# Patient Record
Sex: MALE | Race: BLACK OR AFRICAN AMERICAN | NOT HISPANIC OR LATINO | Employment: OTHER | URBAN - NONMETROPOLITAN AREA
[De-identification: names, ages, dates, MRNs, and addresses within clinical notes are randomized per-mention and may not be internally consistent; named-entity substitution may affect disease eponyms.]

---

## 2018-01-17 ENCOUNTER — APPOINTMENT (OUTPATIENT)
Dept: LAB | Facility: HOSPITAL | Age: 58
End: 2018-01-17

## 2018-01-17 ENCOUNTER — OFFICE VISIT (OUTPATIENT)
Dept: FAMILY MEDICINE CLINIC | Facility: CLINIC | Age: 58
End: 2018-01-17

## 2018-01-17 VITALS
SYSTOLIC BLOOD PRESSURE: 150 MMHG | HEART RATE: 83 BPM | HEIGHT: 73 IN | OXYGEN SATURATION: 98 % | DIASTOLIC BLOOD PRESSURE: 84 MMHG | WEIGHT: 184 LBS | BODY MASS INDEX: 24.39 KG/M2

## 2018-01-17 DIAGNOSIS — M17.0 PRIMARY OSTEOARTHRITIS OF BOTH KNEES: ICD-10-CM

## 2018-01-17 DIAGNOSIS — E78.5 HYPERLIPIDEMIA, UNSPECIFIED HYPERLIPIDEMIA TYPE: ICD-10-CM

## 2018-01-17 DIAGNOSIS — K21.9 GASTROESOPHAGEAL REFLUX DISEASE, ESOPHAGITIS PRESENCE NOT SPECIFIED: ICD-10-CM

## 2018-01-17 DIAGNOSIS — Z02.83 ENCOUNTER FOR DRUG SCREENING: ICD-10-CM

## 2018-01-17 DIAGNOSIS — F17.200 TOBACCO DEPENDENCE SYNDROME: Primary | ICD-10-CM

## 2018-01-17 DIAGNOSIS — I10 ESSENTIAL HYPERTENSION: ICD-10-CM

## 2018-01-17 LAB
ALBUMIN SERPL-MCNC: 4.5 G/DL (ref 3.4–4.8)
ALBUMIN/GLOB SERPL: 1.3 G/DL (ref 1.1–1.8)
ALP SERPL-CCNC: 77 U/L (ref 38–126)
ALT SERPL W P-5'-P-CCNC: 32 U/L (ref 21–72)
ANION GAP SERPL CALCULATED.3IONS-SCNC: 9 MMOL/L (ref 5–15)
ARTICHOKE IGE QN: 109 MG/DL (ref 1–129)
AST SERPL-CCNC: 30 U/L (ref 17–59)
BILIRUB SERPL-MCNC: 0.2 MG/DL (ref 0.2–1.3)
BUN BLD-MCNC: 16 MG/DL (ref 7–21)
BUN/CREAT SERPL: 15.7 (ref 7–25)
CALCIUM SPEC-SCNC: 9.6 MG/DL (ref 8.4–10.2)
CHLORIDE SERPL-SCNC: 104 MMOL/L (ref 95–110)
CHOLEST SERPL-MCNC: 204 MG/DL (ref 0–199)
CO2 SERPL-SCNC: 25 MMOL/L (ref 22–31)
CREAT BLD-MCNC: 1.02 MG/DL (ref 0.7–1.3)
DEPRECATED RDW RBC AUTO: 48 FL (ref 35.1–43.9)
ERYTHROCYTE [DISTWIDTH] IN BLOOD BY AUTOMATED COUNT: 15.1 % (ref 11.5–14.5)
GFR SERPL CREATININE-BSD FRML MDRD: 91 ML/MIN/1.73 (ref 56–130)
GLOBULIN UR ELPH-MCNC: 3.4 GM/DL (ref 2.3–3.5)
GLUCOSE BLD-MCNC: 94 MG/DL (ref 60–100)
HCT VFR BLD AUTO: 43.7 % (ref 39–49)
HDLC SERPL-MCNC: 60 MG/DL (ref 60–200)
HGB BLD-MCNC: 14.3 G/DL (ref 13.7–17.3)
LDLC/HDLC SERPL: 1.96 {RATIO} (ref 0–3.55)
MCH RBC QN AUTO: 28.5 PG (ref 26.5–34)
MCHC RBC AUTO-ENTMCNC: 32.7 G/DL (ref 31.5–36.3)
MCV RBC AUTO: 87.1 FL (ref 80–98)
PLATELET # BLD AUTO: 259 10*3/MM3 (ref 150–450)
PMV BLD AUTO: 10.4 FL (ref 8–12)
POTASSIUM BLD-SCNC: 4.1 MMOL/L (ref 3.5–5.1)
PROT SERPL-MCNC: 7.9 G/DL (ref 6.3–8.6)
RBC # BLD AUTO: 5.02 10*6/MM3 (ref 4.37–5.74)
SODIUM BLD-SCNC: 138 MMOL/L (ref 137–145)
TRIGL SERPL-MCNC: 131 MG/DL (ref 20–199)
WBC NRBC COR # BLD: 6.31 10*3/MM3 (ref 3.2–9.8)

## 2018-01-17 PROCEDURE — 80307 DRUG TEST PRSMV CHEM ANLYZR: CPT | Performed by: FAMILY MEDICINE

## 2018-01-17 PROCEDURE — 80061 LIPID PANEL: CPT | Performed by: FAMILY MEDICINE

## 2018-01-17 PROCEDURE — 80053 COMPREHEN METABOLIC PANEL: CPT | Performed by: FAMILY MEDICINE

## 2018-01-17 PROCEDURE — G0481 DRUG TEST DEF 8-14 CLASSES: HCPCS | Performed by: FAMILY MEDICINE

## 2018-01-17 PROCEDURE — 85027 COMPLETE CBC AUTOMATED: CPT | Performed by: FAMILY MEDICINE

## 2018-01-17 PROCEDURE — 99213 OFFICE O/P EST LOW 20 MIN: CPT | Performed by: FAMILY MEDICINE

## 2018-01-17 PROCEDURE — 36415 COLL VENOUS BLD VENIPUNCTURE: CPT | Performed by: FAMILY MEDICINE

## 2018-01-17 RX ORDER — ALBUTEROL SULFATE 90 UG/1
2 AEROSOL, METERED RESPIRATORY (INHALATION) EVERY 4 HOURS PRN
Qty: 6.7 G | Refills: 2 | Status: SHIPPED | OUTPATIENT
Start: 2018-01-17 | End: 2018-07-23 | Stop reason: SDUPTHER

## 2018-01-17 RX ORDER — GABAPENTIN 300 MG/1
300 CAPSULE ORAL DAILY
Qty: 30 CAPSULE | Refills: 2 | Status: SHIPPED | OUTPATIENT
Start: 2018-01-17 | End: 2018-04-25 | Stop reason: SDUPTHER

## 2018-01-17 RX ORDER — GABAPENTIN 600 MG/1
600 TABLET ORAL DAILY
Qty: 30 TABLET | Refills: 2 | Status: SHIPPED | OUTPATIENT
Start: 2018-01-17 | End: 2018-01-17 | Stop reason: DRUGHIGH

## 2018-01-17 RX ORDER — LISINOPRIL 40 MG/1
40 TABLET ORAL DAILY
Qty: 30 TABLET | Refills: 1 | Status: SHIPPED | OUTPATIENT
Start: 2018-01-17 | End: 2018-02-26 | Stop reason: ALTCHOICE

## 2018-01-17 RX ORDER — RANITIDINE 150 MG/1
150 TABLET ORAL NIGHTLY
Qty: 30 TABLET | Refills: 2 | Status: SHIPPED | OUTPATIENT
Start: 2018-01-17 | End: 2018-04-18

## 2018-01-17 RX ORDER — NICOTINE 21 MG/24HR
1 PATCH, TRANSDERMAL 24 HOURS TRANSDERMAL EVERY 24 HOURS
Qty: 28 PATCH | Refills: 3 | Status: SHIPPED | OUTPATIENT
Start: 2018-01-17 | End: 2018-08-09

## 2018-01-17 RX ORDER — FLUTICASONE PROPIONATE 50 MCG
2 SPRAY, SUSPENSION (ML) NASAL DAILY
Qty: 9.9 ML | Refills: 2 | Status: SHIPPED | OUTPATIENT
Start: 2018-01-17 | End: 2018-05-04 | Stop reason: SDUPTHER

## 2018-01-17 NOTE — PROGRESS NOTES
Subjective   Zak Lutz is a 57 y.o. male.     History of Present Illness  Patient is a 57-year-old -American male who is presenting today to establish care.  Patient states he was previously incarcerated and is coming back to establish care with a residency after being gone for a while.  Patient would like to be back on his medications he had been on and states he has not been taking his medications as he was in care home.  Patient mentions that previously he smoked a pack per day but has cut that down to one cigarette but still would like a nicotine patch.  Patient also states previously with his physician that for his blood pressure they discuss HCTZ and lisinopril, discussed with the patient today we would just try lisinopril 40 mg tablet daily and then reassess in 2 months to see how his blood pressure responded to the medication.  Patient mentions that he has chronic arthritis in his knees and cannot chart review patient had been previously prescribed Neurontin 600 mg tablet daily.  Did indicate to the patient we could start that medication but we would start at a lower dose and titrate up as needed.  Patient also states he has a history of GERD but has been well-controlled with Zantac 150 milligrams tablet daily.  Patient states no other acute complaints at this time.  Patient is agreeable to getting a CBC, CMP, and lipid panel performed today.  The following portions of the patient's history were reviewed and updated as appropriate: allergies, current medications, past family history, past medical history, past social history, past surgical history and problem list.    Review of Systems      Constitutional: no weight loss, fever, chills, weakness  HEENT: no visual loss, blurred vision, double vision, yellow scalarea  Skin: no rash, no discoloration   CVS: no chest pain, palpitations  Chest: no shortness of air, cough, dyspnea  GI: no abdominal pain, blood in stool, no nausea, vomiting,  "diarrhea  : no burning in urination, change in odor, no change in frequency  Neuro: no headache, dizziness, syncope, paralysis, ataxia, numbness  Musculoskeletal: no muscle, back pain, joint pain, stiffness  Lymphatics: no enlarged nodes  Endo: no reports of sweating, cold or heat intolerance, no polyuria      Objective   Physical Exam  /84  Pulse 83  Ht 185.4 cm (73\")  Wt 83.5 kg (184 lb)  SpO2 98%  BMI 24.28 kg/m2      GENERAL APPEARANCE: Well developed, well nourished, in no acute distress.  SKIN: Inspection of the skin reveals no rashes, ulcerations or petechiae.  HEENT: The sclerae were anicteric and conjunctivae were pink and moist. Extraocular movements were intact and pupils were equal, round, and reactive to light with normal accommodation. External inspection of the ears and nose showed no scars, lesions, or masses. Lips, teeth, and gums showed normal mucosa.   LUNGS: Auscultation of the lungs revealed normal breath sounds without any other adventitious sounds or rubs.  CARDIOVASCULAR: There was a regular rate and rhythm without any murmurs, gallops, rubs.   MUSCULOSKELETAL: Gait was normal. There was no tenderness or effusions noted. Muscle strength and tone were normal.  EXTREMITIES: No cyanosis, clubbing or edema.  NEUROLOGIC: Alert and oriented x 3. Normal affect. Gait was normal. . Sensation to touch was normal. CN2-12 grossly intact    Assessment/Plan   Zak was seen today for establish care.    Diagnoses and all orders for this visit:    Tobacco dependence syndrome  -     CBC No Differential  -     Comprehensive Metabolic Panel    Primary osteoarthritis of both knees    Gastroesophageal reflux disease, esophagitis presence not specified    Essential hypertension  -     CBC No Differential  -     Comprehensive Metabolic Panel    Hyperlipidemia, unspecified hyperlipidemia type  -     Lipid Panel    Encounter for drug screening  -     Cancel: Urine Drug Screen - Urine, Clean Catch  -    "  ToxASSURE Select 13 (MW) - Urine, Clean Catch    Other orders  -     nicotine (NICODERM CQ) 21 MG/24HR patch; Place 1 patch on the skin Daily.  -     lisinopril (PRINIVIL,ZESTRIL) 40 MG tablet; Take 1 tablet by mouth Daily.  -     fluticasone (FLONASE) 50 MCG/ACT nasal spray; 2 sprays into each nostril Daily.  -     albuterol (VENTOLIN HFA) 108 (90 Base) MCG/ACT inhaler; Inhale 2 puffs Every 4 (Four) Hours As Needed for Wheezing.  -     raNITIdine (ZANTAC) 150 MG tablet; Take 1 tablet by mouth Every Night.  -     Discontinue: gabapentin (NEURONTIN) 600 MG tablet; Take 1 tablet by mouth Daily.  -     gabapentin (NEURONTIN) 300 MG capsule; Take 1 capsule by mouth Daily.               This document has been electronically signed by Nayely Cedillo MD on January 17, 2018 4:51 PM

## 2018-01-23 ENCOUNTER — OFFICE VISIT (OUTPATIENT)
Dept: ORTHOPEDIC SURGERY | Facility: CLINIC | Age: 58
End: 2018-01-23

## 2018-01-23 VITALS — HEIGHT: 73 IN | WEIGHT: 187 LBS | BODY MASS INDEX: 24.78 KG/M2

## 2018-01-23 DIAGNOSIS — M25.511 RIGHT SHOULDER PAIN, UNSPECIFIED CHRONICITY: ICD-10-CM

## 2018-01-23 DIAGNOSIS — M17.0 PRIMARY OSTEOARTHRITIS OF BOTH KNEES: Primary | ICD-10-CM

## 2018-01-23 PROCEDURE — 99214 OFFICE O/P EST MOD 30 MIN: CPT | Performed by: NURSE PRACTITIONER

## 2018-01-23 RX ORDER — MELOXICAM 15 MG/1
15 TABLET ORAL DAILY
COMMUNITY
End: 2018-02-01 | Stop reason: SDUPTHER

## 2018-01-23 NOTE — PROGRESS NOTES
Zak Lutz is a 57 y.o. male returns for     Chief Complaint   Patient presents with   • Right Knee - Pain   • Left Knee - Pain       HISTORY OF PRESENT ILLNESS: Patient is here today for bilateral knee pain. Patient states that his knee pain has gotten worse and that the left knee has been giving out on him. Both knees cause him significant pain.        CONCURRENT MEDICAL HISTORY:    Past Medical History:   Diagnosis Date   • Allergic rhinitis    • Alternating exotropia     with hypertrophic component      • Asthma    • Astigmatism    • Carpal tunnel syndrome    • Cervical radiculopathy    • Chronic bronchitis    • Chronic obstructive lung disease    • Degenerative joint disease involving multiple joints    • Essential hypertension    • Gastroesophageal reflux disease    • Hip pain    • History of colon polyps    • Hypercholesterolemia    • Hyperlipidemia    • Hypertensive left ventricular hypertrophy    • Neck pain     L trapezius strain      • Paresthesia of upper limb     left shoulder, arm, forearm, hands and fingers      • Presbyopia    • Shoulder pain    • Tobacco dependence syndrome        Allergies   Allergen Reactions   • Morphine And Related    • Tramadol          Current Outpatient Prescriptions:   •  albuterol (VENTOLIN HFA) 108 (90 Base) MCG/ACT inhaler, Inhale 2 puffs Every 4 (Four) Hours As Needed for Wheezing., Disp: 6.7 g, Rfl: 2  •  fluticasone (FLONASE) 50 MCG/ACT nasal spray, 2 sprays into each nostril Daily., Disp: 9.9 mL, Rfl: 2  •  gabapentin (NEURONTIN) 300 MG capsule, Take 1 capsule by mouth Daily., Disp: 30 capsule, Rfl: 2  •  lisinopril (PRINIVIL,ZESTRIL) 40 MG tablet, Take 1 tablet by mouth Daily., Disp: 30 tablet, Rfl: 1  •  meloxicam (MOBIC) 15 MG tablet, Take 15 mg by mouth Daily., Disp: , Rfl:   •  nicotine (NICODERM CQ) 21 MG/24HR patch, Place 1 patch on the skin Daily., Disp: 28 patch, Rfl: 3  •  raNITIdine (ZANTAC) 150 MG tablet, Take 1 tablet by mouth Every Night., Disp:  "30 tablet, Rfl: 2    Past Surgical History:   Procedure Laterality Date   • ENDOSCOPY       w/ tube 65187 (Slight erythema, distal esophagus, compatible with reflux esophagitis.)   07/26/1990    • ENDOSCOPY AND COLONOSCOPY      (Diverticulum in sigmoid colon. Internal & external hemorrhoids found.)   03/01/2012    • HIP ARTHROSCOPY      Removal of plate & screws from the left hip & left total hip arthroplasty. Status post ORIF of intertrochanteric fracture of the left hip w/ osteonecrosis of the left femoral head)   07/25/2011    • HIP SURGERY      Open reduction and intern fixation fo left posterior column acetabulum fracture.Closed treatment of the pelvic ring fracture of left superior and inferior rami.)   12/10/2015    • INJECTION OF MEDICATION  07/21/2015    Depo Medrol (Methylprednisone   • INJECTION OF MEDICATION  01/28/2014    kenalog(1)   • INJECTION OF MEDICATION  11/08/2014    toradol(2)    • OTHER SURGICAL HISTORY      Neck/chest surgery procedure (Excision of multiple scars and keloids of the neck and the upper chest measuring 15 cm x 2 cm in diameter. Plastic repair of the wound with zig-zag plasty 15 cm x 3 cm.)   09/21/2001    • SHOULDER SURGERY      Repair of rotator cuff of left shoulder. Repair of rotator cuff of left shoulder.)   07/08/2013        ROS  No fevers or chills.  No chest pain or shortness of air.  No GI or  disturbances.    PHYSICAL EXAMINATION:       Ht 185.4 cm (73\")  Wt 84.8 kg (187 lb)  BMI 24.67 kg/m2    Physical Exam   Constitutional: He is oriented to person, place, and time. Vital signs are normal. He appears well-developed and well-nourished. He is cooperative.   HENT:   Head: Normocephalic and atraumatic.   Neck: Trachea normal and phonation normal.   Pulmonary/Chest: Effort normal. No respiratory distress.   Abdominal: Soft. Normal appearance. He exhibits no distension.   Musculoskeletal:        Right knee: He exhibits no effusion.        Left knee: He exhibits no " effusion.   Neurological: He is alert and oriented to person, place, and time. GCS eye subscore is 4. GCS verbal subscore is 5. GCS motor subscore is 6.   Skin: Skin is warm, dry and intact.   Psychiatric: He has a normal mood and affect. His speech is normal and behavior is normal. Judgment and thought content normal. Cognition and memory are normal.   Vitals reviewed.      GAIT:     []  Normal  [x]  Antalgic    Assistive device: []  None  []  Walker     []  Crutches  []  Cane     []  Wheelchair  []  Stretcher    Right Knee Exam     Tenderness   The patient is experiencing tenderness in the pes anserinus and medial joint line.    Range of Motion   Extension: normal   Flexion: abnormal     Other   Erythema: absent  Scars: absent  Sensation: normal  Pulse: present  Swelling: mild  Other tests: no effusion present      Left Knee Exam     Tenderness   The patient is experiencing tenderness in the medial joint line and pes anserinus.    Range of Motion   Extension: normal   Flexion: abnormal     Other   Erythema: absent  Scars: absent  Sensation: normal  Pulse: present  Swelling: none  Effusion: no effusion present      Right Shoulder Exam     Tenderness   The patient is experiencing tenderness in the acromioclavicular joint.    Range of Motion   Active Abduction: abnormal   Forward Flexion: abnormal   External Rotation: abnormal   Internal Rotation 90 degrees: abnormal     Muscle Strength   Abduction: 4/5   Internal Rotation: 4/5   External Rotation: 4/5   Supraspinatus: 4/5     Tests   Cross Arm: positive  Drop Arm: positive  Impingement: positive    Other   Erythema: absent  Scars: absent  Sensation: normal  Pulse: present      Left Shoulder Exam   Left shoulder exam is normal.              No results found.          ASSESSMENT:    Diagnoses and all orders for this visit:    Primary osteoarthritis of both knees  -     XR Knee Bilateral AP Standing; Future  -     XR Knee 1 or 2 View Bilateral; Future  -      Miscellaneous DME  -     MRI Shoulder Right Without Contrast; Future    Right shoulder pain, unspecified chronicity  -     XR Shoulder 2+ View Right; Future  -     Miscellaneous DME  -     MRI Shoulder Right Without Contrast; Future    Other orders  -     meloxicam (MOBIC) 15 MG tablet; Take 15 mg by mouth Daily.          PLAN  Patient was seen today for both knee and shoulder pain.  We will recommend bracing with a hinged knee brace and for the shoulder will recommend an MRI to rule out a rotator cuff tear. In the meantime he can continue take his meloxicam which was refilled today for discomfort  No Follow-up on file.    Franck Andres, APRN

## 2018-01-24 LAB — CONV REPORT SUMMARY: NORMAL

## 2018-01-25 RX ORDER — MELOXICAM 15 MG/1
TABLET ORAL
Qty: 30 TABLET | Refills: 0 | OUTPATIENT
Start: 2018-01-25

## 2018-02-01 ENCOUNTER — TELEPHONE (OUTPATIENT)
Dept: ORTHOPEDIC SURGERY | Facility: CLINIC | Age: 58
End: 2018-02-01

## 2018-02-01 DIAGNOSIS — M25.511 CHRONIC RIGHT SHOULDER PAIN: Primary | ICD-10-CM

## 2018-02-01 DIAGNOSIS — G89.29 CHRONIC RIGHT SHOULDER PAIN: Primary | ICD-10-CM

## 2018-02-01 RX ORDER — MELOXICAM 15 MG/1
15 TABLET ORAL DAILY
Qty: 30 TABLET | Refills: 0 | Status: SHIPPED | OUTPATIENT
Start: 2018-02-01 | End: 2018-03-06 | Stop reason: SDUPTHER

## 2018-02-01 NOTE — TELEPHONE ENCOUNTER
----- Message from Sallie Ryan sent at 2/1/2018  8:46 AM CST -----  Franck,    Patient's insurance has denied MRI right shoulder. Patient must complete 6 weeks of conservative therapy that includes physical therapy within the last 3 months.    -Sallie

## 2018-02-07 ENCOUNTER — HOSPITAL ENCOUNTER (OUTPATIENT)
Dept: PHYSICAL THERAPY | Facility: HOSPITAL | Age: 58
Setting detail: THERAPIES SERIES
Discharge: HOME | End: 2018-02-07

## 2018-02-07 DIAGNOSIS — G89.29 CHRONIC RIGHT SHOULDER PAIN: Primary | ICD-10-CM

## 2018-02-07 DIAGNOSIS — M25.511 CHRONIC RIGHT SHOULDER PAIN: Primary | ICD-10-CM

## 2018-02-07 PROCEDURE — 97161 PT EVAL LOW COMPLEX 20 MIN: CPT | Performed by: PHYSICAL THERAPIST

## 2018-02-07 NOTE — THERAPY EVALUATION
Outpatient Physical Therapy Ortho Initial Evaluation  [facility]     Patient Name: Zak Lutz  : 1960  MRN: [id number]  Today's Date: 2018      Visit Date: 2018  Attendance:   Subjective % Improvement: N/A  Recert Date: 2018  MD appointment: TBD    Therapy Diagnosis: Chronic right shoulder pain    Patient Active Problem List   Diagnosis   • Tobacco dependence syndrome   • Hyperlipidemia   • Gastroesophageal reflux disease   • Essential hypertension   • Degenerative joint disease involving multiple joints   • Chronic obstructive lung disease   • Allergic rhinitis   • Primary osteoarthritis of both knees   • Encounter for drug screening        Past Medical History:   Diagnosis Date   • Allergic rhinitis    • Alternating exotropia     with hypertrophic component      • Asthma    • Astigmatism    • Carpal tunnel syndrome    • Cervical radiculopathy    • Chronic bronchitis    • Chronic obstructive lung disease    • Degenerative joint disease involving multiple joints    • Essential hypertension    • Gastroesophageal reflux disease    • Hip pain    • History of colon polyps    • Hypercholesterolemia    • Hyperlipidemia    • Hypertensive left ventricular hypertrophy    • Neck pain     L trapezius strain      • Paresthesia of upper limb     left shoulder, arm, forearm, hands and fingers      • Presbyopia    • Shoulder pain    • Tobacco dependence syndrome         Past Surgical History:   Procedure Laterality Date   • ENDOSCOPY       w/ tube 86729 (Slight erythema, distal esophagus, compatible with reflux esophagitis.)   1990    • ENDOSCOPY AND COLONOSCOPY      (Diverticulum in sigmoid colon. Internal & external hemorrhoids found.)   2012    • HIP ARTHROSCOPY      Removal of plate & screws from the left hip & left total hip arthroplasty. Status post ORIF of intertrochanteric fracture of the left hip w/ osteonecrosis of the left femoral head)   2011    • HIP  "SURGERY      Open reduction and intern fixation fo left posterior column acetabulum fracture.Closed treatment of the pelvic ring fracture of left superior and inferior rami.)   12/10/2015    • INJECTION OF MEDICATION  07/21/2015    Depo Medrol (Methylprednisone   • INJECTION OF MEDICATION  01/28/2014    kenalog(1)   • INJECTION OF MEDICATION  11/08/2014    toradol(2)    • OTHER SURGICAL HISTORY      Neck/chest surgery procedure (Excision of multiple scars and keloids of the neck and the upper chest measuring 15 cm x 2 cm in diameter. Plastic repair of the wound with zig-zag plasty 15 cm x 3 cm.)   09/21/2001    • SHOULDER SURGERY      Repair of rotator cuff of left shoulder. Repair of rotator cuff of left shoulder.)   07/08/2013        Visit Dx:     ICD-10-CM ICD-9-CM   1. Chronic right shoulder pain M25.511 719.41    G89.29 338.29             Patient History       02/07/18 1350          History    Chief Complaint Pain  -BB      Type of Pain Shoulder pain  -BB      Date Current Problem(s) Began --   chronic  -BB      Brief Description of Current Complaint Present today with right shoulder pain, reports pain is chronic of \"couple years\" reports it has the same symptoms of the left shoulder that had to have a RTC repair 2013, reports having numbness and tingling in right arm when lays on it. Pain in shoulder comes and goes. Lifting and straining it increases pain.   -BB      Onset Date- PT 2/7/2018  -BB      Patient/Caregiver Goals Relieve pain;Improve mobility  -BB      Patient's Rating of General Health Very good  -BB      Hand Dominance right-handed  -BB      Occupation/sports/leisure activities Disabled-working on getting disability back   -BB      What clinical tests have you had for this problem? X-ray  -BB      Pain     Pain Location Shoulder  -BB      Pain at Present 2  -BB      Pain at Best 0  -BB      Pain at Worst 10  -BB      Pain Frequency Intermittent  -BB      Pain Description Sharp;Throbbing  -BB      " What Performance Factors Make the Current Problem(s) WORSE? using the arm   -BB      What Performance Factors Make the Current Problem(s) BETTER? rest   -BB      Is your sleep disturbed? Yes  -BB      Is medication used to assist with sleep? Yes  -BB      Fall Risk Assessment    Any falls in the past year: Yes  -BB      Number of falls reported in the last 12 months 1  -BB      Factors that contributed to the fall: --   carrying items up stairs  -BB        User Key  (r) = Recorded By, (t) = Taken By, (c) = Cosigned By    Initials Name Provider Type    DANTE Cruz PT Physical Therapist                PT Ortho       02/07/18 1350    Subjective Comments    Subjective Comments See patient history   -BB    Precautions and Contraindications    Precautions Check PRN BP, Caution with Bilateral knees and left shoulder   -BB    Subjective Pain    Able to rate subjective pain? yes  -BB    Pre-Treatment Pain Level 2  -BB    Post-Treatment Pain Level 2  -BB    Posture/Observations    Posture/Observations Comments No acute distress. Antalgic gait with cane.    -BB    Special Tests/Palpation    Special Tests/Palpation Shoulder  -BB    Shoulder Girdle Palpation    Long Head of Biceps Right:;Tender;Guarded/taut  -BB    Short Head of Biceps Right:;Tender;Guarded/taut  -BB    Teres Minor Right:;Guarded/taut  -BB    Pect Minor Right:;Guarded/taut  -BB    Shoulder Girdle Palpation? Yes  -BB    Shoulder Impingement/Rotator Cuff Special Tests    Avina-Vargas Test (RC Lesion vs. Bursitis) Right:;Positive  -BB    Empty Can Test (RC Lesion) Right:;Positive  -BB    Drop Arm Test (Full Thickness RC Lesion) Right:;Negative  -BB    Hornblower Test (Teres Minor Lesion) Right:;Negative  -BB    ROM (Range of Motion)    General ROM upper extremity range of motion deficits identified  -BB    Right Shoulder    Flexion AROM Deficit 120  -BB    Extension AROM Deficit 47  -BB    ABduction AROM Deficit 105  -BB    External Rotation AROM Deficit  57  -BB    Internal Rotation AROM Deficit Thumb to glute   -BB    General UE Assessment    ROM shoulder, right: UE ROM deficit  -BB    MMT (Manual Muscle Testing)    General MMT Assessment upper extremity strength deficits identified  -BB    Right Shoulder    Flexion Gross Movement (4-/5) good minus  -BB    ABduction Gross Movement (4-/5) good minus  -BB    Int Rotation Gross Movement (4-/5) good minus  -BB    Ext Rotation Gross Movement (4-/5) good minus  -BB    Upper Extremity    Upper Ext Manual Muscle Testing right shoulder strength deficit  -BB      User Key  (r) = Recorded By, (t) = Taken By, (c) = Cosigned By    Initials Name Provider Type    DANTE Cruz, PT Physical Therapist                      Therapy Education  Education Details: Ice PRN for pain, pain is guide  Given: Symptoms/condition management  Program: New  How Provided: Verbal  Provided to: Patient  Level of Understanding: Verbalized           PT OP Goals       02/07/18 1350       PT Short Term Goals    STG Date to Achieve 02/28/18  -BB     STG 1 Independent in HEP   -BB     STG 2 AROM shoulder abduction 130 degrees or better  -BB     STG 3 AROM shoulder flexion 130 degrees or better  -BB     STG 4 Shoulder MMT grossly 5/5   -BB     Time Calculation    PT Goal Re-Cert Due Date 02/28/18  -BB       User Key  (r) = Recorded By, (t) = Taken By, (c) = Cosigned By    Initials Name Provider Type    DANTE Cruz PT Physical Therapist                PT Assessment/Plan       02/07/18 1350       PT Assessment    Functional Limitations Limitation in home management;Limitations in functional capacity and performance;Limitations in community activities  -BB     Impairments Impaired muscle endurance;Muscle strength;Pain;Poor body mechanics;Range of motion;Sensation  -BB     Assessment Comments Patient presents with chronic right shoulder pain, sensation in tact to crude tough, decreased AROM of the shoulder, decreased strength all limited to pain  today. Patient primarily has tenderness of the anterior shoulder capsule of the bicep tendon. Patient could benefit from skilled PT to improve mobility and use of arm.   -BB     Rehab Potential Good  -BB     Patient/caregiver participated in establishment of treatment plan and goals Yes  -BB     Patient would benefit from skilled therapy intervention Yes  -BB     PT Plan    PT Frequency 2x/week  -BB     Predicted Duration of Therapy Intervention (days/wks) 3-4 weeks  -BB     Planned CPT's? PT RE-EVAL: 76107;PT EVAL LOW COMPLEXITY: 57539;PT THER PROC EA 15 MIN: 18891;PT THER ACT EA 15 MIN: 34548;PT MANUAL THERAPY EA 15 MIN: 98064;PT ELECTRICAL STIM UNATTEND: ;PT ULTRASOUND EA 15 MIN: 52336;PT THER SUPP EA 15 MIN  -BB     Physical Therapy Interventions (Optional Details) gross motor skills;home exercise program;joint mobilization;manual therapy techniques;modalities;patient/family education;postural re-education;ROM (Range of Motion);strengthening;stretching  -BB     PT Plan Comments Progress ROM, strength, scapular stabilization, manual as needed, and modalities PRN. Caution with patients visits as patient may have a left knee surgery this year per patient report.   -BB       User Key  (r) = Recorded By, (t) = Taken By, (c) = Cosigned By    Initials Name Provider Type    DANTE Cruz PT Physical Therapist                  Exercises       02/07/18 1350          Subjective Comments    Subjective Comments See patient history   -BB      Subjective Pain    Able to rate subjective pain? yes  -BB      Pre-Treatment Pain Level 2  -BB      Post-Treatment Pain Level 2  -BB        User Key  (r) = Recorded By, (t) = Taken By, (c) = Cosigned By    Initials Name Provider Type    DANTE Cruz PT Physical Therapist                        Outcome Measure Options: Quick DASH  Quick DASH  Open a tight or new jar.: Mild Difficulty  Do heavy household chores (e.g., wash walls, wash floors): Severe Difficulty  Carry a  shopping bag or briefcase: Mild Difficulty  Wash your back: Severe Difficulty  Use a knife to cut food: No Difficulty  Recreational activities in which you take some force or impact through your arm, should or hand (e.g. golf, hammering, tennis, etc.): Severe Difficulty  During the past week, to what extent has your arm, shoulder, or hand problem interfered with your normal social activites with family, friends, neighbors or groups?: Moderately  During the past week, were you limited in your work or other regular daily activities as a result of your arm, shoulder or hand problem?: Slightly Limited  Arm, Shoulder, or hand pain: Moderate  Tingling (pins and needles) in your arm, shoulder, or hand: Mild  During the past week, how much difficulty have you had sleeping because of the pain in your arm, shoulder or hand?: Moderate Difficiculty  Number of Questions Answered: 11  Quick DASH Score: 43.18         Time Calculation:   Start Time: 1350  Stop Time: 1423  Time Calculation (min): 33 min  Total Timed Code Minutes- PT: 0 minute(s) (eval only per insurance )     Therapy Charges for Today     Code Description Service Date Service Provider Modifiers Qty    [id number]  PT EVAL LOW COMPLEXITY 2 2/7/2018 Cornelia Cruz, PT GP 1          PT G-Codes  Outcome Measure Options: Quick DASH         Cornelia Cruz, PT  2/7/2018

## 2018-02-13 ENCOUNTER — HOSPITAL ENCOUNTER (OUTPATIENT)
Dept: PHYSICAL THERAPY | Facility: HOSPITAL | Age: 58
Setting detail: THERAPIES SERIES
End: 2018-02-13

## 2018-02-15 ENCOUNTER — HOSPITAL ENCOUNTER (OUTPATIENT)
Dept: PHYSICAL THERAPY | Facility: HOSPITAL | Age: 58
Setting detail: THERAPIES SERIES
Discharge: HOME | End: 2018-02-15

## 2018-02-15 DIAGNOSIS — M25.511 CHRONIC RIGHT SHOULDER PAIN: Primary | ICD-10-CM

## 2018-02-15 DIAGNOSIS — G89.29 CHRONIC RIGHT SHOULDER PAIN: Primary | ICD-10-CM

## 2018-02-15 PROCEDURE — 97110 THERAPEUTIC EXERCISES: CPT | Performed by: PHYSICAL THERAPIST

## 2018-02-15 NOTE — THERAPY TREATMENT NOTE
Outpatient Physical Therapy Ortho Treatment Note  [facility]     Patient Name: Zak Lutz  : 1960  MRN: [id number]  Today's Date: 2/15/2018      Visit Date: 02/15/2018  Attendance: 2/2 of 8 approved  Subjective % Improvement: 0%  Recert Date: 2018  MD appointment: 3/1/18 Fredi for knee    Therapy Diagnosis: Chronic right shoulder pain    Visit Dx:    ICD-10-CM ICD-9-CM   1. Chronic right shoulder pain M25.511 719.41    G89.29 338.29       Patient Active Problem List   Diagnosis   • Tobacco dependence syndrome   • Hyperlipidemia   • Gastroesophageal reflux disease   • Essential hypertension   • Degenerative joint disease involving multiple joints   • Chronic obstructive lung disease   • Allergic rhinitis   • Primary osteoarthritis of both knees   • Encounter for drug screening        Past Medical History:   Diagnosis Date   • Allergic rhinitis    • Alternating exotropia     with hypertrophic component      • Asthma    • Astigmatism    • Carpal tunnel syndrome    • Cervical radiculopathy    • Chronic bronchitis    • Chronic obstructive lung disease    • Degenerative joint disease involving multiple joints    • Essential hypertension    • Gastroesophageal reflux disease    • Hip pain    • History of colon polyps    • Hypercholesterolemia    • Hyperlipidemia    • Hypertensive left ventricular hypertrophy    • Neck pain     L trapezius strain      • Paresthesia of upper limb     left shoulder, arm, forearm, hands and fingers      • Presbyopia    • Shoulder pain    • Tobacco dependence syndrome         Past Surgical History:   Procedure Laterality Date   • ENDOSCOPY       w/ tube 68210 (Slight erythema, distal esophagus, compatible with reflux esophagitis.)   1990    • ENDOSCOPY AND COLONOSCOPY      (Diverticulum in sigmoid colon. Internal & external hemorrhoids found.)   2012    • HIP ARTHROSCOPY      Removal of plate & screws from the left hip & left total hip arthroplasty.  "Status post ORIF of intertrochanteric fracture of the left hip w/ osteonecrosis of the left femoral head)   07/25/2011    • HIP SURGERY      Open reduction and intern fixation fo left posterior column acetabulum fracture.Closed treatment of the pelvic ring fracture of left superior and inferior rami.)   12/10/2015    • INJECTION OF MEDICATION  07/21/2015    Depo Medrol (Methylprednisone   • INJECTION OF MEDICATION  01/28/2014    kenalog(1)   • INJECTION OF MEDICATION  11/08/2014    toradol(2)    • OTHER SURGICAL HISTORY      Neck/chest surgery procedure (Excision of multiple scars and keloids of the neck and the upper chest measuring 15 cm x 2 cm in diameter. Plastic repair of the wound with zig-zag plasty 15 cm x 3 cm.)   09/21/2001    • SHOULDER SURGERY      Repair of rotator cuff of left shoulder. Repair of rotator cuff of left shoulder.)   07/08/2013              PT Ortho       02/15/18 1345    Precautions and Contraindications    Precautions Check PRN BP, Caution with Bilateral knees and left shoulder   -BB    Subjective Pain    Able to rate subjective pain? yes  -BB    Pre-Treatment Pain Level 1  -BB    Post-Treatment Pain Level --   \"no more than a 3\"  -BB    Subjective Pain Comment \"took my medicine about 1.5 hours ago\"  -BB    Posture/Observations    Posture/Observations Comments No AD, Left dynamic ACL brace noted recieved today.   -BB      User Key  (r) = Recorded By, (t) = Taken By, (c) = Cosigned By    Initials Name Provider Type    DANTE Cruz, PT Physical Therapist                            PT Assessment/Plan       02/15/18 2125       PT Assessment    Assessment Comments Patient tolerated treatment well today with out increased complaints of pain. Patient reported thinking his medicine had kicked in and helped.   -BB     Patient would benefit from skilled therapy intervention Yes  -BB     PT Plan    PT Frequency 2x/week  -BB     Predicted Duration of Therapy Intervention (days/wks) 3-4 weeks  " "-BB     PT Plan Comments Progress strength and ROM as tolerated.   -BB       User Key  (r) = Recorded By, (t) = Taken By, (c) = Cosigned By    Initials Name Provider Type    DANTE Cruz PT Physical Therapist                Modalities       02/15/18 1345          Ice    Patient reports will apply ice at home to involved area Yes   ice to go  -BB        User Key  (r) = Recorded By, (t) = Taken By, (c) = Cosigned By    Initials Name Provider Type    DANTE Cruz, PT Physical Therapist                Exercises       02/15/18 1345          Subjective Comments    Subjective Comments [facility] ([id number]). Reports shoulder hurting today due to slamming car door. Reports taking medicine about 1.5 hour ago.     Arm shakes with strain on right arm   -BB      Subjective Pain    Able to rate subjective pain? yes  -BB      Pre-Treatment Pain Level 1  -BB      Post-Treatment Pain Level --   \"no more than a 3\"  -BB      Subjective Pain Comment \"took my medicine about 1.5 hours ago\"  -BB      Exercise 1    Exercise Name 1 Pro 2 seat 11 level 1   -BB      Time (Minutes) 1 10'  -BB      Exercise 2    Exercise Name 2 BP check   -BB      Additional Comments 140/90   -BB      Exercise 3    Exercise Name 3 Rest with recheck BP   -BB      Time (Minutes) 3 3'  -BB      Additional Comments 120/82  -BB      Exercise 4    Exercise Name 4 No money with red tband   -BB      Sets 4 2  -BB      Reps 4 10  -BB      Exercise 5    Exercise Name 5 Door way stretch arms down   -BB      Sets 5 3  -BB      Time (Seconds) 5 30\"  -BB      Exercise 6    Exercise Name 6 Pulleys flex/scap   -BB      Sets 6 1  -BB      Reps 6 20  -BB      Additional Comments each   -BB      Exercise 7    Exercise Name 7 Lat pull down red tband   -BB      Sets 7 2  -BB      Reps 7 10  -BB      Exercise 8    Exercise Name 8 Mid row red tband   -BB      Sets 8 2  -BB      Reps 8 10  -BB   "      User Key  (r) = Recorded By, (t) = Taken By, (c) = Cosigned By    Initials Name Provider Type    DANTE Cruz, PT Physical Therapist                               PT OP Goals       02/15/18 1345       PT Short Term Goals    STG Date to Achieve 02/28/18  -BB     STG 1 Independent in HEP   -BB     STG 1 Progress Progressing  -BB     STG 2 AROM shoulder abduction 130 degrees or better  -BB     STG 2 Progress Not Met  -BB     STG 3 AROM shoulder flexion 130 degrees or better  -BB     STG 3 Progress Not Met  -BB     STG 4 Shoulder MMT grossly 5/5   -BB     STG 4 Progress Not Met  -BB     Time Calculation    PT Goal Re-Cert Due Date 02/28/18  -BB       User Key  (r) = Recorded By, (t) = Taken By, (c) = Cosigned By    Initials Name Provider Type    DANTE Cruz PT Physical Therapist          Therapy Education  Education Details: No new HEP today. Ice for 20' after activity  Given: Symptoms/condition management  How Provided: Verbal  Provided to: Patient  Level of Understanding: Verbalized              Time Calculation:   Start Time: 1345  Stop Time: 1437  Time Calculation (min): 52 min  Total Timed Code Minutes- PT: 41 minute(s) (with Elliot from ([id number]))    Therapy Charges for Today     Code Description Service Date Service Provider Modifiers Qty    75924302155 HC PT THER PROC EA 15 MIN 2/15/2018 Cornelia Cruz, PT GP 3    88031020330 HC PT THER SUPP EA 15 MIN 2/15/2018 Cornelia Cruz PT GP 1                    Cornelia Cruz, PT  2/15/2018

## 2018-02-21 ENCOUNTER — TELEPHONE (OUTPATIENT)
Dept: ORTHOPEDIC SURGERY | Facility: CLINIC | Age: 58
End: 2018-02-21

## 2018-02-21 ENCOUNTER — HOSPITAL ENCOUNTER (OUTPATIENT)
Dept: PHYSICAL THERAPY | Facility: HOSPITAL | Age: 58
Setting detail: THERAPIES SERIES
Discharge: HOME | End: 2018-02-21

## 2018-02-21 DIAGNOSIS — G89.29 CHRONIC RIGHT SHOULDER PAIN: Primary | ICD-10-CM

## 2018-02-21 DIAGNOSIS — M25.511 CHRONIC RIGHT SHOULDER PAIN: Primary | ICD-10-CM

## 2018-02-21 PROCEDURE — 97110 THERAPEUTIC EXERCISES: CPT

## 2018-02-21 NOTE — THERAPY TREATMENT NOTE
Outpatient Physical Therapy Ortho Treatment Note  [facility]     Patient Name: Zak Lutz  : 1960  MRN: [id number]  Today's Date: 2018      Visit Date: 2018     Subjective Improvement 0  Visits 3/4  Visits approved 8 visits until 3-  RTMD After PT  Recert Date 2018    Chronic right shoulder pain    Visit Dx:    ICD-10-CM ICD-9-CM   1. Chronic right shoulder pain M25.511 719.41    G89.29 338.29       Patient Active Problem List   Diagnosis   • Tobacco dependence syndrome   • Hyperlipidemia   • Gastroesophageal reflux disease   • Essential hypertension   • Degenerative joint disease involving multiple joints   • Chronic obstructive lung disease   • Allergic rhinitis   • Primary osteoarthritis of both knees   • Encounter for drug screening        Past Medical History:   Diagnosis Date   • Allergic rhinitis    • Alternating exotropia     with hypertrophic component      • Asthma    • Astigmatism    • Carpal tunnel syndrome    • Cervical radiculopathy    • Chronic bronchitis    • Chronic obstructive lung disease    • Degenerative joint disease involving multiple joints    • Essential hypertension    • Gastroesophageal reflux disease    • Hip pain    • History of colon polyps    • Hypercholesterolemia    • Hyperlipidemia    • Hypertensive left ventricular hypertrophy    • Neck pain     L trapezius strain      • Paresthesia of upper limb     left shoulder, arm, forearm, hands and fingers      • Presbyopia    • Shoulder pain    • Tobacco dependence syndrome         Past Surgical History:   Procedure Laterality Date   • ENDOSCOPY       w/ tube 51071 (Slight erythema, distal esophagus, compatible with reflux esophagitis.)   1990    • ENDOSCOPY AND COLONOSCOPY      (Diverticulum in sigmoid colon. Internal & external hemorrhoids found.)   2012    • HIP ARTHROSCOPY      Removal of plate & screws from the left hip & left total hip arthroplasty. Status post ORIF of  intertrochanteric fracture of the left hip w/ osteonecrosis of the left femoral head)   07/25/2011    • HIP SURGERY      Open reduction and intern fixation fo left posterior column acetabulum fracture.Closed treatment of the pelvic ring fracture of left superior and inferior rami.)   12/10/2015    • INJECTION OF MEDICATION  07/21/2015    Depo Medrol (Methylprednisone   • INJECTION OF MEDICATION  01/28/2014    kenalog(1)   • INJECTION OF MEDICATION  11/08/2014    toradol(2)    • OTHER SURGICAL HISTORY      Neck/chest surgery procedure (Excision of multiple scars and keloids of the neck and the upper chest measuring 15 cm x 2 cm in diameter. Plastic repair of the wound with zig-zag plasty 15 cm x 3 cm.)   09/21/2001    • SHOULDER SURGERY      Repair of rotator cuff of left shoulder. Repair of rotator cuff of left shoulder.)   07/08/2013              PT Ortho       02/21/18 1500    Precautions and Contraindications    Precautions check BP PRN, caution with bilateral knees and left shoulder  -CP    Subjective Pain    Able to rate subjective pain? yes  -CP    Pre-Treatment Pain Level 2  -CP    Subjective Pain Comment took pain pill this am  -CP    Posture/Observations    Posture/Observations Comments No AD and no ACL brace  -CP      User Key  (r) = Recorded By, (t) = Taken By, (c) = Cosigned By    Initials Name Provider Type    CP Carolyn Zaman PTA Physical Therapy Assistant                            PT Assessment/Plan       02/21/18 1655       PT Assessment    Assessment Comments Patient advised to call his MD concerning elevated B/P.  -CP     PT Plan    PT Frequency 2x/week  -CP     Predicted Duration of Therapy Intervention (days/wks) 3 to 4 weeks  -CP     PT Plan Comments cont with poc.  cont to monitor B/P  -CP       User Key  (r) = Recorded By, (t) = Taken By, (c) = Cosigned By    Initials Name Provider Type    CP Carolyn Zaman PTA Physical Therapy Assistant                    Exercises       02/21/18 1500           Subjective Comments    Subjective Comments Patient states that he has pulleys at home. Will see see Dr. landry for knee knee pain. Didnt wear the knee brace today .  States that the MD changed his B/P meds and his B/P has been running high.    -CP      Subjective Pain    Able to rate subjective pain? yes  -CP      Pre-Treatment Pain Level 2  -CP      Post-Treatment Pain Level 2  -CP      Subjective Pain Comment took pain pill this am  -CP      Exercise 1    Exercise Name 1 Pro II level 1  -CP      Time (Minutes) 1 10  -CP      Exercise 2    Exercise Name 2 pulley  -CP      Reps 2 30  -CP      Time (Minutes) 2 fl and AB  -CP      Exercise 3    Exercise Name 3 doorway stretch  -CP      Sets 3 3  -CP      Reps 3 --  -CP      Time (Seconds) 3 30  -CP      Exercise 4    Exercise Name 4 vitals  -CP      Sets 4 --  -CP      Time (Seconds) 4 --  -CP      Additional Comments B/P 160/100, Pulse 98  -CP      Exercise 5    Exercise Name 5 Rest  -CP      Sets 5 --  -CP      Time (Minutes) 5 5  -CP      Time (Seconds) 5 --  -CP      Exercise 6    Exercise Name 6 vitals  -CP      Sets 6 --  -CP      Reps 6 --  -CP      Time (Minutes) 6 --  -CP      Time (Seconds) 6 pulse 92, O2 94  -CP      Additional Comments B/P 165/100  -CP        User Key  (r) = Recorded By, (t) = Taken By, (c) = Cosigned By    Initials Name Provider Type    CP Carolyn Zaman, PTA Physical Therapy Assistant                               PT OP Goals       02/21/18 1600       PT Short Term Goals    STG Date to Achieve 02/28/18  -CP     STG 1 Independent in HEP   -CP     STG 1 Progress Progressing  -CP     STG 2 AROM shoulder abduction 130 degrees or better  -CP     STG 2 Progress Not Met  -CP     STG 3 AROM shoulder flexion 130 degrees or better  -CP     STG 3 Progress Not Met  -CP     STG 4 Shoulder MMT grossly 5/5   -CP     STG 4 Progress Not Met  -CP     Time Calculation    PT Goal Re-Cert Due Date 02/28/18  -CP       User Key  (r) = Recorded By,  (t) = Taken By, (c) = Cosigned By    Initials Name Provider Type    CP Carolyn Zaman PTA Physical Therapy Assistant                         Time Calculation:   Start Time: 1525  Stop Time: 1550  Time Calculation (min): 25 min  Total Timed Code Minutes- PT: 15 minute(s)    Therapy Charges for Today     Code Description Service Date Service Provider Modifiers Qty    66302247525 HC PT THER PROC EA 15 MIN 2/21/2018 Carolyn Zaman PTA GP 1    10081516529 HC PT THER SUPP EA 15 MIN 2/21/2018 Carolyn Zaman PTA GP 1                    Carolyn Zaman PTA  2/21/2018

## 2018-02-22 ENCOUNTER — TELEPHONE (OUTPATIENT)
Dept: FAMILY MEDICINE CLINIC | Facility: CLINIC | Age: 58
End: 2018-02-22

## 2018-02-22 NOTE — TELEPHONE ENCOUNTER
PATIENT CALLED ABOUT HIS BP. IT HAS BEEN RUNNING HIGH SINCE HIS LISINOPRIL WAS CHANGED TO A HIGHER DOSAGE.  TODAY IT /100.  WHAT SHOULD HE DO?    PLEASE CALL  [phone]    THANK YOU

## 2018-02-22 NOTE — TELEPHONE ENCOUNTER
He is calling his primary care. He got confused and thought lauren was his primary care provider.

## 2018-02-23 ENCOUNTER — TELEPHONE (OUTPATIENT)
Dept: FAMILY MEDICINE CLINIC | Facility: CLINIC | Age: 58
End: 2018-02-23

## 2018-02-23 ENCOUNTER — HOSPITAL ENCOUNTER (OUTPATIENT)
Dept: PHYSICAL THERAPY | Facility: HOSPITAL | Age: 58
Setting detail: THERAPIES SERIES
Discharge: HOME | End: 2018-02-23

## 2018-02-23 NOTE — THERAPY TREATMENT NOTE
Outpatient Physical Therapy Ortho Treatment Note  [facility]     Patient Name: Zak Lutz  : 1960  MRN: [id number]  Today's Date: 2018      Visit Date: 2018     Subjective Improvement 0  Visits 4/5  Visits approved 8 plus eval until 3-  RTMD After PT  Recert Date 2018    Chronic right shoulder pain    Visit Dx:  No diagnosis found.    Patient Active Problem List   Diagnosis   • Tobacco dependence syndrome   • Hyperlipidemia   • Gastroesophageal reflux disease   • Essential hypertension   • Degenerative joint disease involving multiple joints   • Chronic obstructive lung disease   • Allergic rhinitis   • Primary osteoarthritis of both knees   • Encounter for drug screening        Past Medical History:   Diagnosis Date   • Allergic rhinitis    • Alternating exotropia     with hypertrophic component      • Asthma    • Astigmatism    • Carpal tunnel syndrome    • Cervical radiculopathy    • Chronic bronchitis    • Chronic obstructive lung disease    • Degenerative joint disease involving multiple joints    • Essential hypertension    • Gastroesophageal reflux disease    • Hip pain    • History of colon polyps    • Hypercholesterolemia    • Hyperlipidemia    • Hypertensive left ventricular hypertrophy    • Neck pain     L trapezius strain      • Paresthesia of upper limb     left shoulder, arm, forearm, hands and fingers      • Presbyopia    • Shoulder pain    • Tobacco dependence syndrome         Past Surgical History:   Procedure Laterality Date   • ENDOSCOPY       w/ tube 16117 (Slight erythema, distal esophagus, compatible with reflux esophagitis.)   1990    • ENDOSCOPY AND COLONOSCOPY      (Diverticulum in sigmoid colon. Internal & external hemorrhoids found.)   2012    • HIP ARTHROSCOPY      Removal of plate & screws from the left hip & left total hip arthroplasty. Status post ORIF of intertrochanteric fracture of the left hip w/ osteonecrosis of the left  femoral head)   07/25/2011    • HIP SURGERY      Open reduction and intern fixation fo left posterior column acetabulum fracture.Closed treatment of the pelvic ring fracture of left superior and inferior rami.)   12/10/2015    • INJECTION OF MEDICATION  07/21/2015    Depo Medrol (Methylprednisone   • INJECTION OF MEDICATION  01/28/2014    kenalog(1)   • INJECTION OF MEDICATION  11/08/2014    toradol(2)    • OTHER SURGICAL HISTORY      Neck/chest surgery procedure (Excision of multiple scars and keloids of the neck and the upper chest measuring 15 cm x 2 cm in diameter. Plastic repair of the wound with zig-zag plasty 15 cm x 3 cm.)   09/21/2001    • SHOULDER SURGERY      Repair of rotator cuff of left shoulder. Repair of rotator cuff of left shoulder.)   07/08/2013              PT Ortho       02/21/18 1500    Precautions and Contraindications    Precautions check BP PRN, caution with bilateral knees and left shoulder  -CP    Subjective Pain    Able to rate subjective pain? yes  -CP    Pre-Treatment Pain Level 2  -CP    Subjective Pain Comment took pain pill this am  -CP    Posture/Observations    Posture/Observations Comments No AD and no ACL brace  -CP      User Key  (r) = Recorded By, (t) = Taken By, (c) = Cosigned By    Initials Name Provider Type    CP Carolyn Zaman PTA Physical Therapy Assistant                            PT Assessment/Plan       02/23/18 1133       PT Assessment    Assessment Comments No ther ex secondary to patient elvated b/p.  Patient will have started his new b/p meds this weekend and hopefully it will help to control his b/p  -CP     PT Plan    PT Frequency 2x/week  -CP     Predicted Duration of Therapy Intervention (days/wks) 3-4 weeks  -CP     PT Plan Comments Cont to monitor patient b/p prior to ther ex.  -CP       User Key  (r) = Recorded By, (t) = Taken By, (c) = Cosigned By    Initials Name Provider Type    SERENITY Zaman PTA Physical Therapy Assistant                     Exercises       02/23/18 1100          Subjective Comments    Subjective Comments States that he called his Md concerning his high B/P.  MD has called in another prescription.  However, patient hasnt bunnyn it filled yet.  He is taking his old med still.  States that at home his B/P has been running high this am it was 158/100..  Reports being dizzy about one month ago catalina tis when they changed his meds.  -CP      Subjective Pain    Able to rate subjective pain? yes  -CP      Pre-Treatment Pain Level 2  -CP      Subjective Pain Comment Took a pain pill around  5 amn.  Took old B/P meds at 830 a.m.  -CP      Exercise 1    Exercise Name 1 vital  -CP      Time (Minutes) 1 o2 98  -CP      Time (Seconds) 1 Pulse 90  -CP      Additional Comments B/P 160/102  -CP      Exercise 2    Exercise Name 2 rest 5 minutes  -CP      Time (Minutes) 2 O2 98  -CP      Time (Seconds) 2 pulse 81  -CP      Additional Comments B/P 150/100  -CP      Exercise 3    Exercise Name 3 vitals  -CP      Time (Minutes) 3 o2 98  -CP      Time (Seconds) 3 pulse 81  -CP      Additional Comments B/P 150/100  -CP        User Key  (r) = Recorded By, (t) = Taken By, (c) = Cosigned By    Initials Name Provider Type    CP Carolyn Zaman PTA Physical Therapy Assistant                               PT OP Goals       02/23/18 1100       PT Short Term Goals    STG Date to Achieve 02/28/18  -CP     STG 1 Independent in HEP   -CP     STG 1 Progress Progressing  -CP     STG 2 AROM shoulder abduction 130 degrees or better  -CP     STG 2 Progress Not Met  -CP     STG 3 AROM shoulder flexion 130 degrees or better  -CP     STG 3 Progress Not Met  -CP     STG 4 Shoulder MMT grossly 5/5   -CP     STG 4 Progress Not Met  -CP     Time Calculation    PT Goal Re-Cert Due Date 02/28/18  -CP       User Key  (r) = Recorded By, (t) = Taken By, (c) = Cosigned By    Initials Name Provider Type    CP Carolyn Zaman PTA Physical Therapy Assistant                         Time  Calculation:   Start Time: 1110  Stop Time: 1135  Time Calculation (min): 25 min  Total Timed Code Minutes- PT: 25 minute(s)    Therapy Charges for Today     Code Description Service Date Service Provider Modifiers Qty    49931652966 HC PT THER SUPP EA 15 MIN 2/23/2018 Carolyn Zaman, PTA GP 1                    Carolyn Zaman, ALYSA  2/23/2018

## 2018-02-23 NOTE — TELEPHONE ENCOUNTER
PATIENT CALLED AGAIN TODAY ABOUT HIS BP.  IT'S STILL RUNNING HIGH AND HE THINKS IT'S THE MEDICATION CHANGE ON 1/17 OF HIS LISINOPRIL.  IT STARTED AT THE SAME TIME.    PLEASE CALL    THANK YOU

## 2018-02-26 ENCOUNTER — OFFICE VISIT (OUTPATIENT)
Dept: FAMILY MEDICINE CLINIC | Facility: CLINIC | Age: 58
End: 2018-02-26

## 2018-02-26 ENCOUNTER — HOSPITAL ENCOUNTER (OUTPATIENT)
Dept: PHYSICAL THERAPY | Facility: HOSPITAL | Age: 58
Setting detail: THERAPIES SERIES
Discharge: HOME | End: 2018-02-26

## 2018-02-26 VITALS
HEART RATE: 76 BPM | DIASTOLIC BLOOD PRESSURE: 96 MMHG | HEIGHT: 73 IN | WEIGHT: 184.5 LBS | OXYGEN SATURATION: 98 % | SYSTOLIC BLOOD PRESSURE: 140 MMHG | BODY MASS INDEX: 24.45 KG/M2

## 2018-02-26 DIAGNOSIS — M25.511 CHRONIC RIGHT SHOULDER PAIN: Primary | ICD-10-CM

## 2018-02-26 DIAGNOSIS — I10 ESSENTIAL HYPERTENSION: Primary | ICD-10-CM

## 2018-02-26 DIAGNOSIS — G89.29 CHRONIC RIGHT SHOULDER PAIN: Primary | ICD-10-CM

## 2018-02-26 PROCEDURE — 99213 OFFICE O/P EST LOW 20 MIN: CPT | Performed by: FAMILY MEDICINE

## 2018-02-26 PROCEDURE — 97110 THERAPEUTIC EXERCISES: CPT | Performed by: PHYSICAL THERAPIST

## 2018-02-26 RX ORDER — HYDROCHLOROTHIAZIDE 12.5 MG/1
12.5 CAPSULE, GELATIN COATED ORAL DAILY
Qty: 30 CAPSULE | Refills: 0 | Status: SHIPPED | OUTPATIENT
Start: 2018-02-26 | End: 2018-04-18

## 2018-02-26 NOTE — THERAPY TREATMENT NOTE
Outpatient Physical Therapy Ortho Treatment Note  [facility]     Patient Name: Zak Lutz  : 1960  MRN: [id number]  Today's Date: 2018      Visit Date: 2018  Attendance: 5/6  Subjective % Improvement: 0%  Recert Date: 18  MD appointment: 3/1/18    Therapy Diagnosis: Right shoulder pain    Visit Dx:    ICD-10-CM ICD-9-CM   1. Chronic right shoulder pain M25.511 719.41    G89.29 338.29       Patient Active Problem List   Diagnosis   • Tobacco dependence syndrome   • Hyperlipidemia   • Gastroesophageal reflux disease   • Essential hypertension   • Degenerative joint disease involving multiple joints   • Chronic obstructive lung disease   • Allergic rhinitis   • Primary osteoarthritis of both knees   • Encounter for drug screening        Past Medical History:   Diagnosis Date   • Allergic rhinitis    • Alternating exotropia     with hypertrophic component      • Asthma    • Astigmatism    • Carpal tunnel syndrome    • Cervical radiculopathy    • Chronic bronchitis    • Chronic obstructive lung disease    • Degenerative joint disease involving multiple joints    • Essential hypertension    • Gastroesophageal reflux disease    • Hip pain    • History of colon polyps    • Hypercholesterolemia    • Hyperlipidemia    • Hypertensive left ventricular hypertrophy    • Neck pain     L trapezius strain      • Paresthesia of upper limb     left shoulder, arm, forearm, hands and fingers      • Presbyopia    • Shoulder pain    • Tobacco dependence syndrome         Past Surgical History:   Procedure Laterality Date   • ENDOSCOPY       w/ tube 77972 (Slight erythema, distal esophagus, compatible with reflux esophagitis.)   1990    • ENDOSCOPY AND COLONOSCOPY      (Diverticulum in sigmoid colon. Internal & external hemorrhoids found.)   2012    • HIP ARTHROSCOPY      Removal of plate & screws from the left hip & left total hip arthroplasty. Status post ORIF of intertrochanteric  fracture of the left hip w/ osteonecrosis of the left femoral head)   07/25/2011    • HIP SURGERY      Open reduction and intern fixation fo left posterior column acetabulum fracture.Closed treatment of the pelvic ring fracture of left superior and inferior rami.)   12/10/2015    • INJECTION OF MEDICATION  07/21/2015    Depo Medrol (Methylprednisone   • INJECTION OF MEDICATION  01/28/2014    kenalog(1)   • INJECTION OF MEDICATION  11/08/2014    toradol(2)    • OTHER SURGICAL HISTORY      Neck/chest surgery procedure (Excision of multiple scars and keloids of the neck and the upper chest measuring 15 cm x 2 cm in diameter. Plastic repair of the wound with zig-zag plasty 15 cm x 3 cm.)   09/21/2001    • SHOULDER SURGERY      Repair of rotator cuff of left shoulder. Repair of rotator cuff of left shoulder.)   07/08/2013              PT Ortho       02/26/18 1345    Subjective Comments    Subjective Comments Patient presents today stating he has a appt with PCP for BP concerns with no HA or dizziness and that he continues to have shoulder pain with shaking in the hand and slept on his arm last night that made it go to sleep and continues to have left knee pain.   -BB    Precautions and Contraindications    Precautions check BP PRN, caution with bilateral knees and left shoulder  -BB    Subjective Pain    Able to rate subjective pain? yes  -BB    Pre-Treatment Pain Level 3  -BB    Post-Treatment Pain Level 3  -BB    Posture/Observations    Posture/Observations Comments antaglic gait, no bracing noted. Right hand shakes with exercise.No acute distress. Denies dizziness or feeling bad.   -BB      User Key  (r) = Recorded By, (t) = Taken By, (c) = Cosigned By    Initials Name Provider Type    DANTE Cruz, PT Physical Therapist                            PT Assessment/Plan       02/26/18 1345       PT Assessment    Assessment Comments Patient presented today with elevated BP and we decided to perform low level shoulder  exercise sitting and monitor BP. BP at start of treatment was 140/95 at 1350 and increased to 150/98 with two sitting shoulder exercises. Treatment was ended and sent patient to PCP appointment. Patient is to check BP prior to coming to PT and cx if BP is too high or low. Patient in agreement.   -BB     Patient would benefit from skilled therapy intervention Yes  -BB     PT Plan    PT Frequency 2x/week  -BB     Predicted Duration of Therapy Intervention (days/wks) 3-4 weeks  -BB     PT Plan Comments Check BP prior to start of tx. recheck scheduled for 3/5/18.   -BB       User Key  (r) = Recorded By, (t) = Taken By, (c) = Cosigned By    Initials Name Provider Type    DANTE Cruz, PT Physical Therapist                    Exercises       02/26/18 1345          Subjective Comments    Subjective Comments Patient presents today stating he has a appt with PCP for BP concerns with no HA or dizziness and that he continues to have shoulder pain with shaking in the hand and slept on his arm last night that made it go to sleep and continues to have left knee pain.   -BB      Subjective Pain    Able to rate subjective pain? yes  -BB      Pre-Treatment Pain Level 3  -BB      Post-Treatment Pain Level 3  -BB      Exercise 1    Exercise Name 1 BP: 140/95  -BB      Exercise 2    Exercise Name 2 Sitting mid rows  -BB      Sets 2 2  -BB      Reps 2 10  -BB      Exercise 3    Exercise Name 3 sitting lat pull down   -BB      Sets 3 2  -BB      Reps 3 10  -BB      Exercise 4    Exercise Name 4 /98  -BB        User Key  (r) = Recorded By, (t) = Taken By, (c) = Cosigned By    Initials Name Provider Type    DANTE Cruz PT Physical Therapist                               PT OP Goals       02/26/18 1345       PT Short Term Goals    STG Date to Achieve 02/28/18  -BB     STG 1 Independent in HEP   -BB     STG 1 Progress Progressing  -BB     STG 2 AROM shoulder abduction 130 degrees or better  -BB     STG 2 Progress Not Met  -BB      STG 3 AROM shoulder flexion 130 degrees or better  -BB     STG 3 Progress Not Met  -BB     STG 4 Shoulder MMT grossly 5/5   -BB     STG 4 Progress Not Met  -BB     Time Calculation    PT Goal Re-Cert Due Date 02/28/18  -BB       User Key  (r) = Recorded By, (t) = Taken By, (c) = Cosigned By    Initials Name Provider Type    DANTE Cruz, PT Physical Therapist          Therapy Education  Education Details: No new HEP today              Time Calculation:   Start Time: 1345  Stop Time: 1406  Time Calculation (min): 21 min    Therapy Charges for Today     Code Description Service Date Service Provider Modifiers Qty    61956121546 HC PT THER PROC EA 15 MIN 2/26/2018 Cornelia Cruz, PT GP 1    98735418725 HC PT THER SUPP EA 15 MIN 2/26/2018 Cornelia Cruz, PT GP 1                    Cornelia Cruz, PT  2/26/2018

## 2018-02-26 NOTE — PROGRESS NOTES
"Subjective   Zak Lutz is a 57 y.o. male.     History of Present Illness  Patient is a 57-year-old -American male who is coming in for follow-up for hypertension.  Patient previously had been well-controlled on lisinopril and on previous visit I did have to explain to the patient that because of his race the guidelines recommend a different class of medication.  I visit patient wanted to continue the same medication and wanted to increase the dose.  Patient states that he sees been on the Cipro 40 mg for the past couple of months and states that his systolic blood pressure has been ranging from 140-150 and his diastolic blood pressure has been going from .  Patient does have chronic right shoulder pain in which he has been seen physical therapy and that has been following up with orthopedic surgery.  Patient denies any dizziness, blurry vision, or any other complaints at this time.  The following portions of the patient's history were reviewed and updated as appropriate: allergies, current medications, past family history, past medical history, past social history, past surgical history and problem list.    Review of Systems      Constitutional: no weight loss, fever, chills, weakness  HEENT: no visual loss, blurred vision, double vision, yellow scalarea  Skin: no rash, no discoloration   CVS: no chest pain, palpitations  Chest: no shortness of air, cough, dyspnea  GI: no abdominal pain, blood in stool, no nausea, vomiting, diarrhea  : no burning in urination, change in odor, no change in frequency  Neuro: no headache, dizziness, syncope, paralysis, ataxia, numbness  Musculoskeletal: positive for right shoulder pain  Lymphatics: no enlarged nodes  Endo: no reports of sweating, cold or heat intolerance, no polyuria      Objective   Physical Exam  /96 (BP Location: Right arm, Patient Position: Sitting, Cuff Size: Adult)  Pulse 76  Ht 185.4 cm (73\")  Wt 83.7 kg (184 lb 8 oz)  SpO2 98% "  BMI 24.34 kg/m2      GENERAL APPEARANCE: Well developed, well nourished, in no acute distress.  SKIN: Inspection of the skin reveals no rashes, ulcerations or petechiae.  HEENT: The sclerae were anicteric and conjunctivae were pink and moist. Extraocular movements were intact and pupils were equal, round, and reactive to light with normal accommodation.   CHEST: Normal AP diameter and normal contour without any kyphoscoliosis.  LUNGS: Auscultation of the lungs revealed normal breath sounds without any other adventitious sounds or rubs.  CARDIOVASCULAR: There was a regular rate and rhythm without any murmurs, gallops, rubs.   MUSCULOSKELETAL: Gait was normal. There was no tenderness or effusions noted. Muscle strength and tone were normal.  EXTREMITIES: No cyanosis, clubbing or edema.  NEUROLOGIC: Alert and oriented x 3. Normal affect. Gait was normal.  Sensation to touch was normal. CN2-12 grossly intact    Assessment/Plan   Zak was seen today for follow-up.    Diagnoses and all orders for this visit:    Essential hypertension    Other orders  -     hydrochlorothiazide (MICROZIDE) 12.5 MG capsule; Take 1 capsule by mouth Daily.             This document has been electronically signed by Nayely Cedillo MD on February 26, 2018 2:38 PM

## 2018-02-26 NOTE — PROGRESS NOTES
I have seen the patient.  I have reviewed the notes, assessments, and/or procedures performed by Dr. Cedillo, I concur with her/his documentation and assessment and plan for Zak Lutz.       This document has been electronically signed by Parminder Govea MD on February 26, 2018 2:59 PM

## 2018-02-28 ENCOUNTER — APPOINTMENT (OUTPATIENT)
Dept: PHYSICAL THERAPY | Facility: HOSPITAL | Age: 58
End: 2018-02-28

## 2018-03-01 ENCOUNTER — OFFICE VISIT (OUTPATIENT)
Dept: ORTHOPEDIC SURGERY | Facility: CLINIC | Age: 58
End: 2018-03-01

## 2018-03-01 VITALS — WEIGHT: 183.5 LBS | HEIGHT: 73 IN | BODY MASS INDEX: 24.32 KG/M2

## 2018-03-01 DIAGNOSIS — M23.92 INTERNAL DERANGEMENT OF KNEE, LEFT: Primary | ICD-10-CM

## 2018-03-01 DIAGNOSIS — J44.9 CHRONIC OBSTRUCTIVE PULMONARY DISEASE, UNSPECIFIED COPD TYPE (HCC): ICD-10-CM

## 2018-03-01 DIAGNOSIS — J45.909 MILD ASTHMA, UNSPECIFIED WHETHER COMPLICATED, UNSPECIFIED WHETHER PERSISTENT: ICD-10-CM

## 2018-03-01 DIAGNOSIS — G89.29 CHRONIC PAIN OF LEFT KNEE: ICD-10-CM

## 2018-03-01 DIAGNOSIS — I10 ESSENTIAL HYPERTENSION: ICD-10-CM

## 2018-03-01 DIAGNOSIS — M25.562 CHRONIC PAIN OF LEFT KNEE: ICD-10-CM

## 2018-03-01 DIAGNOSIS — K21.9 GASTROESOPHAGEAL REFLUX DISEASE WITHOUT ESOPHAGITIS: ICD-10-CM

## 2018-03-01 PROCEDURE — 99214 OFFICE O/P EST MOD 30 MIN: CPT | Performed by: ORTHOPAEDIC SURGERY

## 2018-03-01 NOTE — PROGRESS NOTES
Zak Lutz is a 57 y.o. male returns for     Chief Complaint   Patient presents with   • Left Knee - Follow-up   • Right Knee - Follow-up       HISTORY OF PRESENT ILLNESS:   Patient was originally treated by Dr Urban then by Franck. He is experiencing worsening pain especially in the left. He is wearing the knee brace ordered by Franck. Patient wants surgery options for the left.   He has pain most of the time.  He states that his left knee seems to hyperextend and is unstable for many activities.  He states that the brace does help some.  His left knee is significantly worse than his right knee.       CONCURRENT MEDICAL HISTORY:    Past Medical History:   Diagnosis Date   • Allergic rhinitis    • Alternating exotropia     with hypertrophic component      • Asthma    • Astigmatism    • Carpal tunnel syndrome    • Cervical radiculopathy    • Chronic bronchitis    • Chronic obstructive lung disease    • Degenerative joint disease involving multiple joints    • Essential hypertension    • Gastroesophageal reflux disease    • Hip pain    • History of colon polyps    • Hypercholesterolemia    • Hyperlipidemia    • Hypertensive left ventricular hypertrophy    • Neck pain     L trapezius strain      • Paresthesia of upper limb     left shoulder, arm, forearm, hands and fingers      • Presbyopia    • Shoulder pain    • Tobacco dependence syndrome        Allergies   Allergen Reactions   • Morphine And Related    • Tramadol          Current Outpatient Prescriptions:   •  albuterol (VENTOLIN HFA) 108 (90 Base) MCG/ACT inhaler, Inhale 2 puffs Every 4 (Four) Hours As Needed for Wheezing., Disp: 6.7 g, Rfl: 2  •  fluticasone (FLONASE) 50 MCG/ACT nasal spray, 2 sprays into each nostril Daily., Disp: 9.9 mL, Rfl: 2  •  gabapentin (NEURONTIN) 300 MG capsule, Take 1 capsule by mouth Daily., Disp: 30 capsule, Rfl: 2  •  hydrochlorothiazide (MICROZIDE) 12.5 MG capsule, Take 1 capsule by mouth Daily., Disp: 30 capsule, Rfl:  "0  •  meloxicam (MOBIC) 15 MG tablet, Take 1 tablet by mouth Daily., Disp: 30 tablet, Rfl: 0  •  nicotine (NICODERM CQ) 21 MG/24HR patch, Place 1 patch on the skin Daily., Disp: 28 patch, Rfl: 3  •  raNITIdine (ZANTAC) 150 MG tablet, Take 1 tablet by mouth Every Night., Disp: 30 tablet, Rfl: 2    Past Surgical History:   Procedure Laterality Date   • ENDOSCOPY       w/ tube 82776 (Slight erythema, distal esophagus, compatible with reflux esophagitis.)   07/26/1990    • ENDOSCOPY AND COLONOSCOPY      (Diverticulum in sigmoid colon. Internal & external hemorrhoids found.)   03/01/2012    • HIP ARTHROSCOPY      Removal of plate & screws from the left hip & left total hip arthroplasty. Status post ORIF of intertrochanteric fracture of the left hip w/ osteonecrosis of the left femoral head)   07/25/2011    • HIP SURGERY      Open reduction and intern fixation fo left posterior column acetabulum fracture.Closed treatment of the pelvic ring fracture of left superior and inferior rami.)   12/10/2015    • INJECTION OF MEDICATION  07/21/2015    Depo Medrol (Methylprednisone   • INJECTION OF MEDICATION  01/28/2014    kenalog(1)   • INJECTION OF MEDICATION  11/08/2014    toradol(2)    • OTHER SURGICAL HISTORY      Neck/chest surgery procedure (Excision of multiple scars and keloids of the neck and the upper chest measuring 15 cm x 2 cm in diameter. Plastic repair of the wound with zig-zag plasty 15 cm x 3 cm.)   09/21/2001    • SHOULDER SURGERY      Repair of rotator cuff of left shoulder. Repair of rotator cuff of left shoulder.)   07/08/2013        ROS  No fevers or chills.  No chest pain or shortness of air.  No GI or  disturbances.  All other systems reviewed as negative.    PHYSICAL EXAMINATION:       Ht 185.4 cm (73\")  Wt 83.2 kg (183 lb 8 oz)  BMI 24.21 kg/m2    Physical Exam   Constitutional: He is oriented to person, place, and time. He appears well-developed and well-nourished.   Neurological: He is alert and " oriented to person, place, and time.   Psychiatric: He has a normal mood and affect. His behavior is normal. Judgment and thought content normal.       GAIT:     []  Normal  []  Antalgic    Assistive device: []  None  []  Walker     []  Crutches  []  Cane     []  Wheelchair  []  Stretcher    Right Knee Exam     Tenderness   Right knee tenderness location: diffuse.    Range of Motion   Extension: 0   Flexion: 120     Muscle Strength     The patient has normal right knee strength.    Tests   Drawer:       Anterior - negative    Posterior - negative  Varus: negative  Valgus: negative    Other   Sensation: normal  Pulse: present  Swelling: none    Comments:  Mild Crepitation on motion.        Left Knee Exam     Tenderness   Left knee tenderness location: diffuse.    Range of Motion   Extension: -5   Flexion: 120     Muscle Strength     The patient has normal left knee strength.    Other   Erythema: absent  Sensation: normal  Pulse: present  Swelling: none    Comments:  Crepitation with motion  Mild to moderate pain through arc of motion  He has a significant amount of laxity with varus and valgus testing.  He has mild laxity with anterior drawer testing as well.                    Standing AP of both knees with lateral views of both knees reveal mild tricompartmental degenerative changes without any acute radiological abnormality noted.  01/24/18 at 10:12 AM by TON Kamara       PROCEDURE DATE/TIME- October 22, 2013      PROCEDURE- MRI OF LEFT KNEE      INDICATION FOR PROCEDURE- 53-year-old patient presents for evaluation  of left knee pain.      TECHNIQUE-  Multiplanar multisequence MR images of the left knee are  submitted for interpretation.      COMPARISON-  Radiographs of left knee dated October 10, 2013.      FINDINGS-  There is a small joint effusion. There is abnormal thinning  of the retropatellar cartilage adjacent to the medial patellar facet  with associated abnormal signal consistent with grade  III  chondromalacia changes. The patella is appropriately positioned within  the trochlear groove. Medial and lateral retinacula are within normal  limits. There is a small Baker's cyst.       Quadriceps and patellar tendons, medial and lateral collateral  ligaments have a normal appearance. The iliotibial band and biceps  femoris tendon has a normal appearance. Anterior and posterior  cruciate ligaments have a normal appearance. There does appear to be  anterior displacement of the tibia in relationship to the femur which  suggests an MR Lachman's sign. There is abnormal signal within the  posterior horn of the medial meniscus that probably represent sequela  of myxoid degeneration. Medial and lateral menisci have normal MR  appearance, otherwise. The visualized hyaline cartilage has a normal  appearance.      Imaged medullary bone and skeletal muscles have a normal appearance.      CONCLUSION-       1. Chondromalacia patella.   2. Small joint effusion.   3. Apparent ligamentous laxity of the anterior cruciate ligament which  appears to be intact.   4. 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   5. Small Baker's cyst.  Electronically Signed By- Rose Marie Mills MD On: 2013-10-22 19:16:42          ASSESSMENT:    Diagnoses and all orders for this visit:    Internal derangement of knee, left  -     MRI Knee Left Without Contrast; Future  -     Ambulatory Referral to Physical Therapy ([id number])    Chronic pain of left knee  -     MRI Knee Left Without Contrast; Future  -     Ambulatory Referral to Physical Therapy ([id number])    Chronic obstructive pulmonary disease, unspecified COPD type    Mild asthma, unspecified whether complicated, unspecified whether persistent    Essential hypertension    Gastroesophageal reflux disease without esophagitis          PLAN    He does have mild arthritic changes on his x-ray.  In review of the MRI taken in 2013 he does have some apparent laxity in the ACL although it  appeared to be intact at the time.  Plan is to proceed with a repeat MRI on today's date to assess for bone bruising, ligamentous instability and to help to assess the potential surgical indications.  He also is going to get a [id number] to assess the integrity of the anterior cruciate ligament.  We will follow-up after the MRI to discuss further treatment options.  Continue taking meloxicam as nonsteroidal anti-inflammatory medication.  Today with home exercise program and physical therapy exercises.    Patient's BMI is within normal parameters. No follow-up required.      Return for recheck for MRI results.    Jeramie Rai MD

## 2018-03-05 ENCOUNTER — HOSPITAL ENCOUNTER (OUTPATIENT)
Dept: PHYSICAL THERAPY | Facility: HOSPITAL | Age: 58
Setting detail: THERAPIES SERIES
Discharge: HOME | End: 2018-03-05

## 2018-03-05 ENCOUNTER — DOCUMENTATION (OUTPATIENT)
Dept: PHYSICAL THERAPY | Facility: HOSPITAL | Age: 58
End: 2018-03-05

## 2018-03-05 DIAGNOSIS — M25.562 CHRONIC PAIN OF LEFT KNEE: ICD-10-CM

## 2018-03-05 DIAGNOSIS — M23.92 INTERNAL DERANGEMENT OF LEFT KNEE: Primary | ICD-10-CM

## 2018-03-05 DIAGNOSIS — G89.29 CHRONIC PAIN OF LEFT KNEE: ICD-10-CM

## 2018-03-05 PROCEDURE — 97161 PT EVAL LOW COMPLEX 20 MIN: CPT | Performed by: PHYSICAL THERAPIST

## 2018-03-05 NOTE — THERAPY EVALUATION
Outpatient Physical Therapy Ortho Initial Evaluation  [facility]     Patient Name: Zak Lutz  : 1960  MRN: [id number]  Today's Date: 3/5/2018      Visit Date: 2018  Attendance:   Subjective % Improvement: N/A  Recert Date: N/A  MD appointment: TBD     Therapy Diagnosis: Chronic left knee pain    Patient Active Problem List   Diagnosis   • Tobacco dependence syndrome   • Hyperlipidemia   • Gastroesophageal reflux disease   • Essential hypertension   • Degenerative joint disease involving multiple joints   • Chronic obstructive lung disease   • Allergic rhinitis   • Primary osteoarthritis of both knees   • Encounter for drug screening        Past Medical History:   Diagnosis Date   • Allergic rhinitis    • Alternating exotropia     with hypertrophic component      • Asthma    • Astigmatism    • Carpal tunnel syndrome    • Cervical radiculopathy    • Chronic bronchitis    • Chronic obstructive lung disease    • Degenerative joint disease involving multiple joints    • Essential hypertension    • Gastroesophageal reflux disease    • Hip pain    • History of colon polyps    • Hypercholesterolemia    • Hyperlipidemia    • Hypertensive left ventricular hypertrophy    • Neck pain     L trapezius strain      • Paresthesia of upper limb     left shoulder, arm, forearm, hands and fingers      • Presbyopia    • Shoulder pain    • Tobacco dependence syndrome         Past Surgical History:   Procedure Laterality Date   • ENDOSCOPY       w/ tube 71939 (Slight erythema, distal esophagus, compatible with reflux esophagitis.)   1990    • ENDOSCOPY AND COLONOSCOPY      (Diverticulum in sigmoid colon. Internal & external hemorrhoids found.)   2012    • HIP ARTHROSCOPY      Removal of plate & screws from the left hip & left total hip arthroplasty. Status post ORIF of intertrochanteric fracture of the left hip w/ osteonecrosis of the left femoral head)   2011    • HIP SURGERY       "Open reduction and intern fixation fo left posterior column acetabulum fracture.Closed treatment of the pelvic ring fracture of left superior and inferior rami.)   12/10/2015    • INJECTION OF MEDICATION  07/21/2015    Depo Medrol (Methylprednisone   • INJECTION OF MEDICATION  01/28/2014    kenalog(1)   • INJECTION OF MEDICATION  11/08/2014    toradol(2)    • OTHER SURGICAL HISTORY      Neck/chest surgery procedure (Excision of multiple scars and keloids of the neck and the upper chest measuring 15 cm x 2 cm in diameter. Plastic repair of the wound with zig-zag plasty 15 cm x 3 cm.)   09/21/2001    • SHOULDER SURGERY      Repair of rotator cuff of left shoulder. Repair of rotator cuff of left shoulder.)   07/08/2013        Visit Dx:     ICD-10-CM ICD-9-CM   1. Internal derangement of left knee M23.92 717.9   2. 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 Reports \"when i have weight on it, most of the time it hurts\". Recieved a ACL brace that helps stabilize the leg. Reports occasional numbness and tingling sensation in the knee. Reports the knee moves side to side and the pain is deep in the knee. Reports pain in the knee is improved with rest.   -BB      Patient/Caregiver Goals Improve strength;Return to prior level of function  -BB      Patient's Rating of General Health Very good  -BB      Hand Dominance right-handed  -BB      Occupation/sports/leisure activities Disabled-working on getting disability back   -BB      What clinical tests have you had for this problem? X-ray  -BB      Pain     Pain Location Knee  -BB      Pain at Present 0  -BB      Pain at Best 0  " "-BB      Pain at Worst 8  -BB      Pain Frequency Intermittent  -BB      Pain Description Throbbing  -BB      What Performance Factors Make the Current Problem(s) WORSE? weightbearing   -BB      What Performance Factors Make the Current Problem(s) BETTER? rest   -BB      Is your sleep disturbed? Yes  -BB      Is medication used to assist with sleep? Yes  -BB      Fall Risk Assessment    Any falls in the past year: Yes  -BB      Number of falls reported in the last 12 months --   1  -BB      Factors that contributed to the fall: --   \"knee gave out\"  -BB        User Key  (r) = Recorded By, (t) = Taken By, (c) = Cosigned By    Initials Name Provider Type    BB Cornelia Cruz, PT Physical Therapist                PT Ortho       03/05/18 1307    Subjective Comments    Subjective Comments See patient history   -BB    Precautions and Contraindications    Precautions check BP PRN, caution with bilateral knees and left shoulder  -BB    Subjective Pain    Pre-Treatment Pain Level 0  -BB    Post-Treatment Pain Level 0  -BB    Posture/Observations    Posture/Observations Comments Antalgic gait, no bracing. No edema. Skin intact.   -BB    Special Tests/Palpation    Special Tests/Palpation Knee  -BB    Knee Palpation    Knee Palpation? --   No siginificant tenderness noted today.   -BB    Patellar Accessory Motions    Patellar Accessory Motions Tested? --   Hypermobile   -BB    Knee Special Tests    Anterior drawer (ACL lesion) Positive   laxity noted-endfeel seen   -BB    Lachman’s (ACL lesion) Positive   laxity noted-endfeel noted   -BB    ROM (Range of Motion)    General ROM Detail Left knee 0-130 with pain in the left knee   -BB    MMT (Manual Muscle Testing)    General MMT Assessment Detail No lag with SLR- difficulty noted. Knee flexion: 4/5, knee extension 4/5 with pain. Hip flexion/abduction 4/5   -BB    Transfers    Transfer, Comment Independent   -BB      User Key  (r) = Recorded By, (t) = Taken By, (c) = Cosigned By    " Initials Name Provider Type    DANTE Cruz PT Physical Therapist                      Therapy Education  Education Details: SLR, VMO SLR, LAQ, QS, hip abduction sitting and SL, Clamshells, HS curl with tband, hip extension prone   Given: HEP  Program: New  How Provided: Verbal (pictures )  Provided to: Patient  Level of Understanding: Verbalized, Demonstrated           PT OP Goals       03/05/18 1307       PT Short Term Goals    STG Date to Achieve 03/05/18  -BB     STG 1 Independent in HEP-demonstrate each exercise   -BB     STG 1 Progress Met  -BB       User Key  (r) = Recorded By, (t) = Taken By, (c) = Cosigned By    Initials Name Provider Type    DANTE Cruz PT Physical Therapist                PT Assessment/Plan       03/05/18 1307       PT Assessment    Functional Limitations Impaired gait;Decreased safety during functional activities;Limitations in functional capacity and performance;Performance in leisure activities  -BB     Impairments Gait;Joint integrity;Muscle strength;Pain;Range of motion;Impaired muscle endurance  -BB     Assessment Comments Patient presents today with chronic left knee pain with pain with ROM, decreased functional strength of the hip and knee and with laxity noted of the knee but endfeels felt. Patient underwent a KT 1000 that noted mild laxity of ACL but is in tact. Patient given HEP for hip and knee to strengthen and conserve PT visits in the event of possible surgery in the future. Chart will be placed on hold pending further recommendations of MD.   -BB     Rehab Potential Fair  -BB     Patient/caregiver participated in establishment of treatment plan and goals Yes  -BB     Patient would benefit from skilled therapy intervention Yes  -BB     PT Plan    Predicted Duration of Therapy Intervention (days/wks) On hold-awaiting further MD recommendations  -BB     Planned CPT's? PT EVAL LOW COMPLEXITY: 75340;PT RE-EVAL: 30393;PT THER PROC EA 15 MIN: 91679;PT GAIT TRAINING EA  15 MIN: 60397;PT THER SUPP EA 15 MIN  -BB     Physical Therapy Interventions (Optional Details) stretching;strengthening;ROM (Range of Motion);patient/family education;manual therapy techniques;home exercise program;gait training;balance training  -BB     PT Plan Comments On hold-awaiting further MD recommendations  -BB       User Key  (r) = Recorded By, (t) = Taken By, (c) = Cosigned By    Initials Name Provider Type    BB Cornelia Cruz PT Physical Therapist                  Exercises       03/05/18 1307          Subjective Comments    Subjective Comments See patient history   -BB      Subjective Pain    Able to rate subjective pain? yes  -BB      Pre-Treatment Pain Level 0  -BB      Post-Treatment Pain Level 0  -BB      Exercise 1    Exercise Name 1 [id number]  -BB      Additional Comments performed by [employer] [facility] [employer]   -BB      Exercise 2    Exercise Name 2 HEP for strength given for patient which performed 1-2 reps of each exercise   -BB        User Key  (r) = Recorded By, (t) = Taken By, (c) = Cosigned By    Initials Name Provider Type    BB Cornelia Cruz, PT Physical Therapist                        Outcome Measure Options: Lower Extremity Functional Scale (LEFS)  Lower Extremity Functional Index  Any of your usual work, housework or school activities: Quite a bit of difficulty  Your usual hobbies, recreational or sporting activities: Quite a bit of difficulty  Getting into or out of the bath: Moderate difficulty  Walking between rooms: A little bit of difficulty  Putting on your shoes or socks: Moderate difficulty  Squatting: Quite a bit of difficulty  Lifting an object, like a bag of groceries from the floor: Moderate difficulty  Performing light activities around your home: Moderate difficulty  Performing heavy activities around your home: Quite a bit of difficulty  Getting into or out of a car: A little bit of difficulty  Walking 2 blocks: A little bit of difficulty  Walking a mile: Quite a bit of  difficulty  Going up or down 10 stairs (about 1 flight of stairs): Quite a bit of difficulty  Standing for 1 hour: Extreme difficulty or unable to perform activity  Sitting for 1 hour: Quite a bit of difficulty  Running on even ground: Extreme difficulty or unable to perform activity  Running on uneven ground: Extreme difficulty or unable to perform activity  Making sharp turns while running fast: Extreme difficulty or unable to perform activity  Hopping: Quite a bit of difficulty  Rolling over in bed: Moderate difficulty  Total: 27      Time Calculation:   Start Time: 1307  Stop Time: 1351  Time Calculation (min): 44 min  Total Timed Code Minutes- PT: 0 minute(s)     Therapy Charges for Today     Code Description Service Date Service Provider Modifiers Qty    96443223492 HC PT EVAL LOW COMPLEXITY 3 3/5/2018 Cornelia Cruz, PT GP 1          PT G-Codes  Outcome Measure Options: Lower Extremity Functional Scale (LEFS)         Cornelia Cruz, PT  3/5/2018

## 2018-03-06 DIAGNOSIS — R52 PAIN: Primary | ICD-10-CM

## 2018-03-06 RX ORDER — MELOXICAM 15 MG/1
TABLET ORAL
Qty: 30 TABLET | Refills: 0 | Status: SHIPPED | OUTPATIENT
Start: 2018-03-06 | End: 2018-04-18

## 2018-03-12 ENCOUNTER — HOSPITAL ENCOUNTER (OUTPATIENT)
Dept: MRI IMAGING | Facility: HOSPITAL | Age: 58
Discharge: HOME | End: 2018-03-12
Admitting: ORTHOPAEDIC SURGERY

## 2018-03-12 DIAGNOSIS — M25.562 CHRONIC PAIN OF LEFT KNEE: ICD-10-CM

## 2018-03-12 DIAGNOSIS — M23.92 INTERNAL DERANGEMENT OF KNEE, LEFT: ICD-10-CM

## 2018-03-12 DIAGNOSIS — G89.29 CHRONIC PAIN OF LEFT KNEE: ICD-10-CM

## 2018-03-12 PROCEDURE — 73721 MRI JNT OF LWR EXTRE W/O DYE: CPT

## 2018-03-14 ENCOUNTER — TELEPHONE (OUTPATIENT)
Dept: FAMILY MEDICINE CLINIC | Facility: CLINIC | Age: 58
End: 2018-03-14

## 2018-03-27 ENCOUNTER — OFFICE VISIT (OUTPATIENT)
Dept: ORTHOPEDIC SURGERY | Facility: CLINIC | Age: 58
End: 2018-03-27

## 2018-03-27 ENCOUNTER — OFFICE VISIT (OUTPATIENT)
Dept: FAMILY MEDICINE CLINIC | Facility: CLINIC | Age: 58
End: 2018-03-27

## 2018-03-27 VITALS
WEIGHT: 183.25 LBS | HEART RATE: 82 BPM | OXYGEN SATURATION: 98 % | DIASTOLIC BLOOD PRESSURE: 80 MMHG | SYSTOLIC BLOOD PRESSURE: 152 MMHG | HEIGHT: 73 IN | BODY MASS INDEX: 24.29 KG/M2

## 2018-03-27 VITALS — WEIGHT: 183 LBS | BODY MASS INDEX: 24.25 KG/M2 | HEIGHT: 73 IN

## 2018-03-27 DIAGNOSIS — M94.262 CHONDROMALACIA, LEFT KNEE: ICD-10-CM

## 2018-03-27 DIAGNOSIS — G89.29 CHRONIC PAIN OF LEFT KNEE: ICD-10-CM

## 2018-03-27 DIAGNOSIS — I10 ESSENTIAL HYPERTENSION: Primary | ICD-10-CM

## 2018-03-27 DIAGNOSIS — K21.9 GASTROESOPHAGEAL REFLUX DISEASE WITHOUT ESOPHAGITIS: ICD-10-CM

## 2018-03-27 DIAGNOSIS — M17.0 PRIMARY OSTEOARTHRITIS OF BOTH KNEES: Primary | ICD-10-CM

## 2018-03-27 DIAGNOSIS — J44.9 CHRONIC OBSTRUCTIVE PULMONARY DISEASE, UNSPECIFIED COPD TYPE (HCC): ICD-10-CM

## 2018-03-27 DIAGNOSIS — I10 ESSENTIAL HYPERTENSION: ICD-10-CM

## 2018-03-27 DIAGNOSIS — M71.22 BAKER'S CYST OF KNEE, LEFT: ICD-10-CM

## 2018-03-27 DIAGNOSIS — M23.92 INTERNAL DERANGEMENT OF KNEE, LEFT: ICD-10-CM

## 2018-03-27 DIAGNOSIS — M25.562 CHRONIC PAIN OF LEFT KNEE: ICD-10-CM

## 2018-03-27 PROCEDURE — 99214 OFFICE O/P EST MOD 30 MIN: CPT | Performed by: ORTHOPAEDIC SURGERY

## 2018-03-27 PROCEDURE — 99213 OFFICE O/P EST LOW 20 MIN: CPT | Performed by: FAMILY MEDICINE

## 2018-03-27 RX ORDER — AMLODIPINE BESYLATE 5 MG/1
5 TABLET ORAL DAILY
Qty: 30 TABLET | Refills: 2 | Status: SHIPPED | OUTPATIENT
Start: 2018-03-27 | End: 2018-06-17 | Stop reason: SDUPTHER

## 2018-03-27 NOTE — PROGRESS NOTES
I have reviewed the notes, assessments, and/or procedures performed. I concur with her/his documentation of Zak Lutz.     Sourav Mccollum, DO

## 2018-03-27 NOTE — PROGRESS NOTES
Subjective   Zak Lutz is a 57 y.o. male.     History of Present Illness  Patient is a 57 year old  male who is presenting today as a hypertension recheck.  On previous visit patient was started on HCTZ 12.5mg tablet daily and is following up.  Patient states that he has been taking his blood pressure medication as directed and has been checking it at home.  Patient mentions that his systolic is still ranging from 140-150.  Patient denies any headaches, dizziness, or any other complaints at this time.  Patient does follow up with orthopedics for chronic left knee issues and has been going to physical therapy for this.  Patient recently had an MRI performed of his left knee which showed:   1. Moderate to large size Baker's cyst.  2. Very small likely ganglion cyst along the upper posterior  aspect of the medial gastrocnemius tendon.  3. Mild changes of chondromalacia and underlying reactive marrow  change in the medial patellofemoral compartment.     Does have an appointment with orthopedics today to discuss possible surgical intervention  The following portions of the patient's history were reviewed and updated as appropriate: allergies, current medications, past family history, past medical history, past social history, past surgical history and problem list.    Review of Systems      Constitutional: no weight loss, fever, chills, weakness  HEENT: no visual loss, blurred vision, double vision, yellow scalarea  Skin: no rash, no discoloration   CVS: no chest pain, palpitations  Chest: no shortness of air, cough, dyspnea  GI: no abdominal pain, blood in stool, no nausea, vomiting, diarrhea  : no burning in urination, change in odor, no change in frequency  Neuro: no headache, dizziness, syncope, paralysis, ataxia, numbness  Musculoskeletal: chronic left knee pain  Lymphatics: no enlarged nodes  Endo: no reports of sweating, cold or heat intolerance, no polyuria      Objective   Physical  "Exam  /80 (BP Location: Left arm, Patient Position: Sitting, Cuff Size: Adult)   Pulse 82   Ht 185.4 cm (73\")   Wt 83.1 kg (183 lb 4 oz)   SpO2 98%   BMI 24.18 kg/m²       GENERAL APPEARANCE: Well developed, well nourished, in no acute distress.  SKIN: Inspection of the skin reveals no rashes, ulcerations or petechiae.  HEENT: The sclerae were anicteric and conjunctivae were pink and moist. Extraocular movements were intact and pupils were equal, round  LUNGS: Auscultation of the lungs revealed normal breath sounds without any other adventitious sounds or rubs.  CARDIOVASCULAR: There was a regular rate and rhythm without any murmurs, gallops, rubs.   EXTREMITIES: No cyanosis, clubbing or edema.  NEUROLOGIC: Alert and oriented x 3. Normal affect. Gait was normal.     Assessment/Plan   Zak was seen today for hypertension.    Diagnoses and all orders for this visit:    Essential hypertension    Other orders  -     amLODIPine (NORVASC) 5 MG tablet; Take 1 tablet by mouth Daily.       -patient's blood pressure is still elevated despite HCTZ 12.5mg tablet daily, will add in norvasc 5mg daily in addition          This document has been electronically signed by Nayely Cedillo MD on March 27, 2018 11:13 AM             "

## 2018-03-27 NOTE — PROGRESS NOTES
Zak Lutz is a 57 y.o. male returns for     Chief Complaint   Patient presents with   • Left Knee - Follow-up   • Results     mri left knee       HISTORY OF PRESENT ILLNESS:  Patient is having pain with pivoting and twisting.  His left knee is loose and he has difficulty walking up and down slopes and he has difficulty with daily activities.  His knee hyperextends he has pain with activity.  He reports no new injury.  Mostly he has dull aches but he does have sharp stabbing pains as well.  He has tried physical therapy, home exercise program.  Wearing braces.  Using a cane.  He has had steroid injection as well.  He is unhappy with his quality of life and wants to pursue surgical intervention.     CONCURRENT MEDICAL HISTORY:    Past Medical History:   Diagnosis Date   • Allergic rhinitis    • Alternating exotropia     with hypertrophic component      • Asthma    • Astigmatism    • Carpal tunnel syndrome    • Cervical radiculopathy    • Chronic bronchitis    • Chronic obstructive lung disease    • Degenerative joint disease involving multiple joints    • Essential hypertension    • Gastroesophageal reflux disease    • Hip pain    • History of colon polyps    • Hypercholesterolemia    • Hyperlipidemia    • Hypertensive left ventricular hypertrophy    • Neck pain     L trapezius strain      • Paresthesia of upper limb     left shoulder, arm, forearm, hands and fingers      • Presbyopia    • Shoulder pain    • Tobacco dependence syndrome        Allergies   Allergen Reactions   • Morphine And Related    • Tramadol          Current Outpatient Prescriptions:   •  albuterol (VENTOLIN HFA) 108 (90 Base) MCG/ACT inhaler, Inhale 2 puffs Every 4 (Four) Hours As Needed for Wheezing., Disp: 6.7 g, Rfl: 2  •  amLODIPine (NORVASC) 5 MG tablet, Take 1 tablet by mouth Daily., Disp: 30 tablet, Rfl: 2  •  fluticasone (FLONASE) 50 MCG/ACT nasal spray, 2 sprays into each nostril Daily., Disp: 9.9 mL, Rfl: 2  •  gabapentin  (NEURONTIN) 300 MG capsule, Take 1 capsule by mouth Daily., Disp: 30 capsule, Rfl: 2  •  hydrochlorothiazide (MICROZIDE) 12.5 MG capsule, Take 1 capsule by mouth Daily., Disp: 30 capsule, Rfl: 0  •  meloxicam (MOBIC) 15 MG tablet, TAKE 1 TABLET BY MOUTH DAILY., Disp: 30 tablet, Rfl: 0  •  nicotine (NICODERM CQ) 21 MG/24HR patch, Place 1 patch on the skin Daily., Disp: 28 patch, Rfl: 3  •  raNITIdine (ZANTAC) 150 MG tablet, Take 1 tablet by mouth Every Night., Disp: 30 tablet, Rfl: 2    Past Surgical History:   Procedure Laterality Date   • ENDOSCOPY       w/ tube 47844 (Slight erythema, distal esophagus, compatible with reflux esophagitis.)   07/26/1990    • ENDOSCOPY AND COLONOSCOPY      (Diverticulum in sigmoid colon. Internal & external hemorrhoids found.)   03/01/2012    • HIP ARTHROSCOPY      Removal of plate & screws from the left hip & left total hip arthroplasty. Status post ORIF of intertrochanteric fracture of the left hip w/ osteonecrosis of the left femoral head)   07/25/2011    • HIP SURGERY      Open reduction and intern fixation fo left posterior column acetabulum fracture.Closed treatment of the pelvic ring fracture of left superior and inferior rami.)   12/10/2015    • INJECTION OF MEDICATION  07/21/2015    Depo Medrol (Methylprednisone   • INJECTION OF MEDICATION  01/28/2014    kenalog(1)   • INJECTION OF MEDICATION  11/08/2014    toradol(2)    • OTHER SURGICAL HISTORY      Neck/chest surgery procedure (Excision of multiple scars and keloids of the neck and the upper chest measuring 15 cm x 2 cm in diameter. Plastic repair of the wound with zig-zag plasty 15 cm x 3 cm.)   09/21/2001    • SHOULDER SURGERY      Repair of rotator cuff of left shoulder. Repair of rotator cuff of left shoulder.)   07/08/2013      Family History   Problem Relation Age of Onset   • Throat cancer Father    • Aneurysm Sister      brain   • Diabetes Other    • Hypertension Other      Social History   Substance Use Topics   •  "Smoking status: Current Every Day Smoker     Types: Cigarettes   • Smokeless tobacco: Never Used      Comment: 2-3 cigs/day   • Alcohol use No      Comment: nominal         ROS  No fevers or chills.  No chest pain or shortness of air.  No GI or  disturbances.  All other systems reviewed as negative.    PHYSICAL EXAMINATION:       Ht 185.4 cm (73\")   Wt 83 kg (183 lb)   BMI 24.14 kg/m²     Physical Exam   Constitutional: He is oriented to person, place, and time. He appears well-developed and well-nourished. No distress.   Cardiovascular: Normal rate, regular rhythm and normal heart sounds.    Pulmonary/Chest: Effort normal and breath sounds normal.   Abdominal: Soft. Bowel sounds are normal.   Neurological: He is alert and oriented to person, place, and time.   Psychiatric: He has a normal mood and affect. His behavior is normal. Judgment and thought content normal.   Vitals reviewed.      GAIT:     [x]  Normal  []  Antalgic    Assistive device: [x]  None  []  Walker     []  Crutches  []  Cane     []  Wheelchair  []  Stretcher    Right Knee Exam     Tenderness   The patient is experiencing no tenderness.         Range of Motion   The patient has normal right knee ROM.    Muscle Strength     The patient has normal right knee strength.    Tests   Lachman:  Anterior - negative      Drawer:       Anterior - negative        Other   Erythema: absent  Sensation: normal  Pulse: present  Swelling: none      Left Knee Exam     Tenderness   Left knee tenderness location: diffuse.    Range of Motion   Extension: 5   Flexion: 130     Muscle Strength     The patient has normal left knee strength.    Tests   Drawer:       Anterior - 2+     Posterior - negative  Varus: positive  Valgus: positive    Other   Erythema: absent  Sensation: normal  Pulse: present  Swelling: none    Comments:  He has mild instability with varus valgus testing.  He does have a good endpoint.  He has some crepitus with motion as well.              Mri " Knee Left Without Contrast    Result Date: 3/12/2018  Narrative: MRI left knee without contrast on 3/12/2018 CLINICAL INDICATION: Left knee pain mostly posterior to patella TECHNIQUE: Multiplanar, multisequence MR images are obtained throughout the left knee without the administration of contrast. This examination was performed on a 1.5 Bri magnet. COMPARISON: X-ray from 1/23/2018 FINDINGS: There is a moderate to large size Baker's cyst. The Baker's cyst measures approximately 5.1 x 1.6 x 2.7 cm. There is no significant joint effusion. Popliteus tendon is intact and unremarkable. There is mild chondromalacia with underlying minimal reactive marrow change in the medial patellar facet. There is also chondromalacia with underlying reactive marrow change in the anterior lateral aspect of the medial femoral condyle. The MCL and lateral collateral ligament complex are intact and unremarkable. There is a small likely ganglion cyst along the upper posterior aspect of the medial head of the gastrocnemius tendon. This is small but may be multiseptated, and in greatest dimension this measures 1.1 cm. The patellar and quadriceps tendons are intact and unremarkable. The ACL and PCL are intact and unremarkable. There is some mild degenerative signal in the posterior horn of the medial meniscus without extension to the surface to suggest a tear. Medial and lateral menisci are otherwise intact and unremarkable without evidence of a meniscal tear. No other cartilage abnormality is noted. Bone marrow signal is otherwise unremarkable.     Impression: 1. Moderate to large size Baker's cyst. 2. Very small likely ganglion cyst along the upper posterior aspect of the medial gastrocnemius tendon. 3. Mild changes of chondromalacia and underlying reactive marrow change in the medial patellofemoral compartment. Electronically signed by:  Ricky Palacios  3/12/2018 10:12 PM CDT Workstation: [id number]    Standing AP of both knees with  lateral views of both knees reveal mild tricompartmental degenerative changes without any acute radiological abnormality noted.  01/24/18 at 10:12 AM by TON Kamara        ASSESSMENT:    Diagnoses and all orders for this visit:    Primary osteoarthritis of both knees  -     Case Request; Standing  -     Type and screen; Future  -     sodium chloride 0.9 % flush 1-10 mL; Infuse 1-10 mL into a venous catheter As Needed for Line Care.  -     ceFAZolin (ANCEF) 2 g in sodium chloride 0.9 % 100 mL IVPB; Infuse 2 g into a venous catheter 1 (One) Time.  -     acetaminophen (TYLENOL) tablet 975 mg; Take 3 tablets by mouth 1 (One) Time.  -     meloxicam (MOBIC) tablet 15 mg; Take 2 tablets by mouth 1 (One) Time.  -     pregabalin (LYRICA) capsule 75 mg; Take 3 capsules by mouth 1 (One) Time.  -     oxyCODONE (oxyCONTIN) 12 hr tablet 10 mg; Take 1 tablet by mouth 1 (One) Time.  -     Tranexamic Acid 1,000 mg in sodium chloride 0.9 % 100 mL; Infuse 1,000 mg into a venous catheter 1 (One) Time.  -     Tranexamic Acid 1,000 mg in sodium chloride 0.9 % 100 mL; Infuse 1,000 mg into a venous catheter 1 (One) Time.    Internal derangement of knee, left  -     Case Request; Standing  -     Type and screen; Future  -     sodium chloride 0.9 % flush 1-10 mL; Infuse 1-10 mL into a venous catheter As Needed for Line Care.  -     ceFAZolin (ANCEF) 2 g in sodium chloride 0.9 % 100 mL IVPB; Infuse 2 g into a venous catheter 1 (One) Time.  -     acetaminophen (TYLENOL) tablet 975 mg; Take 3 tablets by mouth 1 (One) Time.  -     meloxicam (MOBIC) tablet 15 mg; Take 2 tablets by mouth 1 (One) Time.  -     pregabalin (LYRICA) capsule 75 mg; Take 3 capsules by mouth 1 (One) Time.  -     oxyCODONE (oxyCONTIN) 12 hr tablet 10 mg; Take 1 tablet by mouth 1 (One) Time.  -     Tranexamic Acid 1,000 mg in sodium chloride 0.9 % 100 mL; Infuse 1,000 mg into a venous catheter 1 (One) Time.  -     Tranexamic Acid 1,000 mg in sodium chloride 0.9  % 100 mL; Infuse 1,000 mg into a venous catheter 1 (One) Time.    Chronic pain of left knee  -     Case Request; Standing  -     Type and screen; Future  -     sodium chloride 0.9 % flush 1-10 mL; Infuse 1-10 mL into a venous catheter As Needed for Line Care.  -     ceFAZolin (ANCEF) 2 g in sodium chloride 0.9 % 100 mL IVPB; Infuse 2 g into a venous catheter 1 (One) Time.  -     acetaminophen (TYLENOL) tablet 975 mg; Take 3 tablets by mouth 1 (One) Time.  -     meloxicam (MOBIC) tablet 15 mg; Take 2 tablets by mouth 1 (One) Time.  -     pregabalin (LYRICA) capsule 75 mg; Take 3 capsules by mouth 1 (One) Time.  -     oxyCODONE (oxyCONTIN) 12 hr tablet 10 mg; Take 1 tablet by mouth 1 (One) Time.  -     Tranexamic Acid 1,000 mg in sodium chloride 0.9 % 100 mL; Infuse 1,000 mg into a venous catheter 1 (One) Time.  -     Tranexamic Acid 1,000 mg in sodium chloride 0.9 % 100 mL; Infuse 1,000 mg into a venous catheter 1 (One) Time.    Baker's cyst of knee, left  -     Case Request; Standing  -     Type and screen; Future  -     sodium chloride 0.9 % flush 1-10 mL; Infuse 1-10 mL into a venous catheter As Needed for Line Care.  -     ceFAZolin (ANCEF) 2 g in sodium chloride 0.9 % 100 mL IVPB; Infuse 2 g into a venous catheter 1 (One) Time.  -     acetaminophen (TYLENOL) tablet 975 mg; Take 3 tablets by mouth 1 (One) Time.  -     meloxicam (MOBIC) tablet 15 mg; Take 2 tablets by mouth 1 (One) Time.  -     pregabalin (LYRICA) capsule 75 mg; Take 3 capsules by mouth 1 (One) Time.  -     oxyCODONE (oxyCONTIN) 12 hr tablet 10 mg; Take 1 tablet by mouth 1 (One) Time.  -     Tranexamic Acid 1,000 mg in sodium chloride 0.9 % 100 mL; Infuse 1,000 mg into a venous catheter 1 (One) Time.  -     Tranexamic Acid 1,000 mg in sodium chloride 0.9 % 100 mL; Infuse 1,000 mg into a venous catheter 1 (One) Time.    Chondromalacia, left knee  -     Case Request; Standing  -     Type and screen; Future  -     sodium chloride 0.9 % flush 1-10 mL;  Infuse 1-10 mL into a venous catheter As Needed for Line Care.  -     ceFAZolin (ANCEF) 2 g in sodium chloride 0.9 % 100 mL IVPB; Infuse 2 g into a venous catheter 1 (One) Time.  -     acetaminophen (TYLENOL) tablet 975 mg; Take 3 tablets by mouth 1 (One) Time.  -     meloxicam (MOBIC) tablet 15 mg; Take 2 tablets by mouth 1 (One) Time.  -     pregabalin (LYRICA) capsule 75 mg; Take 3 capsules by mouth 1 (One) Time.  -     oxyCODONE (oxyCONTIN) 12 hr tablet 10 mg; Take 1 tablet by mouth 1 (One) Time.  -     Tranexamic Acid 1,000 mg in sodium chloride 0.9 % 100 mL; Infuse 1,000 mg into a venous catheter 1 (One) Time.  -     Tranexamic Acid 1,000 mg in sodium chloride 0.9 % 100 mL; Infuse 1,000 mg into a venous catheter 1 (One) Time.    Essential hypertension  -     Case Request; Standing  -     Type and screen; Future  -     sodium chloride 0.9 % flush 1-10 mL; Infuse 1-10 mL into a venous catheter As Needed for Line Care.  -     ceFAZolin (ANCEF) 2 g in sodium chloride 0.9 % 100 mL IVPB; Infuse 2 g into a venous catheter 1 (One) Time.  -     acetaminophen (TYLENOL) tablet 975 mg; Take 3 tablets by mouth 1 (One) Time.  -     meloxicam (MOBIC) tablet 15 mg; Take 2 tablets by mouth 1 (One) Time.  -     pregabalin (LYRICA) capsule 75 mg; Take 3 capsules by mouth 1 (One) Time.  -     oxyCODONE (oxyCONTIN) 12 hr tablet 10 mg; Take 1 tablet by mouth 1 (One) Time.  -     Tranexamic Acid 1,000 mg in sodium chloride 0.9 % 100 mL; Infuse 1,000 mg into a venous catheter 1 (One) Time.  -     Tranexamic Acid 1,000 mg in sodium chloride 0.9 % 100 mL; Infuse 1,000 mg into a venous catheter 1 (One) Time.    Chronic obstructive pulmonary disease, unspecified COPD type  -     Case Request; Standing  -     Type and screen; Future  -     sodium chloride 0.9 % flush 1-10 mL; Infuse 1-10 mL into a venous catheter As Needed for Line Care.  -     ceFAZolin (ANCEF) 2 g in sodium chloride 0.9 % 100 mL IVPB; Infuse 2 g into a venous catheter 1  (One) Time.  -     acetaminophen (TYLENOL) tablet 975 mg; Take 3 tablets by mouth 1 (One) Time.  -     meloxicam (MOBIC) tablet 15 mg; Take 2 tablets by mouth 1 (One) Time.  -     pregabalin (LYRICA) capsule 75 mg; Take 3 capsules by mouth 1 (One) Time.  -     oxyCODONE (oxyCONTIN) 12 hr tablet 10 mg; Take 1 tablet by mouth 1 (One) Time.  -     Tranexamic Acid 1,000 mg in sodium chloride 0.9 % 100 mL; Infuse 1,000 mg into a venous catheter 1 (One) Time.  -     Tranexamic Acid 1,000 mg in sodium chloride 0.9 % 100 mL; Infuse 1,000 mg into a venous catheter 1 (One) Time.    Gastroesophageal reflux disease without esophagitis  -     Case Request; Standing  -     Type and screen; Future  -     sodium chloride 0.9 % flush 1-10 mL; Infuse 1-10 mL into a venous catheter As Needed for Line Care.  -     ceFAZolin (ANCEF) 2 g in sodium chloride 0.9 % 100 mL IVPB; Infuse 2 g into a venous catheter 1 (One) Time.  -     acetaminophen (TYLENOL) tablet 975 mg; Take 3 tablets by mouth 1 (One) Time.  -     meloxicam (MOBIC) tablet 15 mg; Take 2 tablets by mouth 1 (One) Time.  -     pregabalin (LYRICA) capsule 75 mg; Take 3 capsules by mouth 1 (One) Time.  -     oxyCODONE (oxyCONTIN) 12 hr tablet 10 mg; Take 1 tablet by mouth 1 (One) Time.  -     Tranexamic Acid 1,000 mg in sodium chloride 0.9 % 100 mL; Infuse 1,000 mg into a venous catheter 1 (One) Time.  -     Tranexamic Acid 1,000 mg in sodium chloride 0.9 % 100 mL; Infuse 1,000 mg into a venous catheter 1 (One) Time.    Other orders  -     Outpatient In A Bed; Standing  -     Follow Anesthesia Guidelines / Standing Orders; Future  -     Orthopedic Discharge Planning for PT & Case Management - Inpatient With Post-Op Day 2 or 3 Discharge  -     Provide instructions to patient regarding NPO status  -     Follow Anesthesia Guidelines / Standing Orders; Standing  -     Verify NPO Status; Standing  -     GLENYS hose- To be placed on patient in pre-op; Standing  -     POC Glucose Once;  Standing  -     Clip operative site; Standing  -     Obtain informed consent (if not collected inpatient or PAT); Standing  -     MRSA Screen, PCR - Swab, Nares; Standing  -     Insert Peripheral IV; Standing  -     Saline Lock & Maintain IV Access; Standing  -     PT Consult: Eval & Treat; Standing  -     OT Consult: Eval & Treat ADL's, mobility; Standing  -     Inpatient Consult to Case Management ; Standing          PLAN  Discussed the patient's BMI with him. BMI is within normal parameters. No follow-up required.    The patient voiced understanding of the risks, benefits, and alternative forms of treatment that were discussed and the patient consents to proceed with surgery.  All risks, benefits and alternatives were discussed.  Risks including to but not exclusive to anesthetic complications, including death, MI, CVA, infection, bleeding DVT, fracture, residual pain and need for future surgery.  This discussion was held with the patient by Jeramie Rai MD and all questions were answered.    Long discussion was held patient regarding treatment options.  ACL reconstruction at 57 years old is not feasible especially with mild arthritic changes in the left knee.  He is unhappy with his activities of daily living and is unhappy with his stability.  He is unable to do many of the activities that he desires to do.  We discussed possibility of total knee arthroplasty.  Even though his arthritic change was not significant on the x-ray, bracing has not been sufficient and he has continued having pain.  We discussed proceeding with total knee arthroplasty on the left.    Good candidate for TXA    Will need TC-3 as backup in case ligamentous stability is lacking.    Return for Post-operative eval.    Jeramie Rai MD

## 2018-03-28 RX ORDER — MELOXICAM 7.5 MG/1
15 TABLET ORAL ONCE
Status: CANCELLED | OUTPATIENT
Start: 2018-04-30 | End: 2018-04-30

## 2018-03-28 RX ORDER — PREGABALIN 75 MG/1
75 CAPSULE ORAL ONCE
Status: CANCELLED | OUTPATIENT
Start: 2018-04-30 | End: 2018-04-30

## 2018-03-28 RX ORDER — ACETAMINOPHEN 500 MG
1000 TABLET ORAL ONCE
Status: CANCELLED | OUTPATIENT
Start: 2018-04-30 | End: 2018-04-30

## 2018-03-28 RX ORDER — SODIUM CHLORIDE 0.9 % (FLUSH) 0.9 %
1-10 SYRINGE (ML) INJECTION AS NEEDED
Status: CANCELLED | OUTPATIENT
Start: 2018-04-30

## 2018-03-28 RX ORDER — OXYCODONE HCL 10 MG/1
10 TABLET, FILM COATED, EXTENDED RELEASE ORAL ONCE
Status: CANCELLED | OUTPATIENT
Start: 2018-04-30 | End: 2018-04-30

## 2018-04-03 PROBLEM — K21.9 GASTROESOPHAGEAL REFLUX DISEASE WITHOUT ESOPHAGITIS: Status: ACTIVE | Noted: 2018-04-03

## 2018-04-03 PROBLEM — J44.9 CHRONIC OBSTRUCTIVE PULMONARY DISEASE: Status: ACTIVE | Noted: 2018-04-03

## 2018-04-11 ENCOUNTER — OFFICE VISIT (OUTPATIENT)
Dept: FAMILY MEDICINE CLINIC | Facility: CLINIC | Age: 58
End: 2018-04-11

## 2018-04-11 VITALS
OXYGEN SATURATION: 98 % | HEART RATE: 104 BPM | HEIGHT: 73 IN | DIASTOLIC BLOOD PRESSURE: 90 MMHG | WEIGHT: 177 LBS | BODY MASS INDEX: 23.46 KG/M2 | SYSTOLIC BLOOD PRESSURE: 120 MMHG

## 2018-04-11 DIAGNOSIS — I10 ESSENTIAL HYPERTENSION: Primary | ICD-10-CM

## 2018-04-11 PROCEDURE — 99213 OFFICE O/P EST LOW 20 MIN: CPT | Performed by: FAMILY MEDICINE

## 2018-04-11 NOTE — PROGRESS NOTES
"Subjective   Zak Lutz is a 57 y.o. male.     History of Present Illness  Patient is a 57-year-old -American male that is presenting today as a follow-up for hypertension.  Patient previous visit was started on HCTZ 12.5 mg tablet daily as well as Norvasc 5 mg tablet daily.  Patient states that he's been checking his systolic blood pressure home and has been ranged from 120 to 130 this diastolic ranging from 80-90.  Patient's coming in because he was concerned that his blood pressure was too low.  Patient denies any symptoms such as lightheadedness, dizziness on standing, or any headaches at this time.  Patient mentions  left knee arthritis and has been following up with orthopedic surgery.  Patient has failed a trial of injections as well as physical therapy and has been scheduled for a total left knee replacement at the end of this month.  Patient reports no other acute complaints at this time.  The following portions of the patient's history were reviewed and updated as appropriate: allergies, current medications, past family history, past medical history, past social history, past surgical history and problem list.    Review of Systems      Constitutional: no weight loss, fever, chills, weakness  HEENT: no visual loss, blurred vision, double vision, yellow scalarea  Skin: no rash, no discoloration   CVS: no chest pain, palpitations  Chest: no shortness of air, cough, dyspnea  GI: no abdominal pain, blood in stool, no nausea, vomiting, diarrhea  : no burning in urination, change in odor, no change in frequency  Neuro: no headache, dizziness, syncope, paralysis, ataxia, numbness  Musculoskeletal: no muscle, back pain, joint pain, stiffness  Lymphatics: no enlarged nodes  Endo: no reports of sweating, cold or heat intolerance, no polyuria      Objective   Physical Exam  /90 (BP Location: Left arm, Patient Position: Sitting, Cuff Size: Adult)   Pulse 104   Ht 185.4 cm (73\")   Wt 80.3 kg " (177 lb)   SpO2 98%   BMI 23.35 kg/m²       GENERAL APPEARANCE: Well developed, well nourished, in no acute distress.  SKIN: Inspection of the skin reveals no rashes, ulcerations or petechiae.  HEENT: The sclerae were anicteric and conjunctivae were pink and moist. Extraocular movements were intact and pupils were equal, round  LUNGS: Auscultation of the lungs revealed normal breath sounds without any other adventitious sounds or rubs.  CARDIOVASCULAR: There was a regular rate and rhythm without any murmurs, gallops, rubs.  EXTREMITIES: No cyanosis, clubbing or edema.  NEUROLOGIC: Alert and oriented x 3. Normal affect. Gait was normal.    Assessment/Plan   Zak was seen today for hypertension.    Diagnoses and all orders for this visit:    Essential hypertension             This document has been electronically signed by Nayely Cedillo MD on April 11, 2018 10:37 AM

## 2018-04-18 ENCOUNTER — OFFICE VISIT (OUTPATIENT)
Dept: ORTHOPEDIC SURGERY | Facility: CLINIC | Age: 58
End: 2018-04-18

## 2018-04-18 ENCOUNTER — APPOINTMENT (OUTPATIENT)
Dept: PREADMISSION TESTING | Facility: HOSPITAL | Age: 58
End: 2018-04-18

## 2018-04-18 VITALS
HEIGHT: 73 IN | OXYGEN SATURATION: 98 % | RESPIRATION RATE: 16 BRPM | BODY MASS INDEX: 24.65 KG/M2 | DIASTOLIC BLOOD PRESSURE: 68 MMHG | SYSTOLIC BLOOD PRESSURE: 110 MMHG | WEIGHT: 186 LBS | HEART RATE: 87 BPM

## 2018-04-18 VITALS — BODY MASS INDEX: 23.86 KG/M2 | WEIGHT: 180 LBS | HEIGHT: 73 IN

## 2018-04-18 DIAGNOSIS — M17.0 PRIMARY OSTEOARTHRITIS OF BOTH KNEES: ICD-10-CM

## 2018-04-18 DIAGNOSIS — M71.22 BAKER'S CYST OF KNEE, LEFT: ICD-10-CM

## 2018-04-18 DIAGNOSIS — G89.29 CHRONIC PAIN OF LEFT KNEE: ICD-10-CM

## 2018-04-18 DIAGNOSIS — M94.262 CHONDROMALACIA, LEFT KNEE: ICD-10-CM

## 2018-04-18 DIAGNOSIS — I10 ESSENTIAL HYPERTENSION: ICD-10-CM

## 2018-04-18 DIAGNOSIS — M23.92 INTERNAL DERANGEMENT OF KNEE, LEFT: Primary | ICD-10-CM

## 2018-04-18 DIAGNOSIS — M23.92 INTERNAL DERANGEMENT OF KNEE, LEFT: ICD-10-CM

## 2018-04-18 DIAGNOSIS — K21.9 GASTROESOPHAGEAL REFLUX DISEASE WITHOUT ESOPHAGITIS: ICD-10-CM

## 2018-04-18 DIAGNOSIS — M25.562 CHRONIC PAIN OF LEFT KNEE: ICD-10-CM

## 2018-04-18 DIAGNOSIS — J44.9 CHRONIC OBSTRUCTIVE PULMONARY DISEASE, UNSPECIFIED COPD TYPE (HCC): ICD-10-CM

## 2018-04-18 LAB
ABO GROUP BLD: NORMAL
BLD GP AB SCN SERPL QL: NEGATIVE
DEPRECATED RDW RBC AUTO: 45 FL (ref 35.1–43.9)
ERYTHROCYTE [DISTWIDTH] IN BLOOD BY AUTOMATED COUNT: 13.9 % (ref 11.5–14.5)
HCT VFR BLD AUTO: 43.6 % (ref 39–49)
HGB BLD-MCNC: 14.9 G/DL (ref 13.7–17.3)
Lab: NORMAL
MCH RBC QN AUTO: 30.1 PG (ref 26.5–34)
MCHC RBC AUTO-ENTMCNC: 34.2 G/DL (ref 31.5–36.3)
MCV RBC AUTO: 88.1 FL (ref 80–98)
MRSA DNA SPEC QL NAA+PROBE: NEGATIVE
PLATELET # BLD AUTO: 239 10*3/MM3 (ref 150–450)
PMV BLD AUTO: 10.2 FL (ref 8–12)
RBC # BLD AUTO: 4.95 10*6/MM3 (ref 4.37–5.74)
RH BLD: POSITIVE
T&S EXPIRATION DATE: NORMAL
WBC NRBC COR # BLD: 7.44 10*3/MM3 (ref 3.2–9.8)

## 2018-04-18 PROCEDURE — 85027 COMPLETE CBC AUTOMATED: CPT | Performed by: ANESTHESIOLOGY

## 2018-04-18 PROCEDURE — 86901 BLOOD TYPING SEROLOGIC RH(D): CPT | Performed by: ORTHOPAEDIC SURGERY

## 2018-04-18 PROCEDURE — 86850 RBC ANTIBODY SCREEN: CPT | Performed by: ORTHOPAEDIC SURGERY

## 2018-04-18 PROCEDURE — 99214 OFFICE O/P EST MOD 30 MIN: CPT | Performed by: NURSE PRACTITIONER

## 2018-04-18 PROCEDURE — 93010 ELECTROCARDIOGRAM REPORT: CPT | Performed by: INTERNAL MEDICINE

## 2018-04-18 PROCEDURE — 87641 MR-STAPH DNA AMP PROBE: CPT | Performed by: ORTHOPAEDIC SURGERY

## 2018-04-18 PROCEDURE — 93005 ELECTROCARDIOGRAM TRACING: CPT

## 2018-04-18 PROCEDURE — 86900 BLOOD TYPING SEROLOGIC ABO: CPT | Performed by: ORTHOPAEDIC SURGERY

## 2018-04-18 PROCEDURE — 36415 COLL VENOUS BLD VENIPUNCTURE: CPT

## 2018-04-18 RX ORDER — SODIUM CHLORIDE, SODIUM GLUCONATE, SODIUM ACETATE, POTASSIUM CHLORIDE, AND MAGNESIUM CHLORIDE 526; 502; 368; 37; 30 MG/100ML; MG/100ML; MG/100ML; MG/100ML; MG/100ML
1000 INJECTION, SOLUTION INTRAVENOUS CONTINUOUS
Status: CANCELLED | OUTPATIENT
Start: 2018-04-30

## 2018-04-18 RX ORDER — MELOXICAM 15 MG/1
15 TABLET ORAL DAILY
COMMUNITY
End: 2018-05-02 | Stop reason: HOSPADM

## 2018-04-18 RX ORDER — RANITIDINE 150 MG/1
150 TABLET ORAL DAILY
COMMUNITY
End: 2018-05-16 | Stop reason: SDUPTHER

## 2018-04-18 ASSESSMENT — KOOS JR
KOOS JR SCORE: 21
KOOS JR SCORE: 34.174

## 2018-04-18 NOTE — PAT
Dr. Douglas here to review EKG and talk with patient. No new orders received. Chlorhexidine cloth instructions given, patient voiced understanding.

## 2018-04-18 NOTE — DISCHARGE INSTRUCTIONS
[facility]  Pre-op Information and Guidelines    You will be called after 2 p.m. the day before your surgery (Friday for Monday surgery) and notified of your time for arrival and approximate surgery time.  If you have not received a call by 4P.M., please contact Same Day Surgery at [phone] of if outside [facility] call [phone].    Please Follow these Important Safety Guidelines:    • The morning of your procedure, take only the medications listed below with   A sip of water:_____________________________________________       AMLODIPINE, GABAPENTIN, RANITIDINE___________    • DO NOT eat or drink anything after 12:00 midnight the night before surgery  Specific instructions concerning drinking clear liquids will be discussed during  the pre-surgery instruction call the day before your surgery.    • If you take a blood thinner (ex. Plavix, Coumadin, aspirin), ask your doctor when to stop it before surgery  STOP DATE: _________________    • Only 2 visitors are allowed in patient rooms at a time  Your visitors will be asked to wait in the lobby until the admission process is complete with the exception of a parent with a child and patients in need of special assistance.    • YOU CANNOT DRIVE YOURSELF HOME  You must be accompanied by someone who will be responsible for driving you home after surgery and for your care at home.    • DO NOT chew gum, use breath mints, hard candy, or smoke the day of surgery  • DO NOT drink alcohol for at least 24 hours before your surgery  • DO NOT wear any jewelry and remove all body piercing before coming to the hospital  • DO NOT wear make-up to the hospital  • If you are having surgery on an extremity (arm/leg/foot) remove nail polish/artificial nails on the surgical side  • Clothing, glasses, contacts, dentures, and hairpieces must be removed before surgery  • Bathe the night before or the morning of your surgery and do not use powders/lotions on  skin.
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
## 2018-04-23 ENCOUNTER — TELEPHONE (OUTPATIENT)
Dept: ORTHOPEDIC SURGERY | Facility: CLINIC | Age: 58
End: 2018-04-23

## 2018-04-24 ENCOUNTER — TELEPHONE (OUTPATIENT)
Dept: FAMILY MEDICINE CLINIC | Facility: CLINIC | Age: 58
End: 2018-04-24

## 2018-04-25 ENCOUNTER — TELEPHONE (OUTPATIENT)
Dept: FAMILY MEDICINE CLINIC | Facility: CLINIC | Age: 58
End: 2018-04-25

## 2018-04-25 RX ORDER — GABAPENTIN 300 MG/1
300 CAPSULE ORAL DAILY
Qty: 30 CAPSULE | Refills: 2 | Status: SHIPPED | OUTPATIENT
Start: 2018-04-25 | End: 2018-05-09 | Stop reason: SDUPTHER

## 2018-04-25 NOTE — TELEPHONE ENCOUNTER
[facility] called for refills on Gabapentin, Ranitidine, and Fluticasone      They sent over faxes as well for these.

## 2018-04-28 ENCOUNTER — ANESTHESIA EVENT (OUTPATIENT)
Dept: PERIOP | Facility: HOSPITAL | Age: 58
End: 2018-04-28

## 2018-04-29 RX ORDER — MELOXICAM 15 MG/1
15 TABLET ORAL ONCE
Status: COMPLETED | OUTPATIENT
Start: 2018-04-29 | End: 2018-04-30

## 2018-04-30 ENCOUNTER — APPOINTMENT (OUTPATIENT)
Dept: GENERAL RADIOLOGY | Facility: HOSPITAL | Age: 58
End: 2018-04-30

## 2018-04-30 ENCOUNTER — ANESTHESIA (OUTPATIENT)
Dept: PERIOP | Facility: HOSPITAL | Age: 58
End: 2018-04-30

## 2018-04-30 ENCOUNTER — HOSPITAL ENCOUNTER (INPATIENT)
Facility: HOSPITAL | Age: 58
LOS: 2 days | Discharge: HOME | End: 2018-05-02
Attending: ORTHOPAEDIC SURGERY | Admitting: ORTHOPAEDIC SURGERY

## 2018-04-30 DIAGNOSIS — M71.22 BAKER'S CYST OF KNEE, LEFT: ICD-10-CM

## 2018-04-30 DIAGNOSIS — G89.29 CHRONIC PAIN OF LEFT KNEE: ICD-10-CM

## 2018-04-30 DIAGNOSIS — M25.562 CHRONIC PAIN OF LEFT KNEE: ICD-10-CM

## 2018-04-30 DIAGNOSIS — Z78.9 IMPAIRED MOBILITY AND ADLS: ICD-10-CM

## 2018-04-30 DIAGNOSIS — M23.92 INTERNAL DERANGEMENT OF KNEE, LEFT: ICD-10-CM

## 2018-04-30 DIAGNOSIS — K21.9 GASTROESOPHAGEAL REFLUX DISEASE WITHOUT ESOPHAGITIS: ICD-10-CM

## 2018-04-30 DIAGNOSIS — F17.200 TOBACCO DEPENDENCE SYNDROME: ICD-10-CM

## 2018-04-30 DIAGNOSIS — M94.262 CHONDROMALACIA, LEFT KNEE: ICD-10-CM

## 2018-04-30 DIAGNOSIS — Z74.09 IMPAIRED MOBILITY AND ADLS: ICD-10-CM

## 2018-04-30 DIAGNOSIS — J44.9 CHRONIC OBSTRUCTIVE PULMONARY DISEASE, UNSPECIFIED COPD TYPE (HCC): ICD-10-CM

## 2018-04-30 DIAGNOSIS — M17.0 PRIMARY OSTEOARTHRITIS OF BOTH KNEES: Primary | ICD-10-CM

## 2018-04-30 DIAGNOSIS — I10 ESSENTIAL HYPERTENSION: ICD-10-CM

## 2018-04-30 DIAGNOSIS — Z74.09 IMPAIRED PHYSICAL MOBILITY: ICD-10-CM

## 2018-04-30 DIAGNOSIS — Z79.01 ANTICOAGULANT LONG-TERM USE: ICD-10-CM

## 2018-04-30 LAB
ABO GROUP BLD: NORMAL
BLD GP AB SCN SERPL QL: NEGATIVE
HCT VFR BLD AUTO: 42.4 % (ref 39–49)
HGB BLD-MCNC: 14.2 G/DL (ref 13.7–17.3)
Lab: NORMAL
RH BLD: POSITIVE
T&S EXPIRATION DATE: NORMAL

## 2018-04-30 PROCEDURE — 97162 PT EVAL MOD COMPLEX 30 MIN: CPT | Performed by: PHYSICAL THERAPIST

## 2018-04-30 PROCEDURE — 25010000002 FENTANYL CITRATE (PF) 100 MCG/2ML SOLUTION: Performed by: NURSE ANESTHETIST, CERTIFIED REGISTERED

## 2018-04-30 PROCEDURE — G8988 SELF CARE GOAL STATUS: HCPCS

## 2018-04-30 PROCEDURE — 25010000003 CEFAZOLIN PER 500 MG: Performed by: ORTHOPAEDIC SURGERY

## 2018-04-30 PROCEDURE — 88305 TISSUE EXAM BY PATHOLOGIST: CPT | Performed by: PATHOLOGY

## 2018-04-30 PROCEDURE — C1776 JOINT DEVICE (IMPLANTABLE): HCPCS | Performed by: ORTHOPAEDIC SURGERY

## 2018-04-30 PROCEDURE — 25010000002 PROPOFOL 1000 MG/ML EMULSION: Performed by: NURSE ANESTHETIST, CERTIFIED REGISTERED

## 2018-04-30 PROCEDURE — 85014 HEMATOCRIT: CPT | Performed by: ORTHOPAEDIC SURGERY

## 2018-04-30 PROCEDURE — G8978 MOBILITY CURRENT STATUS: HCPCS | Performed by: PHYSICAL THERAPIST

## 2018-04-30 PROCEDURE — 27447 TOTAL KNEE ARTHROPLASTY: CPT | Performed by: ORTHOPAEDIC SURGERY

## 2018-04-30 PROCEDURE — 97535 SELF CARE MNGMENT TRAINING: CPT

## 2018-04-30 PROCEDURE — 73560 X-RAY EXAM OF KNEE 1 OR 2: CPT

## 2018-04-30 PROCEDURE — 97530 THERAPEUTIC ACTIVITIES: CPT

## 2018-04-30 PROCEDURE — G8987 SELF CARE CURRENT STATUS: HCPCS

## 2018-04-30 PROCEDURE — 86900 BLOOD TYPING SEROLOGIC ABO: CPT | Performed by: ANESTHESIOLOGY

## 2018-04-30 PROCEDURE — 0SRD0J9 REPLACEMENT OF LEFT KNEE JOINT WITH SYNTHETIC SUBSTITUTE, CEMENTED, OPEN APPROACH: ICD-10-PCS | Performed by: ORTHOPAEDIC SURGERY

## 2018-04-30 PROCEDURE — G8979 MOBILITY GOAL STATUS: HCPCS | Performed by: PHYSICAL THERAPIST

## 2018-04-30 PROCEDURE — 97165 OT EVAL LOW COMPLEX 30 MIN: CPT

## 2018-04-30 PROCEDURE — 86850 RBC ANTIBODY SCREEN: CPT | Performed by: ANESTHESIOLOGY

## 2018-04-30 PROCEDURE — 97530 THERAPEUTIC ACTIVITIES: CPT | Performed by: PHYSICAL THERAPIST

## 2018-04-30 PROCEDURE — C1713 ANCHOR/SCREW BN/BN,TIS/BN: HCPCS | Performed by: ORTHOPAEDIC SURGERY

## 2018-04-30 PROCEDURE — 94760 N-INVAS EAR/PLS OXIMETRY 1: CPT

## 2018-04-30 PROCEDURE — 25010000002 MIDAZOLAM PER 1 MG: Performed by: NURSE ANESTHETIST, CERTIFIED REGISTERED

## 2018-04-30 PROCEDURE — 88305 TISSUE EXAM BY PATHOLOGIST: CPT | Performed by: ORTHOPAEDIC SURGERY

## 2018-04-30 PROCEDURE — 25010000002 HYDROMORPHONE PER 4 MG: Performed by: ORTHOPAEDIC SURGERY

## 2018-04-30 PROCEDURE — 85018 HEMOGLOBIN: CPT | Performed by: ORTHOPAEDIC SURGERY

## 2018-04-30 PROCEDURE — 94799 UNLISTED PULMONARY SVC/PX: CPT

## 2018-04-30 PROCEDURE — 86901 BLOOD TYPING SEROLOGIC RH(D): CPT | Performed by: ANESTHESIOLOGY

## 2018-04-30 DEVICE — ATTUNE KNEE SYSTEM TIBIAL BASE ROTATING PLATFORM SIZE 7 CEMENTED
Type: IMPLANTABLE DEVICE | Status: FUNCTIONAL
Brand: ATTUNE

## 2018-04-30 DEVICE — SMARTSET HV HIGH VISCOSITY BONE CEMENT 40G
Type: IMPLANTABLE DEVICE | Status: FUNCTIONAL
Brand: SMARTSET

## 2018-04-30 DEVICE — ATTUNE KNEE SYSTEM TIBIAL INSERT ROTATING PLATFORM POSTERIOR STABILIZED 8 6MM AOX
Type: IMPLANTABLE DEVICE | Status: FUNCTIONAL
Brand: ATTUNE

## 2018-04-30 DEVICE — ATTUNE KNEE SYSTEM FEMORAL POSTERIOR STABILIZED SIZE 8 LEFT CEMENTED
Type: IMPLANTABLE DEVICE | Status: FUNCTIONAL
Brand: ATTUNE

## 2018-04-30 DEVICE — TOTL KN ATTUNE DEPUY 9527038: Type: IMPLANTABLE DEVICE | Status: FUNCTIONAL

## 2018-04-30 DEVICE — ATTUNE PATELLA MEDIALIZED DOME 38MM CEMENTED AOX
Type: IMPLANTABLE DEVICE | Status: FUNCTIONAL
Brand: ATTUNE

## 2018-04-30 RX ORDER — ACETAMINOPHEN 650 MG/1
650 SUPPOSITORY RECTAL ONCE AS NEEDED
Status: DISCONTINUED | OUTPATIENT
Start: 2018-04-30 | End: 2018-04-30 | Stop reason: HOSPADM

## 2018-04-30 RX ORDER — FLUTICASONE PROPIONATE 50 MCG
2 SPRAY, SUSPENSION (ML) NASAL NIGHTLY
Status: DISCONTINUED | OUTPATIENT
Start: 2018-04-30 | End: 2018-05-02 | Stop reason: HOSPADM

## 2018-04-30 RX ORDER — SODIUM CHLORIDE, SODIUM GLUCONATE, SODIUM ACETATE, POTASSIUM CHLORIDE, AND MAGNESIUM CHLORIDE 526; 502; 368; 37; 30 MG/100ML; MG/100ML; MG/100ML; MG/100ML; MG/100ML
1000 INJECTION, SOLUTION INTRAVENOUS CONTINUOUS
Status: ACTIVE | OUTPATIENT
Start: 2018-04-30 | End: 2018-05-01

## 2018-04-30 RX ORDER — OXYCODONE HCL 10 MG/1
10 TABLET, FILM COATED, EXTENDED RELEASE ORAL ONCE
Status: COMPLETED | OUTPATIENT
Start: 2018-04-30 | End: 2018-04-30

## 2018-04-30 RX ORDER — ONDANSETRON 2 MG/ML
4 INJECTION INTRAMUSCULAR; INTRAVENOUS ONCE AS NEEDED
Status: DISCONTINUED | OUTPATIENT
Start: 2018-04-30 | End: 2018-04-30 | Stop reason: HOSPADM

## 2018-04-30 RX ORDER — WARFARIN SODIUM 5 MG/1
5 TABLET ORAL
Status: DISCONTINUED | OUTPATIENT
Start: 2018-04-30 | End: 2018-04-30

## 2018-04-30 RX ORDER — WARFARIN SODIUM 4 MG/1
8 TABLET ORAL
Status: COMPLETED | OUTPATIENT
Start: 2018-04-30 | End: 2018-04-30

## 2018-04-30 RX ORDER — PREGABALIN 75 MG/1
75 CAPSULE ORAL ONCE
Status: COMPLETED | OUTPATIENT
Start: 2018-04-30 | End: 2018-04-30

## 2018-04-30 RX ORDER — BISACODYL 5 MG/1
10 TABLET, DELAYED RELEASE ORAL DAILY PRN
Status: DISCONTINUED | OUTPATIENT
Start: 2018-05-01 | End: 2018-05-02 | Stop reason: HOSPADM

## 2018-04-30 RX ORDER — DIPHENHYDRAMINE HYDROCHLORIDE 50 MG/ML
12.5 INJECTION INTRAMUSCULAR; INTRAVENOUS
Status: DISCONTINUED | OUTPATIENT
Start: 2018-04-30 | End: 2018-04-30 | Stop reason: HOSPADM

## 2018-04-30 RX ORDER — FERROUS SULFATE TAB EC 324 MG (65 MG FE EQUIVALENT) 324 (65 FE) MG
324 TABLET DELAYED RESPONSE ORAL
Status: DISCONTINUED | OUTPATIENT
Start: 2018-04-30 | End: 2018-05-02 | Stop reason: HOSPADM

## 2018-04-30 RX ORDER — FAMOTIDINE 40 MG/1
40 TABLET, FILM COATED ORAL DAILY
Status: DISCONTINUED | OUTPATIENT
Start: 2018-04-30 | End: 2018-05-02 | Stop reason: HOSPADM

## 2018-04-30 RX ORDER — ACETAMINOPHEN 500 MG
1000 TABLET ORAL ONCE
Status: COMPLETED | OUTPATIENT
Start: 2018-04-30 | End: 2018-04-30

## 2018-04-30 RX ORDER — BACTERIOSTATIC SODIUM CHLORIDE 0.9 %
VIAL (ML) INJECTION AS NEEDED
Status: DISCONTINUED | OUTPATIENT
Start: 2018-04-30 | End: 2018-05-02 | Stop reason: HOSPADM

## 2018-04-30 RX ORDER — DOCUSATE SODIUM 100 MG/1
100 CAPSULE, LIQUID FILLED ORAL 2 TIMES DAILY PRN
Status: DISCONTINUED | OUTPATIENT
Start: 2018-04-30 | End: 2018-05-02 | Stop reason: HOSPADM

## 2018-04-30 RX ORDER — ALBUTEROL SULFATE 2.5 MG/3ML
SOLUTION RESPIRATORY (INHALATION) EVERY 4 HOURS PRN
Status: DISCONTINUED | OUTPATIENT
Start: 2018-04-30 | End: 2018-05-02 | Stop reason: HOSPADM

## 2018-04-30 RX ORDER — OXYCODONE HCL 10 MG/1
10 TABLET, FILM COATED, EXTENDED RELEASE ORAL EVERY 12 HOURS SCHEDULED
Status: DISCONTINUED | OUTPATIENT
Start: 2018-04-30 | End: 2018-05-02 | Stop reason: HOSPADM

## 2018-04-30 RX ORDER — SODIUM CHLORIDE 0.9 % (FLUSH) 0.9 %
1-10 SYRINGE (ML) INJECTION AS NEEDED
Status: DISCONTINUED | OUTPATIENT
Start: 2018-04-30 | End: 2018-05-02 | Stop reason: HOSPADM

## 2018-04-30 RX ORDER — FLUMAZENIL 0.1 MG/ML
0.2 INJECTION INTRAVENOUS AS NEEDED
Status: DISCONTINUED | OUTPATIENT
Start: 2018-04-30 | End: 2018-04-30 | Stop reason: HOSPADM

## 2018-04-30 RX ORDER — CLINDAMYCIN PHOSPHATE 600 MG/50ML
600 INJECTION INTRAVENOUS EVERY 8 HOURS
Status: COMPLETED | OUTPATIENT
Start: 2018-04-30 | End: 2018-04-30

## 2018-04-30 RX ORDER — WARFARIN SODIUM 5 MG/1
5 TABLET ORAL
Status: DISCONTINUED | OUTPATIENT
Start: 2018-05-01 | End: 2018-05-02 | Stop reason: HOSPADM

## 2018-04-30 RX ORDER — ACETAMINOPHEN 500 MG
1000 TABLET ORAL 4 TIMES DAILY
Status: DISCONTINUED | OUTPATIENT
Start: 2018-04-30 | End: 2018-05-02 | Stop reason: HOSPADM

## 2018-04-30 RX ORDER — ONDANSETRON 4 MG/1
4 TABLET, FILM COATED ORAL EVERY 6 HOURS PRN
Status: DISCONTINUED | OUTPATIENT
Start: 2018-04-30 | End: 2018-05-02 | Stop reason: HOSPADM

## 2018-04-30 RX ORDER — LABETALOL HYDROCHLORIDE 5 MG/ML
5 INJECTION, SOLUTION INTRAVENOUS
Status: DISCONTINUED | OUTPATIENT
Start: 2018-04-30 | End: 2018-04-30 | Stop reason: HOSPADM

## 2018-04-30 RX ORDER — FENTANYL CITRATE 50 UG/ML
INJECTION, SOLUTION INTRAMUSCULAR; INTRAVENOUS AS NEEDED
Status: DISCONTINUED | OUTPATIENT
Start: 2018-04-30 | End: 2018-04-30 | Stop reason: SURG

## 2018-04-30 RX ORDER — BACITRACIN 50000 [IU]/1
INJECTION, POWDER, FOR SOLUTION INTRAMUSCULAR AS NEEDED
Status: DISCONTINUED | OUTPATIENT
Start: 2018-04-30 | End: 2018-05-02 | Stop reason: HOSPADM

## 2018-04-30 RX ORDER — MEPERIDINE HYDROCHLORIDE 50 MG/ML
12.5 INJECTION INTRAMUSCULAR; INTRAVENOUS; SUBCUTANEOUS
Status: DISCONTINUED | OUTPATIENT
Start: 2018-04-30 | End: 2018-04-30 | Stop reason: HOSPADM

## 2018-04-30 RX ORDER — SODIUM CHLORIDE 0.9 % (FLUSH) 0.9 %
1-10 SYRINGE (ML) INJECTION AS NEEDED
Status: DISCONTINUED | OUTPATIENT
Start: 2018-04-30 | End: 2018-04-30 | Stop reason: HOSPADM

## 2018-04-30 RX ORDER — AMLODIPINE BESYLATE 5 MG/1
5 TABLET ORAL DAILY
Status: DISCONTINUED | OUTPATIENT
Start: 2018-05-01 | End: 2018-05-02 | Stop reason: HOSPADM

## 2018-04-30 RX ORDER — NICOTINE 21 MG/24HR
1 PATCH, TRANSDERMAL 24 HOURS TRANSDERMAL EVERY 24 HOURS
Status: DISCONTINUED | OUTPATIENT
Start: 2018-04-30 | End: 2018-05-02 | Stop reason: HOSPADM

## 2018-04-30 RX ORDER — GABAPENTIN 300 MG/1
300 CAPSULE ORAL DAILY
Status: DISCONTINUED | OUTPATIENT
Start: 2018-05-01 | End: 2018-05-02 | Stop reason: HOSPADM

## 2018-04-30 RX ORDER — NALOXONE HCL 0.4 MG/ML
0.2 VIAL (ML) INJECTION AS NEEDED
Status: DISCONTINUED | OUTPATIENT
Start: 2018-04-30 | End: 2018-04-30 | Stop reason: HOSPADM

## 2018-04-30 RX ORDER — SODIUM CHLORIDE 9 MG/ML
100 INJECTION, SOLUTION INTRAVENOUS CONTINUOUS
Status: DISCONTINUED | OUTPATIENT
Start: 2018-04-30 | End: 2018-05-02 | Stop reason: HOSPADM

## 2018-04-30 RX ORDER — EPHEDRINE SULFATE 50 MG/ML
5 INJECTION, SOLUTION INTRAVENOUS ONCE AS NEEDED
Status: DISCONTINUED | OUTPATIENT
Start: 2018-04-30 | End: 2018-04-30 | Stop reason: HOSPADM

## 2018-04-30 RX ORDER — NALOXONE HCL 0.4 MG/ML
0.1 VIAL (ML) INJECTION
Status: DISCONTINUED | OUTPATIENT
Start: 2018-04-30 | End: 2018-05-02 | Stop reason: HOSPADM

## 2018-04-30 RX ORDER — OXYCODONE HYDROCHLORIDE 5 MG/1
5 TABLET ORAL EVERY 4 HOURS PRN
Status: DISCONTINUED | OUTPATIENT
Start: 2018-04-30 | End: 2018-05-02 | Stop reason: HOSPADM

## 2018-04-30 RX ORDER — ONDANSETRON 4 MG/1
4 TABLET, ORALLY DISINTEGRATING ORAL EVERY 6 HOURS PRN
Status: DISCONTINUED | OUTPATIENT
Start: 2018-04-30 | End: 2018-05-02 | Stop reason: HOSPADM

## 2018-04-30 RX ORDER — MIDAZOLAM HYDROCHLORIDE 1 MG/ML
INJECTION INTRAMUSCULAR; INTRAVENOUS AS NEEDED
Status: DISCONTINUED | OUTPATIENT
Start: 2018-04-30 | End: 2018-04-30 | Stop reason: SURG

## 2018-04-30 RX ORDER — ONDANSETRON 2 MG/ML
4 INJECTION INTRAMUSCULAR; INTRAVENOUS EVERY 6 HOURS PRN
Status: DISCONTINUED | OUTPATIENT
Start: 2018-04-30 | End: 2018-05-02 | Stop reason: HOSPADM

## 2018-04-30 RX ORDER — ACETAMINOPHEN 325 MG/1
650 TABLET ORAL ONCE AS NEEDED
Status: DISCONTINUED | OUTPATIENT
Start: 2018-04-30 | End: 2018-04-30 | Stop reason: HOSPADM

## 2018-04-30 RX ADMIN — FENTANYL CITRATE 0.2 MCG: 50 INJECTION, SOLUTION INTRAMUSCULAR; INTRAVENOUS at 07:15

## 2018-04-30 RX ADMIN — OXYCODONE HYDROCHLORIDE 10 MG: 10 TABLET, FILM COATED, EXTENDED RELEASE ORAL at 18:00

## 2018-04-30 RX ADMIN — MIDAZOLAM 2 MG: 1 INJECTION INTRAMUSCULAR; INTRAVENOUS at 07:00

## 2018-04-30 RX ADMIN — CLINDAMYCIN IN 5 PERCENT DEXTROSE 600 MG: 12 INJECTION, SOLUTION INTRAVENOUS at 20:23

## 2018-04-30 RX ADMIN — OXYCODONE HYDROCHLORIDE 5 MG: 5 TABLET ORAL at 20:22

## 2018-04-30 RX ADMIN — ACETAMINOPHEN 1000 MG: 500 TABLET ORAL at 12:36

## 2018-04-30 RX ADMIN — PREGABALIN 75 MG: 75 CAPSULE ORAL at 05:55

## 2018-04-30 RX ADMIN — OXYCODONE HYDROCHLORIDE 10 MG: 10 TABLET, FILM COATED, EXTENDED RELEASE ORAL at 05:55

## 2018-04-30 RX ADMIN — ACETAMINOPHEN 1000 MG: 500 TABLET ORAL at 18:00

## 2018-04-30 RX ADMIN — TRANEXAMIC ACID 1000 MG: 100 INJECTION, SOLUTION INTRAVENOUS at 09:22

## 2018-04-30 RX ADMIN — TRANEXAMIC ACID 1000 MG: 100 INJECTION, SOLUTION INTRAVENOUS at 07:10

## 2018-04-30 RX ADMIN — CLINDAMYCIN IN 5 PERCENT DEXTROSE 600 MG: 12 INJECTION, SOLUTION INTRAVENOUS at 12:37

## 2018-04-30 RX ADMIN — MELOXICAM 15 MG: 15 TABLET ORAL at 05:55

## 2018-04-30 RX ADMIN — OXYCODONE HYDROCHLORIDE 5 MG: 5 TABLET ORAL at 16:28

## 2018-04-30 RX ADMIN — FENTANYL CITRATE 48 MCG: 50 INJECTION, SOLUTION INTRAMUSCULAR; INTRAVENOUS at 08:00

## 2018-04-30 RX ADMIN — NICOTINE 1 PATCH: 21 PATCH TRANSDERMAL at 12:37

## 2018-04-30 RX ADMIN — SODIUM CHLORIDE, SODIUM GLUCONATE, SODIUM ACETATE, POTASSIUM CHLORIDE, AND MAGNESIUM CHLORIDE: 526; 502; 368; 37; 30 INJECTION, SOLUTION INTRAVENOUS at 08:00

## 2018-04-30 RX ADMIN — CEFAZOLIN SODIUM 2 G: 1 INJECTION, POWDER, FOR SOLUTION INTRAMUSCULAR; INTRAVENOUS at 16:28

## 2018-04-30 RX ADMIN — WARFARIN SODIUM 8 MG: 4 TABLET ORAL at 18:05

## 2018-04-30 RX ADMIN — MEPERIDINE HYDROCHLORIDE 12.5 MG: 50 INJECTION INTRAMUSCULAR; INTRAVENOUS; SUBCUTANEOUS at 10:32

## 2018-04-30 RX ADMIN — MEPERIDINE HYDROCHLORIDE 12.5 MG: 50 INJECTION INTRAMUSCULAR; INTRAVENOUS; SUBCUTANEOUS at 10:47

## 2018-04-30 RX ADMIN — FERROUS SULFATE TAB EC 324 MG (65 MG FE EQUIVALENT) 324 MG: 324 (65 FE) TABLET DELAYED RESPONSE at 12:36

## 2018-04-30 RX ADMIN — SODIUM CHLORIDE, SODIUM GLUCONATE, SODIUM ACETATE, POTASSIUM CHLORIDE, AND MAGNESIUM CHLORIDE 1000 ML: 526; 502; 368; 37; 30 INJECTION, SOLUTION INTRAVENOUS at 05:54

## 2018-04-30 RX ADMIN — MEPERIDINE HYDROCHLORIDE 25 MG: 25 INJECTION, SOLUTION INTRAMUSCULAR; INTRAVENOUS; SUBCUTANEOUS at 10:12

## 2018-04-30 RX ADMIN — PROPOFOL 40 MCG/KG/MIN: 10 INJECTION, EMULSION INTRAVENOUS at 07:30

## 2018-04-30 RX ADMIN — ACETAMINOPHEN 1000 MG: 500 TABLET ORAL at 05:55

## 2018-04-30 RX ADMIN — HYDROMORPHONE HYDROCHLORIDE 0.5 MG: 1 INJECTION, SOLUTION INTRAMUSCULAR; INTRAVENOUS; SUBCUTANEOUS at 14:28

## 2018-04-30 RX ADMIN — ACETAMINOPHEN 1000 MG: 500 TABLET ORAL at 20:22

## 2018-04-30 RX ADMIN — OXYCODONE HYDROCHLORIDE 5 MG: 5 TABLET ORAL at 12:48

## 2018-04-30 RX ADMIN — FENTANYL CITRATE 50 MCG: 50 INJECTION, SOLUTION INTRAMUSCULAR; INTRAVENOUS at 07:30

## 2018-04-30 RX ADMIN — TRANEXAMIC ACID 1000 MG: 100 INJECTION, SOLUTION INTRAVENOUS at 05:55

## 2018-04-30 RX ADMIN — SODIUM CHLORIDE, SODIUM GLUCONATE, SODIUM ACETATE, POTASSIUM CHLORIDE, AND MAGNESIUM CHLORIDE: 526; 502; 368; 37; 30 INJECTION, SOLUTION INTRAVENOUS at 09:40

## 2018-04-30 RX ADMIN — CEFAZOLIN SODIUM 2 G: 1 INJECTION, POWDER, FOR SOLUTION INTRAMUSCULAR; INTRAVENOUS at 07:33

## 2018-04-30 RX ADMIN — CEFAZOLIN SODIUM 2 G: 1 INJECTION, POWDER, FOR SOLUTION INTRAMUSCULAR; INTRAVENOUS at 23:39

## 2018-04-30 RX ADMIN — FAMOTIDINE 40 MG: 40 TABLET ORAL at 12:36

## 2018-04-30 RX ADMIN — SODIUM CHLORIDE 100 ML/HR: 9 INJECTION, SOLUTION INTRAVENOUS at 23:39

## 2018-04-30 RX ADMIN — HYDROMORPHONE HYDROCHLORIDE 0.5 MG: 1 INJECTION, SOLUTION INTRAMUSCULAR; INTRAVENOUS; SUBCUTANEOUS at 23:39

## 2018-04-30 RX ADMIN — SODIUM CHLORIDE 100 ML/HR: 9 INJECTION, SOLUTION INTRAVENOUS at 12:37

## 2018-04-30 NOTE — ANESTHESIA POSTPROCEDURE EVALUATION
Patient: Zak Lutz    Procedure Summary     Date:  04/30/18 Room / Location:  [facility] OR [address] [place] [address] OR    Anesthesia Start:  0705 Anesthesia Stop:  1016    Procedure:  TOTAL KNEE ARTHROPLASTY ATTUNE with adductor canal block tc-3 for backup (Left ) Diagnosis:       Essential hypertension      Gastroesophageal reflux disease without esophagitis      Primary osteoarthritis of both knees      Chronic pain of left knee      Internal derangement of knee, left      Baker's cyst of knee, left      Chronic obstructive pulmonary disease, unspecified COPD type      Chondromalacia, left knee      (Essential hypertension [I10])      (Gastroesophageal reflux disease without esophagitis [K21.9])      (Primary osteoarthritis of both knees [M17.0])      (Chronic pain of left knee [M25.562, G89.29])      (Internal derangement of knee, left [M23.92])      (Baker's cyst of knee, left [M71.22])      (Chronic obstructive pulmonary disease, unspecified COPD type [J44.9])      (Chondromalacia, left knee [M94.262])    Surgeon:  Jeramie Rai MD Provider:  Pepe Hernandez MD    Anesthesia Type:  spinal, MAC ASA Status:  3          Anesthesia Type: spinal, MAC  Last vitals  BP   165/99 (04/30/18 0544)   Temp   97.2 °F (36.2 °C) (04/30/18 0544)   Pulse   85 (04/30/18 0544)   Resp   20 (04/30/18 0544)     SpO2   97 % (04/30/18 0544)     Post Anesthesia Care and Evaluation    Patient location during evaluation: PACU  Patient participation: complete - patient participated  Level of consciousness: awake and alert  Pain score: 0  Pain management: adequate  Airway patency: patent  Anesthetic complications: No anesthetic complications  PONV Status: none  Cardiovascular status: acceptable  Respiratory status: acceptable  Hydration status: acceptable

## 2018-04-30 NOTE — ANESTHESIA PROCEDURE NOTES
Spinal Block    Patient location during procedure: OR  Start Time: 4/30/2018 7:21 AM  Stop Time: 4/30/2018 7:29 AM  Preanesthetic Checklist  Completed: patient identified, site marked, surgical consent, pre-op evaluation, timeout performed, IV checked, risks and benefits discussed and monitors and equipment checked  Spinal Block Prep:  Patient Position:sitting  Sterile Tech:cap, gloves, mask and sterile barriers  Prep:Chloraprep  Patient Monitoring:blood pressure monitoring and continuous pulse oximetry  Spinal Block Procedure  Approach:midline  Location:L4-L5  Needle Type:Pencan  Needle Gauge:24 G  Placement of Spinal needle event:cerebrospinal fluid aspirated  Paresthesia: transient  Fluid Appearance:clear  Post Assessment  Patient Tolerance:patient tolerated the procedure well with no apparent complications  Complications no

## 2018-04-30 NOTE — ANESTHESIA PREPROCEDURE EVALUATION
Anesthesia Evaluation     Patient summary reviewed and Nursing notes reviewed   NPO Solid Status: > 8 hours  NPO Liquid Status: > 8 hours           Airway   Mallampati: II  TM distance: >3 FB  Neck ROM: full  possible difficult intubation  Dental    (+) poor dentation    Pulmonary - normal exam    breath sounds clear to auscultation  (+) a smoker Current Abstained day of surgery, COPD moderate, asthma, decreased breath sounds,   Cardiovascular - normal exam    ECG reviewed  Rhythm: regular  Rate: normal    (+) hypertension, hyperlipidemia,   (-) murmur, carotid bruits    ROS comment: Normal sinus rhythm  Nonspecific ST and T wave abnormality  Abnormal ECG  When compared with ECG of 04-APR-2016 12:07,  No significant change was found  Confirmed by IZZY VALENTIN, B. N. ([id number]),  SHAHBAZ SHERIFF ([id number]) on  4/19/2018 12:10:30 PM    Neuro/Psych  (+) numbness (Carpal tunnel and cervicalgia.),     GI/Hepatic/Renal/Endo    (+)  GERD well controlled,      Musculoskeletal     (+) neck pain,   Abdominal    Substance History - negative use     OB/GYN negative ob/gyn ROS         Other   (+) arthritis                     Anesthesia Plan    ASA 3     spinal   (Discussed peripheral nerve block(adductor canal) for post op pain relief and patient understands possible complications,risks and agrees.)  intravenous induction   Anesthetic plan and risks discussed with patient.

## 2018-04-30 NOTE — ANESTHESIA PROCEDURE NOTES
Peripheral Block    Patient location during procedure: OR  Start time: 4/30/2018 7:10 AM  Stop time: 4/30/2018 7:17 AM  Reason for block: at surgeon's request  Performed by  CRNA: RAGHU JAEGER  Preanesthetic Checklist  Completed: patient identified, site marked, surgical consent, pre-op evaluation, timeout performed, IV checked, risks and benefits discussed and monitors and equipment checked  Prep:  Pt Position: supine  Sterile barriers:cap, gloves, mask and sterile barriers  Prep: ChloraPrep  Patient monitoring: blood pressure monitoring and continuous pulse oximetry  Procedure  Sedation:no  Performed under: local infiltration  Guidance:ultrasound guided  ULTRASOUND INTERPRETATION.  Using ultrasound guidance a 22 G gauge needle was placed in close proximity to the femoral nerve, at which point, under ultrasound guidance anesthetic was injected in the area of the nerve and spread of the anesthesia was seen on ultrasound in close proximity thereto.  There were no abnormalities seen on ultrasound; a digital image was taken; and the patient tolerated the procedure with no complications. Images:still images obtained    Laterality:left  Block Type:adductor canal block  Injection Technique:single-shot  Needle Type:echogenic  Needle Gauge:22 G    Medications  Local Injected:ropivacaine 0.5% Local Amount Injected:30mL  Post Assessment  Injection Assessment: negative aspiration for heme  Patient Tolerance:comfortable throughout block  Complications:no

## 2018-05-01 LAB
INR PPP: 1.06 (ref 0.8–1.2)
LAB AP CASE REPORT: NORMAL
Lab: NORMAL
PATH REPORT.FINAL DX SPEC: NORMAL
PATH REPORT.GROSS SPEC: NORMAL
PROTHROMBIN TIME: 13.6 SECONDS (ref 11.1–15.3)

## 2018-05-01 PROCEDURE — 97530 THERAPEUTIC ACTIVITIES: CPT

## 2018-05-01 PROCEDURE — 99024 POSTOP FOLLOW-UP VISIT: CPT | Performed by: ORTHOPAEDIC SURGERY

## 2018-05-01 PROCEDURE — 85610 PROTHROMBIN TIME: CPT | Performed by: ORTHOPAEDIC SURGERY

## 2018-05-01 PROCEDURE — 94799 UNLISTED PULMONARY SVC/PX: CPT

## 2018-05-01 PROCEDURE — 25010000002 PNEUMOCOCCAL VAC POLYVALENT PER 0.5 ML: Performed by: ORTHOPAEDIC SURGERY

## 2018-05-01 PROCEDURE — 97110 THERAPEUTIC EXERCISES: CPT

## 2018-05-01 PROCEDURE — 90732 PPSV23 VACC 2 YRS+ SUBQ/IM: CPT | Performed by: ORTHOPAEDIC SURGERY

## 2018-05-01 PROCEDURE — G0009 ADMIN PNEUMOCOCCAL VACCINE: HCPCS | Performed by: ORTHOPAEDIC SURGERY

## 2018-05-01 PROCEDURE — 97535 SELF CARE MNGMENT TRAINING: CPT

## 2018-05-01 PROCEDURE — 97116 GAIT TRAINING THERAPY: CPT

## 2018-05-01 RX ORDER — OXYCODONE AND ACETAMINOPHEN 7.5; 325 MG/1; MG/1
1 TABLET ORAL EVERY 4 HOURS PRN
Qty: 40 TABLET | Refills: 0 | Status: SHIPPED | OUTPATIENT
Start: 2018-05-01 | End: 2018-05-16

## 2018-05-01 RX ADMIN — ACETAMINOPHEN 1000 MG: 500 TABLET ORAL at 08:10

## 2018-05-01 RX ADMIN — ACETAMINOPHEN 1000 MG: 500 TABLET ORAL at 12:44

## 2018-05-01 RX ADMIN — ACETAMINOPHEN 1000 MG: 500 TABLET ORAL at 20:25

## 2018-05-01 RX ADMIN — OXYCODONE HYDROCHLORIDE 10 MG: 10 TABLET, FILM COATED, EXTENDED RELEASE ORAL at 08:10

## 2018-05-01 RX ADMIN — FAMOTIDINE 40 MG: 40 TABLET ORAL at 08:10

## 2018-05-01 RX ADMIN — OXYCODONE HYDROCHLORIDE 5 MG: 5 TABLET ORAL at 01:38

## 2018-05-01 RX ADMIN — GABAPENTIN 300 MG: 300 CAPSULE ORAL at 08:10

## 2018-05-01 RX ADMIN — ACETAMINOPHEN 1000 MG: 500 TABLET ORAL at 17:43

## 2018-05-01 RX ADMIN — FLUTICASONE PROPIONATE 2 SPRAY: 50 SPRAY, METERED NASAL at 20:25

## 2018-05-01 RX ADMIN — PNEUMOCOCCAL VACCINE POLYVALENT 0.5 ML
25; 25; 25; 25; 25; 25; 25; 25; 25; 25; 25; 25; 25; 25; 25; 25; 25; 25; 25; 25; 25; 25; 25 INJECTION, SOLUTION INTRAMUSCULAR; SUBCUTANEOUS at 20:27

## 2018-05-01 RX ADMIN — FERROUS SULFATE TAB EC 324 MG (65 MG FE EQUIVALENT) 324 MG: 324 (65 FE) TABLET DELAYED RESPONSE at 08:10

## 2018-05-01 RX ADMIN — AMLODIPINE BESYLATE 5 MG: 5 TABLET ORAL at 08:10

## 2018-05-01 RX ADMIN — WARFARIN SODIUM 5 MG: 5 TABLET ORAL at 17:43

## 2018-05-01 RX ADMIN — NICOTINE 1 PATCH: 21 PATCH TRANSDERMAL at 12:35

## 2018-05-01 RX ADMIN — OXYCODONE HYDROCHLORIDE 5 MG: 5 TABLET ORAL at 05:37

## 2018-05-01 RX ADMIN — OXYCODONE HYDROCHLORIDE 10 MG: 10 TABLET, FILM COATED, EXTENDED RELEASE ORAL at 20:25

## 2018-05-01 RX ADMIN — OXYCODONE HYDROCHLORIDE 5 MG: 5 TABLET ORAL at 12:44

## 2018-05-01 NOTE — PROGRESS NOTES
Orthopedic Total Knee Progress Note      Patient: Zak Lutz  Date of Admission: 4/30/2018  YOB: 1960  Medical Record Number: [id number]    LOS: 1    Status Post: Procedure(s) (LRB):  TOTAL KNEE ARTHROPLASTY ATTUNE with adductor canal block tc-3 for backup (Left)        Systemic or Specific Complaints: No Complaints  Complications: None  Pain Relief: some relief    Physical Exam:  57 y.o. male alert and oriented  Temp:  [96.4 °F (35.8 °C)-99.4 °F (37.4 °C)] 98.9 °F (37.2 °C)  Heart Rate:  [60-90] 90  Resp:  [16-20] 18  BP: (125-168)/() 132/84    Abd: Soft, NT, with BS +  Ext: NV intact. ROM appropriate. Calf is soft and nontender. Negative Homans Sn  Skin: Incision clean dry and intact w/out signs or  symptoms of infection.    Activity: Mobilizing Per P.T.   Weight Bearing: As Tolerated    Data Review  Admission on 04/30/2018   Component Date Value Ref Range Status   • ABO Type 04/30/2018 O   Final   • RH type 04/30/2018 Positive   Final   • Antibody Screen 04/30/2018 Negative   Final   • T&S Expiration Date 04/30/2018 5/3/2018 11:59:59 PM   Final   • Previous History 04/30/2018 Previous Record on File   Final   • Hemoglobin 04/30/2018 14.2  13.7 - 17.3 g/dL Final   • Hematocrit 04/30/2018 42.4  39.0 - 49.0 % Final   • Protime 05/01/2018 13.6  11.1 - 15.3 Seconds Final   • INR 05/01/2018 1.06  0.80 - 1.20 Final       No results found.    Medications    acetaminophen 1,000 mg Oral 4x Daily   amLODIPine 5 mg Oral Daily   famotidine 40 mg Oral Daily   ferrous sulfate 324 mg Oral Daily With Breakfast   fluticasone 2 spray Nasal Nightly   gabapentin 300 mg Oral Daily   nicotine 1 patch Transdermal Q24H   oxyCODONE 10 mg Oral Q12H   warfarin 5 mg Oral Daily     albuterol  •  bacitracin  •  bisacodyl  •  docusate sodium  •  HYDROmorphone **AND** naloxone  •  ondansetron **OR** ondansetron ODT **OR** ondansetron  •  oxyCODONE  •  Pharmacy to dose warfarin  •  sodium chloride  •  sodium  chloride    Assessment:  Doing well following total knee replacement  Patient Active Problem List   Diagnosis   • Tobacco dependence syndrome   • Hyperlipidemia   • Gastroesophageal reflux disease   • Essential hypertension   • Degenerative joint disease involving multiple joints   • Chronic obstructive lung disease   • Allergic rhinitis   • Primary osteoarthritis of both knees   • Encounter for drug screening   • Internal derangement of knee, left   • Chronic pain of left knee   • Baker's cyst of knee, left   • Chondromalacia, left knee   • Gastroesophageal reflux disease without esophagitis   • Chronic obstructive pulmonary disease         Plan:   Continue efforts to mobilize  Continue Pain Control Measures  Continue incisional Care  DVT prophylaxis    Discharge Plan:Home today or tomorrow.  Expect outpatient PT.  Mobilize as tolerated.    Jeramie Rai MD    Date: 5/1/2018   Time: 6:55 AM

## 2018-05-01 NOTE — THERAPY TREATMENT NOTE
Acute Care - Physical Therapy Treatment Note  [facility]     Patient Name: Zak Lutz  : 1960  MRN: [id number]  Today's Date: 2018  Onset of Illness/Injury or Date of Surgery: 18  Date of Referral to PT: 18  Referring Physician: Jeramie Rai MD    Admit Date: 2018    Visit Dx:    ICD-10-CM ICD-9-CM   1. Internal derangement of knee, left M23.92 717.9   2. Chronic pain of left knee M25.562 719.46    G89.29 338.29   3. Baker's cyst of knee, left M71.22 727.51   4. Chondromalacia, left knee M94.262 717.7   5. Essential hypertension I10 401.9   6. Chronic obstructive pulmonary disease, unspecified COPD type J44.9 496   7. Gastroesophageal reflux disease without esophagitis K21.9 530.81   8. Primary osteoarthritis of both knees M17.0 715.16   9. Impaired physical mobility Z74.09 781.99   10. Impaired mobility and ADLs Z74.09 799.89     Patient Active Problem List   Diagnosis   • Tobacco dependence syndrome   • Hyperlipidemia   • Gastroesophageal reflux disease   • Essential hypertension   • Degenerative joint disease involving multiple joints   • Chronic obstructive lung disease   • Allergic rhinitis   • Primary osteoarthritis of both knees   • Encounter for drug screening   • Internal derangement of knee, left   • Chronic pain of left knee   • Baker's cyst of knee, left   • Chondromalacia, left knee   • Gastroesophageal reflux disease without esophagitis   • Chronic obstructive pulmonary disease       Therapy Treatment          Rehabilitation Treatment Summary     Row Name 18 1316 18 1024 18 0843       Treatment Time/Intention    Discipline physical therapy assistant  -CHARLINE occupational therapy assistant  -CS physical therapy assistant  -CHARLINE    Document Type therapy note (daily note)  -CHARLINE therapy note (daily note)  -CS therapy note (daily note)  -CHARLINE    Subjective Information no complaints;pain  -CHARLINE no complaints  -CS  --    Mode of Treatment physical  therapy;individual therapy  -CHARLINE occupational therapy  -CS physical therapy;individual therapy  -CHARLINE    Total Minutes, Occupational Therapy Treatment  -- 55  -CS2  --    Therapy Frequency (OT Eval)  -- other (see comments)   3-14x/wk  -CS2  --    Patient Effort good  -CHARLINE good  -CS3 good  -CHARLINE    Comment pt drowsy upon entry which improved.   -CHARLINE  --  --    Existing Precautions/Restrictions fall  -CHARLINE  -- fall  -CHARLINE    Recorded by [CHARLINE] Alfredo Ontiveros, PTA 05/01/18 1416 05/01/18 1416 [CS] Morenita Conklin, LAMAS/L 05/01/18 1036 05/01/18 1036  [CS2] Morenita Conklin, LAMAS/L 05/01/18 1301 05/01/18 1301  [CS3] Morenita Conklin LAMAS/L 05/01/18 1227 05/01/18 1227 [CHARLINE] Alfredo Ontiveros, PTA 05/01/18 1304 05/01/18 1304    Row Name 05/01/18 1316 05/01/18 1024 05/01/18 0843       Vital Signs    Pre Systolic BP Rehab 164  -CHARLINE  -- 160  -CHARLINE    Pre Treatment Diastolic BP 90  -CHARLINE  -- 90  -CHARLINE    Post Systolic BP Rehab 158  -CHARLINE  -- 144  -CHARLINE    Post Treatment Diastolic BP 90  -CHARLINE  -- 66  -CHARLINE    Pretreatment Heart Rate (beats/min) 92  -CHARLINE 102  -CS 87  -CHARLINE    Intratreatment Heart Rate (beats/min)  --  -- 110  -CHARLINE    Posttreatment Heart Rate (beats/min) 95  -CHARLINE 101  -CS2 94  -CHARLINE    Pre SpO2 (%) 94  -CHARLINE 92  -CS 98  -CHARLINE    O2 Delivery Pre Treatment room air  -CHARLINE room air  -CS room air  -CHARLINE    Intra SpO2 (%)  --  -- 98  -CHARLINE    O2 Delivery Intra Treatment  --  -- room air  -CHARLINE    Post SpO2 (%) 97  -CHARLINE 94  -CS2 98  -CHARLINE    O2 Delivery Post Treatment room air  -CHARLINE room air  -CS2 room air  -CHARLINE    Pre Patient Position Supine  -CHARLINE Supine  -CS Supine  -CHARLINE    Intra Patient Position  -- Standing  -CS2  --    Post Patient Position Supine  -CHARLINE Sitting  -CS2 Supine  -CHARLINE    Recorded by [CHARLINE] Alfredo Ontiveros, ALYSA 05/01/18 1416 05/01/18 1416 [CS] Morenita Conklin, LAMAS/L 05/01/18 1036 05/01/18 1036  [CS2] MYRNA Miner 05/01/18 1227 05/01/18 1227 [CHARLINE] Alfredo Ontiveros, PTA 05/01/18 1304 05/01/18 1304    Row Name 05/01/18 1316 05/01/18 1024 05/01/18  0843       Cognitive Assessment/Intervention- PT/OT    Affect/Mental Status (Cognitive) WFL  -CHARLINE WFL  -CS WFL  -CHARLINE    Orientation Status (Cognition) oriented x 4  -CHARLINE oriented x 4  -CS oriented x 4  -CHARLINE    Follows Commands (Cognition) WFL  -CHARLINE WFL  -CS WFL  -CHARLINE    Cognitive Function (Cognitive) WFL  -CHARLINE WFL  -CS WFL  -CHARLINE    Personal Safety Interventions gait belt;muscle strengthening facilitated;nonskid shoes/slippers when out of bed;supervised activity  -CHARLINE  -- gait belt;muscle strengthening facilitated;nonskid shoes/slippers when out of bed;supervised activity  -CHARLINE    Recorded by [CHARLINE] Alfredo Ontiveros, PTA 05/01/18 1416 05/01/18 1416 [CS] MYRNA Miner 05/01/18 1227 05/01/18 1227 [CHARLINE] Alfredo Ontiveros, PTA 05/01/18 1304 05/01/18 1304    Row Name 05/01/18 1316 05/01/18 0843          Mobility Assessment/Intervention    Extremity Weight-bearing Status left lower extremity  -CHARLINE left lower extremity  -CHARLINE     Left Lower Extremity (Weight-bearing Status) weight-bearing as tolerated (WBAT)  -CHARLINE weight-bearing as tolerated (WBAT)  -CHARLNIE     Recorded by [CHARLINE] Alfredo Ontiveros, ALYSA 05/01/18 1416 05/01/18 1416 [CHARLINE] Alfredo Ontiveros, PTA 05/01/18 1304 05/01/18 1304     Row Name 05/01/18 1316 05/01/18 1024 05/01/18 0843       Bed Mobility Assessment/Treatment    Bed Mobility Assessment/Treatment supine-sit;sit-supine;scooting/bridging  -CHARLINE  -- supine-sit;sit-supine;scooting/bridging  -CHARLINE    Scooting/Bridging [place] (Bed Mobility) independent  -CHARLINE independent  -CS conditional independence  -CHARLINE    Supine-Sit [place] (Bed Mobility) independent  -CHARLINE supervision  -CS supervision  -CHARLINE    Sit-Supine [place] (Bed Mobility) independent  -CHARLINE  -- supervision  -CHARLINE    Assistive Device (Bed Mobility) --   HOB flat, no bed rails  -CHARLINE head of bed elevated  -CS  --    Comment (Bed Mobility) pt assists L LE w/ UEs.   -CHARLINE  -- pt uses UEs to assist L LE  -CHARLINE    Recorded by [CHARLINE] Alfredo Ontiveros, PTA 05/01/18 1416 05/01/18 1416  [CS] PEYTON MinerA/L 05/01/18 1227 05/01/18 1227 [CHARLINE] Alfredo Ontiveros, PTA 05/01/18 1304 05/01/18 1304    Row Name 05/01/18 1024             Functional Mobility    Functional Mobility- Ind. Level contact guard assist  -CS      Functional Mobility- Device rolling walker  -CS      Functional Mobility-Distance (Feet) 35  -CS      Recorded by [CS] PEYTON MinerA/L 05/01/18 1227 05/01/18 1227      Row Name 05/01/18 1316 05/01/18 1024 05/01/18 0843       Transfer Assessment/Treatment    Transfer Assessment/Treatment sit-stand transfer;stand-sit transfer  -CHARLINE bed-chair transfer;sit-stand transfer;stand-sit transfer;toilet transfer  -CS bed-chair transfer;chair-bed transfer;sit-stand transfer;stand-sit transfer  -CHARLINE    Bed-Chair [place] (Transfers)  -- contact guard  -CS contact guard  -CHARLINE    Chair-Bed [place] (Transfers)  --  -- contact guard  -CHARLINE    Assistive Device (Bed-Chair Transfers)  -- walker, front-wheeled  - walker, front-wheeled  -    Sit-Stand [place] (Transfers) independent  -CHARLINE supervision  -CS contact guard  -CHARLINE    Stand-Sit [place] (Transfers) independent  -CHARLINE supervision  -CS contact guard  -CHARLINE    [place] Level (Toilet Transfer)  -- contact guard  -CS  --    Assistive Device (Toilet Transfer)  -- walker, front-wheeled;grab bars/safety frame  -CS  --    Recorded by [CHARLINE] Alfredo Ontiveros, PTA 05/01/18 1416 05/01/18 1416 [CS] CDYNEY Miner/SILKE 05/01/18 1227 05/01/18 1227 [CHARLINE] Alfredo Ontiveros, PTA 05/01/18 1304 05/01/18 1304    Row Name 05/01/18 0843             Chair-Bed Transfer    Assistive Device (Chair-Bed Transfers) walker, front-wheeled  -CHARLINE      Recorded by [CHARLINE] Alfredo Ontiveros, PTA 05/01/18 1304 05/01/18 1304      Row Name 05/01/18 1316 05/01/18 1024 05/01/18 0843       Sit-Stand Transfer    Assistive Device (Sit-Stand Transfers) walker, front-wheeled  - walker, front-wheeled  - walker, front-wheeled  -CHARLINE    Recorded by [CHARLINE] Alfredo Ontiveros, PTA  05/01/18 1416 05/01/18 1416 [CS] Morenita Conklin, LAMAS/L 05/01/18 1227 05/01/18 1227 [CHARLINE] Alfredo Ontiveros, PTA 05/01/18 1304 05/01/18 1304    Row Name 05/01/18 1316 05/01/18 1024 05/01/18 0843       Stand-Sit Transfer    Assistive Device (Stand-Sit Transfers) walker, front-wheeled  -CHARLINE walker, front-wheeled  -CS walker, front-wheeled  -CHARLINE    Recorded by [CHARLINE] Alfredo Ontiveros, PTA 05/01/18 1416 05/01/18 1416 [CS] Morenita Conklin LAMAS/L 05/01/18 1227 05/01/18 1227 [CHARLINE] Alfredo Ontiveros, PTA 05/01/18 1304 05/01/18 1304    Row Name 05/01/18 1024             Toilet Transfer    Type (Toilet Transfer) sit-stand;stand-sit  -CS      Recorded by [CS] PEYTON MinerA/L 05/01/18 1227 05/01/18 1227      Row Name 05/01/18 1316 05/01/18 0843          Gait/Stairs Assessment/Training    Gait/Stairs Assessment/Training gait/ambulation independence;gait/ambulation assistive device;distance ambulated;gait pattern;gait deviations;stairs negotiation  -CHARLINE gait/ambulation independence;gait/ambulation assistive device;distance ambulated;gait pattern;gait deviations;stairs negotiation  -CHARLINE     [place] Level (Gait) conditional independence  -CHARLINE supervision;contact guard  -CHARLINE     Assistive Device (Gait) walker, front-wheeled  -CHARLINE walker, front-wheeled  -CHARLINE     Distance in Feet (Gait) 188  -CHARLINE 180  -CHARLINE     Pattern (Gait) 3-point  -CHARLINE 3-point  -CHARLINE     Deviations/Abnormal Patterns (Gait) antalgic   guarded, lacking knee flexion w/ gait  -CHARLINE antalgic   pt not flexing L knee w/ gait. no heel strike  -CHARLINE     Negotiation (Stairs) stairs independence;stairs assistive device;handrail location;number of steps;ascending technique;descending technique  -CHARLINE stairs independence;stairs assistive device;handrail location;number of steps;ascending technique;descending technique  -CHARLINE     [place] Level (Stairs) supervision  -CHARLINE contact guard  -CHARLINE     Assistive Device (Stairs) walker, front-wheeled  -CHARLINE walker, front-wheeled  -CHARLINE     Number of  Steps (Stairs) 1   curb  -CHARLINE 1   curb  -CHARLINE     Ascending Technique (Stairs) step-to-step  -CHARLINE step-to-step  -CHARLINE     Descending Technique (Stairs) step-to-step  -CHARLINE step-to-step  -CHARLINE     Comment (Gait/Stairs)  -- 2 attempts w/ curb step  -CHARLINE     Recorded by [CHARLINE] Alfredo Ontiveros, PTA 05/01/18 1416 05/01/18 1416 [CHARLINE] Alfredo Ontiveros, PTA 05/01/18 1304 05/01/18 1304     Row Name 05/01/18 1024             Bathing Assessment/Intervention    Bathing [place] Level bathing skills;set up  -CS      Assistive Devices (Bathing) bath mitt  -CS      Bathing Position supported sitting  -CS      Recorded by [CS] CYDNEY Miner/SILKE 05/01/18 1301 05/01/18 1301      Row Name 05/01/18 1024             Upper Body Dressing Assessment/Training    Upper Body Dressing [place] Level doff;don;set up   gown  -CS      Upper Body Dressing Position supported sitting  -CS      Recorded by [CS] CYDNEY Miner/SILKE 05/01/18 1301 05/01/18 1301      Row Name 05/01/18 1024             Grooming Assessment/Training    [place] Level (Grooming) grooming skills;set up  -CS      Grooming Position supported sitting  -CS      Recorded by [CS] CYDNEY Miner/SILKE 05/01/18 1301 05/01/18 1301      Row Name 05/01/18 1024             Toileting Assessment/Training    [place] Level (Toileting) toileting skills  -CS      Assistive Devices (Toileting) grab bar/safety frame  -CS      Toileting Position supported standing  -CS      Recorded by [CS] CYDNEY Miner/L 05/01/18 1301 05/01/18 1301      Row Name 05/01/18 1316 05/01/18 0843          Left Lower Ext    Lt Knee Extension/Flexion AROM 5-53 degrees  -CHARLINE 5-52 degrees  -CHARLINE     Recorded by [CHARLINE] Alfredo Ontiveros, PTA 05/01/18 1416 05/01/18 1416 [CHARLINE] Alfredo Ontiveros, PTA 05/01/18 1304 05/01/18 1304     Row Name 05/01/18 0843             Therapeutic Exercise    Therapeutic Exercise supine, lower extremities;seated, lower extremities  -CHARLINE      Recorded by [CHARLINE] Alfredo Ontiveros, PTA  05/01/18 1304 05/01/18 1304      Row Name 05/01/18 1316 05/01/18 0843          Lower Extremity Seated Therapeutic Exercise    Performed, Seated Lower Extremity (Therapeutic Exercise) other (see comments)   HS   -CHARLINE other (see comments)   HS in sitting  -CHARLINE     Exercise Type, Seated Lower Extremity (Therapeutic Exercise) AAROM (active assistive range of motion)  -CHARLINE AAROM (active assistive range of motion)   self assisted  -CHARLINE     Sets/Reps Detail, Seated Lower Extremity (Therapeutic Exercise) 10x2  -CHARLINE 15x1  -CHARLINE     Comment, Seated Lower Extremity (Therapeutic Exercise) pain limiting ROM  -CHARLINE pain limiting ROM  -CHARLINE     Recorded by [CHARLINE] Alfredo Ontiveros, PTA 05/01/18 1416 05/01/18 1416 [CHARLINE] Alfredo Ontiveros, PTA 05/01/18 1304 05/01/18 1304     Row Name 05/01/18 1316 05/01/18 0843          Lower Extremity Supine Therapeutic Exercise    Performed, Supine Lower Extremity (Therapeutic Exercise) quadriceps sets  -CHARLINE quadriceps sets  -CHARLINE     Exercise Type, Supine Lower Extremity (Therapeutic Exercise) AROM (active range of motion)  -CHARLINE AROM (active range of motion)  -CHARLINE     Sets/Reps Detail, Supine Lower Extremity (Therapeutic Exercise) 15x1  -CHARLINE 15x1  -CHARLINE     Comment, Supine Lower Extremity (Therapeutic Exercise) reinforced HEP. edu on home safety.   - pt edu on HEP  -CHARLINE     Recorded by [CHARLINE] Alfredo Ontiveros, PTA 05/01/18 1416 05/01/18 1416 [CHARLINE] Alfredo Ontiveros, PTA 05/01/18 1304 05/01/18 1304     Row Name 05/01/18 1316 05/01/18 1024 05/01/18 0843       Positioning and Restraints    Pre-Treatment Position in bed  -CHARLINE in bed  -CS in bed  -CHARLINE    Post Treatment Position bed  -CHARLINE chair  -CS bed  -CHARLINE    In Bed fowlers;call light within reach;encouraged to call for assist;exit alarm on;with family/caregiver   all needs met  -CHARLINE  -- fowlers;call light within reach;encouraged to call for assist;exit alarm on   all needs met  -CHARLINE    In Chair  -- sitting;call light within reach;exit alarm on  -CS  --    Recorded by [CHARLINE] Alfredo BAPTISTE  [place], PTA 05/01/18 1416 05/01/18 1416 [CS] Morenita Conklin LAMAS/L 05/01/18 1301 05/01/18 1301 [CHARLINE] Alfredo Ontiveros, PTA 05/01/18 1304 05/01/18 1304    Row Name 05/01/18 0843             Pain Assessment    Additional Documentation Pain Scale: Numbers Pre/Post-Treatment (Group)  -CHARLINE      Recorded by [CHARLINE] Alfredo Ontiveros, PTA 05/01/18 1304 05/01/18 1304      Row Name 05/01/18 1316 05/01/18 1024 05/01/18 0843       Pain Scale: Numbers Pre/Post-Treatment    Pain Scale: Numbers, Pretreatment 8/10  -CHARLINE 3/10  -CS 7/10  -CHARLINE    Pain Scale: Numbers, Post-Treatment 6/10  -CHARLINE 4/10  -CS2 4/10  -CHARLINE    Pain Location - Side Left  -CHARLINE  --  --    Pain Location knee  -CHARLINE knee  -CS knee  -CHARLINE    Pain Intervention(s) --   pzin meds prior to tx  -CHARLINE Medication (See MAR)  -CS Medication (See MAR);Cold pack  -CHARLINE    Recorded by [CHARLINE] Alfredo Ontiveros, PTA 05/01/18 1416 05/01/18 1416 [CS] Morenita Conklin LAMAS/L 05/01/18 1036 05/01/18 1036  [CS2] Morenita Conklin LAMAS/L 05/01/18 1301 05/01/18 1301 [CHARLINE] Alfredo Ontiveros, PTA 05/01/18 1304 05/01/18 1304    Row Name                Wound 04/30/18 0922 Left knee surgical    Wound - Properties Group Date first assessed: 04/30/18 [KW] Time first assessed: 0922 [KW] Present On Admission : no [KW] Side: Left [KW] Location: knee [KW] Type: surgical [KW] Recorded by:  [KW] Chen Santos RN 04/30/18 0922 04/30/18 0922    Row Name 05/01/18 1316 05/01/18 1024          Outcome Summary/Treatment Plan (OT)    Daily Summary of Progress (OT) progress toward functional goals as expected  -CHARLINE progress toward functional goals as expected  -CS     Plan for Continued Treatment (OT) ROM  -CHARLINE cont OT POC  -CS     Anticipated Discharge Disposition (OT) home with home health  -CHARLINE home or self care   out pt rehab  -CS     Recorded by [CHARLINE] Alfredo Ontiveros PTA 05/01/18 1416 05/01/18 1416 [CS] MYRNA Miner 05/01/18 1301 05/01/18 1301     Row Name 05/01/18 0843             Outcome Summary/Treatment Plan (PT)     Daily Summary of Progress (PT) progress toward functional goals as expected  -CHARLINE      Anticipated Equipment Needs at Discharge (PT) front wheeled walker  -CHARLINE      Anticipated Discharge Disposition (PT) home with home health care  -CHARLINE      Recorded by [CHARLINE] Alfredo Ontiveros, ALYSA 05/01/18 1304 05/01/18 1304        User Key  (r) = Recorded By, (t) = Taken By, (c) = Cosigned By    Initials Name Effective Dates Discipline    CHARLINE Alfredo Ontiveros PTA 03/07/18 -  PT    CYDNEY Morris/L 03/07/18 -  OT    KW Chen Santos RN 10/17/16 -  Nurse          Wound 04/30/18 0922 Left knee surgical (Active)   Dressing Appearance dry;intact 5/1/2018  8:08 AM   Closure EVELINE 5/1/2018  8:08 AM   Base dressing in place, unable to visualize 5/1/2018  8:08 AM   Drainage Amount none 5/1/2018  8:08 AM               PT Rehab Goals     Row Name 05/01/18 1316 05/01/18 0843          Bed Mobility Goal 1 (PT)    Activity/Assistive Device (Bed Mobility Goal 1, PT) sit to supine;supine to sit   HOB down and no rails  -CHARLINE sit to supine;supine to sit   HOB down and no rails  -CHARLINE     [place] Level/Cues Needed (Bed Mobility Goal 1, PT) independent  -CHARLINE independent  -CHARLINE     Time Frame (Bed Mobility Goal 1, PT) 3 days  -CHARLINE 3 days  -CHARLINE     Progress/Outcomes (Bed Mobility Goal 1, PT) goal met  -CHARLINE goal not met  -CHARLINE        Transfer Goal 1 (PT)    Activity/Assistive Device (Transfer Goal 1, PT) sit-to-stand/stand-to-sit;bed-to-chair/chair-to-bed  -CHARLINE sit-to-stand/stand-to-sit;bed-to-chair/chair-to-bed  -CHARLINE     [place] Level/Cues Needed (Transfer Goal 1, PT) conditional independence  -CHARLINE conditional independence  -CHARLINE     Time Frame (Transfer Goal 1, PT) 2 - 3 days  -CHARLINE 2 - 3 days  -CHARLINE     Progress/Outcome (Transfer Goal 1, PT) goal not met  -CHARLINE goal not met  -CHARLINE        Gait Training Goal 1 (PT)    Activity/Assistive Device (Gait Training Goal 1, PT) gait (walking locomotion);assistive device use;increase endurance/gait distance  -CHARLINE gait  (walking locomotion);assistive device use;increase endurance/gait distance  -CHARLINE     [place] Level (Gait Training Goal 1, PT) conditional independence  -CHARLINE conditional independence  -CHARLINE     Distance (Gait Goal 1, PT) 200 feet  -CHARLINE 200 feet  -CHARLINE     Time Frame (Gait Training Goal 1, PT) 2 - 3 days  -CHARLINE 2 - 3 days  -CHARLINE     Progress/Outcome (Gait Training Goal 1, PT) goal not met  -CHARLINE goal not met  -CHARLINE        ROM Goal 1 (PT)    ROM Goal 1 (PT) 0-90 L knee flexion  -CHARLINE 0-90 L knee flexion  -CHARLINE     Time Frame (ROM Goal 1, PT) 2 - 3 days  -CHARLINE 2 - 3 days  -CHARLINE     Progress/Outcome (ROM Goal 1, PT) goal not met  -CHARLINE goal not met  -CHARLINE        Stairs Goal 1 (PT)    Activity/Assistive Device (Stairs Goal 1, PT) ascending stairs;descending stairs;step-to-step;walker, rolling;assistive device use  -CHARLINE ascending stairs;descending stairs;step-to-step;walker, rolling;assistive device use  -CHARLINE     [place] Level/Cues Needed (Stairs Goal 1, PT) contact guard assist  -CHARLINE contact guard assist  -CHARLINE     Number of Stairs (Stairs Goal 1, PT) 1  -CHARLINE 1  -CHARLINE     Time Frame (Stairs Goal 1, PT) 2 - 3 days  -CHARLINE 2 - 3 days  -CHARLINE     Progress/Outcome (Stairs Goal 1, PT) goal met  -CHARLINE goal not met  -CHARLINE        Patient Education Goal (PT)    Activity (Patient Education Goal, PT) HEP  -CHARLINE HEP  -CHARLINE     [place]/Cues/Accuracy (Memory Goal 2, PT) demonstrates adequately;verbalizes understanding  -CHARLINE demonstrates adequately;verbalizes understanding  -CHARLINE     Time Frame (Patient Education Goal, PT) 3 days;2 days  -CHARLINE 3 days;2 days  -CHARLINE     Progress/Outcome (Patient Education Goal, PT) goal met  -CHARLINE goal not met  -CHARLINE       User Key  (r) = Recorded By, (t) = Taken By, (c) = Cosigned By    Initials Name Provider Type Discipline    CHARLINE Ontiveros, PTA Physical Therapy Assistant PT          Physical Therapy Education     Title: PT OT SLP Therapies (Active)     Topic: Physical Therapy (Active)     Point: Mobility training (Active)    Learning  Progress Summary     Learner Status Readiness Method Response Comment Documented by    Patient Active Acceptance E NR   05/01/18 1417     Active Acceptance E NR   05/01/18 1305     Active Acceptance D,E NR   04/30/18 1500          Point: Home exercise program (Done)    Learning Progress Summary     Learner Status Readiness Method Response Comment Documented by    Patient Done Acceptance EH SAMUEL pt edu on HEP ans home safety.  05/01/18 1417          Point: Precautions (Active)    Learning Progress Summary     Learner Status Readiness Method Response Comment Documented by    Patient Active Acceptance D,E NR   04/30/18 1500                      User Key     Initials Effective Dates Name Provider Type Discipline     04/06/17 -  Alexia Dickson, PT Physical Therapist PT     03/07/18 -  Alfredo Ontiveros, PTA Physical Therapy Assistant PT                    PT Recommendation and Plan  Anticipated Discharge Disposition (PT): home with home health care  Outcome Summary/Treatment Plan (PT)  Daily Summary of Progress (PT): progress toward functional goals as expected  Anticipated Equipment Needs at Discharge (PT): front wheeled walker  Anticipated Discharge Disposition (PT): home with home health care  Plan of Care Reviewed With: patient  Progress: improving  Outcome Summary: pt responded well to therapy w/ improved mobility. pt is indep w/ bed mobility. pt is modified indep w/ sit-stands and gait. pt w/ increased gait to 188 ft w/ RW. pt required SBA w/ stair trainng. pt is guarded w/ gait and ther ex due to pain w/ knee flexion. L knee ROM :5-53 degrees AAROM. Recommend HH PT upon DC.           Outcome Measures     Row Name 05/01/18 1316 05/01/18 1300 05/01/18 0843       How much help from another person do you currently need...    Turning from your back to your side while in flat bed without using bedrails? 4  -CHARLINE  -- 3  -CHARLINE    Moving from lying on back to sitting on the side of a flat bed without bedrails? 4   -CHARLINE  -- 3  -CHARLINE    Moving to and from a bed to a chair (including a wheelchair)? 4  -CHARLINE  -- 3  -CHARLINE    Standing up from a chair using your arms (e.g., wheelchair, bedside chair)? 4  -CHARLINE  -- 3  -CHARLINE    Climbing 3-5 steps with a railing? 3  -CHARLINE  -- 3  -CHARLINE    To walk in hospital room? 4  -CHARLINE  -- 3  -CHARLINE    AM-PAC 6 Clicks Score 23  -CHARLINE  -- 18  -CHARLINE       How much help from another is currently needed...    Putting on and taking off regular lower body clothing?  -- 3  -CS  --    Bathing (including washing, rinsing, and drying)  -- 3  -CS  --    Toileting (which includes using toilet bed pan or urinal)  -- 3  -CS  --    Putting on and taking off regular upper body clothing  -- 4  -CS  --    Taking care of personal grooming (such as brushing teeth)  -- 4  -CS  --    Eating meals  -- 4  -CS  --    Score  -- 21  -CS  --       Functional Assessment    Outcome Measure Options AM-[facility] 6 Clicks Basic Mobility (PT)  -[facility] 6 Clicks Daily Activity (OT)  -CS [facility] 6 Clicks Basic Mobility (PT)  -    Row Name 04/30/18 1504 04/30/18 1305          How much help from another person do you currently need...    Turning from your back to your side while in flat bed without using bedrails?  -- 3  -CB     Moving from lying on back to sitting on the side of a flat bed without bedrails?  -- 3  -CB     Moving to and from a bed to a chair (including a wheelchair)?  -- 3  -CB     Standing up from a chair using your arms (e.g., wheelchair, bedside chair)?  -- 3  -CB     Climbing 3-5 steps with a railing?  -- 2  -CB     To walk in hospital room?  -- 3  -CB     AM-PAC 6 Clicks Score  -- 17  -CB        How much help from another is currently needed...    Putting on and taking off regular lower body clothing? 3  -RW  --     Bathing (including washing, rinsing, and drying) 3  -RW  --     Toileting (which includes using toilet bed pan or urinal) 3  -RW  --     Putting on and taking off regular upper body clothing 4  -RW  --     Taking care of personal  grooming (such as brushing teeth) 4  -RW  --     Eating meals 4  -RW  --     Score 21  -RW  --        Functional Assessment    Outcome Measure Options AM-PAC 6 Clicks Daily Activity (OT)  -RW AM-PAC 6 Clicks Basic Mobility (PT)  -CB       User Key  (r) = Recorded By, (t) = Taken By, (c) = Cosigned By    Initials Name Provider Type    CB Alexia Dickson, PT Physical Therapist    RW Maricel Franco, OTR/L Occupational Therapist    CHARLINE Ontiveros, ALYSA Physical Therapy Assistant    VÍCTOR Conklin, LAMAS/L Occupational Therapy Assistant           Time Calculation:         PT Charges     Row Name 05/01/18 1420 05/01/18 1305          Time Calculation    Start Time 1316  -CHARLINE 0843  -CHARLINE     Stop Time 1358  -CHARLINE 0952  -CHARLINE     Time Calculation (min) 42 min  -CHARLINE 69 min  -CHARLINE        Time Calculation- PT    Total Timed Code Minutes- PT 42 minute(s)  -CHARLINE 69 minute(s)  -CHARLINE       User Key  (r) = Recorded By, (t) = Taken By, (c) = Cosigned By    Initials Name Provider Type    CHARLINE Ontiveros PTA Physical Therapy Assistant          Therapy Charges for Today     Code Description Service Date Service Provider Modifiers Qty    59045331060 HC PT SELF CARE/MGMT/TRAIN EA 15 MIN 5/1/2018 Alfredo Ontiveros PTA GP 1    48453633037 HC GAIT TRAINING EA 15 MIN 5/1/2018 Alfredo Ontiveros PTA GP 1    10401874052 HC PT THER PROC EA 15 MIN 5/1/2018 Alfredo Ontiveros PTA GP 1    17506295638 HC PT THERAPEUTIC ACT EA 15 MIN 5/1/2018 Alfredo Ontiveros PTA GP 2    05351778211 HC GAIT TRAINING EA 15 MIN 5/1/2018 Alfredo Ontiveros PTA GP 1    35852611571 HC PT THERAPEUTIC ACT EA 15 MIN 5/1/2018 Alfredo Ontiveros PTA GP 1    33148946121 HC PT THER PROC EA 15 MIN 5/1/2018 Alfredo Ontiveros PTA GP 1          PT G-Codes  PT Professional Judgement Used?: Yes  Outcome Measure Options: AM-PAC 6 Clicks Basic Mobility (PT)  Score: 17  Functional Limitation: Mobility: Walking and moving around  Mobility: Walking and Moving Around Current Status (): At least  40 percent but less than 60 percent impaired, limited or restricted  Mobility: Walking and Moving Around Goal Status (): At least 20 percent but less than 40 percent impaired, limited or restricted    Alfredo Ontiveros, PTA  5/1/2018
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
## 2018-05-01 NOTE — PLAN OF CARE
Problem: Patient Care Overview  Goal: Plan of Care Review  Outcome: Ongoing (interventions implemented as appropriate)   05/01/18 0348   Coping/Psychosocial   Plan of Care Reviewed With patient   Plan of Care Review   Progress no change   OTHER   Outcome Summary Patient VSS, working on pain control, ice to incision throughout shift, up to bathroom     Goal: Individualization and Mutuality  Outcome: Ongoing (interventions implemented as appropriate)    Goal: Discharge Needs Assessment  Outcome: Ongoing (interventions implemented as appropriate)      Problem: Fall Risk (Adult)  Goal: Absence of Fall  Outcome: Ongoing (interventions implemented as appropriate)      Problem: Knee Arthroplasty (Total, Partial) (Adult)  Goal: Signs and Symptoms of Listed Potential Problems Will be Absent, Minimized or Managed (Knee Arthroplasty)  Outcome: Ongoing (interventions implemented as appropriate)    Goal: Anesthesia/Sedation Recovery  Outcome: Ongoing (interventions implemented as appropriate)

## 2018-05-01 NOTE — PROGRESS NOTES
"Anticoagulation by Pharmacy - Warfarin    Zak Lutz is a 57 y.o.male being continued on warfarin for VTE prophylaxis  Home regimen: Not on PTA  INR Goal: 1.7-2.5  Today's INR:   Lab Results   Component Value Date    INR 1.06 05/01/2018       Objective:  [Ht: 185.4 cm (73\"); Wt: 82.4 kg (181 lb 10.5 oz)]  Lab Results   Component Value Date    INR 1.06 05/01/2018    INR 0.9 12/09/2015    INR 0.9 07/19/2015    PROTIME 13.6 05/01/2018    PROTIME 12.5 12/09/2015    PROTIME 12.4 07/19/2015     Lab Results   Component Value Date    HGB 14.2 04/30/2018    HGB 14.9 04/18/2018    HGB 14.3 01/17/2018    HCT 42.4 04/30/2018    HCT 43.6 04/18/2018    HCT 43.7 01/17/2018     04/18/2018     01/17/2018     06/14/2016       Recent Warfarin Administrations       The 5 most recent administrations since 04/24/2018 are shown below each listed medication.    Warfarin Sodium         Order Dose Date Given     warfarin (COUMADIN) tablet 8 mg 8 mg 04/30/2018                      Assessment  Interacting medications: No significant interactions noted  INR is subtherapeutic, no previous INRs to assess trend  H&H WNL  Will start 5 mg daily    Plan:  1.  Give warfarin 5 mg tablet PO @ 1800 tonight  2.  Draw a PT/INR in AM  3.  Pharmacy will continue to follow    Glen Courtney RPH  05/01/18 2:57 PM     "

## 2018-05-01 NOTE — PLAN OF CARE
Problem: Patient Care Overview  Goal: Plan of Care Review  Outcome: Ongoing (interventions implemented as appropriate)   05/01/18 1710   Coping/Psychosocial   Plan of Care Reviewed With patient   Plan of Care Review   Progress no change   OTHER   Outcome Summary VS stable; pt working with PT; home tomorrow     Goal: Individualization and Mutuality  Outcome: Ongoing (interventions implemented as appropriate)    Goal: Discharge Needs Assessment  Outcome: Ongoing (interventions implemented as appropriate)    Goal: Interprofessional Rounds/Family Conf  Outcome: Ongoing (interventions implemented as appropriate)      Problem: Fall Risk (Adult)  Goal: Absence of Fall  Outcome: Ongoing (interventions implemented as appropriate)      Problem: Knee Arthroplasty (Total, Partial) (Adult)  Goal: Signs and Symptoms of Listed Potential Problems Will be Absent, Minimized or Managed (Knee Arthroplasty)  Outcome: Outcome(s) achieved Date Met: 05/01/18    Goal: Anesthesia/Sedation Recovery  Outcome: Outcome(s) achieved Date Met: 05/01/18

## 2018-05-01 NOTE — PLAN OF CARE
Problem: Patient Care Overview  Goal: Plan of Care Review  Outcome: Ongoing (interventions implemented as appropriate)   05/01/18 1316   Coping/Psychosocial   Plan of Care Reviewed With patient   Plan of Care Review   Progress improving   OTHER   Outcome Summary pt responded well to therapy w/ improved mobility. pt is indep w/ bed mobility. pt is modified indep w/ sit-stands and gait. pt w/ increased gait to 188 ft w/ RW. pt required SBA w/ stair trainng. pt is guarded w/ gait and ther ex due to pain w/ knee flexion. L knee ROM :5-53 degrees AAROM. Recommend HH PT upon DC.

## 2018-05-01 NOTE — THERAPY TREATMENT NOTE
Acute Care - Physical Therapy Treatment Note  [facility]     Patient Name: Zak Lutz  : 1960  MRN: [id number]  Today's Date: 2018  Onset of Illness/Injury or Date of Surgery: 18  Date of Referral to PT: 18  Referring Physician: Jeramie Rai MD    Admit Date: 2018    Visit Dx:    ICD-10-CM ICD-9-CM   1. Internal derangement of knee, left M23.92 717.9   2. Chronic pain of left knee M25.562 719.46    G89.29 338.29   3. Baker's cyst of knee, left M71.22 727.51   4. Chondromalacia, left knee M94.262 717.7   5. Essential hypertension I10 401.9   6. Chronic obstructive pulmonary disease, unspecified COPD type J44.9 496   7. Gastroesophageal reflux disease without esophagitis K21.9 530.81   8. Primary osteoarthritis of both knees M17.0 715.16   9. Impaired physical mobility Z74.09 781.99   10. Impaired mobility and ADLs Z74.09 799.89     Patient Active Problem List   Diagnosis   • Tobacco dependence syndrome   • Hyperlipidemia   • Gastroesophageal reflux disease   • Essential hypertension   • Degenerative joint disease involving multiple joints   • Chronic obstructive lung disease   • Allergic rhinitis   • Primary osteoarthritis of both knees   • Encounter for drug screening   • Internal derangement of knee, left   • Chronic pain of left knee   • Baker's cyst of knee, left   • Chondromalacia, left knee   • Gastroesophageal reflux disease without esophagitis   • Chronic obstructive pulmonary disease       Therapy Treatment          Rehabilitation Treatment Summary     Row Name 18 1024 18 0843          Treatment Time/Intention    Discipline occupational therapy assistant  -CS physical therapy assistant  -CHARLINE     Document Type therapy note (daily note)  -CS therapy note (daily note)  -CHARLINE     Subjective Information no complaints  -CS  --     Mode of Treatment occupational therapy  -CS physical therapy;individual therapy  -CHARLINE     Total Minutes, Occupational Therapy Treatment  55  -CS2  --     Therapy Frequency (OT Eval) other (see comments)   3-14x/wk  -CS2  --     Patient Effort good  -CS3 good  -CHARLINE     Existing Precautions/Restrictions  -- fall  -CHARLINE     Recorded by [CS] Morenitafrancisco Conklin LAMAS/L 05/01/18 1036 05/01/18 1036  [CS2] Morenita Conklin LAMAS/L 05/01/18 1301 05/01/18 1301  [CS3] Morenita Conklin LAMAS/L 05/01/18 1227 05/01/18 1227 [CHARLINE] Alfredo Ontiveros, PTA 05/01/18 1304 05/01/18 1304     Row Name 05/01/18 1024 05/01/18 0843          Vital Signs    Pre Systolic BP Rehab  -- 160  -CHARLINE     Pre Treatment Diastolic BP  -- 90  -CHARLINE     Post Systolic BP Rehab  -- 144  -CHARLINE     Post Treatment Diastolic BP  -- 66  -CHARLINE     Pretreatment Heart Rate (beats/min) 102  -CS 87  -CHARLINE     Intratreatment Heart Rate (beats/min)  -- 110  -CHARLINE     Posttreatment Heart Rate (beats/min) 101  -CS2 94  -CHARLINE     Pre SpO2 (%) 92  -CS 98  -CHARLINE     O2 Delivery Pre Treatment room air  -CS room air  -CHARLINE     Intra SpO2 (%)  -- 98  -CHARLINE     O2 Delivery Intra Treatment  -- room air  -CHARLINE     Post SpO2 (%) 94  -CS2 98  -CHARLINE     O2 Delivery Post Treatment room air  -CS2 room air  -CHARLINE     Pre Patient Position Supine  -CS Supine  -CHARLINE     Intra Patient Position Standing  -CS2  --     Post Patient Position Sitting  -CS2 Supine  -CHARLINE     Recorded by [CS] Morenita Conklin LAMAS/L 05/01/18 1036 05/01/18 1036  [CS2] Morenita Conklin LAMAS/L 05/01/18 1227 05/01/18 1227 [CHARLINE] Alfredo Ontiveros, PTA 05/01/18 1304 05/01/18 1304     Row Name 05/01/18 1024 05/01/18 0843          Cognitive Assessment/Intervention- PT/OT    Affect/Mental Status (Cognitive) WFL  -CS WFL  -CHARLINE     Orientation Status (Cognition) oriented x 4  -CS oriented x 4  -CHARLINE     Follows Commands (Cognition) WFL  -CS WFL  -CHARLINE     Cognitive Function (Cognitive) WFL  -CS WFL  -CHARLINE     Personal Safety Interventions  -- gait belt;muscle strengthening facilitated;nonskid shoes/slippers when out of bed;supervised activity  -CHARLINE     Recorded by [CS] MYRNA Miner 05/01/18  1227 05/01/18 1227 [CHARLINE] Alfredo Ontiveros, PTA 05/01/18 1304 05/01/18 1304     Row Name 05/01/18 0843             Mobility Assessment/Intervention    Extremity Weight-bearing Status left lower extremity  -CHARLINE      Left Lower Extremity (Weight-bearing Status) weight-bearing as tolerated (WBAT)  -CHARLINE      Recorded by [CHARLINE] Alfredo Ontiveros, PTA 05/01/18 1304 05/01/18 1304      Row Name 05/01/18 1024 05/01/18 0843          Bed Mobility Assessment/Treatment    Bed Mobility Assessment/Treatment  -- supine-sit;sit-supine;scooting/bridging  -CHARLINE     Scooting/Bridging [place] (Bed Mobility) independent  -CS conditional independence  -CHARLINE     Supine-Sit [place] (Bed Mobility) supervision  -CS supervision  -CHARLINE     Sit-Supine [place] (Bed Mobility)  -- supervision  -CHARLINE     Assistive Device (Bed Mobility) head of bed elevated  -CS  --     Comment (Bed Mobility)  -- pt uses UEs to assist L LE  -CHARLINE     Recorded by [CS] CYDNEY Miner/SILKE 05/01/18 1227 05/01/18 1227 [CHARLINE] Alfredo Ontiveros, PTA 05/01/18 1304 05/01/18 1304     Row Name 05/01/18 1024             Functional Mobility    Functional Mobility- Ind. Level contact guard assist  -CS      Functional Mobility- Device rolling walker  -CS      Functional Mobility-Distance (Feet) 35  -CS      Recorded by [CS] CYDNEY Miner/SILKE 05/01/18 1227 05/01/18 1227      Row Name 05/01/18 1024 05/01/18 0843          Transfer Assessment/Treatment    Transfer Assessment/Treatment bed-chair transfer;sit-stand transfer;stand-sit transfer;toilet transfer  -CS bed-chair transfer;chair-bed transfer;sit-stand transfer;stand-sit transfer  -CHARLINE     Bed-Chair [place] (Transfers) contact guard  -CS contact guard  -CHARLINE     Chair-Bed [place] (Transfers)  -- contact guard  -CHARLINE     Assistive Device (Bed-Chair Transfers) walker, front-wheeled  -CS walker, front-wheeled  -CHARLINE     Sit-Stand [place] (Transfers) supervision  -CS contact guard  -CHARLINE     Stand-Sit [place]  (Transfers) supervision  -CS contact guard  -CHARLINE     [place] Level (Toilet Transfer) contact guard  -CS  --     Assistive Device (Toilet Transfer) walker, front-wheeled;grab bars/safety frame  -CS  --     Recorded by [CS] PEYTON MinerA/L 05/01/18 1227 05/01/18 1227 [CHARLINE] Alfredo Ontiveros, PTA 05/01/18 1304 05/01/18 1304     Row Name 05/01/18 0843             Chair-Bed Transfer    Assistive Device (Chair-Bed Transfers) walker, front-wheeled  -CHARLINE      Recorded by [CHARLINE] Alfredo Ontiveros, PTA 05/01/18 1304 05/01/18 1304      Row Name 05/01/18 1024 05/01/18 0843          Sit-Stand Transfer    Assistive Device (Sit-Stand Transfers) walker, front-wheeled  -CS walker, front-wheeled  -CHARLINE     Recorded by [CS] PEYTON MinerA/L 05/01/18 1227 05/01/18 1227 [CHARLINE] Alfredo Ontiveros, PTA 05/01/18 1304 05/01/18 1304     Row Name 05/01/18 1024 05/01/18 0843          Stand-Sit Transfer    Assistive Device (Stand-Sit Transfers) walker, front-wheeled  -CS walker, front-wheeled  -CHARLINE     Recorded by [CS] PEYTON MinerA/L 05/01/18 1227 05/01/18 1227 [CHARLINE] Alfredo Ontiveros, PTA 05/01/18 1304 05/01/18 1304     Row Name 05/01/18 1024             Toilet Transfer    Type (Toilet Transfer) sit-stand;stand-sit  -CS      Recorded by [CS] PEYTON MinerA/L 05/01/18 1227 05/01/18 1227      Row Name 05/01/18 0843             Gait/Stairs Assessment/Training    Gait/Stairs Assessment/Training gait/ambulation independence;gait/ambulation assistive device;distance ambulated;gait pattern;gait deviations;stairs negotiation  -CHARLINE      [place] Level (Gait) supervision;contact guard  -CHARLINE      Assistive Device (Gait) walker, front-wheeled  -CHARLINE      Distance in Feet (Gait) 180  -CHARLINE      Pattern (Gait) 3-point  -CHARLINE      Deviations/Abnormal Patterns (Gait) antalgic   pt not flexing L knee w/ gait. no heel strike  -CHARLINE      Negotiation (Stairs) stairs independence;stairs assistive device;handrail location;number of steps;ascending  technique;descending technique  -CHARLINE      [place] Level (Stairs) contact guard  -CHARLINE      Assistive Device (Stairs) walker, front-wheeled  -CHARLINE      Number of Steps (Stairs) 1   curb  -CHARLINE      Ascending Technique (Stairs) step-to-step  -CHARLINE      Descending Technique (Stairs) step-to-step  -CHARLINE      Comment (Gait/Stairs) 2 attempts w/ curb step  -CHARLINE      Recorded by [CHARLINE] Alfredo Ontiveros PTA 05/01/18 1304 05/01/18 1304      Row Name 05/01/18 1024             Bathing Assessment/Intervention    Bathing [place] bathing skills;set up  -CS      Assistive Devices (Bathing) bath mitt  -CS      Bathing Position supported sitting  -CS      Recorded by [CS] CYDNEY Miner/SILKE 05/01/18 1301 05/01/18 1301      Row Name 05/01/18 1024             Upper Body Dressing Assessment/Training    Upper Body Dressing [place] doff;don;set up   gown  -CS      Upper Body Dressing Position supported sitting  -CS      Recorded by [CS] CYDNEY Miner/SILKE 05/01/18 1301 05/01/18 1301      Row Name 05/01/18 1024             Grooming Assessment/Training    [place] Level (Grooming) grooming skills;set up  -CS      Grooming Position supported sitting  -CS      Recorded by [CS] CYDNEY Miner/SILKE 05/01/18 1301 05/01/18 1301      Row Name 05/01/18 1024             Toileting Assessment/Training    [place] (Toileting) toileting skills  -CS      Assistive Devices (Toileting) grab bar/safety frame  -CS      Toileting Position supported standing  -CS      Recorded by [CS] CYDNEY Miner/SILKE 05/01/18 1301 05/01/18 1301      Row Name 05/01/18 0843             Left Lower Ext    Lt Knee Extension/Flexion AROM 5-52 degrees  -CHARLINE      Recorded by [CHARLINE] Alfredo Ontiveros PTA 05/01/18 1304 05/01/18 1304      Row Name 05/01/18 0843             Therapeutic Exercise    Therapeutic Exercise supine, lower extremities;seated, lower extremities  -CHARLINE      Recorded by [CHARLINE] Alfredo Ontiveros PTA 05/01/18 1304 05/01/18  1304      Row Name 05/01/18 0843             Lower Extremity Seated Therapeutic Exercise    Performed, Seated Lower Extremity (Therapeutic Exercise) other (see comments)   HS in sitting  -CHARLINE      Exercise Type, Seated Lower Extremity (Therapeutic Exercise) AAROM (active assistive range of motion)   self assisted  -CHARLINE      Sets/Reps Detail, Seated Lower Extremity (Therapeutic Exercise) 15x1  -CHARLINE      Comment, Seated Lower Extremity (Therapeutic Exercise) pain limiting ROM  -CHARLINE      Recorded by [CHARLINE] Alfredo Ontiveros, PTA 05/01/18 1304 05/01/18 1304      Row Name 05/01/18 0843             Lower Extremity Supine Therapeutic Exercise    Performed, Supine Lower Extremity (Therapeutic Exercise) quadriceps sets  -CHARLINE      Exercise Type, Supine Lower Extremity (Therapeutic Exercise) AROM (active range of motion)  -CHARLINE      Sets/Reps Detail, Supine Lower Extremity (Therapeutic Exercise) 15x1  -CHARLINE      Comment, Supine Lower Extremity (Therapeutic Exercise) pt edu on HEP  -CHARLINE      Recorded by [CHARLINE] Alfredo Ontiveros, PTA 05/01/18 1304 05/01/18 1304      Row Name 05/01/18 1024 05/01/18 0843          Positioning and Restraints    Pre-Treatment Position in bed  -CS in bed  -CHARLINE     Post Treatment Position chair  -CS bed  -CHARLINE     In Bed  -- fowlers;call light within reach;encouraged to call for assist;exit alarm on   all needs met  -CHARLINE     In Chair sitting;call light within reach;exit alarm on  -CS  --     Recorded by [CS] MYRNA Miner 05/01/18 1301 05/01/18 1301 [CHARLINE] Alfredo Ontiveros, PTA 05/01/18 1304 05/01/18 1304     Row Name 05/01/18 0843             Pain Assessment    Additional Documentation Pain Scale: Numbers Pre/Post-Treatment (Group)  -CHARLINE      Recorded by [CHARLINE] Alfredo Ontiveros, PTA 05/01/18 1304 05/01/18 1304      Row Name 05/01/18 1024 05/01/18 0843          Pain Scale: Numbers Pre/Post-Treatment    Pain Scale: Numbers, Pretreatment 3/10  -CS 7/10  -CHARLINE     Pain Scale: Numbers, Post-Treatment 4/10  -CS2 4/10  -CHARLINE      Pain Location knee  -CS knee  -CHARLINE     Pain Intervention(s) Medication (See MAR)  -CS Medication (See MAR);Cold pack  -CHARLINE     Recorded by [CS] CYDNEY Miner/SILKE 05/01/18 1036 05/01/18 1036  [CS2] CYDNEY Miner/SILKE 05/01/18 1301 05/01/18 1301 [CHARLINE] Alfredo Ontiveros, PTA 05/01/18 1304 05/01/18 1304     Row Name                Wound 04/30/18 0922 Left knee surgical    Wound - Properties Group Date first assessed: 04/30/18 [KW] Time first assessed: 0922 [KW] Present On Admission : no [KW] Side: Left [KW] Location: knee [KW] Type: surgical [KW] Recorded by:  [KW] Chen Santos RN 04/30/18 0922 04/30/18 0922    Row Name 05/01/18 1024             Outcome Summary/Treatment Plan (OT)    Daily Summary of Progress (OT) progress toward functional goals as expected  -CS      Plan for Continued Treatment (OT) cont OT POC  -CS      Anticipated Discharge Disposition (OT) home or self care   out pt rehab  -CS      Recorded by [CS] CYDNEY Miner/SILKE 05/01/18 1301 05/01/18 1301      Row Name 05/01/18 0843             Outcome Summary/Treatment Plan (PT)    Daily Summary of Progress (PT) progress toward functional goals as expected  -CHARLINE      Anticipated Equipment Needs at Discharge (PT) front wheeled walker  -CHARLINE      Anticipated Discharge Disposition (PT) home with home health care  -CHARLINE      Recorded by [CHARLINE] Alfredo Ontiveros PTA 05/01/18 1304 05/01/18 1304        User Key  (r) = Recorded By, (t) = Taken By, (c) = Cosigned By    Initials Name Effective Dates Discipline    CHARLINE Alfredo Ontiveros PTA 03/07/18 -  PT    CS CYDNEY Miner/L 03/07/18 -  OT    KW Chen Santos RN 10/17/16 -  Nurse          Wound 04/30/18 0922 Left knee surgical (Active)   Dressing Appearance dry;intact 5/1/2018  8:08 AM   Closure EVELINE 5/1/2018  8:08 AM   Base dressing in place, unable to visualize 5/1/2018  8:08 AM   Drainage Amount none 5/1/2018  8:08 AM               PT Rehab Goals     Row Name 05/01/18 0843             Bed Mobility  Goal 1 (PT)    Activity/Assistive Device (Bed Mobility Goal 1, PT) sit to supine;supine to sit   HOB down and no rails  -CHARLINE      [place] Level/Cues Needed (Bed Mobility Goal 1, PT) independent  -CHARLINE      Time Frame (Bed Mobility Goal 1, PT) 3 days  -CHARLINE      Progress/Outcomes (Bed Mobility Goal 1, PT) goal not met  -CHARLINE         Transfer Goal 1 (PT)    Activity/Assistive Device (Transfer Goal 1, PT) sit-to-stand/stand-to-sit;bed-to-chair/chair-to-bed  -CHARLINE      [place] Level/Cues Needed (Transfer Goal 1, PT) conditional independence  -CHARLINE      Time Frame (Transfer Goal 1, PT) 2 - 3 days  -CHARLINE      Progress/Outcome (Transfer Goal 1, PT) goal not met  -CHARLINE         Gait Training Goal 1 (PT)    Activity/Assistive Device (Gait Training Goal 1, PT) gait (walking locomotion);assistive device use;increase endurance/gait distance  -CHARLINE      [place] Level (Gait Training Goal 1, PT) conditional independence  -CHARLINE      Distance (Gait Goal 1, PT) 200 feet  -CHARLINE      Time Frame (Gait Training Goal 1, PT) 2 - 3 days  -CHARLINE      Progress/Outcome (Gait Training Goal 1, PT) goal not met  -CHARLINE         ROM Goal 1 (PT)    ROM Goal 1 (PT) 0-90 L knee flexion  -CHARLINE      Time Frame (ROM Goal 1, PT) 2 - 3 days  -CHARLINE      Progress/Outcome (ROM Goal 1, PT) goal not met  -CHARLINE         Stairs Goal 1 (PT)    Activity/Assistive Device (Stairs Goal 1, PT) ascending stairs;descending stairs;step-to-step;walker, rolling;assistive device use  -CHARLINE      [place] Level/Cues Needed (Stairs Goal 1, PT) contact guard assist  -CHARLINE      Number of Stairs (Stairs Goal 1, PT) 1  -CHARLINE      Time Frame (Stairs Goal 1, PT) 2 - 3 days  -CHARLINE      Progress/Outcome (Stairs Goal 1, PT) goal not met  -CHARLINE         Patient Education Goal (PT)    Activity (Patient Education Goal, PT) HEP  -CHARLINE      [place]/Cues/Accuracy (Memory Goal 2, PT) demonstrates adequately;verbalizes understanding  -CHARLINE      Time Frame (Patient Education Goal, PT) 3 days;2 days  -CHARLINE       Progress/Outcome (Patient Education Goal, PT) goal not met  -        User Key  (r) = Recorded By, (t) = Taken By, (c) = Cosigned By    Initials Name Provider Type Discipline     Alfredo Ontiveros PTA Physical Therapy Assistant PT          Physical Therapy Education     Title: PT OT SLP Therapies (Active)     Topic: Physical Therapy (Active)     Point: Mobility training (Active)    Learning Progress Summary     Learner Status Readiness Method Response Comment Documented by    Patient Active Acceptance E NR   05/01/18 1305     Active Acceptance D,E NR   04/30/18 1500          Point: Precautions (Active)    Learning Progress Summary     Learner Status Readiness Method Response Comment Documented by    Patient Active Acceptance D,E NR   04/30/18 1500                      User Key     Initials Effective Dates Name Provider Type Discipline     04/06/17 -  Alexia Dickson, PT Physical Therapist PT     03/07/18 -  Alfredo Ontiveros PTA Physical Therapy Assistant PT                    PT Recommendation and Plan  Anticipated Discharge Disposition (PT): home with home health care  Outcome Summary/Treatment Plan (PT)  Daily Summary of Progress (PT): progress toward functional goals as expected  Anticipated Equipment Needs at Discharge (PT): front wheeled walker  Anticipated Discharge Disposition (PT): home with home health care  Plan of Care Reviewed With: patient  Progress: improving  Outcome Summary: pt responded well to therapy. pt c/o pain which limits ROM. pt is guarded with mobility. pt requires SBA for bed mob and CGA for t/fs. pt w/ increased gait to 180 ft SBA-CGA w/ RW. pt required CGA for stiar trainng. no new goals met at this time. pt would continue to benefit from PT services. Recommend  PT upon DC.           Outcome Measures     Row Name 05/01/18 0843 04/30/18 1504 04/30/18 1305       How much help from another person do you currently need...    Turning from your back to your side while in flat bed  without using bedrails? 3  -CHARLINE  -- 3  -CB    Moving from lying on back to sitting on the side of a flat bed without bedrails? 3  -CHARLINE  -- 3  -CB    Moving to and from a bed to a chair (including a wheelchair)? 3  -CHARLINE  -- 3  -CB    Standing up from a chair using your arms (e.g., wheelchair, bedside chair)? 3  -CHARLINE  -- 3  -CB    Climbing 3-5 steps with a railing? 3  -CHARLINE  -- 2  -CB    To walk in hospital room? 3  -CHARLINE  -- 3  -CB    AM-PAC 6 Clicks Score 18  -CHARLINE  -- 17  -CB       How much help from another is currently needed...    Putting on and taking off regular lower body clothing?  -- 3  -RW  --    Bathing (including washing, rinsing, and drying)  -- 3  -RW  --    Toileting (which includes using toilet bed pan or urinal)  -- 3  -RW  --    Putting on and taking off regular upper body clothing  -- 4  -RW  --    Taking care of personal grooming (such as brushing teeth)  -- 4  -RW  --    Eating meals  -- 4  -RW  --    Score  -- 21  -RW  --       Functional Assessment    Outcome Measure Options AM-PAC 6 Clicks Basic Mobility (PT)  -CHARLINE AM-PAC 6 Clicks Daily Activity (OT)  -RW AM-[facility] 6 Clicks Basic Mobility (PT)  -CB      User Key  (r) = Recorded By, (t) = Taken By, (c) = Cosigned By    Initials Name Provider Type    CB Alexia Dickson, PT Physical Therapist    RW Maricel Franco, OTR/L Occupational Therapist    CHARLINE Ontiveros PTA Physical Therapy Assistant           Time Calculation:         PT Charges     Row Name 05/01/18 1305             Time Calculation    Start Time 0843  -      Stop Time 0952  -      Time Calculation (min) 69 min  -         Time Calculation- PT    Total Timed Code Minutes- PT 69 minute(s)  -        User Key  (r) = Recorded By, (t) = Taken By, (c) = Cosigned By    Initials Name Provider Type    CHARLINE Ontiveros PTA Physical Therapy Assistant          Therapy Charges for Today     Code Description Service Date Service Provider Modifiers Qty    56191546949 HC PT SELF CARE/MGMT/TRAIN EA  15 MIN 5/1/2018 Alfredo Ontiveros PTA GP 1    54267702071 HC GAIT TRAINING EA 15 MIN 5/1/2018 Alfredo Ontiveros PTA GP 1    08680125707 HC PT THER PROC EA 15 MIN 5/1/2018 Alfredo Ontiveros PTA GP 1    93260233430 HC PT THERAPEUTIC ACT EA 15 MIN 5/1/2018 Alfredo Ontiveros PTA GP 2          PT G-Codes  PT Professional Judgement Used?: Yes  Outcome Measure Options: AM-PAC 6 Clicks Basic Mobility (PT)  Score: 17  Functional Limitation: Mobility: Walking and moving around  Mobility: Walking and Moving Around Current Status (): At least 40 percent but less than 60 percent impaired, limited or restricted  Mobility: Walking and Moving Around Goal Status (): At least 20 percent but less than 40 percent impaired, limited or restricted    Alfredo Ontiveros PTA  5/1/2018

## 2018-05-01 NOTE — PLAN OF CARE
Problem: Patient Care Overview  Goal: Plan of Care Review  Outcome: Ongoing (interventions implemented as appropriate)   05/01/18 1321   Coping/Psychosocial   Plan of Care Reviewed With patient   Plan of Care Review   Progress improving   OTHER   Outcome Summary Pt tolerated tx well this date. Pt was CGA with toilet t/f. Pt completed an ADL. No goals met this tx. Continue POC.

## 2018-05-01 NOTE — PROGRESS NOTES
"Anticoagulation by Pharmacy - Warfarin Day 2 of 42    Zak Lutz is a 57 y.o.male being continued on warfarin for VTE prophylaxis  Home regimen: Not on PTA  INR Goal: 1.7-2.5  Today's INR:   Lab Results   Component Value Date    INR 1.06 05/01/2018       Objective:  [Ht: 185.4 cm (73\"); Wt: 82.4 kg (181 lb 10.5 oz)]  Lab Results   Component Value Date    INR 1.06 05/01/2018    INR 0.9 12/09/2015    INR 0.9 07/19/2015    PROTIME 13.6 05/01/2018    PROTIME 12.5 12/09/2015    PROTIME 12.4 07/19/2015     Lab Results   Component Value Date    HGB 14.2 04/30/2018    HGB 14.9 04/18/2018    HGB 14.3 01/17/2018    HCT 42.4 04/30/2018    HCT 43.6 04/18/2018    HCT 43.7 01/17/2018     04/18/2018     01/17/2018     06/14/2016       Recent Warfarin Administrations       The 5 most recent administrations since 04/24/2018 are shown below each listed medication.    Warfarin Sodium         Order Dose Date Given     warfarin (COUMADIN) tablet 8 mg 8 mg 04/30/2018                      Assessment  Interacting medications: No significant interactions noted  INR is subtherapeutic, no previous INRs to assess trend  H&H WNL  Will start 5 mg daily    Plan:  1.  Give warfarin 5 mg tablet PO @ 1800 tonight  2.  Draw a PT/INR in AM  3.  Pharmacy will continue to follow    Glen Courtney RPH  05/01/18 2:57 PM     "

## 2018-05-01 NOTE — PLAN OF CARE
Problem: Patient Care Overview  Goal: Plan of Care Review  Outcome: Ongoing (interventions implemented as appropriate)   05/01/18 0838   Coping/Psychosocial   Plan of Care Reviewed With patient   Plan of Care Review   Progress improving   OTHER   Outcome Summary pt responded well to therapy. pt c/o pain which limits ROM. pt is guarded with mobility. pt requires SBA for bed mob and CGA for t/fs. pt w/ increased gait to 180 ft SBA-CGA w/ RW. pt required CGA for stair trainng. L knee ROM: 5-52 degrees. no new goals met at this time. pt would continue to benefit from PT services. Recommend  PT upon DC.

## 2018-05-02 ENCOUNTER — ANTICOAGULATION VISIT (OUTPATIENT)
Dept: PHARMACY | Facility: HOSPITAL | Age: 58
End: 2018-05-02

## 2018-05-02 VITALS
HEIGHT: 73 IN | TEMPERATURE: 98.2 F | RESPIRATION RATE: 18 BRPM | BODY MASS INDEX: 24.08 KG/M2 | WEIGHT: 181.66 LBS | SYSTOLIC BLOOD PRESSURE: 155 MMHG | OXYGEN SATURATION: 99 % | HEART RATE: 98 BPM | DIASTOLIC BLOOD PRESSURE: 90 MMHG

## 2018-05-02 LAB
INR PPP: 1.11 (ref 0.8–1.2)
PROTHROMBIN TIME: 14.1 SECONDS (ref 11.1–15.3)

## 2018-05-02 PROCEDURE — 97110 THERAPEUTIC EXERCISES: CPT

## 2018-05-02 PROCEDURE — 85610 PROTHROMBIN TIME: CPT | Performed by: ORTHOPAEDIC SURGERY

## 2018-05-02 PROCEDURE — 99024 POSTOP FOLLOW-UP VISIT: CPT | Performed by: ORTHOPAEDIC SURGERY

## 2018-05-02 PROCEDURE — 97535 SELF CARE MNGMENT TRAINING: CPT

## 2018-05-02 PROCEDURE — 97530 THERAPEUTIC ACTIVITIES: CPT

## 2018-05-02 PROCEDURE — 97116 GAIT TRAINING THERAPY: CPT

## 2018-05-02 RX ORDER — WARFARIN SODIUM 5 MG/1
5 TABLET ORAL NIGHTLY
Qty: 30 TABLET | Refills: 1 | Status: SHIPPED | OUTPATIENT
Start: 2018-05-02 | End: 2018-06-11

## 2018-05-02 RX ADMIN — FERROUS SULFATE TAB EC 324 MG (65 MG FE EQUIVALENT) 324 MG: 324 (65 FE) TABLET DELAYED RESPONSE at 09:43

## 2018-05-02 RX ADMIN — FAMOTIDINE 40 MG: 40 TABLET ORAL at 09:43

## 2018-05-02 RX ADMIN — OXYCODONE HYDROCHLORIDE 5 MG: 5 TABLET ORAL at 12:50

## 2018-05-02 RX ADMIN — ACETAMINOPHEN 1000 MG: 500 TABLET ORAL at 12:57

## 2018-05-02 RX ADMIN — ACETAMINOPHEN 1000 MG: 500 TABLET ORAL at 09:43

## 2018-05-02 RX ADMIN — AMLODIPINE BESYLATE 5 MG: 5 TABLET ORAL at 09:43

## 2018-05-02 RX ADMIN — NICOTINE 1 PATCH: 21 PATCH TRANSDERMAL at 12:57

## 2018-05-02 RX ADMIN — OXYCODONE HYDROCHLORIDE 10 MG: 10 TABLET, FILM COATED, EXTENDED RELEASE ORAL at 09:43

## 2018-05-02 RX ADMIN — OXYCODONE HYDROCHLORIDE 5 MG: 5 TABLET ORAL at 05:07

## 2018-05-02 RX ADMIN — GABAPENTIN 300 MG: 300 CAPSULE ORAL at 09:43

## 2018-05-02 RX ADMIN — OXYCODONE HYDROCHLORIDE 5 MG: 5 TABLET ORAL at 01:15

## 2018-05-02 NOTE — PLAN OF CARE
Problem: Patient Care Overview  Goal: Plan of Care Review  Outcome: Ongoing (interventions implemented as appropriate)   05/02/18 0323   Coping/Psychosocial   Plan of Care Reviewed With patient   Plan of Care Review   Progress no change   OTHER   Outcome Summary Patient VSS, pain controlled with PO pain meds, discharge later today anticipated, no changes     Goal: Individualization and Mutuality  Outcome: Ongoing (interventions implemented as appropriate)    Goal: Discharge Needs Assessment  Outcome: Ongoing (interventions implemented as appropriate)      Problem: Fall Risk (Adult)  Goal: Absence of Fall  Outcome: Ongoing (interventions implemented as appropriate)
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
## 2018-05-02 NOTE — DISCHARGE PLACEMENT REQUEST
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 [J44.9]     * Chondromalacia, left knee [M94.262]    Procedure:  Procedure(s):  TOTAL KNEE ARTHROPLASTY ATTUNE - left    Surgeon(s):  Jeramie Rai MD    SURGEON: Jeramie Rai MD    ASSISTANT:  TABATHA Archer    Anesthesia: Spinal    Staff:   Circulator: Chen Santos RN  Scrub Person: Dhiraj Faith  Assistant: Jasmyn Curiel CSA    Estimated Blood Loss: 50 mL    Specimens:                ID Type Source Tests Collected by Time   A : bone and soft tissue left knee Tissue Knee, Left TISSUE PATHOLOGY EXAM Jeramie Rai MD 4/30/2018 0801         Drains:  hemovac      Findings:  End-stage osteoarthritis Left knee    Complications: None    IMPLANTS:     Implant Name Type Inv. Item Serial No.  Lot No. LRB No. Used   CMT BONE  SMARTSET HV 40GM - [id number] Implant CMT BONE SMARTSET HV 40GM  [id number] [id number] Left 1   CMT BONE SMARTSET HV 40GM - [id number] Implant CMT BONE SMARTSET HV 40GM  DEPUY [id number] Left 1   BASE TIB ATTUNE CMT RP SZ7 - [id number] Implant BASE TIB ATTUNE CMT RP SZ7  DEPUY [id number] Left 1   COMP PAT ATTUNE CMT MEDL/DOME 38MM - [id number] Implant COMP PAT ATTUNE CMT MEDL/DOME 38MM  DEPUY [id number] Left 1   COMP FEM ATTUNE CMT PS SZ8 LT - [id number] Implant COMP FEM ATTUNE CMT PS SZ8 LT  [id number] Left 1   INSRT TIB ATTUNE RP PS SZ8 6MM - [id number] Implant INSRT TIB ATTUNE RP PS SZ8 6MM   DEPUY [id number] Left 1         PROCEDURE:    The patient was taken to the operating room and placed in the supine position. Preoperative antibiotics were administered. Surgical time out was performed. After adequate induction of anesthesia, the leg was prepped and draped in the usual sterile fashion, exsanguinated with an Ace bandage and the tourniquet inflated to 300 mmHg. A midline incision was performed followed by a medial parapatellar arthrotomy.    Attention was then placed to the patella. The patella was noted to track centrally through range of motion. The patella was then sized with the trials. The thickness of the patella was then measured and the cut was made in a free-hand technique. The patella protector was then placed and the surrounding osteophytes were removed.   The patella was subluxed laterally in a non-everted position.  A portion of the fat pad, ACL, and anterior horns of the meniscus were excised.     The drill hole was placed in the distal femur and the canal was the irrigated and suctioned. The IM osiel was placed and a 5 degree distal valgus cut was performed on the femur. The femur was then sized with a sizing guide. The femoral cutting block was placed and all femoral cuts were performed. The proximal tibia was exposed. We used the extramedullary tibial cutting guide set for removal of an appropriate amount of  proximal tibia. The tibial cut was performed. The posterior horns of the menisci were excised. The posterior osteophytes were removed. Flexion extension blocks were then used to balance the knee. The tibial cut surface was then sized with the sizing templates and the tibial and femoral trial were then placed. The knee was placed in full extension and then the patella was re-addressed. The patella was resurfaced and the preoperative thickness was reproduced. The patella tracked centrally through range of motion.     At this point all trial components were removed, the knee was copiously irrigated with pulsed lavage.. The cut surfaces were then dried with clean lap sponges, and the components were cemented tibia, followed by femur, then patella. The knee was held in full extension and all excess cement was removed. The knee was held still until the cement had completely hardened. We then placed the trial polyethylene spacer which resulted in full extension and excellent flexion-extension balance. We placed the final polyethylene spacer.    The knee was then copiously irrigated. The tourniquet was then released. There was excellent hemostasis. We placed a 10 [place] Hemovac drain. We closed the knee in multiple layers in standard fashion. Sterile dressing were applied. At the end of the case, the sponge and needle counts were reported as being correct. There were no known complications. The patient was then transported to the recovery room in satisfactory condition..      Jeramie Rai MD     Date: 4/30/2018  Time: 10:25 AM      Electronically signed by Jeramie Rai MD at 4/30/2018 10:26 AM

## 2018-05-02 NOTE — DISCHARGE SUMMARY
Patient Name: Zak Lutz  Patient YOB: 1960    Date of Admission:  4/30/2018  Date of Discharge:  5/2/2018  Discharge Diagnosis:   Primary osteoarthritis of both knees    Internal derangement of knee, left    Chronic pain of left knee    Baker's cyst of knee, left    Chondromalacia, left knee    Essential hypertension    Chronic obstructive pulmonary disease, unspecified COPD type    Gastroesophageal reflux disease without esophagitis    Impaired physical mobility    Impaired mobility and ADLs    Tobacco dependence syndrome    Presenting Problem/History of Present Illness: Essential hypertension [I10]  Gastroesophageal reflux disease without esophagitis [K21.9]  Primary osteoarthritis of both knees [M17.0]  Chronic pain of left knee [M25.562, G89.29]  Internal derangement of knee, left [M23.92]  Baker's cyst of knee, left [M71.22]  Chronic obstructive pulmonary disease, unspecified COPD type [J44.9]  Chondromalacia, left knee [M94.262]  Primary osteoarthritis of both knees [M17.0]  Internal derangement of knee, left [M23.92]    Procedure:  Procedure(s) (LRB):  TOTAL KNEE ARTHROPLASTY ATTUNE with adductor canal block tc-3 for backup (Left)      Admitting Physician:  Jeramie Rai MD    Consults:   Consults     No orders found from 4/1/2018 to 5/1/2018.          DETAILS OF HOSPITAL STAY:  Patient is a 57 y.o. male was admitted to the floor following the above procedure and underwent an uncomplicated hospital stay.  Patient did well with physical therapy and was ambulating well without problems.  On the day of discharge the wound was clean, dry and intact and calf was soft and non tender and Homans sign was negative.  Patient was tolerating diet without problems.  Patient was discharged home.    Condition on Discharge:  Stable    Vital Signs  Temp:  [97.8 °F (36.6 °C)-100.9 °F (38.3 °C)] 99.3 °F (37.4 °C)  Heart Rate:  [] 104  Resp:  [18] 18  BP: (160-177)/(86-98) 162/90    LABS:       Admission on 04/30/2018   Component Date Value Ref Range Status   • ABO Type 04/30/2018 O   Final   • RH type 04/30/2018 Positive   Final   • Antibody Screen 04/30/2018 Negative   Final   • T&S Expiration Date 04/30/2018 5/3/2018 11:59:59 PM   Final   • Previous History 04/30/2018 Previous Record on File   Final   • Case Report 05/01/2018    Final                    Value:Surgical Pathology Report                         Case: [id number]                                  Authorizing Provider:  Jeramie Rai MD  Collected:           04/30/2018 08:01 AM          Ordering Location:     [facility]             Received:            04/30/2018 12:44 PM                                 [place]                                                              Pathologist:           Akhil Bennett MD                                                            Specimen:    Knee, Left, bone and soft tissue left knee                                                • Final Diagnosis 05/01/2018    Final                    Value:This result contains rich text formatting which cannot be displayed here.   • Gross Description 05/01/2018    Final                    Value:This result contains rich text formatting which cannot be displayed here.   • Hemoglobin 04/30/2018 14.2  13.7 - 17.3 g/dL Final   • Hematocrit 04/30/2018 42.4  39.0 - 49.0 % Final   • Protime 05/01/2018 13.6  11.1 - 15.3 Seconds Final   • INR 05/01/2018 1.06  0.80 - 1.20 Final       No results found.    Discharge Medications   Zak Lutz   Home Medication Instructions MARILYN:[id number]    Printed on:05/02/18 0654   Medication Information                      albuterol (VENTOLIN HFA) 108 (90 Base) MCG/ACT inhaler  Inhale 2 puffs Every 4 (Four) Hours As Needed for Wheezing.             amLODIPine (NORVASC) 5 MG tablet  Take 1 tablet by mouth Daily.             fluticasone (FLONASE) 50 MCG/ACT nasal spray  2 sprays into each nostril Daily.              gabapentin (NEURONTIN) 300 MG capsule  Take 1 capsule by mouth Daily.             nicotine (NICODERM CQ) 21 MG/24HR patch  Place 1 patch on the skin Daily.             oxyCODONE-acetaminophen (PERCOCET) 7.5-325 MG per tablet  Take 1 tablet by mouth Every 4 (Four) Hours As Needed for Moderate Pain .             raNITIdine (ZANTAC) 150 MG tablet  Take 150 mg by mouth Daily.             Warfarin Sodium (PHARMACY TO DOSE WARFARIN)  Continuous As Needed (6 weeks).                 Discharge Diet:   Diet Instructions     Diet: Regular       Discharge Diet:  Regular          Activity at Discharge:   Activity Instructions     Activity as Tolerated             Discharge Instructions: Patient is to continue with physical therapy exercises daily and continue working with the physical therapist as ordered. Patient may weight bear as tolerated. Continue GLENYS hose daily and ice regularly. Patient instructed on frequent calf pumping exercises.  Patient also instructed on incentive spirometer during hospitalization and encouraged to continue to use at home regularly. Patient may shower on POD#2. The wound should be gently cleaned with antibacterial soap then allowed to dry.  If there is any drainage then it can be covered with a dry sterile dressing.  If there is drainage, redness or swelling, then the physician should be notified. Follow up appointment in 2 weeks - patient to call the office at [phone] to schedule.  Coumadin management per hospital pharmacy.  All other meds per the discharge med/rec    Discharge Diagnosis:    Patient Active Problem List   Diagnosis   • Tobacco dependence syndrome   • Hyperlipidemia   • Gastroesophageal reflux disease   • Essential hypertension   • Degenerative joint disease involving multiple joints   • Chronic obstructive lung disease   • Allergic rhinitis   • Primary osteoarthritis of both knees   • Encounter for drug screening   • Internal derangement of knee, left   • Chronic pain of  "left knee   • Baker's cyst of knee, left   • Chondromalacia, left knee   • Gastroesophageal reflux disease without esophagitis   • Chronic obstructive pulmonary disease       Follow-up Appointments  No future appointments.  Additional Instructions for the Follow-ups that You Need to Schedule     Ambulatory Referral to Physical Therapy    As directed      Left TKA  Advance as tolerated.  Swelling control  Work on full extension first    Order Comments:  Left TKA Advance as tolerated. Swelling control Work on full extension first          Call MD With Problems / Concerns    As directed      Instructions:   Call the office with questions, problems, or concerns.  Remember that pain medication can not be \"called in\" but a prescription must be written and picked up from the office.   So, be sure to notify the office with a little notice prior to running out.    Order Comments:  Instructions: Call the office with questions, problems, or concerns. Remember that pain medication can not be \"called in\" but a prescription must be written and picked up from the office. So, be sure to notify the office with a little notice prior to running out.          Discharge Follow-up with Specified Provider: HENRY Arenas R Peyton; 2 Weeks    As directed      To:  HENRY Arenas R Peyton    Follow Up:  2 Weeks                  Jeramie Rai MD  05/02/18  6:54 AM    "

## 2018-05-02 NOTE — THERAPY TREATMENT NOTE
Acute Care - Physical Therapy Treatment Note  [facility]     Patient Name: Zak Lutz  : 1960  MRN: [id number]  Today's Date: 2018  Onset of Illness/Injury or Date of Surgery: 18  Date of Referral to PT: 18  Referring Physician: Jeramie Rai MD    Admit Date: 2018    Visit Dx:    ICD-10-CM ICD-9-CM   1. Primary osteoarthritis of both knees M17.0 715.16   2. Internal derangement of knee, left M23.92 717.9   3. Chronic pain of left knee M25.562 719.46    G89.29 338.29   4. Baker's cyst of knee, left M71.22 727.51   5. Chondromalacia, left knee M94.262 717.7   6. Essential hypertension I10 401.9   7. Chronic obstructive pulmonary disease, unspecified COPD type J44.9 496   8. Gastroesophageal reflux disease without esophagitis K21.9 530.81   9. Impaired physical mobility Z74.09 781.99   10. Impaired mobility and ADLs Z74.09 799.89   11. Tobacco dependence syndrome F17.200 305.1   12. Anticoagulant long-term use Z79.01 V58.61     Patient Active Problem List   Diagnosis   • Tobacco dependence syndrome   • Hyperlipidemia   • Gastroesophageal reflux disease   • Essential hypertension   • Degenerative joint disease involving multiple joints   • Chronic obstructive lung disease   • Allergic rhinitis   • Primary osteoarthritis of both knees   • Encounter for drug screening   • Internal derangement of knee, left   • Chronic pain of left knee   • Baker's cyst of knee, left   • Chondromalacia, left knee   • Gastroesophageal reflux disease without esophagitis   • Chronic obstructive pulmonary disease       Therapy Treatment          Rehabilitation Treatment Summary     Row Name 18 1004 18 0837          Treatment Time/Intention    Discipline physical therapy assistant  -CHARLINE occupational therapy assistant  -CS     Document Type therapy note (daily note)  -CHARLINE therapy note (daily note)  -CS     Subjective Information complains of;pain  -CHARLINE no complaints  -CS     Mode of Treatment  physical therapy;individual therapy  -CHARLINE occupational therapy  -CS     Total Minutes, Occupational Therapy Treatment  -- 74  -CS2     Therapy Frequency (OT Eval)  -- daily  -CS2     Patient Effort good  -CHARLINE good  -CS3     Existing Precautions/Restrictions fall  -CHARLINE  --     Recorded by [CHARLINE] Alfredo Ontiveros, PTA 05/02/18 1228 05/02/18 1228 [CS] Morenita Conklin, LAMAS/L 05/02/18 0850 05/02/18 0850  [CS2] Morenita Conklin, LAMAS/L 05/02/18 1134 05/02/18 1134  [CS3] Morenita Conklin, LAMAS/L 05/02/18 1124 05/02/18 1124     Row Name 05/02/18 1004 05/02/18 0837          Vital Signs    Pre Systolic BP Rehab 162  -CHARLINE 160  -CS     Pre Treatment Diastolic BP 95  -CHARLINE 96  -CS     Post Systolic BP Rehab 153  -CHARLINE  --     Post Treatment Diastolic   -CHARLINE  --     Pretreatment Heart Rate (beats/min) 102  -CHARLINE 100  -CS     Posttreatment Heart Rate (beats/min) 94  -CHARLINE 85  -CS2     Pre SpO2 (%) 98  -CHARLINE 97  -CS     O2 Delivery Pre Treatment room air  -CHARLINE room air  -CS     Post SpO2 (%) 97  -CHARLINE 94  -CS2     O2 Delivery Post Treatment room air  -CHARLINE room air  -CS2     Pre Patient Position Supine  -CHARLINE Supine  -CS     Intra Patient Position  -- Standing  -CS2     Post Patient Position Supine  -CHARLINE Sitting  -CS2     Recorded by [CHARLINE] Alfredo Ontiveros, ALYSA 05/02/18 1228 05/02/18 1228 [CS] Morenita Conklin, LAMAS/L 05/02/18 0850 05/02/18 0850  [CS2] Morenita Conklin, LAMAS/L 05/02/18 1004 05/02/18 1004     Row Name 05/02/18 1004 05/02/18 0837          Cognitive Assessment/Intervention- PT/OT    Affect/Mental Status (Cognitive) WFL  -CHARLINE WFL  -CS     Orientation Status (Cognition) oriented x 4  -CHARLINE oriented x 4  -CS     Follows Commands (Cognition) WFL  -CHARLINE WFL  -CS     Cognitive Function (Cognitive) WFL  -CHARLINE WFL  -CS     Personal Safety Interventions gait belt;muscle strengthening facilitated;nonskid shoes/slippers when out of bed;supervised activity  -CHARLINE gait belt;nonskid shoes/slippers when out of bed  -CS     Recorded by [CHARLINE] Alfredo Ontiveros, PTA  05/02/18 1228 05/02/18 1228 [CS] Morenita Conklin LAMAS/L 05/02/18 1134 05/02/18 1134     Row Name 05/02/18 1004 05/02/18 0837          Cognitive Assessment Intervention- SLP    Cognitive Function (Cognition) WFL  -CHARLINE WFL  -CS     Recorded by [CHARLINE] Alfredo Ontiveros, ALYSA 05/02/18 1228 05/02/18 1228 [CS] Morenita Conklin LAMAS/L 05/02/18 0850 05/02/18 0850     Row Name 05/02/18 1004 05/02/18 0837          Bed Mobility Assessment/Treatment    Bed Mobility Assessment/Treatment supine-sit;sit-supine;scooting/bridging  -CHARLINE supine-sit-supine  -CS     Scooting/Bridging [place] (Bed Mobility) independent  -CHARLINE independent  -CS     Supine-Sit [place] (Bed Mobility) independent  -CHARLINE independent  -CS     Sit-Supine [place] (Bed Mobility) independent  -CHARLINE independent  -CS     Supine-Sit-Supine [place] (Bed Mobility)  -- independent  -CS     Recorded by [CHARLINE] Alfredo Ontiveros, ALYSA 05/02/18 1228 05/02/18 1228 [CS] Morenita Conklin LAMAS/L 05/02/18 1134 05/02/18 1134     Row Name 05/02/18 0837             Functional Mobility    Functional Mobility- Ind. Level conditional independence  -CS      Functional Mobility- Device rolling walker  -CS      Functional Mobility-Distance (Feet) 15  -CS      Recorded by [CS] Morenita Conklin LAMAS/L 05/02/18 1134 05/02/18 1134      Row Name 05/02/18 1004 05/02/18 0837          Transfer Assessment/Treatment    Transfer Assessment/Treatment sit-stand transfer;stand-sit transfer  -CHARLINE sit-stand transfer;stand-sit transfer  -CS     Sit-Stand [place] (Transfers) conditional independence  -CHARLINE conditional independence  -CS     Stand-Sit [place] (Transfers) conditional independence  -CHARLINE conditional independence  -CS     Recorded by [CHARLINE] Alfredo Ontiveros, ALYSA 05/02/18 1228 05/02/18 1228 [CS] Morenita Conklin LAMAS/L 05/02/18 1134 05/02/18 1134     Row Name 05/02/18 1004 05/02/18 0837          Sit-Stand Transfer    Assistive Device (Sit-Stand Transfers) walker, front-wheeled  -CHARLINE  walker, front-wheeled  -CS     Recorded by [CHARLINE] Alfredo Ontiveros, PTA 05/02/18 1228 05/02/18 1228 [CS] PEYTON MinerA/L 05/02/18 1134 05/02/18 1134     Row Name 05/02/18 1004 05/02/18 0837          Stand-Sit Transfer    Assistive Device (Stand-Sit Transfers) walker, front-wheeled  -CHARLINE walker, front-wheeled  -CS     Recorded by [CHARLINE] Alfredo Ontiveros, PTA 05/02/18 1228 05/02/18 1228 [CS] PEYTON MinerA/L 05/02/18 1134 05/02/18 1134     Row Name 05/02/18 1004             Gait/Stairs Assessment/Training    Gait/Stairs Assessment/Training gait/ambulation independence;gait/ambulation assistive device;distance ambulated;gait pattern;gait deviations  -CHARLINE      [place] Level (Gait) conditional independence  -CHARLINE      Assistive Device (Gait) walker, front-wheeled  -CHARLINE      Distance in Feet (Gait) 230  -CHARLINE      Pattern (Gait) 3-point  -CHARLINE      Deviations/Abnormal Patterns (Gait) antalgic  -CHARLINE      Recorded by [CHARLINE] Alfredo Ontiveros, PTA 05/02/18 1228 05/02/18 1228      Row Name 05/02/18 0837             Lower Body Dressing Assessment/Training    Lower Body Dressing [place] Level set up;doff;don;socks  -CS      Assistive Devices (Lower Body Dressing) reacher;sock-aid  -CS      Lower Body Dressing Position supported sitting  -CS      Recorded by [CS] CYDNEY Miner/SILKE 05/02/18 1134 05/02/18 1134      Row Name 05/02/18 0837             Grooming Assessment/Training    [place] Level (Grooming) grooming skills;independent  -CS      Grooming Position supported standing;sink side  -CS      Recorded by [CS] PEYTON MinerA/L 05/02/18 1134 05/02/18 1134      Row Name 05/02/18 1004             General ROM    GENERAL ROM COMMENTS L knee ROM: 4-63 degrees AAROM  -CHARLINE      Recorded by [CHARLINE] Alfredo Ontiveros, PTA 05/02/18 1228 05/02/18 1228      Row Name 05/02/18 1004             Lower Extremity Seated Therapeutic Exercise    Performed, Seated Lower Extremity (Therapeutic Exercise) LAQ (long arc quad),  knee extension;other (see comments)   HS in sitting  -CHARLINE      Exercise Type, Seated Lower Extremity (Therapeutic Exercise) AAROM (active assistive range of motion)  -CHARLINE      Sets/Reps Detail, Seated Lower Extremity (Therapeutic Exercise) 10-20x1  -CHARLINE      Recorded by [CHARLINE] Alfredo Ontiveros, ALYSA 05/02/18 1228 05/02/18 1228      Row Name 05/02/18 1004             Lower Extremity Supine Therapeutic Exercise    Performed, Supine Lower Extremity (Therapeutic Exercise) ankle dorsiflexion/plantarflexion;quadriceps sets;hip abduction/adduction;SLR (straight leg raise)  -CHARLINE      Exercise Type, Supine Lower Extremity (Therapeutic Exercise) AROM (active range of motion);AAROM (active assistive range of motion)  -CHARLINE      Sets/Reps Detail, Supine Lower Extremity (Therapeutic Exercise) 20x1  -CHARLINE      Comment, Supine Lower Extremity (Therapeutic Exercise) HEP reinforced  -CHARLINE      Recorded by [CHARLINE] Alfredo Ontiveros, ALYSA 05/02/18 1228 05/02/18 1228      Row Name 05/02/18 0837             Therapeutic Exercise    Upper Extremity (Therapeutic Exercise) bicep curl, bilateral  -CS      Upper Extremity Range of Motion (Therapeutic Exercise) shoulder flexion/extension, bilateral;shoulder internal/external rotation, bilateral;elbow flexion/extension, bilateral;shoulder abduction/adduction, bilateral  -CS      Weight/Resistance (Therapeutic Exercise) green  -CS      Exercise Type (Therapeutic Exercise) AROM (active range of motion)  -CS      Position (Therapeutic Exercise) seated  -CS      Sets/Reps (Therapeutic Exercise) 1 set 20 reps  -CS      Equipment (Therapeutic Exercise) resistive bands  -CS      Expected Outcome (Therapeutic Exercise) improve functional tolerance, self-care activity;improve performance, transfer skills  -CS      Recorded by [CS] MYRNA Miner 05/02/18 1134 05/02/18 1134      Row Name 05/02/18 1004 05/02/18 0837          Positioning and Restraints    Pre-Treatment Position in bed  -CHARLINE in bed  -CS     Post  Treatment Position bed  -CHARLINE bed  -CS2     In Bed fowlers;call light within reach;encouraged to call for assist;exit alarm on   all needs met  -CHARLINE sitting;call light within reach  -CS2     Recorded by [CHARLINE] Alfredo Ontiveros PTA 05/02/18 1228 05/02/18 1228 [CS] Morenita Conklin LAMAS/L 05/02/18 0850 05/02/18 0850  [CS2] Morenita Conklin LAMAS/L 05/02/18 1134 05/02/18 1134     Row Name 05/02/18 1004 05/02/18 0837          Pain Scale: Numbers Pre/Post-Treatment    Pain Scale: Numbers, Pretreatment 6/10  -CHARLINE 6/10  -CS     Pain Scale: Numbers, Post-Treatment 8/10  -CHARLINE 5/10  -CS2     Pain Location - Side Left  -CHARLINE Left  -CS     Pain Location knee  -CHARLINE knee  -CS     Pain Intervention(s) Medication (See MAR)  -CHARLINE Medication (See MAR)  -CS2     Recorded by [CHARLINE] Alfredo Ontiveros PTA 05/02/18 1228 05/02/18 1228 [CS] Morenita Conklin LAMAS/L 05/02/18 0850 05/02/18 0850  [CS2] Morenita Conklin LAMAS/L 05/02/18 1134 05/02/18 1134     Row Name                Wound 04/30/18 0922 Left knee surgical    Wound - Properties Group Date first assessed: 04/30/18 [KW] Time first assessed: 0922 [KW] Present On Admission : no [KW] Side: Left [KW] Location: knee [KW] Type: surgical [KW] Recorded by:  [KW] Chen Santos RN 04/30/18 0922 04/30/18 0922    Row Name 05/02/18 0837             Outcome Summary/Treatment Plan (OT)    Daily Summary of Progress (OT) progress toward functional goals as expected  -CS      Plan for Continued Treatment (OT) cont OT POC  -CS      Anticipated Discharge Disposition (OT) home or self care   out pt rehab  -CS      Recorded by [CS] PEYTON MinerA/L 05/02/18 1134 05/02/18 1134      Row Name 05/02/18 1004             Outcome Summary/Treatment Plan (PT)    Daily Summary of Progress (PT) progress toward functional goals as expected  -CHARLINE      Barriers to Overall Progress (PT) pain tolerance limiting ROM  -CHARLINE      Anticipated Equipment Needs at Discharge (PT) front wheeled walker  -CHARLINE      Anticipated Discharge  Disposition (PT) home with home health care  -CHARLINE      Recorded by [CHARLINE] Alfredo Ontiveros, PTA 05/02/18 1228 05/02/18 1228        User Key  (r) = Recorded By, (t) = Taken By, (c) = Cosigned By    Initials Name Effective Dates Discipline    CHARLINE Alfredo Ontiveros, PTA 03/07/18 -  PT    CS Morenita Conklin, LAMAS/L 03/07/18 -  OT    KW Chen Santos RN 10/17/16 -  Nurse          Wound 04/30/18 0922 Left knee surgical (Active)   Dressing Appearance open to air 5/2/2018  8:49 AM   Closure Open to air;Staples 5/2/2018  8:49 AM   Base clean;dry 5/2/2018  8:49 AM   Drainage Amount none 5/1/2018  8:25 PM   Dressing Care, Wound open to air 5/2/2018  8:49 AM               PT Rehab Goals     Row Name 05/02/18 1004             Bed Mobility Goal 1 (PT)    Activity/Assistive Device (Bed Mobility Goal 1, PT) sit to supine;supine to sit   HOB down and no rails  -CHARLINE      [place] Level/Cues Needed (Bed Mobility Goal 1, PT) independent  -CHARLINE      Time Frame (Bed Mobility Goal 1, PT) 3 days  -CHARLINE      Progress/Outcomes (Bed Mobility Goal 1, PT) goal met  -CHARLINE         Transfer Goal 1 (PT)    Activity/Assistive Device (Transfer Goal 1, PT) sit-to-stand/stand-to-sit;bed-to-chair/chair-to-bed  -CHARLINE      [place] Level/Cues Needed (Transfer Goal 1, PT) conditional independence  -CHARLINE      Time Frame (Transfer Goal 1, PT) 2 - 3 days  -CHARLINE      Progress/Outcome (Transfer Goal 1, PT) goal not met  -CHARLINE         Gait Training Goal 1 (PT)    Activity/Assistive Device (Gait Training Goal 1, PT) gait (walking locomotion);assistive device use;increase endurance/gait distance  -CHARLINE      [place] Level (Gait Training Goal 1, PT) conditional independence  -CHARLINE      Distance (Gait Goal 1, PT) 200 feet  -CHARLINE      Time Frame (Gait Training Goal 1, PT) 2 - 3 days  -CHARLINE      Progress/Outcome (Gait Training Goal 1, PT) goal met  -CHARLINE         ROM Goal 1 (PT)    ROM Goal 1 (PT) 0-90 L knee flexion  -CHARLINE      Time Frame (ROM Goal 1, PT) 2 - 3 days  -CHARLINE       Progress/Outcome (ROM Goal 1, PT) goal not met  -CHARLINE         Stairs Goal 1 (PT)    Activity/Assistive Device (Stairs Goal 1, PT) ascending stairs;descending stairs;step-to-step;walker, rolling;assistive device use  -CHARLINE      [place] Level/Cues Needed (Stairs Goal 1, PT) contact guard assist  -CHARLINE      Number of Stairs (Stairs Goal 1, PT) 1  -CHARLINE      Time Frame (Stairs Goal 1, PT) 2 - 3 days  -CHARLINE      Progress/Outcome (Stairs Goal 1, PT) goal met  -CHARLINE         Patient Education Goal (PT)    Activity (Patient Education Goal, PT) HEP  -CHARLINE      [place]/Cues/Accuracy (Memory Goal 2, PT) demonstrates adequately;verbalizes understanding  -CHARLINE      Time Frame (Patient Education Goal, PT) 3 days;2 days  -CHARLINE      Progress/Outcome (Patient Education Goal, PT) goal met  -CHARLINE        User Key  (r) = Recorded By, (t) = Taken By, (c) = Cosigned By    Initials Name Provider Type Discipline    CHARLINE Ontiveros, PTA Physical Therapy Assistant PT          Physical Therapy Education     Title: PT OT SLP Therapies (Active)     Topic: Physical Therapy (Active)     Point: Mobility training (Done)    Learning Progress Summary     Learner Status Readiness Method Response Comment Documented by    Patient Done Acceptance E JOSE LUIS BAKER  CHARLINE 05/02/18 1229     Active Acceptance E NR   05/01/18 1417     Active Acceptance E NR   05/01/18 1305     Active Acceptance D,E NR   04/30/18 1500          Point: Home exercise program (Done)    Learning Progress Summary     Learner Status Readiness Method Response Comment Documented by    Patient Done Acceptance E JOSE LUIS BAKER  CHARLINE 05/02/18 1229     Done Acceptance E,H SAMUEL pt edu on HEP ans home safety.  05/01/18 1417          Point: Precautions (Active)    Learning Progress Summary     Learner Status Readiness Method Response Comment Documented by    Patient Active Acceptance D,E NR   04/30/18 1500                      User Key     Initials Effective Dates Name Provider Type Discipline     04/06/17 -   Alexia Dickson, PT Physical Therapist PT     03/07/18 -  Alfredo Ontiveros, PTA Physical Therapy Assistant PT                    PT Recommendation and Plan  Anticipated Discharge Disposition (PT): home with home health care  Outcome Summary/Treatment Plan (PT)  Daily Summary of Progress (PT): progress toward functional goals as expected  Barriers to Overall Progress (PT): pain tolerance limiting ROM  Anticipated Equipment Needs at Discharge (PT): front wheeled walker  Anticipated Discharge Disposition (PT): home with home health care  Plan of Care Reviewed With: patient  Progress: improving  Outcome Summary: pt responded well to therapy w/ increased gait to 230 ft mod indep w/ RW. pt w/ improved ROM: 4-63 degrees. HEP reinforced. pt met 1 new goal this tx. Recommend  PT upon DC.           Outcome Measures     Row Name 05/02/18 1100 05/02/18 1004 05/01/18 1316       How much help from another person do you currently need...    Turning from your back to your side while in flat bed without using bedrails?  -- 4  -CHARLINE 4  -CHARLINE    Moving from lying on back to sitting on the side of a flat bed without bedrails?  -- 4  -CHARLINE 4  -CHARLINE    Moving to and from a bed to a chair (including a wheelchair)?  -- 4  -CHARLINE 4  -CHARLINE    Standing up from a chair using your arms (e.g., wheelchair, bedside chair)?  -- 4  -CHARLINE 4  -CHARLINE    Climbing 3-5 steps with a railing?  -- 3  -CHARLINE 3  -CHARLINE    To walk in hospital room?  -- 4  -CHARLINE 4  -CHARLINE    AM-PAC 6 Clicks Score  -- 23  -CHARLINE 23  -CHARLINE       How much help from another is currently needed...    Putting on and taking off regular lower body clothing? 3  -CS  --  --    Bathing (including washing, rinsing, and drying) 3  -CS  --  --    Toileting (which includes using toilet bed pan or urinal) 3  -CS  --  --    Putting on and taking off regular upper body clothing 4  -CS  --  --    Taking care of personal grooming (such as brushing teeth) 4  -CS  --  --    Eating meals 4  -CS  --  --    Score 21  -CS  --  --        Functional Assessment    Outcome Measure Options AM-PAC 6 Clicks Daily Activity (OT)  -CS AM-PAC 6 Clicks Basic Mobility (PT)  -CHARLINE -PAC 6 Clicks Basic Mobility (PT)  -CHARLINE    Row Name 05/01/18 1300 05/01/18 0843 04/30/18 1504       How much help from another person do you currently need...    Turning from your back to your side while in flat bed without using bedrails?  -- 3  -CHARLINE  --    Moving from lying on back to sitting on the side of a flat bed without bedrails?  -- 3  -CHARLINE  --    Moving to and from a bed to a chair (including a wheelchair)?  -- 3  -CHARLINE  --    Standing up from a chair using your arms (e.g., wheelchair, bedside chair)?  -- 3  -CHARLINE  --    Climbing 3-5 steps with a railing?  -- 3  -CHARLINE  --    To walk in hospital room?  -- 3  -CHARLINE  --    AM-PAC 6 Clicks Score  -- 18  -CHARLINE  --       How much help from another is currently needed...    Putting on and taking off regular lower body clothing? 3  -CS  -- 3  -RW    Bathing (including washing, rinsing, and drying) 3  -CS  -- 3  -RW    Toileting (which includes using toilet bed pan or urinal) 3  -CS  -- 3  -RW    Putting on and taking off regular upper body clothing 4  -CS  -- 4  -RW    Taking care of personal grooming (such as brushing teeth) 4  -CS  -- 4  -RW    Eating meals 4  -CS  -- 4  -RW    Score 21  -CS  -- 21  -RW       Functional Assessment    Outcome Measure Options AM-PAC 6 Clicks Daily Activity (OT)  -CS AM-[facility] 6 Clicks Basic Mobility (PT)  -[facility] 6 Clicks Daily Activity (OT)  -RW    Row Name 04/30/18 1305             How much help from another person do you currently need...    Turning from your back to your side while in flat bed without using bedrails? 3  -CB      Moving from lying on back to sitting on the side of a flat bed without bedrails? 3  -CB      Moving to and from a bed to a chair (including a wheelchair)? 3  -CB      Standing up from a chair using your arms (e.g., wheelchair, bedside chair)? 3  -CB      Climbing 3-5 steps with a  railing? 2  -CB      To walk in hospital room? 3  -CB      AM-PAC 6 Clicks Score 17  -CB         Functional Assessment    Outcome Measure Options AM-PAC 6 Clicks Basic Mobility (PT)  -CB        User Key  (r) = Recorded By, (t) = Taken By, (c) = Cosigned By    Initials Name Provider Type    CB Alexia Dickson, PT Physical Therapist    SHAHEEN Franco, OTR/L Occupational Therapist    CHARLINE Ontiveros PTA Physical Therapy Assistant    VÍCTOR Conklin, LAMAS/L Occupational Therapy Assistant           Time Calculation:         PT Charges     Row Name 05/02/18 1230             Time Calculation    Start Time 1004  -CHARLINE      Stop Time 1043  -CHARLINE      Time Calculation (min) 39 min  -CHARLINE         Time Calculation- PT    Total Timed Code Minutes- PT 39 minute(s)  -CHARLINE        User Key  (r) = Recorded By, (t) = Taken By, (c) = Cosigned By    Initials Name Provider Type    CHARLINE Ontiveros PTA Physical Therapy Assistant          Therapy Charges for Today     Code Description Service Date Service Provider Modifiers Qty    97981078037 HC PT SELF CARE/MGMT/TRAIN EA 15 MIN 5/1/2018 Alfredo Ontiveros, PTA GP 1    71884241363 HC GAIT TRAINING EA 15 MIN 5/1/2018 Alfredo Ontiveros, PTA GP 1    68274945649 HC PT THER PROC EA 15 MIN 5/1/2018 Aflredo Ontiveros, PTA GP 1    62593079756 HC PT THERAPEUTIC ACT EA 15 MIN 5/1/2018 Alfredo Ontiveros, PTA GP 2    71418304398 HC GAIT TRAINING EA 15 MIN 5/1/2018 Alfredo Ontiveros, PTA GP 1    07299386990 HC PT THERAPEUTIC ACT EA 15 MIN 5/1/2018 Alfredo Ontiveros, PTA GP 1    39675483123 HC PT THER PROC EA 15 MIN 5/1/2018 Alfredo Ontiveros, PTA GP 1    26481396088 HC GAIT TRAINING EA 15 MIN 5/2/2018 Alfredo Ontiveros, PTA GP 1    08919902681 HC PT THER PROC EA 15 MIN 5/2/2018 Alfredo Ontiveros, PTA GP 1    14422124872 HC PT THERAPEUTIC ACT EA 15 MIN 5/2/2018 Alfredo Ontiveros, PTA GP 1          PT G-Codes  PT Professional Judgement Used?: Yes  Outcome Measure Options: AM-PAC 6 Clicks Daily Activity (OT)  Score:  17  Functional Limitation: Mobility: Walking and moving around  Mobility: Walking and Moving Around Current Status (): At least 40 percent but less than 60 percent impaired, limited or restricted  Mobility: Walking and Moving Around Goal Status (): At least 20 percent but less than 40 percent impaired, limited or restricted    Alfredo Ontiveros, PTA  5/2/2018

## 2018-05-02 NOTE — THERAPY DISCHARGE NOTE
Acute Care - Physical Therapy Discharge Summary  [facility]       Patient Name: Zak Lutz  : 1960  MRN: [id number]    Today's Date: 2018  Onset of Illness/Injury or Date of Surgery: 18    Date of Referral to PT: 18  Referring Physician: Jermaie Rai MD      Admit Date: 2018      PT Recommendation and Plan    Visit Dx:    ICD-10-CM ICD-9-CM   1. Primary osteoarthritis of both knees M17.0 715.16   2. Internal derangement of knee, left M23.92 717.9   3. Chronic pain of left knee M25.562 719.46    G89.29 338.29   4. Baker's cyst of knee, left M71.22 727.51   5. Chondromalacia, left knee M94.262 717.7   6. Essential hypertension I10 401.9   7. Chronic obstructive pulmonary disease, unspecified COPD type J44.9 496   8. Gastroesophageal reflux disease without esophagitis K21.9 530.81   9. Impaired physical mobility Z74.09 781.99   10. Impaired mobility and ADLs Z74.09 799.89   11. Tobacco dependence syndrome F17.200 305.1   12. Anticoagulant long-term use Z79.01 V58.61             Outcome Measures     Row Name 18 1100 18 1004 18 1316       How much help from another person do you currently need...    Turning from your back to your side while in flat bed without using bedrails?  -- 4  -CHARLINE 4  -CHARLINE    Moving from lying on back to sitting on the side of a flat bed without bedrails?  -- 4  -CHARLINE 4  -CHARLINE    Moving to and from a bed to a chair (including a wheelchair)?  -- 4  -CHARLINE 4  -CHARLINE    Standing up from a chair using your arms (e.g., wheelchair, bedside chair)?  -- 4  -CHARLINE 4  -CHARLINE    Climbing 3-5 steps with a railing?  -- 3  -CHARLINE 3  -CHARLINE    To walk in hospital room?  -- 4  -CHARLINE 4  -CHARLINE    AM-PAC 6 Clicks Score  -- 23  -CHARLINE 23  -CHARLINE       How much help from another is currently needed...    Putting on and taking off regular lower body clothing? 3  -CS  --  --    Bathing (including washing, rinsing, and drying) 3  -CS  --  --    Toileting (which includes using toilet bed pan or  urinal) 3  -CS  --  --    Putting on and taking off regular upper body clothing 4  -CS  --  --    Taking care of personal grooming (such as brushing teeth) 4  -CS  --  --    Eating meals 4  -CS  --  --    Score 21  -CS  --  --       Functional Assessment    Outcome Measure Options AM-PAC 6 Clicks Daily Activity (OT)  -CS AM-PAC 6 Clicks Basic Mobility (PT)  -[facility]-PAC 6 Clicks Basic Mobility (PT)  -CHARLINE    Row Name 05/01/18 1300 05/01/18 0843 04/30/18 1504       How much help from another person do you currently need...    Turning from your back to your side while in flat bed without using bedrails?  -- 3  -CHARLINE  --    Moving from lying on back to sitting on the side of a flat bed without bedrails?  -- 3  -CHARLINE  --    Moving to and from a bed to a chair (including a wheelchair)?  -- 3  -CHARLINE  --    Standing up from a chair using your arms (e.g., wheelchair, bedside chair)?  -- 3  -CHARLINE  --    Climbing 3-5 steps with a railing?  -- 3  -CHARLINE  --    To walk in hospital room?  -- 3  -CHARLINE  --    AM-PAC 6 Clicks Score  -- 18  -CHARLINE  --       How much help from another is currently needed...    Putting on and taking off regular lower body clothing? 3  -CS  -- 3  -RW    Bathing (including washing, rinsing, and drying) 3  -CS  -- 3  -RW    Toileting (which includes using toilet bed pan or urinal) 3  -CS  -- 3  -RW    Putting on and taking off regular upper body clothing 4  -CS  -- 4  -RW    Taking care of personal grooming (such as brushing teeth) 4  -CS  -- 4  -RW    Eating meals 4  -CS  -- 4  -RW    Score 21  -CS  -- 21  -RW       Functional Assessment    Outcome Measure Options AM-PAC 6 Clicks Daily Activity (OT)  -CS AM-PAC 6 Clicks Basic Mobility (PT)  -CHARLINE -[facility] 6 Clicks Daily Activity (OT)  -RW    Row Name 04/30/18 1305             How much help from another person do you currently need...    Turning from your back to your side while in flat bed without using bedrails? 3  -CB      Moving from lying on back to sitting on the side of  a flat bed without bedrails? 3  -CB      Moving to and from a bed to a chair (including a wheelchair)? 3  -CB      Standing up from a chair using your arms (e.g., wheelchair, bedside chair)? 3  -CB      Climbing 3-5 steps with a railing? 2  -CB      To walk in hospital room? 3  -CB      AM-PAC 6 Clicks Score 17  -CB         Functional Assessment    Outcome Measure Options AM-PAC 6 Clicks Basic Mobility (PT)  -CB        User Key  (r) = Recorded By, (t) = Taken By, (c) = Cosigned By    Initials Name Provider Type    CB Alexia Dickson, PT Physical Therapist    RW Maricel Franco, OTR/L Occupational Therapist    CHARLINE Ontiveros, PTA Physical Therapy Assistant    VÍCTOR Conklin, LAMAS/L Occupational Therapy Assistant                PT Charges     Row Name 05/02/18 1230             Time Calculation    Start Time 1004  -CHARLINE      Stop Time 1043  -CHARLINE      Time Calculation (min) 39 min  -CHARLINE         Time Calculation- PT    Total Timed Code Minutes- PT 39 minute(s)  -CHARLINE        User Key  (r) = Recorded By, (t) = Taken By, (c) = Cosigned By    Initials Name Provider Type    CHARLINE Ontiveros, PTA Physical Therapy Assistant                  PT Rehab Goals     Row Name 05/02/18 1004             Bed Mobility Goal 1 (PT)    Activity/Assistive Device (Bed Mobility Goal 1, PT) sit to supine;supine to sit   HOB down and no rails  -CHARLINE      [place] Level/Cues Needed (Bed Mobility Goal 1, PT) independent  -CHARLINE      Time Frame (Bed Mobility Goal 1, PT) 3 days  -CHARLINE      Progress/Outcomes (Bed Mobility Goal 1, PT) goal met  -CHARLINE         Transfer Goal 1 (PT)    Activity/Assistive Device (Transfer Goal 1, PT) sit-to-stand/stand-to-sit;bed-to-chair/chair-to-bed  -CHARLINE      [place] Level/Cues Needed (Transfer Goal 1, PT) conditional independence  -CHARLINE      Time Frame (Transfer Goal 1, PT) 2 - 3 days  -CHARLINE      Progress/Outcome (Transfer Goal 1, PT) goal not met  -CHARLINE         Gait Training Goal 1 (PT)    Activity/Assistive Device  (Gait Training Goal 1, PT) gait (walking locomotion);assistive device use;increase endurance/gait distance  -CHARLINE      [place] Level (Gait Training Goal 1, PT) conditional independence  -CHARLINE      Distance (Gait Goal 1, PT) 200 feet  -CHARLINE      Time Frame (Gait Training Goal 1, PT) 2 - 3 days  -CHARLINE      Progress/Outcome (Gait Training Goal 1, PT) goal met  -CHARLINE         ROM Goal 1 (PT)    ROM Goal 1 (PT) 0-90 L knee flexion  -CHARLINE      Time Frame (ROM Goal 1, PT) 2 - 3 days  -CHARLINE      Progress/Outcome (ROM Goal 1, PT) goal not met  -CHARLINE         Stairs Goal 1 (PT)    Activity/Assistive Device (Stairs Goal 1, PT) ascending stairs;descending stairs;step-to-step;walker, rolling;assistive device use  -CHARLINE      [place] Level/Cues Needed (Stairs Goal 1, PT) contact guard assist  -CHARLINE      Number of Stairs (Stairs Goal 1, PT) 1  -CHARLINE      Time Frame (Stairs Goal 1, PT) 2 - 3 days  -CHARLINE      Progress/Outcome (Stairs Goal 1, PT) goal met  -CHARLINE         Patient Education Goal (PT)    Activity (Patient Education Goal, PT) HEP  -CHARLINE      [place]/Cues/Accuracy (Memory Goal 2, PT) demonstrates adequately;verbalizes understanding  -CHARLINE      Time Frame (Patient Education Goal, PT) 3 days;2 days  -CHARLINE      Progress/Outcome (Patient Education Goal, PT) goal met  -CHARLINE        User Key  (r) = Recorded By, (t) = Taken By, (c) = Cosigned By    Initials Name Provider Type Discipline    CHARLINE Ontiveros, PTA Physical Therapy Assistant PT              PT Discharge Summary  Anticipated Discharge Disposition (PT): home with home health care  Reason for Discharge: Discharge from facility, Per MD order  Outcomes Achieved: Patient able to partially acheive established goals  Discharge Destination: Home      Rain Hinds, PT   5/2/2018

## 2018-05-02 NOTE — PROGRESS NOTES
Orthopedic Total Knee Progress Note      Patient: Zak Lutz  Date of Admission: 4/30/2018  YOB: 1960  Medical Record Number: [id number]    LOS: 2    Status Post: Procedure(s) (LRB):  TOTAL KNEE ARTHROPLASTY ATTUNE with adductor canal block tc-3 for backup (Left)        Systemic or Specific Complaints: No Complaints  Complications: None  Pain Relief: some relief    Physical Exam:  57 y.o. male alert and oriented  Temp:  [97.8 °F (36.6 °C)-100.9 °F (38.3 °C)] 99.3 °F (37.4 °C)  Heart Rate:  [] 104  Resp:  [18] 18  BP: (160-177)/(86-98) 162/90    Abd: Soft, NT, with BS +  Ext: NV intact. ROM appropriate. Calf is soft and nontender. Negative Homans Sn  Skin: Incision clean dry and intact w/out signs or  symptoms of infection.    Activity: Mobilizing Per P.T.   Weight Bearing: As Tolerated    Data Review  Admission on 04/30/2018   Component Date Value Ref Range Status   • ABO Type 04/30/2018 O   Final   • RH type 04/30/2018 Positive   Final   • Antibody Screen 04/30/2018 Negative   Final   • T&S Expiration Date 04/30/2018 5/3/2018 11:59:59 PM   Final   • Previous History 04/30/2018 Previous Record on File   Final   • Case Report 05/01/2018    Final                    Value:Surgical Pathology Report                         Case: [id number]                                  Authorizing Provider:  Jeramie Rai MD  Collected:           04/30/2018 08:01 AM          Ordering Location:     [facility]             Received:            04/30/2018 12:44 PM                                 [facility]                                                              Pathologist:           Akhil Bennett MD                                                            Specimen:    Knee, Left, bone and soft tissue left knee                                                • Final Diagnosis 05/01/2018    Final                    Value:This result contains rich text formatting which cannot be  displayed here.   • Gross Description 05/01/2018    Final                    Value:This result contains rich text formatting which cannot be displayed here.   • Hemoglobin 04/30/2018 14.2  13.7 - 17.3 g/dL Final   • Hematocrit 04/30/2018 42.4  39.0 - 49.0 % Final   • Protime 05/01/2018 13.6  11.1 - 15.3 Seconds Final   • INR 05/01/2018 1.06  0.80 - 1.20 Final       No results found.    Medications    acetaminophen 1,000 mg Oral 4x Daily   amLODIPine 5 mg Oral Daily   famotidine 40 mg Oral Daily   ferrous sulfate 324 mg Oral Daily With Breakfast   fluticasone 2 spray Nasal Nightly   gabapentin 300 mg Oral Daily   nicotine 1 patch Transdermal Q24H   oxyCODONE 10 mg Oral Q12H   warfarin 5 mg Oral Daily     albuterol  •  bacitracin  •  bisacodyl  •  docusate sodium  •  HYDROmorphone **AND** naloxone  •  ondansetron **OR** ondansetron ODT **OR** ondansetron  •  oxyCODONE  •  Pharmacy to dose warfarin  •  sodium chloride  •  sodium chloride    Assessment:  Doing well following total knee replacement  Patient Active Problem List   Diagnosis   • Tobacco dependence syndrome   • Hyperlipidemia   • Gastroesophageal reflux disease   • Essential hypertension   • Degenerative joint disease involving multiple joints   • Chronic obstructive lung disease   • Allergic rhinitis   • Primary osteoarthritis of both knees   • Encounter for drug screening   • Internal derangement of knee, left   • Chronic pain of left knee   • Baker's cyst of knee, left   • Chondromalacia, left knee   • Gastroesophageal reflux disease without esophagitis   • Chronic obstructive pulmonary disease         Plan:   Continue efforts to mobilize  Continue Pain Control Measures  Continue incisional Care  DVT prophylaxis    Discharge Plan:Home today with outpatient PT    Jeramie Rai MD    Date: 5/2/2018   Time: 6:47 AM

## 2018-05-02 NOTE — PROGRESS NOTES
Anticoagulation- Warfarin     Completed Discharge Warfarin Counseling    Discussed the followin. Reason for Medication and Length of therapy  2. Drug-Drug Interactions -mobic/NSAIDs, avoid for the next 6 weeks  3. Drug-Diet Interactions  4. Drug- Alcohol Interactions  5. Signs and Symptoms of Bleeding  6. Fall risk- go to the Emergency Room  7. Home procedures:  Patient is going home with warfarin 5 mg daily  8. Sent electronically to [facility] for warfarin 5 mg, Number: 30  Si tablet every night or As Directed + 1 refills  9. Gave Warfarin booklet  10. Answered all Questions  11. Called in lab orders to [facility]  12. Drug -smoking interactions -nicotine patches  13. Patient mentioned [facility] might come out to house at the start, will follow up, but sent orders in to both    Ricky Puri, [facility]  18  11:34 AM

## 2018-05-03 ENCOUNTER — TRANSCRIBE ORDERS (OUTPATIENT)
Dept: ORTHOPEDIC SURGERY | Facility: CLINIC | Age: 58
End: 2018-05-03

## 2018-05-03 ENCOUNTER — HOSPITAL ENCOUNTER (OUTPATIENT)
Dept: PHYSICAL THERAPY | Facility: HOSPITAL | Age: 58
Setting detail: THERAPIES SERIES
Discharge: HOME | End: 2018-05-03

## 2018-05-03 DIAGNOSIS — M25.562 LEFT KNEE PAIN, UNSPECIFIED CHRONICITY: ICD-10-CM

## 2018-05-03 DIAGNOSIS — Z96.652 STATUS POST LEFT KNEE REPLACEMENT: Primary | ICD-10-CM

## 2018-05-03 PROCEDURE — 97161 PT EVAL LOW COMPLEX 20 MIN: CPT

## 2018-05-03 RX ORDER — RANITIDINE 150 MG/1
150 TABLET ORAL NIGHTLY
Qty: 30 TABLET | Refills: 2 | Status: SHIPPED | OUTPATIENT
Start: 2018-05-03 | End: 2018-09-19 | Stop reason: SDUPTHER

## 2018-05-03 RX ORDER — HYDROCHLOROTHIAZIDE 12.5 MG/1
12.5 CAPSULE, GELATIN COATED ORAL DAILY
Qty: 30 CAPSULE | Refills: 0 | Status: SHIPPED | OUTPATIENT
Start: 2018-05-03 | End: 2018-08-09

## 2018-05-03 RX ORDER — LISINOPRIL 40 MG/1
40 TABLET ORAL DAILY
Qty: 30 TABLET | Refills: 1 | Status: SHIPPED | OUTPATIENT
Start: 2018-05-03 | End: 2018-05-16
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
## 2018-05-04 ENCOUNTER — ANTICOAGULATION VISIT (OUTPATIENT)
Dept: PHARMACY | Facility: HOSPITAL | Age: 58
End: 2018-05-04

## 2018-05-04 ENCOUNTER — APPOINTMENT (OUTPATIENT)
Dept: LAB | Facility: HOSPITAL | Age: 58
End: 2018-05-04

## 2018-05-04 DIAGNOSIS — Z79.01 LONG-TERM (CURRENT) USE OF ANTICOAGULANTS: Primary | ICD-10-CM

## 2018-05-04 LAB — INR PPP: 1.7 (ref 0.8–1.2)

## 2018-05-04 PROCEDURE — 85610 PROTHROMBIN TIME: CPT

## 2018-05-04 NOTE — PROGRESS NOTES
Spoke with Zak. Reviewed current lab.  No s/s of bleeding. No new meds. No missed doses. Reviewed schedule for following week. Pt read back regimen and verbalized understanding. All questions answered. Next INR on 05/09 provided by [facility].    Carolyn Hale RP  05/04/18  2:07 PM

## 2018-05-06 ENCOUNTER — TELEPHONE (OUTPATIENT)
Dept: ORTHOPEDIC SURGERY | Facility: CLINIC | Age: 58
End: 2018-05-06

## 2018-05-06 NOTE — TELEPHONE ENCOUNTER
PHONE CALL:    Patient was contacted by phone to ensure that there were no issues.  All questions were answered.  Patient conveyed that they were doing well and that there were no current problems.  Patient will keep their current follow-up appointment.    05/06/18 at 3:59 PM by Jeramie Rai MD

## 2018-05-09 ENCOUNTER — HOSPITAL ENCOUNTER (OUTPATIENT)
Dept: PHYSICAL THERAPY | Facility: HOSPITAL | Age: 58
Setting detail: THERAPIES SERIES
Discharge: HOME | End: 2018-05-09

## 2018-05-09 ENCOUNTER — ANTICOAGULATION VISIT (OUTPATIENT)
Dept: PHARMACY | Facility: HOSPITAL | Age: 58
End: 2018-05-09

## 2018-05-09 ENCOUNTER — LAB (OUTPATIENT)
Dept: LAB | Facility: HOSPITAL | Age: 58
End: 2018-05-09

## 2018-05-09 ENCOUNTER — OFFICE VISIT (OUTPATIENT)
Dept: FAMILY MEDICINE CLINIC | Facility: CLINIC | Age: 58
End: 2018-05-09

## 2018-05-09 VITALS
WEIGHT: 173.13 LBS | HEART RATE: 69 BPM | OXYGEN SATURATION: 97 % | SYSTOLIC BLOOD PRESSURE: 136 MMHG | BODY MASS INDEX: 22.95 KG/M2 | HEIGHT: 73 IN | DIASTOLIC BLOOD PRESSURE: 72 MMHG

## 2018-05-09 DIAGNOSIS — M25.562 LEFT KNEE PAIN, UNSPECIFIED CHRONICITY: ICD-10-CM

## 2018-05-09 DIAGNOSIS — Z96.652 STATUS POST LEFT KNEE REPLACEMENT: Primary | ICD-10-CM

## 2018-05-09 DIAGNOSIS — Z79.01 ANTICOAGULANT LONG-TERM USE: ICD-10-CM

## 2018-05-09 DIAGNOSIS — Z09 HOSPITAL DISCHARGE FOLLOW-UP: ICD-10-CM

## 2018-05-09 DIAGNOSIS — M23.92 INTERNAL DERANGEMENT OF LEFT KNEE: ICD-10-CM

## 2018-05-09 DIAGNOSIS — Z51.81 ENCOUNTER FOR MEDICATION MONITORING: Primary | ICD-10-CM

## 2018-05-09 LAB
INR PPP: 1.38 (ref 0.8–1.2)
PROTHROMBIN TIME: 16.6 SECONDS (ref 11.1–15.3)

## 2018-05-09 PROCEDURE — 85610 PROTHROMBIN TIME: CPT

## 2018-05-09 PROCEDURE — 97110 THERAPEUTIC EXERCISES: CPT

## 2018-05-09 PROCEDURE — 99213 OFFICE O/P EST LOW 20 MIN: CPT | Performed by: FAMILY MEDICINE

## 2018-05-09 PROCEDURE — 36415 COLL VENOUS BLD VENIPUNCTURE: CPT

## 2018-05-09 PROCEDURE — 80307 DRUG TEST PRSMV CHEM ANLYZR: CPT | Performed by: FAMILY MEDICINE

## 2018-05-09 PROCEDURE — G0481 DRUG TEST DEF 8-14 CLASSES: HCPCS | Performed by: FAMILY MEDICINE

## 2018-05-09 RX ORDER — GABAPENTIN 300 MG/1
300 CAPSULE ORAL DAILY
Qty: 45 CAPSULE | Refills: 1 | Status: SHIPPED | OUTPATIENT
Start: 2018-05-09 | End: 2018-06-25 | Stop reason: SDUPTHER
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
## 2018-05-09 NOTE — PROGRESS NOTES
Subjective   Zak Lutz is a 57 y.o. male.     History of Present Illness  Patient is a 57-year-old -American male who is following up posthospitalization for a total left knee replacement.  Patient had the procedure April 30, 2018 and was discharged May 2 of this year.  Patient still has staples placed and states that he is to get these removed on his next follow-up which is May 16.  Patient states that he has some tenderness to palpation in the area and some localized swelling but otherwise it has been improving.  Patient states that the combination of the Percocet that he was given posthospitalization as well as the Neurontin that he's been on chronically has been helping with the pain.  Patient states that he has gone to physical therapy for his initial evaluation and is to see them again next week.  Patient states that he's been doing home exercises and that he has been progressing.  Patient has no other acute concerns or complaints at this time.  The following portions of the patient's history were reviewed and updated as appropriate: allergies, current medications, past family history, past medical history, past social history, past surgical history and problem list.    Review of Systems      Constitutional: no weight loss, fever, chills, weakness  HEENT: no visual loss, blurred vision, double vision, yellow scalarea  Skin: no rash, no discoloration   CVS: no chest pain, palpitations  Chest: no shortness of air, cough, dyspnea  GI: no abdominal pain, blood in stool, no nausea, vomiting, diarrhea  : no burning in urination, change in odor, no change in frequency  Neuro: no headache, dizziness, syncope, paralysis, ataxia, numbness  Musculoskeletal: left knee pain  Lymphatics: no enlarged nodes  Endo: no reports of sweating, cold or heat intolerance, no polyuria        Objective   Physical Exam  /72 (BP Location: Left arm, Patient Position: Sitting, Cuff Size: Adult)   Pulse 69   Ht  "185.4 cm (73\")   Wt 78.5 kg (173 lb 2 oz)   SpO2 97%   BMI 22.84 kg/m²       GENERAL APPEARANCE: Well developed, well nourished, in no acute distress.  SKIN: Inspection of the skin reveals no rashes, ulcerations or petechiae.  HEENT: The sclerae were anicteric and conjunctivae were pink and moist. Extraocular movements were intact and pupils were equal, round  LUNGS: Auscultation of the lungs revealed normal breath sounds without any other adventitious sounds or rubs.  CARDIOVASCULAR: There was a regular rate and rhythm without any murmurs, gallops, rubs.  MUSCULOSKELETAL: tenderness to palpation in the left knee, staples are on the left knee, no evidence or erythema, minimal swelling   NEUROLOGIC: Alert and oriented x 3. Normal affect    Assessment/Plan   Zak was seen today for procedure.    Diagnoses and all orders for this visit:    Encounter for medication monitoring  -     ToxASSURE Select 13 (MW) - Urine, Clean Catch    Hospital discharge follow-up    Other orders  -     gabapentin (NEURONTIN) 300 MG capsule; Take 1 capsule by mouth Daily. Twice a day for the first two weeks, than once a day           This document has been electronically signed by Nayely Cedillo MD on May 9, 2018 2:20 PM               "

## 2018-05-10 DIAGNOSIS — Z96.652 STATUS POST LEFT KNEE REPLACEMENT: Primary | ICD-10-CM

## 2018-05-10 RX ORDER — HYDROCODONE BITARTRATE AND ACETAMINOPHEN 7.5; 325 MG/1; MG/1
1 TABLET ORAL EVERY 6 HOURS PRN
Qty: 45 TABLET | Refills: 0 | Status: SHIPPED | OUTPATIENT
Start: 2018-05-10 | End: 2018-06-17

## 2018-05-10 NOTE — PROGRESS NOTES
Spoke with Mr. Lutz. Reviewed current lab.  No s/s of bleeding. No new meds. No missed doses.  INR 1.38 decreased from Friday, 1.7 was most likely from a bolus of 8 mg on Monday.  Continue 5 mg daily except 7.5 mg on MoWeFr.  Reviewed schedule for following week. Pt read back regimen and verbalized understanding. All questions answered. [facility]    Ricky Puri [facility]  05/09/18  8:11 PM

## 2018-05-11 ENCOUNTER — TELEPHONE (OUTPATIENT)
Dept: ORTHOPEDIC SURGERY | Facility: CLINIC | Age: 58
End: 2018-05-11

## 2018-05-11 ENCOUNTER — HOSPITAL ENCOUNTER (OUTPATIENT)
Dept: PHYSICAL THERAPY | Facility: HOSPITAL | Age: 58
Setting detail: THERAPIES SERIES
Discharge: HOME | End: 2018-05-11

## 2018-05-11 DIAGNOSIS — Z96.652 STATUS POST LEFT KNEE REPLACEMENT: Primary | ICD-10-CM

## 2018-05-11 DIAGNOSIS — M25.562 LEFT KNEE PAIN, UNSPECIFIED CHRONICITY: ICD-10-CM

## 2018-05-11 PROCEDURE — 97110 THERAPEUTIC EXERCISES: CPT

## 2018-05-11 NOTE — TELEPHONE ENCOUNTER
THE SCRIPT HE WAS GIVEN IS NOT COVERED BY HIS INSURANCE. WANTS PERCOCET AT HIGHER DOSAGE. [phone]
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
## 2018-05-15 DIAGNOSIS — Z96.652 STATUS POST LEFT KNEE REPLACEMENT: Primary | ICD-10-CM

## 2018-05-15 RX ORDER — FLUTICASONE PROPIONATE 50 MCG
SPRAY, SUSPENSION (ML) NASAL
Qty: 16 ML | Refills: 2 | Status: SHIPPED | OUTPATIENT
Start: 2018-05-15 | End: 2018-12-30 | Stop reason: SDUPTHER

## 2018-05-16 ENCOUNTER — HOSPITAL ENCOUNTER (OUTPATIENT)
Dept: PHYSICAL THERAPY | Facility: HOSPITAL | Age: 58
Setting detail: THERAPIES SERIES
Discharge: HOME | End: 2018-05-16

## 2018-05-16 ENCOUNTER — OFFICE VISIT (OUTPATIENT)
Dept: ORTHOPEDIC SURGERY | Facility: CLINIC | Age: 58
End: 2018-05-16

## 2018-05-16 ENCOUNTER — LAB (OUTPATIENT)
Dept: LAB | Facility: HOSPITAL | Age: 58
End: 2018-05-16

## 2018-05-16 ENCOUNTER — ANTICOAGULATION VISIT (OUTPATIENT)
Dept: PHARMACY | Facility: HOSPITAL | Age: 58
End: 2018-05-16

## 2018-05-16 VITALS — WEIGHT: 174 LBS | BODY MASS INDEX: 23.06 KG/M2 | HEIGHT: 73 IN

## 2018-05-16 DIAGNOSIS — M25.562 CHRONIC PAIN OF LEFT KNEE: ICD-10-CM

## 2018-05-16 DIAGNOSIS — M17.0 PRIMARY OSTEOARTHRITIS OF BOTH KNEES: ICD-10-CM

## 2018-05-16 DIAGNOSIS — Z79.01 ANTICOAGULANT LONG-TERM USE: ICD-10-CM

## 2018-05-16 DIAGNOSIS — M71.22 BAKER'S CYST OF KNEE, LEFT: ICD-10-CM

## 2018-05-16 DIAGNOSIS — Z96.652 STATUS POST LEFT KNEE REPLACEMENT: Primary | ICD-10-CM

## 2018-05-16 DIAGNOSIS — M25.562 LEFT KNEE PAIN, UNSPECIFIED CHRONICITY: ICD-10-CM

## 2018-05-16 DIAGNOSIS — M23.92 INTERNAL DERANGEMENT OF LEFT KNEE: ICD-10-CM

## 2018-05-16 DIAGNOSIS — M94.262 CHONDROMALACIA, LEFT KNEE: ICD-10-CM

## 2018-05-16 DIAGNOSIS — G89.29 CHRONIC PAIN OF LEFT KNEE: ICD-10-CM

## 2018-05-16 DIAGNOSIS — M23.92 INTERNAL DERANGEMENT OF KNEE, LEFT: ICD-10-CM

## 2018-05-16 LAB — CONV REPORT SUMMARY: NORMAL

## 2018-05-16 PROCEDURE — 36415 COLL VENOUS BLD VENIPUNCTURE: CPT

## 2018-05-16 PROCEDURE — 97110 THERAPEUTIC EXERCISES: CPT

## 2018-05-16 PROCEDURE — 99024 POSTOP FOLLOW-UP VISIT: CPT | Performed by: NURSE PRACTITIONER

## 2018-05-16 NOTE — PROGRESS NOTES
Zak Lutz is a 57 y.o. male is s/p       Chief Complaint   Patient presents with   • Left Knee - Post-op   • Suture / Staple Removal       HISTORY OF PRESENT ILLNESS: Patient presents to office for postoperative follow up status post left total knee arthroplasty per Dr. Rai on 4/30/2018. Patient is doing well and progressing as expected with no unusual complaints or concerns noted. He continues with his current course of physical therapy as instructed. He is ambulatory in office today with a steady gait and no assistive device. Staples removed today. X-rays repeated today. being seen for left knee post-op. X-rays done today.        Allergies   Allergen Reactions   • Tramadol Itching   • Morphine And Related Rash     States po form         Current Outpatient Prescriptions:   •  albuterol (VENTOLIN HFA) 108 (90 Base) MCG/ACT inhaler, Inhale 2 puffs Every 4 (Four) Hours As Needed for Wheezing., Disp: 6.7 g, Rfl: 2  •  amLODIPine (NORVASC) 5 MG tablet, Take 1 tablet by mouth Daily., Disp: 30 tablet, Rfl: 2  •  fluticasone (FLONASE) 50 MCG/ACT nasal spray, ADMINISTER 2 SPRAYS INTO EACH NOSTRIL DAILY., Disp: 16 mL, Rfl: 2  •  gabapentin (NEURONTIN) 300 MG capsule, Take 1 capsule by mouth Daily. Twice a day for the first two weeks, than once a day, Disp: 45 capsule, Rfl: 1  •  hydrochlorothiazide (MICROZIDE) 12.5 MG capsule, TAKE 1 CAPSULE BY MOUTH DAILY., Disp: 30 capsule, Rfl: 0  •  HYDROcodone-acetaminophen (NORCO) 7.5-325 MG per tablet, Take 1 tablet by mouth Every 6 (Six) Hours As Needed for Moderate Pain ., Disp: 45 tablet, Rfl: 0  •  nicotine (NICODERM CQ) 21 MG/24HR patch, Place 1 patch on the skin Daily., Disp: 28 patch, Rfl: 3  •  raNITIdine (ZANTAC) 150 MG tablet, TAKE 1 TABLET BY MOUTH EVERY NIGHT., Disp: 30 tablet, Rfl: 2  •  warfarin (COUMADIN) 5 MG tablet, Take 1 tablet by mouth Every Night. Take one tablet by mouth nightly or as directed by [facility], Disp: 30 tablet, Rfl: 1    No  fevers or chills.  No nausea or vomiting.      PHYSICAL EXAMINATION:       Zak Lutz is a 57 y.o. male    Patient is awake and alert, answers questions appropriately and is in no apparent distress.    GAIT:     []  Normal  [x]  Antalgic    Assistive device: [x]  None  []  Walker     []  Crutches  []  Cane     []  Wheelchair  []  Stretcher    Left Knee Exam     Tenderness   Left knee tenderness location: Diffuse, mild.    Range of Motion   Extension: 5   Flexion: 120     Other   Erythema: absent  Sensation: normal  Pulse: present  Swelling: mild  Effusion: effusion (Mild) present    Comments:  Surgical incision is well-approximated with no erythema and no drainage noted. No signs of infection noted. Mild swelling/effusion present. Mild warmth. Calf is soft and nontender. Genna's sign is negative. Good range of motion with minimal pain.         Xr Knee 1 Or 2 View Left    Result Date: 5/16/2018  Narrative: Lateral view of the left knee reveals postsurgical changes post-knee arthroplasty.  Prostheses are well positioned with no evidence of hardware failure or loosening noted.  Subcutaneous staples are noted to the anterior knee.  There is surrounding soft tissue swelling and a moderate effusion present that is new in the interim since last images done on 4/30/2018.  No other significant changes are noted when compared with prior images. 05/16/18 at 4:26 PM by TON Olmstead     Xr Knee 1 Or 2 View Left    Result Date: 4/30/2018  Narrative: Two view left knee portable HISTORY: Left knee replacement AP and lateral views obtained portably. COMPARISON: January 23, 2018 Total knee prosthesis now in place. Anatomic relationship of the components of the prosthesis. No fracture or dislocation. Immediate postoperative intra-articular and periarticular air. Surgical drain and skin staples in place.     Impression: CONCLUSION: Immediate postop total left knee arthroplasty. [id number] Electronically signed by:  Earl  Gene VALENTIN  4/30/2018 10:59 AM CDT Workstation: [id number]    Xr Knee Bilateral Ap Standing    Result Date: 5/16/2018  Narrative: AP standing view of bilateral knees reveals postsurgical changes in the left knee post knee arthroplasty.  Prostheses are well positioned with no evidence of hardware failure or loosening noted.  Subcutaneous staples are present in the anterior knee.  No significant changes are noted in the left knee when compared with previous images done on 4/30/2018.  Degenerative changes are noted in the right knee with moderate loss of medial compartmental joint spacing.  No significant changes are noted in the right knee when compared with previous images from 1/23/2018. 05/16/18 at 4:29 PM by TON Olmstead         ASSESSMENT:    Diagnoses and all orders for this visit:    Status post left knee replacement    Internal derangement of knee, left    Chronic pain of left knee    Baker's cyst of knee, left    Chondromalacia, left knee    Primary osteoarthritis of both knees    PLAN    Patient is doing very well postoperatively and progressing as expected. Surgical incision is healing well with no signs of infection. Staples removed today and Steri-strips placed. Wound care instructions reviewed, including care of Steri-strips. Patient is instructed to continue to monitor the incision for any signs of infection including increased redness, increased swelling, increased warmth, increased tenderness and/or purulent drainage. Patient is instructed to notify the office immediately if any signs of infection are noted. Continue with physical therapy. Continue to progress activity as tolerated. Recommend intermittent elevation of the left knee to minimize swelling. Recommend ice therapy intermittently to the left knee 3 to 4 times daily for 20 minutes at a time to minimize pain/swelling. Continue Norco as needed for pain, as previously prescribed. Continue Coumadin therapy, managed per hospital  pharmacy. Follow up in 4 weeks for recheck.     Return in about 4 weeks (around 6/13/2018) for Recheck.      This document has been electronically signed by TON Olmstead on May 17, 2018 3:16 PM      TON Olmstead

## 2018-05-16 NOTE — PROGRESS NOTES
Spoke with Mr. Lutz.  No s/s of bleeding. No new meds. No missed doses. Reviewed schedule for following week. Pt read back regimen and verbalized understanding. All questions answered.  Patient went to lab today but lab did not collect enough blood per patient and they asked him to return tomorrow.  He will take 5 mg tonight and return to lab tomorrow Next INR on 5/17 provided by [facility].    Nitin Mack III, [facility]  05/16/18  6:34 PM
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
## 2018-05-17 ENCOUNTER — ANTICOAGULATION VISIT (OUTPATIENT)
Dept: PHARMACY | Facility: HOSPITAL | Age: 58
End: 2018-05-17

## 2018-05-17 ENCOUNTER — APPOINTMENT (OUTPATIENT)
Dept: LAB | Facility: HOSPITAL | Age: 58
End: 2018-05-17

## 2018-05-17 LAB
INR PPP: 1.94 (ref 0.8–1.2)
PROTHROMBIN TIME: 21.4 SECONDS (ref 11.1–15.3)

## 2018-05-17 PROCEDURE — 36415 COLL VENOUS BLD VENIPUNCTURE: CPT

## 2018-05-17 PROCEDURE — 85610 PROTHROMBIN TIME: CPT

## 2018-05-17 NOTE — PROGRESS NOTES
Spoke with Zak. Reviewed current lab.  No s/s of bleeding. No new meds. No missed doses. Reviewed schedule for following week. Pt read back regimen and verbalized understanding. All questions answered. Next INR on 05/23 provided by [facility].    Carolyn Hale [facility]  05/17/18  6:06 PM

## 2018-05-18 ENCOUNTER — HOSPITAL ENCOUNTER (OUTPATIENT)
Dept: PHYSICAL THERAPY | Facility: HOSPITAL | Age: 58
Setting detail: THERAPIES SERIES
Discharge: HOME | End: 2018-05-18

## 2018-05-18 DIAGNOSIS — M25.562 LEFT KNEE PAIN, UNSPECIFIED CHRONICITY: ICD-10-CM

## 2018-05-18 DIAGNOSIS — Z96.652 STATUS POST LEFT KNEE REPLACEMENT: Primary | ICD-10-CM

## 2018-05-18 PROCEDURE — 97110 THERAPEUTIC EXERCISES: CPT
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
## 2018-05-22 RX ORDER — RANITIDINE 150 MG/1
150 TABLET ORAL NIGHTLY
Qty: 30 TABLET | Refills: 2 | Status: SHIPPED | OUTPATIENT
Start: 2018-05-22 | End: 2018-08-13 | Stop reason: SDUPTHER

## 2018-05-23 ENCOUNTER — ANTICOAGULATION VISIT (OUTPATIENT)
Dept: PHARMACY | Facility: HOSPITAL | Age: 58
End: 2018-05-23

## 2018-05-23 ENCOUNTER — HOSPITAL ENCOUNTER (OUTPATIENT)
Dept: PHYSICAL THERAPY | Facility: HOSPITAL | Age: 58
Setting detail: THERAPIES SERIES
Discharge: HOME | End: 2018-05-23

## 2018-05-23 ENCOUNTER — LAB (OUTPATIENT)
Dept: LAB | Facility: HOSPITAL | Age: 58
End: 2018-05-23

## 2018-05-23 DIAGNOSIS — Z96.652 STATUS POST LEFT KNEE REPLACEMENT: Primary | ICD-10-CM

## 2018-05-23 DIAGNOSIS — Z79.01 ANTICOAGULANT LONG-TERM USE: ICD-10-CM

## 2018-05-23 LAB
INR PPP: 1.12 (ref 0.8–1.2)
PROTHROMBIN TIME: 14.2 SECONDS (ref 11.1–15.3)

## 2018-05-23 PROCEDURE — 97110 THERAPEUTIC EXERCISES: CPT

## 2018-05-23 PROCEDURE — 36415 COLL VENOUS BLD VENIPUNCTURE: CPT

## 2018-05-23 PROCEDURE — 85610 PROTHROMBIN TIME: CPT

## 2018-05-23 PROCEDURE — 97140 MANUAL THERAPY 1/> REGIONS: CPT

## 2018-05-23 RX ORDER — OXYCODONE AND ACETAMINOPHEN 7.5; 325 MG/1; MG/1
1 TABLET ORAL EVERY 8 HOURS PRN
Qty: 30 TABLET | Refills: 0 | Status: SHIPPED | OUTPATIENT
Start: 2018-05-23 | End: 2018-06-14
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
## 2018-05-23 NOTE — PROGRESS NOTES
Spoke with Mr. Lutz. Reviewed current lab.  No s/s of bleeding. No new meds. No missed doses. Reviewed schedule for following week. Pt read back regimen and verbalized understanding. All questions answered. Next INR on 5/29 provided by [facility].    Nitin Mack III, [facility]  05/23/18  6:49 PM

## 2018-05-23 NOTE — TELEPHONE ENCOUNTER
Magda Guess    He is wanting to know if you will give him some Percocet to get him thru therapy.  He does have 7 or 8 Norco left but he is asking for Percocet to take after they are gone.  His said they helped better.

## 2018-05-25 ENCOUNTER — HOSPITAL ENCOUNTER (OUTPATIENT)
Dept: PHYSICAL THERAPY | Facility: HOSPITAL | Age: 58
Setting detail: THERAPIES SERIES
Discharge: HOME | End: 2018-05-25

## 2018-05-25 DIAGNOSIS — M25.562 LEFT KNEE PAIN, UNSPECIFIED CHRONICITY: ICD-10-CM

## 2018-05-25 DIAGNOSIS — Z96.652 STATUS POST LEFT KNEE REPLACEMENT: Primary | ICD-10-CM

## 2018-05-25 PROCEDURE — 97110 THERAPEUTIC EXERCISES: CPT
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
## 2018-05-29 ENCOUNTER — TELEPHONE (OUTPATIENT)
Dept: PHARMACY | Facility: HOSPITAL | Age: 58
End: 2018-05-29

## 2018-05-29 ENCOUNTER — HOSPITAL ENCOUNTER (OUTPATIENT)
Dept: PHYSICAL THERAPY | Facility: HOSPITAL | Age: 58
Setting detail: THERAPIES SERIES
Discharge: HOME | End: 2018-05-29

## 2018-05-29 DIAGNOSIS — Z96.652 STATUS POST LEFT KNEE REPLACEMENT: ICD-10-CM

## 2018-05-29 DIAGNOSIS — M25.562 CHRONIC PAIN OF LEFT KNEE: ICD-10-CM

## 2018-05-29 DIAGNOSIS — M23.92 INTERNAL DERANGEMENT OF LEFT KNEE: ICD-10-CM

## 2018-05-29 DIAGNOSIS — G89.29 CHRONIC PAIN OF LEFT KNEE: ICD-10-CM

## 2018-05-29 DIAGNOSIS — M25.562 LEFT KNEE PAIN, UNSPECIFIED CHRONICITY: Primary | ICD-10-CM

## 2018-05-29 PROCEDURE — 97110 THERAPEUTIC EXERCISES: CPT
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
## 2018-05-29 NOTE — TELEPHONE ENCOUNTER
Spoke with patient. His therapy is tomorrow. He will go to the lab then. Advised to take 5 mg tonight. He questioned the length of therapy. Advised that he goes thru 6/11/18.  He just refilled his medication as well.    Carolyn Hale, [facility]  05/29/18  6:53 PM

## 2018-05-30 ENCOUNTER — ANTICOAGULATION VISIT (OUTPATIENT)
Dept: PHARMACY | Facility: HOSPITAL | Age: 58
End: 2018-05-30

## 2018-05-30 ENCOUNTER — LAB (OUTPATIENT)
Dept: LAB | Facility: HOSPITAL | Age: 58
End: 2018-05-30

## 2018-05-30 DIAGNOSIS — Z79.01 ANTICOAGULANT LONG-TERM USE: ICD-10-CM

## 2018-05-30 LAB
INR PPP: 1.5 (ref 0.8–1.2)
PROTHROMBIN TIME: 17.6 SECONDS (ref 11.1–15.3)

## 2018-05-30 PROCEDURE — 85610 PROTHROMBIN TIME: CPT

## 2018-05-30 PROCEDURE — 36415 COLL VENOUS BLD VENIPUNCTURE: CPT

## 2018-05-31 ENCOUNTER — DOCUMENTATION (OUTPATIENT)
Dept: ORTHOPEDIC SURGERY | Facility: CLINIC | Age: 58
End: 2018-05-31

## 2018-05-31 ENCOUNTER — TELEPHONE (OUTPATIENT)
Dept: ORTHOPEDIC SURGERY | Facility: CLINIC | Age: 58
End: 2018-05-31

## 2018-05-31 ENCOUNTER — HOSPITAL ENCOUNTER (OUTPATIENT)
Dept: PHYSICAL THERAPY | Facility: HOSPITAL | Age: 58
Setting detail: THERAPIES SERIES
Discharge: HOME | End: 2018-05-31

## 2018-05-31 DIAGNOSIS — M25.562 LEFT KNEE PAIN, UNSPECIFIED CHRONICITY: Primary | ICD-10-CM

## 2018-05-31 DIAGNOSIS — M23.92 INTERNAL DERANGEMENT OF LEFT KNEE: ICD-10-CM

## 2018-05-31 DIAGNOSIS — M25.562 CHRONIC PAIN OF LEFT KNEE: ICD-10-CM

## 2018-05-31 DIAGNOSIS — G89.29 CHRONIC PAIN OF LEFT KNEE: ICD-10-CM

## 2018-05-31 DIAGNOSIS — Z96.652 STATUS POST LEFT KNEE REPLACEMENT: ICD-10-CM

## 2018-05-31 PROCEDURE — 97110 THERAPEUTIC EXERCISES: CPT

## 2018-05-31 NOTE — PROGRESS NOTES
Spoke with Zak. Reviewed current lab.  No s/s of bleeding. No new meds. No missed doses. Reviewed schedule for following week. Pt read back regimen and verbalized understanding. All questions answered. Next INR on 06/06 provided by [facility]. This is last lab.    Carolyn Hale, [facility]  05/30/18  7:36 PM

## 2018-06-04 ENCOUNTER — TELEPHONE (OUTPATIENT)
Dept: ORTHOPEDIC SURGERY | Facility: CLINIC | Age: 58
End: 2018-06-04

## 2018-06-05 RX ORDER — PYRAZINAMIDE 500 MG/1
1 TABLET ORAL EVERY 6 HOURS PRN
Qty: 40 TABLET | Refills: 0 | Status: SHIPPED | OUTPATIENT
Start: 2018-06-05 | End: 2018-06-25 | Stop reason: SDUPTHER

## 2018-06-06 ENCOUNTER — LAB (OUTPATIENT)
Dept: LAB | Facility: HOSPITAL | Age: 58
End: 2018-06-06

## 2018-06-06 ENCOUNTER — ANTICOAGULATION VISIT (OUTPATIENT)
Dept: PHARMACY | Facility: HOSPITAL | Age: 58
End: 2018-06-06

## 2018-06-06 DIAGNOSIS — Z79.01 ANTICOAGULANT LONG-TERM USE: ICD-10-CM

## 2018-06-06 LAB
INR PPP: 1.32 (ref 0.8–1.2)
PROTHROMBIN TIME: 16 SECONDS (ref 11.1–15.3)

## 2018-06-06 PROCEDURE — 36415 COLL VENOUS BLD VENIPUNCTURE: CPT

## 2018-06-06 PROCEDURE — 85610 PROTHROMBIN TIME: CPT

## 2018-06-06 NOTE — PROGRESS NOTES
Spoke with Mr. Lutz. Reviewed current lab.  No s/s of bleeding. No new meds. No missed doses. Reviewed schedule for following week. Pt read back regimen and verbalized understanding. All questions answered. Will call to stop 6/11.  Nitin Mack III, [facility]  06/06/18  5:56 PM

## 2018-06-11 ENCOUNTER — TELEPHONE (OUTPATIENT)
Dept: PHARMACY | Facility: HOSPITAL | Age: 58
End: 2018-06-11

## 2018-06-11 NOTE — TELEPHONE ENCOUNTER
Spoke with Mr. Lutz, he understands to stop taking the warfarin.  He expressed some concern about swelling in the leg where the surgery was done.  I explained that I am not trained to diagnose and recommended that he either reaches out to Dr. Rai office or goes to the urgent care center to rule out a clot.

## 2018-06-12 ENCOUNTER — ANTICOAGULATION VISIT (OUTPATIENT)
Dept: PHARMACY | Facility: HOSPITAL | Age: 58
End: 2018-06-12

## 2018-06-12 ENCOUNTER — HOSPITAL ENCOUNTER (OUTPATIENT)
Dept: PHYSICAL THERAPY | Facility: HOSPITAL | Age: 58
Setting detail: THERAPIES SERIES
Discharge: HOME | End: 2018-06-12

## 2018-06-12 DIAGNOSIS — Z96.652 STATUS POST LEFT KNEE REPLACEMENT: Primary | ICD-10-CM

## 2018-06-12 PROCEDURE — 97110 THERAPEUTIC EXERCISES: CPT

## 2018-06-12 NOTE — PROGRESS NOTES
Duplicate from yesterday for some reason - last anticoagulation encounter was yesterday 6/11, just resolving another encounter to remove from que.  See notes / telephone call from 6/11.
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
## 2018-06-14 ENCOUNTER — OFFICE VISIT (OUTPATIENT)
Dept: ORTHOPEDIC SURGERY | Facility: CLINIC | Age: 58
End: 2018-06-14

## 2018-06-14 ENCOUNTER — HOSPITAL ENCOUNTER (OUTPATIENT)
Dept: PHYSICAL THERAPY | Facility: HOSPITAL | Age: 58
Setting detail: THERAPIES SERIES
Discharge: HOME | End: 2018-06-14

## 2018-06-14 VITALS — BODY MASS INDEX: 23.33 KG/M2 | HEIGHT: 73 IN | WEIGHT: 176 LBS

## 2018-06-14 DIAGNOSIS — M25.562 LEFT KNEE PAIN, UNSPECIFIED CHRONICITY: ICD-10-CM

## 2018-06-14 DIAGNOSIS — Z96.652 STATUS POST LEFT KNEE REPLACEMENT: Primary | ICD-10-CM

## 2018-06-14 PROCEDURE — G0283 ELEC STIM OTHER THAN WOUND: HCPCS

## 2018-06-14 PROCEDURE — 99024 POSTOP FOLLOW-UP VISIT: CPT | Performed by: ORTHOPAEDIC SURGERY

## 2018-06-14 PROCEDURE — 20610 DRAIN/INJ JOINT/BURSA W/O US: CPT | Performed by: ORTHOPAEDIC SURGERY

## 2018-06-14 PROCEDURE — 97110 THERAPEUTIC EXERCISES: CPT

## 2018-06-14 NOTE — PROGRESS NOTES
"Zak Lutz : 1960 MRN: [id number] DATE: 2018    Chief Complaint:  Chief Complaint   Patient presents with   • Left Knee - Follow-up     Date of surgery:  2018. Physical therapy at sports medicine.     SUBJECTIVE:  Patient returns today for 6 week follow up of left total knee replacement. Patient reports doing well with no unusual complaints. Appears to be progressing appropriately.  He does have some stiffness and swelling in knee  Using:  [x]  No assistive device  []  Cane  []  walker    OBJECTIVE:   Vitals:    18 1344   Weight: 79.8 kg (176 lb)   Height: 185.4 cm (73\")       Exam left knee:   The incision is healed. No sign of infection.   Range of motion:   Flexion: 110  Extension: 0  Good stability  The calf is soft and nontender with a negative Homans sign.  Moderate effusion        ASSESSMENT: 6 week status post left knee replacement.    PLAN: 1) continue strength and conditioning   2) continue to progress range of motion   3) continue HEP   4) progress activity as tolerated   5) Follow up in 8 weeks with repeat xrays   6) aspirate effusion    Large Joint Arthrocentesis  Date/Time: 2018 2:21 PM  Consent given by: patient  Site marked: site marked  Timeout: Immediately prior to procedure a time out was called to verify the correct patient, procedure, equipment, support staff and site/side marked as required   Supporting Documentation  Indications: pain   Procedure Details  Location: knee - L knee  Preparation: Patient was prepped and draped in the usual sterile fashion  Needle size: 22 G  Approach: anteromedial  Aspirate amount: 20 mL  Aspirate: yellow  Patient tolerance: patient tolerated the procedure well with no immediate complications              18 at 1:43 PM by Jeramie Rai MD       "
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
## 2018-06-17 ENCOUNTER — HOSPITAL ENCOUNTER (EMERGENCY)
Facility: HOSPITAL | Age: 58
Discharge: HOME | End: 2018-06-17
Attending: EMERGENCY MEDICINE | Admitting: EMERGENCY MEDICINE

## 2018-06-17 VITALS
RESPIRATION RATE: 18 BRPM | OXYGEN SATURATION: 98 % | HEIGHT: 73 IN | HEART RATE: 99 BPM | DIASTOLIC BLOOD PRESSURE: 91 MMHG | WEIGHT: 178 LBS | BODY MASS INDEX: 23.59 KG/M2 | TEMPERATURE: 98.3 F | SYSTOLIC BLOOD PRESSURE: 144 MMHG

## 2018-06-17 DIAGNOSIS — H10.32 ACUTE CONJUNCTIVITIS OF LEFT EYE, UNSPECIFIED ACUTE CONJUNCTIVITIS TYPE: ICD-10-CM

## 2018-06-17 DIAGNOSIS — Z96.652 STATUS POST LEFT KNEE REPLACEMENT: ICD-10-CM

## 2018-06-17 DIAGNOSIS — S05.02XA ABRASION OF LEFT CORNEA, INITIAL ENCOUNTER: Primary | ICD-10-CM

## 2018-06-17 PROCEDURE — 99283 EMERGENCY DEPT VISIT LOW MDM: CPT

## 2018-06-17 RX ORDER — POLYMYXIN B SULFATE AND TRIMETHOPRIM 1; 10000 MG/ML; [USP'U]/ML
1 SOLUTION OPHTHALMIC EVERY 4 HOURS
Qty: 10 ML | Refills: 0 | Status: SHIPPED | OUTPATIENT
Start: 2018-06-17 | End: 2018-09-19

## 2018-06-17 RX ORDER — PURIFIED WATER 986 MG/ML
4 SOLUTION OPHTHALMIC ONCE
Status: COMPLETED | OUTPATIENT
Start: 2018-06-17 | End: 2018-06-17

## 2018-06-17 RX ORDER — PURIFIED WATER 986 MG/ML
SOLUTION OPHTHALMIC
Status: COMPLETED
Start: 2018-06-17 | End: 2018-06-17

## 2018-06-17 RX ORDER — HYDROCODONE BITARTRATE AND ACETAMINOPHEN 7.5; 325 MG/1; MG/1
1 TABLET ORAL EVERY 6 HOURS PRN
Qty: 10 TABLET | Refills: 0 | Status: SHIPPED | OUTPATIENT
Start: 2018-06-17 | End: 2018-08-09

## 2018-06-17 RX ORDER — TETRACAINE HYDROCHLORIDE 5 MG/ML
2 SOLUTION OPHTHALMIC ONCE
Status: COMPLETED | OUTPATIENT
Start: 2018-06-17 | End: 2018-06-17

## 2018-06-17 RX ADMIN — PURIFIED WATER 4 DROP: 986 SOLUTION OPHTHALMIC at 10:32

## 2018-06-17 RX ADMIN — TETRACAINE HYDROCHLORIDE 2 DROP: 5 SOLUTION OPHTHALMIC at 10:30

## 2018-06-17 RX ADMIN — FLUORESCEIN SODIUM 1 STRIP: 1 STRIP OPHTHALMIC at 10:31
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
## 2018-06-17 NOTE — DISCHARGE INSTRUCTIONS
Take pain medications as needed.  Continue with the antibiotic eyedrops.  Follow-up with ophthalmology for reevaluation.  Return to ER for worsening pain, blurry vision, worsening headache.  Use cool compresses.  Use eye patching on the left.

## 2018-06-18 ENCOUNTER — OFFICE VISIT (OUTPATIENT)
Dept: FAMILY MEDICINE CLINIC | Facility: CLINIC | Age: 58
End: 2018-06-18

## 2018-06-18 VITALS
DIASTOLIC BLOOD PRESSURE: 84 MMHG | OXYGEN SATURATION: 98 % | HEART RATE: 100 BPM | BODY MASS INDEX: 22.97 KG/M2 | HEIGHT: 73 IN | WEIGHT: 173.3 LBS | SYSTOLIC BLOOD PRESSURE: 138 MMHG

## 2018-06-18 DIAGNOSIS — S05.02XD ABRASION OF LEFT CORNEA, SUBSEQUENT ENCOUNTER: Primary | ICD-10-CM

## 2018-06-18 PROBLEM — S05.02XA LEFT CORNEAL ABRASION: Status: ACTIVE | Noted: 2018-06-18

## 2018-06-18 PROCEDURE — 99213 OFFICE O/P EST LOW 20 MIN: CPT | Performed by: FAMILY MEDICINE

## 2018-06-18 RX ORDER — AMLODIPINE BESYLATE 5 MG/1
5 TABLET ORAL DAILY
Qty: 30 TABLET | Refills: 2 | Status: SHIPPED | OUTPATIENT
Start: 2018-06-18 | End: 2018-09-26 | Stop reason: SDUPTHER

## 2018-06-18 NOTE — PROGRESS NOTES
"Subjective   Zak Lutz is a 58 y.o. male.     History of Present Illness  Patient is a 58-year-old -American male is presenting today as a hospital follow-up.  Patient was seen in the ER yesterday following a lawnmower accident in which an object going to his left eye.  Patient had some pain in that left eye emergency room.  They're in the emergency room ER physician did some tests which indicated a corneal abrasion left eye.  Patient was prescribed some pain medication and some eyedrops.  Patient denies any blurry vision in that eye but does state he is in a little bit of discomfort.  Patient states that the pain has improved with eyedrops that he has been using.  Patient is looking for a referral for ophthalmology at this time.  The following portions of the patient's history were reviewed and updated as appropriate: allergies, current medications, past family history, past medical history, past social history, past surgical history and problem list.    Review of Systems    Constitutional: no weight loss, fever, chills, weakness  HEENT: no visual loss, blurred vision, positive for left eye pain   Skin: no rash, no discoloration   CVS: no chest pain, palpitations  Chest: no shortness of air, cough, dyspnea  GI: no abdominal pain, blood in stool, no nausea, vomiting, diarrhea  : no burning in urination, change in odor, no change in frequency  Neuro: no headache, dizziness, syncope, paralysis, ataxia, numbness  Musculoskeletal: no muscle, back pain, joint pain, stiffness  Lymphatics: no enlarged nodes  Endo: no reports of sweating, cold or heat intolerance, no polyuria    Objective   Physical Exam  /84 (BP Location: Left arm, Patient Position: Sitting, Cuff Size: Adult)   Pulse 100   Ht 185.4 cm (73\")   Wt 78.6 kg (173 lb 4.8 oz)   SpO2 98%   BMI 22.86 kg/m²       GENERAL APPEARANCE: Well developed, well nourished, in no acute distress.  HEENT: The sclerae were anicteric and conjunctivae " were pink and moist. Extraocular movements were intact and pupils were equal, round, and reactive to light, presence of subconjunctival hemorrhage in the left eye  LUNGS: Auscultation of the lungs revealed normal breath sounds without any other adventitious sounds or rubs.  CARDIOVASCULAR: There was a regular rate and rhythm without any murmurs, gallops, rubs.   ABDOMEN: Soft and nontender with normal bowel sounds.  EXTREMITIES: No cyanosis, clubbing or edema.  NEUROLOGIC: Alert and oriented x 3. Normal affect. Gait was normal.    Assessment/Plan   Zak was seen today for eye pain and eye problem.    Diagnoses and all orders for this visit:    Abrasion of left cornea, subsequent encounter  -     Ambulatory Referral to Ophthalmology           This document has been electronically signed by Nayely Cedillo MD on June 18, 2018 10:08 AM

## 2018-06-19 ENCOUNTER — TELEPHONE (OUTPATIENT)
Dept: FAMILY MEDICINE CLINIC | Facility: CLINIC | Age: 58
End: 2018-06-19

## 2018-06-19 ENCOUNTER — HOSPITAL ENCOUNTER (OUTPATIENT)
Dept: PHYSICAL THERAPY | Facility: HOSPITAL | Age: 58
Setting detail: THERAPIES SERIES
Discharge: HOME | End: 2018-06-19

## 2018-06-19 DIAGNOSIS — M25.562 LEFT KNEE PAIN, UNSPECIFIED CHRONICITY: ICD-10-CM

## 2018-06-19 DIAGNOSIS — Z96.652 STATUS POST LEFT KNEE REPLACEMENT: Primary | ICD-10-CM

## 2018-06-19 PROCEDURE — G0283 ELEC STIM OTHER THAN WOUND: HCPCS

## 2018-06-19 PROCEDURE — 97110 THERAPEUTIC EXERCISES: CPT
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
## 2018-06-21 ENCOUNTER — HOSPITAL ENCOUNTER (OUTPATIENT)
Dept: PHYSICAL THERAPY | Facility: HOSPITAL | Age: 58
Setting detail: THERAPIES SERIES
Discharge: HOME | End: 2018-06-21

## 2018-06-21 DIAGNOSIS — Z96.652 STATUS POST LEFT KNEE REPLACEMENT: Primary | ICD-10-CM

## 2018-06-21 DIAGNOSIS — M25.562 LEFT KNEE PAIN, UNSPECIFIED CHRONICITY: ICD-10-CM

## 2018-06-21 PROCEDURE — 97110 THERAPEUTIC EXERCISES: CPT

## 2018-06-21 PROCEDURE — 97140 MANUAL THERAPY 1/> REGIONS: CPT
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
## 2018-06-25 ENCOUNTER — TELEPHONE (OUTPATIENT)
Dept: FAMILY MEDICINE CLINIC | Facility: CLINIC | Age: 58
End: 2018-06-25

## 2018-06-25 RX ORDER — GABAPENTIN 300 MG/1
300 CAPSULE ORAL DAILY
Qty: 30 CAPSULE | Refills: 2 | Status: SHIPPED | OUTPATIENT
Start: 2018-06-25 | End: 2018-09-19 | Stop reason: SDUPTHER

## 2018-06-25 RX ORDER — WARFARIN SODIUM 5 MG/1
TABLET ORAL
Qty: 30 TABLET | Refills: 1 | OUTPATIENT
Start: 2018-06-25

## 2018-06-25 RX ORDER — GABAPENTIN 300 MG/1
CAPSULE ORAL
Qty: 45 CAPSULE | Refills: 0 | OUTPATIENT
Start: 2018-06-25

## 2018-06-26 ENCOUNTER — APPOINTMENT (OUTPATIENT)
Dept: PHYSICAL THERAPY | Facility: HOSPITAL | Age: 58
End: 2018-06-26

## 2018-06-27 RX ORDER — PYRAZINAMIDE 500 MG/1
1 TABLET ORAL EVERY 6 HOURS PRN
Qty: 40 TABLET | Refills: 0 | Status: SHIPPED | OUTPATIENT
Start: 2018-06-27 | End: 2018-08-09

## 2018-06-28 ENCOUNTER — APPOINTMENT (OUTPATIENT)
Dept: PHYSICAL THERAPY | Facility: HOSPITAL | Age: 58
End: 2018-06-28

## 2018-07-21 RX ORDER — ALBUTEROL SULFATE 90 UG/1
2 AEROSOL, METERED RESPIRATORY (INHALATION) EVERY 4 HOURS PRN
Qty: 18 INHALER | Refills: 2 | Status: CANCELLED | OUTPATIENT
Start: 2018-07-21

## 2018-07-23 RX ORDER — ALBUTEROL SULFATE 90 UG/1
2 AEROSOL, METERED RESPIRATORY (INHALATION) EVERY 4 HOURS PRN
Qty: 6.7 G | Refills: 2 | Status: SHIPPED | OUTPATIENT
Start: 2018-07-23 | End: 2019-03-17 | Stop reason: SDUPTHER

## 2018-08-08 DIAGNOSIS — Z96.652 STATUS POST LEFT KNEE REPLACEMENT: Primary | ICD-10-CM

## 2018-08-09 ENCOUNTER — OFFICE VISIT (OUTPATIENT)
Dept: ORTHOPEDIC SURGERY | Facility: CLINIC | Age: 58
End: 2018-08-09

## 2018-08-09 VITALS — WEIGHT: 171.6 LBS | HEIGHT: 73 IN | BODY MASS INDEX: 22.74 KG/M2

## 2018-08-09 DIAGNOSIS — I10 ESSENTIAL HYPERTENSION: ICD-10-CM

## 2018-08-09 DIAGNOSIS — J44.9 CHRONIC OBSTRUCTIVE PULMONARY DISEASE, UNSPECIFIED COPD TYPE (HCC): ICD-10-CM

## 2018-08-09 DIAGNOSIS — Z96.652 STATUS POST LEFT KNEE REPLACEMENT: Primary | ICD-10-CM

## 2018-08-09 PROCEDURE — 99213 OFFICE O/P EST LOW 20 MIN: CPT | Performed by: ORTHOPAEDIC SURGERY

## 2018-08-09 RX ORDER — MELOXICAM 15 MG/1
15 TABLET ORAL DAILY
Qty: 30 TABLET | Refills: 1 | Status: SHIPPED | OUTPATIENT
Start: 2018-08-09 | End: 2018-09-19 | Stop reason: SDUPTHER

## 2018-08-09 NOTE — PROGRESS NOTES
"Zak Lutz is a 58 y.o. male returns for     Chief Complaint   Patient presents with   • Left Knee - Follow-up     Xray today       HISTORY OF PRESENT ILLNESS:  Follow up left knee post replacement.  Surgery date:   4/30/18    Patient had 20cc yellow fluid aspirated on 6/14/18  He reports therapy has been completed.  He is still swelling some.    He states he was doing much better but then felt a pop 2 weeks ago while he was lifting something  No fevers or chills  Pain has slowly improved.  No new complaints.    CONCURRENT MEDICAL HISTORY:    The following portions of the patient's history were reviewed and updated as appropriate: allergies, current medications, past family history, past medical history, past social history, past surgical history and problem list.     ROS  No fevers or chills.  No chest pain or shortness of air.  No GI or  disturbances.    PHYSICAL EXAMINATION:       Ht 185.4 cm (73\")   Wt 77.8 kg (171 lb 9.6 oz)   BMI 22.64 kg/m²     Physical Exam   Constitutional: He is oriented to person, place, and time. He appears well-developed and well-nourished.   Musculoskeletal:        Left knee: He exhibits effusion (mild).   Neurological: He is alert and oriented to person, place, and time.   Psychiatric: He has a normal mood and affect. His behavior is normal. Judgment and thought content normal.       GAIT:     [x]  Normal  []  Antalgic    Assistive device: [x]  None  []  Walker     []  Crutches  []  Cane     []  Wheelchair  []  Stretcher    Left Knee Exam     Tenderness   The patient is experiencing no tenderness.         Range of Motion   Extension: 0   Flexion: 120     Muscle Strength     The patient has normal left knee strength.    Tests   Drawer:       Anterior - negative       Varus: negative  Valgus: negative    Other   Erythema: absent  Sensation: normal  Pulse: present  Swelling: none  Effusion: effusion (mild) present              Xr Knee 1 Or 2 View Left    Result Date: " 8/9/2018  Narrative: Ordering Provider:  Jeramie Rai MD Ordering Diagnosis/Indication:  Status post left knee replacement Procedure:  XR KNEE 1 OR 2 VW LEFT Exam Date:  8/9/18 COMPARISON:  Todays X-rays were compared to previous images dated May 16, 2018.     Impression:  AP bilateral standing with lateral of the left knee shows acceptable position and alignment of a left total knee arthroplasty.  No sign of implant loosening or failure is noted.  There are multiple small metallic fragments that are proximal in the thigh visible on x-rays but unchanged from previous x-ray.  No other acute bony abnormality is noted.  Appropriate sizing is noted on both views.  No significant arthritic changes are noted in the right knee. Jeramie Rai MD 8/9/18     Xr Knee Bilateral Ap Standing    Result Date: 8/9/2018  Narrative: Ordering Provider:  Jeramie Rai MD Ordering Diagnosis/Indication:  Status post left knee replacement Procedure:  XR KNEE BILATERAL AP STANDING Exam Date:  8/9/18 COMPARISON:  Todays X-rays were compared to previous images dated May 16, 2018..     Impression:  AP bilateral standing with lateral of the left knee shows acceptable position and alignment of a left total knee arthroplasty.  No sign of implant loosening or failure is noted.  There are multiple small metallic fragments that are proximal in the thigh visible on x-rays but unchanged from previous x-ray.  No other acute bony abnormality is noted.  Appropriate sizing is noted on both views.  No significant arthritic changes are noted in the right knee. Jeramie Rai MD 8/9/18             ASSESSMENT:    Diagnoses and all orders for this visit:    Status post left knee replacement    Essential hypertension    Chronic obstructive pulmonary disease, unspecified COPD type (CMS/HCC)    Other orders  -     meloxicam (MOBIC) 15 MG tablet; Take 1 tablet by mouth Daily.          PLAN    He will continue to progress  motion and activity as tolerated  Still has a mild effusion and we discussed ace wrap/neoprene sleeve for compression  No restriction  Discussed progressing activity  Recheck xrays about 1 year from surgery    Patient's Body mass index is 22.64 kg/m². BMI is within normal parameters. No follow-up required\.      Return in about 1 year (around 8/9/2019) for Recheck with repeat xrays.    Jeramie Rai MD

## 2018-08-15 RX ORDER — RANITIDINE 150 MG/1
150 TABLET ORAL NIGHTLY
Qty: 30 TABLET | Refills: 2 | Status: SHIPPED | OUTPATIENT
Start: 2018-08-15 | End: 2018-09-19 | Stop reason: SDUPTHER

## 2018-09-17 RX ORDER — AMLODIPINE BESYLATE 5 MG/1
5 TABLET ORAL DAILY
Qty: 30 TABLET | Refills: 2 | Status: CANCELLED | OUTPATIENT
Start: 2018-09-17

## 2018-09-19 ENCOUNTER — APPOINTMENT (OUTPATIENT)
Dept: LAB | Facility: HOSPITAL | Age: 58
End: 2018-09-19

## 2018-09-19 ENCOUNTER — OFFICE VISIT (OUTPATIENT)
Dept: FAMILY MEDICINE CLINIC | Facility: CLINIC | Age: 58
End: 2018-09-19

## 2018-09-19 VITALS
HEART RATE: 105 BPM | WEIGHT: 173.1 LBS | TEMPERATURE: 97.5 F | DIASTOLIC BLOOD PRESSURE: 90 MMHG | BODY MASS INDEX: 22.94 KG/M2 | SYSTOLIC BLOOD PRESSURE: 132 MMHG | HEIGHT: 73 IN | OXYGEN SATURATION: 100 %

## 2018-09-19 DIAGNOSIS — Z51.81 ENCOUNTER FOR MEDICATION MONITORING: Primary | ICD-10-CM

## 2018-09-19 DIAGNOSIS — K21.9 GASTROESOPHAGEAL REFLUX DISEASE WITHOUT ESOPHAGITIS: ICD-10-CM

## 2018-09-19 DIAGNOSIS — M25.562 CHRONIC PAIN OF LEFT KNEE: ICD-10-CM

## 2018-09-19 DIAGNOSIS — G89.29 CHRONIC PAIN OF LEFT KNEE: ICD-10-CM

## 2018-09-19 PROCEDURE — 80307 DRUG TEST PRSMV CHEM ANLYZR: CPT | Performed by: FAMILY MEDICINE

## 2018-09-19 PROCEDURE — G0481 DRUG TEST DEF 8-14 CLASSES: HCPCS | Performed by: FAMILY MEDICINE

## 2018-09-19 PROCEDURE — 99213 OFFICE O/P EST LOW 20 MIN: CPT | Performed by: FAMILY MEDICINE

## 2018-09-19 RX ORDER — MELOXICAM 15 MG/1
15 TABLET ORAL DAILY
Qty: 30 TABLET | Refills: 1 | Status: SHIPPED | OUTPATIENT
Start: 2018-09-19 | End: 2019-04-16

## 2018-09-19 RX ORDER — RANITIDINE 150 MG/1
150 TABLET ORAL NIGHTLY
Qty: 30 TABLET | Refills: 2 | Status: SHIPPED | OUTPATIENT
Start: 2018-09-19 | End: 2019-02-05 | Stop reason: SDUPTHER

## 2018-09-19 RX ORDER — GABAPENTIN 300 MG/1
300 CAPSULE ORAL DAILY
Qty: 30 CAPSULE | Refills: 2 | Status: SHIPPED | OUTPATIENT
Start: 2018-09-19 | End: 2018-11-19 | Stop reason: SDUPTHER
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
## 2018-09-21 DIAGNOSIS — M25.551 RIGHT HIP PAIN: Primary | ICD-10-CM

## 2018-09-24 ENCOUNTER — OFFICE VISIT (OUTPATIENT)
Dept: ORTHOPEDIC SURGERY | Facility: CLINIC | Age: 58
End: 2018-09-24

## 2018-09-24 VITALS — WEIGHT: 179 LBS | BODY MASS INDEX: 23.72 KG/M2 | HEIGHT: 73 IN

## 2018-09-24 DIAGNOSIS — Z96.641 HISTORY OF RIGHT HIP REPLACEMENT: ICD-10-CM

## 2018-09-24 DIAGNOSIS — M25.551 RIGHT HIP PAIN: Primary | ICD-10-CM

## 2018-09-24 PROCEDURE — 99212 OFFICE O/P EST SF 10 MIN: CPT | Performed by: ORTHOPAEDIC SURGERY

## 2018-09-24 NOTE — PROGRESS NOTES
"Zak Lutz is a 58 y.o. male returns for     Chief Complaint   Patient presents with   • Right Hip - Follow-up, Pain   x-rays done today in office      HISTORY OF PRESENT ILLNESS:continued pain right hip   Pain scale today 4/10 burning pain in right thigh      58 y pain of the right thigh and hip, the pain is present in the hip and thigh, the pain is worse with coughing, the pain does not radiate.  The pain is intermittent, aching.  Lying down improves the pain.    No fevers, no chills.       CONCURRENT MEDICAL HISTORY:    The following portions of the patient's history were reviewed and updated as appropriate: allergies, current medications, past family history, past medical history, past social history, past surgical history and problem list.     ROS  No fevers or chills.  No chest pain or shortness of air.  No GI or  disturbances.    PHYSICAL EXAMINATION:       Ht 185.4 cm (73\")   Wt 81.2 kg (179 lb)   BMI 23.62 kg/m²     Physical Exam   Constitutional: He appears well-developed.   HENT:   Head: Normocephalic and atraumatic.   Eyes: Pupils are equal, round, and reactive to light. Right eye exhibits no discharge. Left eye exhibits no discharge.   Neck: Normal range of motion. No JVD present. No tracheal deviation present. No thyromegaly present.   Cardiovascular: Normal rate and intact distal pulses.  Exam reveals no gallop and no friction rub.    No murmur heard.  Pulmonary/Chest: Effort normal and breath sounds normal. No respiratory distress. He has no wheezes. He has no rales. He exhibits no tenderness.   Abdominal: Soft. Bowel sounds are normal. He exhibits no distension. There is no tenderness. There is no guarding.   Musculoskeletal: He exhibits no edema, tenderness or deformity.   Lymphadenopathy:     He has no cervical adenopathy.   Neurological: He is alert. He has normal reflexes. He displays normal reflexes. No cranial nerve deficit. Coordination normal.   Skin: Skin is warm. No rash " noted. No erythema.   Psychiatric: He has a normal mood and affect. His behavior is normal. Thought content normal.       GAIT:     [x]  Normal  []  Antalgic    Assistive device: [x]  None  []  Walker     []  Crutches  []  Cane     []  Wheelchair  []  Stretcher    Right Hip Exam     Tenderness   The patient is experiencing tenderness in the greater trochanter.    Range of Motion   Extension: 10   Flexion: 100   Abduction: 20   Adduction: 10     Muscle Strength   Abduction: 5/5   Adduction: 5/5   Flexion: 5/5     Tests   HANS: negative  Fadir:  Negative FADIR test    Other   Erythema: absent  Sensation: normal  Pulse: present        no mass palpated, no erythema,         Xr Femur 2 View Right    Result Date: 9/24/2018  Narrative: Ordering Provider:  Jim Garcia MD Ordering Diagnosis/Indication:  Right hip pain Procedure:  XR FEMUR 2 VW RIGHT Exam Date:  9/24/18 RELEVANT PRIOR IMAGES:  XR Knee Bilateral AP Standing 08/09/2018 [id number] Final COMPARISON:  Not applicable, no relevant images available.     Impression:  right total hip arthroplasty, lucency around femoral stem, total hip arthroplasty Bone quality: good Alignment:  good Fracture: none Soft tissue: normal.             ASSESSMENT:    Diagnoses and all orders for this visit:    Right hip pain    History of right hip replacement          PLAN    Revision of femoral stem may be an option, although I advised him to not have this operation unless the pain of the thigh becomes unliveable.  Xray lumbar spine with L5-S1 spot view.     Jim Garcia MD

## 2018-09-26 LAB — CONV REPORT SUMMARY: NORMAL

## 2018-09-26 RX ORDER — AMLODIPINE BESYLATE 5 MG/1
5 TABLET ORAL DAILY
Qty: 30 TABLET | Refills: 2 | Status: SHIPPED | OUTPATIENT
Start: 2018-09-26 | End: 2019-05-12 | Stop reason: SDUPTHER

## 2018-09-26 NOTE — PROGRESS NOTES
I have seen the patient.  I have reviewed the notes, assessments, and/or procedures performed by the resident, I concur with her/his documentation and assessment and plan for Zak Lutz.      This document has been electronically signed by Delilah Holland MD on September 26, 2018 2:10 PM

## 2018-10-03 ENCOUNTER — OFFICE VISIT (OUTPATIENT)
Dept: ORTHOPEDIC SURGERY | Facility: CLINIC | Age: 58
End: 2018-10-03

## 2018-10-03 VITALS — HEIGHT: 73 IN | BODY MASS INDEX: 23.19 KG/M2 | WEIGHT: 175 LBS

## 2018-10-03 DIAGNOSIS — M54.50 LUMBAR SPINE PAIN: ICD-10-CM

## 2018-10-03 DIAGNOSIS — M54.5 ACUTE RIGHT-SIDED LOW BACK PAIN, WITH SCIATICA PRESENCE UNSPECIFIED: ICD-10-CM

## 2018-10-03 DIAGNOSIS — M25.551 RIGHT HIP PAIN: Primary | ICD-10-CM

## 2018-10-03 DIAGNOSIS — Z96.641 STATUS POST RIGHT HIP REPLACEMENT: ICD-10-CM

## 2018-10-03 PROCEDURE — 20610 DRAIN/INJ JOINT/BURSA W/O US: CPT | Performed by: ORTHOPAEDIC SURGERY

## 2018-10-03 PROCEDURE — 99213 OFFICE O/P EST LOW 20 MIN: CPT | Performed by: ORTHOPAEDIC SURGERY

## 2018-10-03 RX ORDER — TRIAMCINOLONE ACETONIDE 40 MG/ML
40 INJECTION, SUSPENSION INTRA-ARTICULAR; INTRAMUSCULAR
Status: COMPLETED | OUTPATIENT
Start: 2018-10-03 | End: 2018-10-03

## 2018-10-03 RX ORDER — LIDOCAINE HYDROCHLORIDE 20 MG/ML
2 INJECTION, SOLUTION INFILTRATION; PERINEURAL
Status: COMPLETED | OUTPATIENT
Start: 2018-10-03 | End: 2018-10-03

## 2018-10-03 RX ADMIN — TRIAMCINOLONE ACETONIDE 40 MG: 40 INJECTION, SUSPENSION INTRA-ARTICULAR; INTRAMUSCULAR at 11:18

## 2018-10-03 RX ADMIN — LIDOCAINE HYDROCHLORIDE 2 ML: 20 INJECTION, SOLUTION INFILTRATION; PERINEURAL at 11:18

## 2018-10-03 NOTE — PROGRESS NOTES
Injection to the right greater trochanter/ sciatic notch, sterile, done with 80mg kenalog and 1cc of 1% lidocaine.  The medicines injected directly through skin to the greater trochanteric region and sciatic notch.  The medicines injected, the needle removed.  No complications.  No immediate reaction to the medicines administered.

## 2018-10-03 NOTE — PROGRESS NOTES
"Zak Lutz is a 58 y.o. male returns for     Chief Complaint   Patient presents with   • Right Hip - Follow-up, Pain     X-rays done today in office     HISTORY OF PRESENT ILLNESS:continued pain right hip  Pain scale today 4/10    58 y complains of right hip pain, states pain occurs when rising from a seated to a standing position, states pain is in the front and back of thigh, down from the hip region, the pain is not sever, the pain is present with movement and walking.  No recent trauma.  No fevers, no chills, no nighttime awakening pain.  No numbness no weakness, denies back pain     CONCURRENT MEDICAL HISTORY:    The following portions of the patient's history were reviewed and updated as appropriate: allergies, current medications, past family history, past medical history, past social history, past surgical history and problem list.     ROS  No fevers or chills.  No chest pain or shortness of air.  No GI or  disturbances.    PHYSICAL EXAMINATION:       Ht 185.4 cm (73\")   Wt 79.4 kg (175 lb)   BMI 23.09 kg/m²     Physical Exam   Constitutional: He appears well-developed.   HENT:   Head: Normocephalic and atraumatic.   Eyes: Pupils are equal, round, and reactive to light. Right eye exhibits no discharge. Left eye exhibits no discharge.   Neck: Normal range of motion. No JVD present. No tracheal deviation present. No thyromegaly present.   Cardiovascular: Normal rate and intact distal pulses.  Exam reveals no gallop and no friction rub.    Pulmonary/Chest: Effort normal and breath sounds normal. No respiratory distress. He has no wheezes. He has no rales. He exhibits no tenderness.   Abdominal: Soft. Bowel sounds are normal. He exhibits no distension. There is no tenderness. There is no guarding.   Musculoskeletal: He exhibits no edema, tenderness or deformity.   Lymphadenopathy:     He has no cervical adenopathy.   Neurological: He is alert. He has normal reflexes. He displays normal reflexes. No " cranial nerve deficit. Coordination normal.   Skin: Skin is warm. No rash noted. No erythema.   Psychiatric: He has a normal mood and affect. His behavior is normal. Thought content normal.       GAIT:     []  Normal  []  Antalgic    Assistive device: [x]  None  []  Walker     []  Crutches  []  Cane     []  Wheelchair  []  Stretcher    Right Hip Exam     Tenderness   The patient is experiencing tenderness in the greater trochanter, ischail tuberosity and posterior.    Range of Motion   Extension: 10   Flexion: 110   Abduction: 20   Adduction: 10     Muscle Strength   Abduction: 5/5   Adduction: 5/5   Flexion: 5/5     Tests   HANS: positive    Other   Erythema: absent  Scars: present  Sensation: normal  Pulse: present      Back Exam     Tenderness   The patient is experiencing tenderness in the lumbar.    Range of Motion   Extension: 10   Flexion: 60               Xr Femur 2 View Right    Result Date: 9/24/2018  Narrative: Ordering Provider:  Jim Garcia MD Ordering Diagnosis/Indication:  Right hip pain Procedure:  XR FEMUR 2 VW RIGHT Exam Date:  9/24/18 RELEVANT PRIOR IMAGES:  XR Knee Bilateral AP Standing 08/09/2018 [id number] Final COMPARISON:  Not applicable, no relevant images available.     Impression:  right total hip arthroplasty, lucency around femoral stem, total hip arthroplasty Bone quality: good Alignment:  good Fracture: none Soft tissue: normal.             ASSESSMENT:    Diagnoses and all orders for this visit:    Right hip pain  -     Large Joint Arthrocentesis    Acute right-sided low back pain, with sciatica presence unspecified  -     MRI Lumbar Spine Without Contrast; Future  -     Large Joint Arthrocentesis    Status post right hip replacement  -     Large Joint Arthrocentesis    Large Joint Arthrocentesis  Date/Time: 10/3/2018 11:18 AM  Procedure Details  Location: hip - R greater trochanteric bursa  Medications administered: 2 mL lidocaine 2%; 40 mg triamcinolone acetonide 40  MG/ML            PLAN  Order MRI to assess lumbar region, lumbar spine.  Revision of femoral stem may be an option, although I advised him to not have this operation unless the pain of the thigh becomes unliveable.  Xray lumbar spine with L5-S1 spot view.        Jim Garcia MD

## 2018-10-10 DIAGNOSIS — M54.50 LUMBAR SPINE PAIN: Primary | ICD-10-CM

## 2018-10-10 DIAGNOSIS — M54.5 ACUTE RIGHT-SIDED LOW BACK PAIN, WITH SCIATICA PRESENCE UNSPECIFIED: ICD-10-CM

## 2018-10-17 ENCOUNTER — HOSPITAL ENCOUNTER (OUTPATIENT)
Dept: PHYSICAL THERAPY | Facility: HOSPITAL | Age: 58
Setting detail: THERAPIES SERIES
Discharge: HOME | End: 2018-10-17
Attending: ORTHOPAEDIC SURGERY

## 2018-10-17 DIAGNOSIS — M54.50 LUMBAR PAIN: Primary | ICD-10-CM

## 2018-10-17 DIAGNOSIS — M54.40 ACUTE RIGHT-SIDED LOW BACK PAIN WITH SCIATICA, SCIATICA LATERALITY UNSPECIFIED: ICD-10-CM

## 2018-10-17 DIAGNOSIS — M25.551 RIGHT HIP PAIN: ICD-10-CM

## 2018-10-17 PROCEDURE — 97161 PT EVAL LOW COMPLEX 20 MIN: CPT
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
## 2018-10-23 ENCOUNTER — APPOINTMENT (OUTPATIENT)
Dept: PHYSICAL THERAPY | Facility: HOSPITAL | Age: 58
End: 2018-10-23
Attending: ORTHOPAEDIC SURGERY

## 2018-10-23 DIAGNOSIS — M54.50 LUMBAR SPINE PAIN: Primary | ICD-10-CM

## 2018-10-24 ENCOUNTER — HOSPITAL ENCOUNTER (OUTPATIENT)
Dept: PHYSICAL THERAPY | Facility: HOSPITAL | Age: 58
Setting detail: THERAPIES SERIES
Discharge: HOME | End: 2018-10-24
Attending: ORTHOPAEDIC SURGERY

## 2018-10-24 ENCOUNTER — TELEPHONE (OUTPATIENT)
Dept: ORTHOPEDIC SURGERY | Facility: CLINIC | Age: 58
End: 2018-10-24

## 2018-10-24 DIAGNOSIS — Z96.652 STATUS POST LEFT KNEE REPLACEMENT: Primary | ICD-10-CM

## 2018-10-24 DIAGNOSIS — M54.50 LUMBAR PAIN: ICD-10-CM

## 2018-10-24 PROCEDURE — 97110 THERAPEUTIC EXERCISES: CPT
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
## 2018-10-31 RX ORDER — PYRAZINAMIDE 500 MG/1
1 TABLET ORAL EVERY 6 HOURS PRN
Qty: 40 TABLET | Refills: 0 | Status: SHIPPED | OUTPATIENT
Start: 2018-10-31 | End: 2018-12-21

## 2018-10-31 NOTE — TELEPHONE ENCOUNTER
PATIENT SCHEDULED AN APPOINTMENT FOR THIS Friday, 11/02/18, FOR A FOLLOW UP ON LOW BACK PAIN RADIATING DOWN THE LEG. HE IS ASKING IF YOU CAN GIVE SOMETHING UNTIL THEN FOR PAIN?  HE DOES NOT WANT NORCO.  HE IS ASKING FOR MAYBE TYLENOL # 3 AT [facility], [place].

## 2018-11-02 ENCOUNTER — OFFICE VISIT (OUTPATIENT)
Dept: ORTHOPEDIC SURGERY | Facility: CLINIC | Age: 58
End: 2018-11-02

## 2018-11-02 VITALS — HEIGHT: 73 IN | BODY MASS INDEX: 23.99 KG/M2 | WEIGHT: 181 LBS

## 2018-11-02 DIAGNOSIS — M54.5 ACUTE RIGHT-SIDED LOW BACK PAIN, WITH SCIATICA PRESENCE UNSPECIFIED: ICD-10-CM

## 2018-11-02 DIAGNOSIS — M54.50 LUMBAR SPINE PAIN: Primary | ICD-10-CM

## 2018-11-02 DIAGNOSIS — M25.551 RIGHT HIP PAIN: ICD-10-CM

## 2018-11-02 PROCEDURE — 99213 OFFICE O/P EST LOW 20 MIN: CPT | Performed by: ORTHOPAEDIC SURGERY

## 2018-11-02 PROCEDURE — 20610 DRAIN/INJ JOINT/BURSA W/O US: CPT | Performed by: ORTHOPAEDIC SURGERY

## 2018-11-02 RX ORDER — NICOTINE 21 MG/24HR
1 PATCH, TRANSDERMAL 24 HOURS TRANSDERMAL EVERY 24 HOURS
COMMUNITY
End: 2019-03-18

## 2018-11-02 RX ORDER — TRIAMCINOLONE ACETONIDE 40 MG/ML
80 INJECTION, SUSPENSION INTRA-ARTICULAR; INTRAMUSCULAR
Status: COMPLETED | OUTPATIENT
Start: 2018-11-02 | End: 2018-11-02

## 2018-11-02 RX ORDER — LIDOCAINE HYDROCHLORIDE 10 MG/ML
1 INJECTION, SOLUTION EPIDURAL; INFILTRATION; INTRACAUDAL; PERINEURAL
Status: COMPLETED | OUTPATIENT
Start: 2018-11-02 | End: 2018-11-02

## 2018-11-02 RX ADMIN — TRIAMCINOLONE ACETONIDE 80 MG: 40 INJECTION, SUSPENSION INTRA-ARTICULAR; INTRAMUSCULAR at 08:43

## 2018-11-02 RX ADMIN — LIDOCAINE HYDROCHLORIDE 1 ML: 10 INJECTION, SOLUTION EPIDURAL; INFILTRATION; INTRACAUDAL; PERINEURAL at 08:43

## 2018-11-02 NOTE — PROGRESS NOTES
"Zak Lutz is a 58 y.o. male returns for     Chief Complaint   Patient presents with   • Lumbar Spine - Follow-up       HISTORY OF PRESENT ILLNESS: Patient being seen for low back follow up. X-rays done today. Patient complains of pain in right hip.   Right hip pain.   States he has pain with movement, the hip injection was helpful  States the last injection provided significant relief, states the pain is present throughout the day.  He is requesting another injection of the hip today.  Unfortunately the insurance company has not approved for him to have MRI of the low back.    CONCURRENT MEDICAL HISTORY:    The following portions of the patient's history were reviewed and updated as appropriate: allergies, current medications, past family history, past medical history, past social history, past surgical history and problem list.     ROS  No fevers or chills.  No chest pain or shortness of air.  No GI or  disturbances.    PHYSICAL EXAMINATION:       Ht 185.4 cm (73\")   Wt 82.1 kg (181 lb)   BMI 23.88 kg/m²     Physical Exam   Constitutional: He is oriented to person, place, and time. He appears well-developed and well-nourished. No distress.   HENT:   Head: Normocephalic.   Mouth/Throat: No oropharyngeal exudate.   Eyes: Pupils are equal, round, and reactive to light. EOM are normal. No scleral icterus.   Neck: No JVD present. No tracheal deviation present. No thyromegaly present.   Cardiovascular: Normal rate and intact distal pulses.    Pulmonary/Chest: Effort normal. No respiratory distress. He has no wheezes. He has no rales.   Abdominal: Soft. He exhibits no distension. There is no tenderness. There is no guarding.   Neurological: He is alert and oriented to person, place, and time. He displays normal reflexes. No cranial nerve deficit. Coordination normal.   Skin: Skin is warm and dry. Capillary refill takes less than 2 seconds. No erythema. No pallor.   Psychiatric: He has a normal mood and " affect. His behavior is normal.       GAIT:     [x]  Normal  []  Antalgic    Assistive device: [x]  None  []  Walker     []  Crutches  []  Cane     []  Wheelchair  []  Stretcher    Right Hip Exam     Tenderness   The patient is experiencing tenderness in the greater trochanter, ischail tuberosity and posterior.    Range of Motion   Extension: 10   Flexion: 110   Abduction: 20   Adduction: 10     Muscle Strength   Abduction: 5/5   Adduction: 5/5   Flexion: 5/5     Tests   HANS: positive    Other   Erythema: absent  Scars: present  Sensation: normal  Pulse: present      Back Exam     Tenderness   The patient is experiencing tenderness in the lumbar.    Range of Motion   Extension: 10   Flexion: 60   Lateral Bend Right: 10   Lateral Bend Left: 10   Rotation Right: 10   Rotation Left: 10     Tests   Straight leg raise right: negative  Straight leg raise left: negative    Other   Gait: normal   Erythema: no back redness              Xr Spine Lumbar 2 Or 3 View    Result Date: 11/2/2018  Narrative: Ordering Provider:  Jim Garcia MD Ordering Diagnosis/Indication:  Lumbar spine pain Procedure:  XR SPINE LUMBAR 2 OR 3 VW Exam Date:  11/2/18 RELEVANT PRIOR IMAGES:  XR Spine Lumbar 2 or 3 View 10/03/2018 [id number] Final COMPARISON:  Todays X-rays were compared to previous images dated 10/3/2018 and demonstrates no change.     Impression:  lumbar alignment is good, lumbar disc heights are normal. Bone quality: good Alignment: normal lumbar alignment Fracture: none Soft tissue: normal alignment pelvic tissues.     Xr Spine Lumbar 2 Or 3 View    Result Date: 10/26/2018  Narrative: Ordering Provider:  Jim Garcia MD Ordering Diagnosis/Indication:  Right hip pain, Lumbar spine pain Procedure:  XR SPINE LUMBAR 2 OR 3 VW Exam Date:  10/3/18 RELEVANT PRIOR IMAGES:  XR Femur 2 View Right 09/24/2018 [id number] Final COMPARISON:  Not applicable, no relevant images available.     Impression:  lumbar  alignment is normal, disc heights normal, concave endplates of the vertebra in the lumbar spine, fracture fixation right acetabulum, total hip replacement Bone quality: good Alignment:  Normal lordosis Fracture: none Soft tissue: normal paravertebral soft tissues             ASSESSMENT:    Diagnoses and all orders for this visit:    Lumbar spine pain    Acute right-sided low back pain, with sciatica presence unspecified  -     MRI Lumbar Spine Without Contrast; Future  -     Ambulatory Referral to Physical Therapy Evaluate and treat, Ortho; Electrotherapy; Tens (Home); Stretching, ROM, Strengthening; 4; 4; 4; Full weight bearing    Right hip pain  -     MRI Lumbar Spine Without Contrast; Future  -     Ambulatory Referral to Physical Therapy Evaluate and treat, Ortho; Electrotherapy; Tens (Home); Stretching, ROM, Strengthening; 4; 4; 4; Full weight bearing    Other orders  -     nicotine (NICODERM CQ) 21 MG/24HR patch; Place 1 patch on the skin as directed by provider Daily.  -     Large Joint Arthrocentesis        Large Joint Arthrocentesis  Date/Time: 11/2/2018 8:43 AM  Consent given by: patient  Supporting Documentation  Indications: pain   Procedure Details  Location: hip - R hip joint  Preparation: Patient was prepped and draped in the usual sterile fashion  Needle size: 22 G  Medications administered: 1 mL lidocaine PF 1% 1 %; 80 mg triamcinolone acetonide 40 MG/ML  Patient tolerance: patient tolerated the procedure well with no immediate complications        PLAN    Physical therapy ordered for the back and right hip.    Ordered MRI for the right hip.        Jim Garcia MD

## 2018-11-19 ENCOUNTER — OFFICE VISIT (OUTPATIENT)
Dept: FAMILY MEDICINE CLINIC | Facility: CLINIC | Age: 58
End: 2018-11-19

## 2018-11-19 VITALS
HEIGHT: 73 IN | HEART RATE: 81 BPM | OXYGEN SATURATION: 98 % | DIASTOLIC BLOOD PRESSURE: 84 MMHG | SYSTOLIC BLOOD PRESSURE: 142 MMHG | BODY MASS INDEX: 23.84 KG/M2 | WEIGHT: 179.9 LBS

## 2018-11-19 DIAGNOSIS — M54.41 ACUTE RIGHT-SIDED LOW BACK PAIN WITH RIGHT-SIDED SCIATICA: Primary | ICD-10-CM

## 2018-11-19 DIAGNOSIS — Z23 IMMUNIZATION DUE: ICD-10-CM

## 2018-11-19 PROCEDURE — 99213 OFFICE O/P EST LOW 20 MIN: CPT | Performed by: FAMILY MEDICINE

## 2018-11-19 RX ORDER — INFLUENZA A VIRUS A/MICHIGAN/45/2015 X-275 (H1N1) ANTIGEN (FORMALDEHYDE INACTIVATED), INFLUENZA A VIRUS A/SINGAPORE/INFIMH-16-0019/2016 IVR-186 (H3N2) ANTIGEN (FORMALDEHYDE INACTIVATED), INFLUENZA B VIRUS B/PHUKET/3073/2013 ANTIGEN (FORMALDEHYDE INACTIVATED), AND INFLUENZA B VIRUS B/MARYLAND/15/2016 BX-69A ANTIGEN (FORMALDEHYDE INACTIVATED) 15; 15; 15; 15 UG/.5ML; UG/.5ML; UG/.5ML; UG/.5ML
INJECTION, SUSPENSION INTRAMUSCULAR
Refills: 0 | COMMUNITY
Start: 2018-09-08 | End: 2019-03-18

## 2018-11-19 RX ORDER — GABAPENTIN 300 MG/1
300 CAPSULE ORAL 3 TIMES DAILY
Qty: 90 CAPSULE | Refills: 2 | Status: SHIPPED | OUTPATIENT
Start: 2018-11-19 | End: 2019-03-18 | Stop reason: SDUPTHER
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
## 2018-11-20 NOTE — PROGRESS NOTES
I have seen the patient.  I have reviewed the notes, assessments, and/or procedures performed by Dr. Noelle Long, I concur with her  documentation and assessment and plan for Zak Lutz.          This document has been electronically signed by Nitin Corbin MD on November 20, 2018 10:06 AM

## 2018-11-29 ENCOUNTER — HOSPITAL ENCOUNTER (OUTPATIENT)
Dept: MRI IMAGING | Facility: HOSPITAL | Age: 58
Discharge: HOME | End: 2018-11-29
Admitting: ORTHOPAEDIC SURGERY

## 2018-11-29 DIAGNOSIS — M54.5 ACUTE RIGHT-SIDED LOW BACK PAIN, WITH SCIATICA PRESENCE UNSPECIFIED: ICD-10-CM

## 2018-11-29 DIAGNOSIS — M25.551 RIGHT HIP PAIN: ICD-10-CM

## 2018-11-29 PROCEDURE — 72148 MRI LUMBAR SPINE W/O DYE: CPT

## 2018-12-05 ENCOUNTER — OFFICE VISIT (OUTPATIENT)
Dept: ORTHOPEDIC SURGERY | Facility: CLINIC | Age: 58
End: 2018-12-05

## 2018-12-05 ENCOUNTER — TELEPHONE (OUTPATIENT)
Dept: ORTHOPEDIC SURGERY | Facility: CLINIC | Age: 58
End: 2018-12-05

## 2018-12-05 VITALS — HEIGHT: 73 IN | BODY MASS INDEX: 23.46 KG/M2 | WEIGHT: 177 LBS

## 2018-12-05 DIAGNOSIS — M54.50 LUMBAR SPINE PAIN: Primary | ICD-10-CM

## 2018-12-05 DIAGNOSIS — Z96.641 STATUS POST RIGHT HIP REPLACEMENT: ICD-10-CM

## 2018-12-05 DIAGNOSIS — M54.5 ACUTE RIGHT-SIDED LOW BACK PAIN, WITH SCIATICA PRESENCE UNSPECIFIED: ICD-10-CM

## 2018-12-05 DIAGNOSIS — M25.551 RIGHT HIP PAIN: ICD-10-CM

## 2018-12-05 PROCEDURE — 99213 OFFICE O/P EST LOW 20 MIN: CPT | Performed by: ORTHOPAEDIC SURGERY

## 2018-12-05 PROCEDURE — 20610 DRAIN/INJ JOINT/BURSA W/O US: CPT | Performed by: ORTHOPAEDIC SURGERY

## 2018-12-05 RX ADMIN — LIDOCAINE HYDROCHLORIDE 1 ML: 10 INJECTION, SOLUTION INFILTRATION; PERINEURAL at 11:27

## 2018-12-05 RX ADMIN — TRIAMCINOLONE ACETONIDE 80 MG: 40 INJECTION, SUSPENSION INTRA-ARTICULAR; INTRAMUSCULAR at 11:27
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
## 2018-12-06 RX ORDER — TRIAMCINOLONE ACETONIDE 40 MG/ML
80 INJECTION, SUSPENSION INTRA-ARTICULAR; INTRAMUSCULAR
Status: COMPLETED | OUTPATIENT
Start: 2018-12-05 | End: 2018-12-05

## 2018-12-06 RX ORDER — LIDOCAINE HYDROCHLORIDE 10 MG/ML
1 INJECTION, SOLUTION INFILTRATION; PERINEURAL
Status: COMPLETED | OUTPATIENT
Start: 2018-12-05 | End: 2018-12-05

## 2018-12-06 RX ORDER — TRIAMCINOLONE ACETONIDE 40 MG/ML
80 INJECTION, SUSPENSION INTRA-ARTICULAR; INTRAMUSCULAR ONCE
Status: DISCONTINUED | OUTPATIENT
Start: 2018-12-06 | End: 2020-02-11

## 2018-12-07 RX ORDER — PYRAZINAMIDE 500 MG/1
1 TABLET ORAL EVERY 6 HOURS PRN
Qty: 40 TABLET | Refills: 0 | OUTPATIENT
Start: 2018-12-07

## 2018-12-21 RX ORDER — NABUMETONE 750 MG/1
750 TABLET, FILM COATED ORAL 2 TIMES DAILY
Qty: 60 TABLET | Refills: 2 | Status: SHIPPED | OUTPATIENT
Start: 2018-12-21 | End: 2019-05-07 | Stop reason: SDUPTHER

## 2018-12-26 ENCOUNTER — OFFICE VISIT (OUTPATIENT)
Dept: FAMILY MEDICINE CLINIC | Facility: CLINIC | Age: 58
End: 2018-12-26

## 2018-12-26 ENCOUNTER — APPOINTMENT (OUTPATIENT)
Dept: LAB | Facility: HOSPITAL | Age: 58
End: 2018-12-26

## 2018-12-26 VITALS
WEIGHT: 175.44 LBS | HEART RATE: 85 BPM | SYSTOLIC BLOOD PRESSURE: 134 MMHG | HEIGHT: 73 IN | OXYGEN SATURATION: 98 % | BODY MASS INDEX: 23.25 KG/M2 | DIASTOLIC BLOOD PRESSURE: 62 MMHG

## 2018-12-26 DIAGNOSIS — Z00.00 ANNUAL VISIT FOR GENERAL ADULT MEDICAL EXAMINATION WITHOUT ABNORMAL FINDINGS: Primary | ICD-10-CM

## 2018-12-26 LAB
ALBUMIN SERPL-MCNC: 4.6 G/DL (ref 3.4–4.8)
ALBUMIN/GLOB SERPL: 1.4 G/DL (ref 1.1–1.8)
ALP SERPL-CCNC: 52 U/L (ref 38–126)
ALT SERPL W P-5'-P-CCNC: 27 U/L (ref 21–72)
ANION GAP SERPL CALCULATED.3IONS-SCNC: 9 MMOL/L (ref 5–15)
ARTICHOKE IGE QN: 72 MG/DL (ref 1–129)
AST SERPL-CCNC: 63 U/L (ref 17–59)
BASOPHILS # BLD AUTO: 0.02 10*3/MM3 (ref 0–0.2)
BASOPHILS NFR BLD AUTO: 0.2 % (ref 0–2)
BILIRUB SERPL-MCNC: 1 MG/DL (ref 0.2–1.3)
BUN BLD-MCNC: 21 MG/DL (ref 7–21)
BUN/CREAT SERPL: 19.4 (ref 7–25)
CALCIUM SPEC-SCNC: 9.7 MG/DL (ref 8.4–10.2)
CHLORIDE SERPL-SCNC: 100 MMOL/L (ref 95–110)
CHOLEST SERPL-MCNC: 180 MG/DL (ref 0–199)
CO2 SERPL-SCNC: 31 MMOL/L (ref 22–31)
CREAT BLD-MCNC: 1.08 MG/DL (ref 0.7–1.3)
DEPRECATED RDW RBC AUTO: 45.3 FL (ref 35.1–43.9)
EOSINOPHIL # BLD AUTO: 0.1 10*3/MM3 (ref 0–0.7)
EOSINOPHIL NFR BLD AUTO: 1.1 % (ref 0–7)
ERYTHROCYTE [DISTWIDTH] IN BLOOD BY AUTOMATED COUNT: 13.4 % (ref 11.5–14.5)
GFR SERPL CREATININE-BSD FRML MDRD: 85 ML/MIN/1.73 (ref 49–113)
GLOBULIN UR ELPH-MCNC: 3.4 GM/DL (ref 2.3–3.5)
GLUCOSE BLD-MCNC: 98 MG/DL (ref 60–100)
HBA1C MFR BLD: 5.7 % (ref 4–5.6)
HCT VFR BLD AUTO: 47.1 % (ref 39–49)
HDLC SERPL-MCNC: 85 MG/DL (ref 60–200)
HGB BLD-MCNC: 16.2 G/DL (ref 13.7–17.3)
IMM GRANULOCYTES # BLD AUTO: 0.03 10*3/MM3 (ref 0–0.02)
IMM GRANULOCYTES NFR BLD AUTO: 0.3 % (ref 0–0.5)
LDLC/HDLC SERPL: 0.7 {RATIO} (ref 0–3.55)
LYMPHOCYTES # BLD AUTO: 2.28 10*3/MM3 (ref 0.6–4.2)
LYMPHOCYTES NFR BLD AUTO: 24.5 % (ref 10–50)
MCH RBC QN AUTO: 31.6 PG (ref 26.5–34)
MCHC RBC AUTO-ENTMCNC: 34.4 G/DL (ref 31.5–36.3)
MCV RBC AUTO: 92 FL (ref 80–98)
MONOCYTES # BLD AUTO: 0.95 10*3/MM3 (ref 0–0.9)
MONOCYTES NFR BLD AUTO: 10.2 % (ref 0–12)
NEUTROPHILS # BLD AUTO: 5.93 10*3/MM3 (ref 2–8.6)
NEUTROPHILS NFR BLD AUTO: 63.7 % (ref 37–80)
PLATELET # BLD AUTO: 257 10*3/MM3 (ref 150–450)
PMV BLD AUTO: 10.3 FL (ref 8–12)
POTASSIUM BLD-SCNC: 4.2 MMOL/L (ref 3.5–5.1)
PROT SERPL-MCNC: 8 G/DL (ref 6.3–8.6)
RBC # BLD AUTO: 5.12 10*6/MM3 (ref 4.37–5.74)
SODIUM BLD-SCNC: 140 MMOL/L (ref 137–145)
TRIGL SERPL-MCNC: 177 MG/DL (ref 20–199)
WBC NRBC COR # BLD: 9.31 10*3/MM3 (ref 3.2–9.8)

## 2018-12-26 PROCEDURE — 80061 LIPID PANEL: CPT | Performed by: FAMILY MEDICINE

## 2018-12-26 PROCEDURE — 36415 COLL VENOUS BLD VENIPUNCTURE: CPT | Performed by: FAMILY MEDICINE

## 2018-12-26 PROCEDURE — 85025 COMPLETE CBC W/AUTO DIFF WBC: CPT | Performed by: FAMILY MEDICINE

## 2018-12-26 PROCEDURE — 99396 PREV VISIT EST AGE 40-64: CPT | Performed by: FAMILY MEDICINE

## 2018-12-26 PROCEDURE — 80053 COMPREHEN METABOLIC PANEL: CPT | Performed by: FAMILY MEDICINE

## 2018-12-26 PROCEDURE — 83036 HEMOGLOBIN GLYCOSYLATED A1C: CPT | Performed by: FAMILY MEDICINE
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
## 2018-12-26 NOTE — PROGRESS NOTES
I have seen the patient.  I have reviewed the notes, assessments, and/or procedures performed by Dr. Long, I concur with her/his documentation and assessment and plan for Zak Lutz.          This document has been electronically signed by Nolvia Doran MD on December 26, 2018 3:57 PM

## 2019-01-01 RX ORDER — FLUTICASONE PROPIONATE 50 MCG
SPRAY, SUSPENSION (ML) NASAL
Qty: 16 ML | Refills: 2 | Status: SHIPPED | OUTPATIENT
Start: 2019-01-01 | End: 2019-04-08 | Stop reason: SDUPTHER

## 2019-01-08 DIAGNOSIS — R52 PAIN: ICD-10-CM

## 2019-01-08 RX ORDER — MELOXICAM 15 MG/1
TABLET ORAL
Qty: 30 TABLET | Refills: 0 | OUTPATIENT
Start: 2019-01-08

## 2019-02-05 ENCOUNTER — TELEPHONE (OUTPATIENT)
Dept: ORTHOPEDIC SURGERY | Facility: CLINIC | Age: 59
End: 2019-02-05

## 2019-02-05 DIAGNOSIS — K21.9 GASTROESOPHAGEAL REFLUX DISEASE WITHOUT ESOPHAGITIS: ICD-10-CM

## 2019-02-05 RX ORDER — RANITIDINE 150 MG/1
TABLET ORAL
Qty: 30 TABLET | Refills: 2 | Status: SHIPPED | OUTPATIENT
Start: 2019-02-05 | End: 2019-05-08 | Stop reason: SDUPTHER

## 2019-02-05 NOTE — TELEPHONE ENCOUNTER
PATIENT CALLED NEEDING A STATEMENT FOR PACS TRANSPORTATION THAT THE PATIENT HAS AN APPOINTMENT Wednesday, 02/20/19, IN [place] WITH DR CRUZ AT THE [facility] FOR AN INJECTION.  SO THEY WILL PROVIDE TRANSPORTATION.    PACS [phone]

## 2019-03-16 DIAGNOSIS — M54.41 ACUTE RIGHT-SIDED LOW BACK PAIN WITH RIGHT-SIDED SCIATICA: ICD-10-CM

## 2019-03-16 RX ORDER — GABAPENTIN 300 MG/1
CAPSULE ORAL
Qty: 90 CAPSULE | Refills: 2 | Status: CANCELLED | OUTPATIENT
Start: 2019-03-16

## 2019-03-18 ENCOUNTER — OFFICE VISIT (OUTPATIENT)
Dept: FAMILY MEDICINE CLINIC | Facility: CLINIC | Age: 59
End: 2019-03-18

## 2019-03-18 ENCOUNTER — APPOINTMENT (OUTPATIENT)
Dept: LAB | Facility: HOSPITAL | Age: 59
End: 2019-03-18

## 2019-03-18 VITALS
DIASTOLIC BLOOD PRESSURE: 80 MMHG | WEIGHT: 174.8 LBS | HEIGHT: 73 IN | SYSTOLIC BLOOD PRESSURE: 130 MMHG | HEART RATE: 105 BPM | OXYGEN SATURATION: 96 % | BODY MASS INDEX: 23.17 KG/M2

## 2019-03-18 DIAGNOSIS — F17.200 TOBACCO DEPENDENCE SYNDROME: ICD-10-CM

## 2019-03-18 DIAGNOSIS — J30.2 SEASONAL ALLERGIES: ICD-10-CM

## 2019-03-18 DIAGNOSIS — M54.41 ACUTE RIGHT-SIDED LOW BACK PAIN WITH RIGHT-SIDED SCIATICA: ICD-10-CM

## 2019-03-18 DIAGNOSIS — Z51.81 THERAPEUTIC DRUG MONITORING: Primary | ICD-10-CM

## 2019-03-18 DIAGNOSIS — R13.10 DYSPHAGIA, UNSPECIFIED TYPE: ICD-10-CM

## 2019-03-18 PROCEDURE — G0481 DRUG TEST DEF 8-14 CLASSES: HCPCS | Performed by: FAMILY MEDICINE

## 2019-03-18 PROCEDURE — 80307 DRUG TEST PRSMV CHEM ANLYZR: CPT | Performed by: FAMILY MEDICINE

## 2019-03-18 PROCEDURE — 99406 BEHAV CHNG SMOKING 3-10 MIN: CPT | Performed by: FAMILY MEDICINE

## 2019-03-18 PROCEDURE — 99213 OFFICE O/P EST LOW 20 MIN: CPT | Performed by: FAMILY MEDICINE

## 2019-03-18 RX ORDER — ALBUTEROL SULFATE 90 UG/1
AEROSOL, METERED RESPIRATORY (INHALATION)
Qty: 18 INHALER | Refills: 2 | Status: SHIPPED | OUTPATIENT
Start: 2019-03-18 | End: 2020-03-17 | Stop reason: SDUPTHER

## 2019-03-18 RX ORDER — NABUMETONE 750 MG/1
TABLET, FILM COATED ORAL
Qty: 60 TABLET | Refills: 2 | Status: SHIPPED | OUTPATIENT
Start: 2019-03-18 | End: 2019-05-16

## 2019-03-18 RX ORDER — GABAPENTIN 300 MG/1
300 CAPSULE ORAL 2 TIMES DAILY
Qty: 60 CAPSULE | Refills: 2 | Status: SHIPPED | OUTPATIENT
Start: 2019-03-18 | End: 2019-06-17 | Stop reason: SDUPTHER

## 2019-03-18 RX ORDER — CETIRIZINE HYDROCHLORIDE 10 MG/1
10 TABLET ORAL DAILY
Qty: 30 TABLET | Refills: 2 | Status: SHIPPED | OUTPATIENT
Start: 2019-03-18 | End: 2019-06-11 | Stop reason: SDUPTHER

## 2019-03-18 RX ORDER — NICOTINE 21 MG/24HR
1 PATCH, TRANSDERMAL 24 HOURS TRANSDERMAL EVERY 24 HOURS
Qty: 21 EACH | Refills: 2 | Status: SHIPPED | OUTPATIENT
Start: 2019-03-18 | End: 2019-06-17

## 2019-03-18 NOTE — PROGRESS NOTES
I have seen the patient.  I have reviewed the notes, assessments, and/or procedures performed by Noelle Long MD, I concur with her/his documentation and assessment and plan for Zak Lutz.               This document has been electronically signed by Dhiraj Watkins MD on March 18, 2019 3:23 PM
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
## 2019-03-23 LAB — CONV REPORT SUMMARY: NORMAL

## 2019-04-08 RX ORDER — FLUTICASONE PROPIONATE 50 MCG
SPRAY, SUSPENSION (ML) NASAL
Qty: 16 ML | Refills: 2 | Status: SHIPPED | OUTPATIENT
Start: 2019-04-08 | End: 2019-06-17 | Stop reason: SDUPTHER

## 2019-04-15 DIAGNOSIS — Z96.652 STATUS POST LEFT KNEE REPLACEMENT: Primary | ICD-10-CM

## 2019-04-16 ENCOUNTER — OFFICE VISIT (OUTPATIENT)
Dept: GASTROENTEROLOGY | Facility: CLINIC | Age: 59
End: 2019-04-16

## 2019-04-16 ENCOUNTER — OFFICE VISIT (OUTPATIENT)
Dept: ORTHOPEDIC SURGERY | Facility: CLINIC | Age: 59
End: 2019-04-16

## 2019-04-16 VITALS — HEIGHT: 73 IN | WEIGHT: 173.7 LBS | BODY MASS INDEX: 23.02 KG/M2

## 2019-04-16 VITALS
WEIGHT: 174.4 LBS | HEIGHT: 73 IN | SYSTOLIC BLOOD PRESSURE: 120 MMHG | DIASTOLIC BLOOD PRESSURE: 82 MMHG | BODY MASS INDEX: 23.11 KG/M2 | HEART RATE: 84 BPM

## 2019-04-16 DIAGNOSIS — I10 ESSENTIAL HYPERTENSION: ICD-10-CM

## 2019-04-16 DIAGNOSIS — Z96.652 STATUS POST LEFT KNEE REPLACEMENT: Primary | ICD-10-CM

## 2019-04-16 DIAGNOSIS — J44.9 CHRONIC OBSTRUCTIVE PULMONARY DISEASE, UNSPECIFIED COPD TYPE (HCC): ICD-10-CM

## 2019-04-16 DIAGNOSIS — K21.9 GASTROESOPHAGEAL REFLUX DISEASE, ESOPHAGITIS PRESENCE NOT SPECIFIED: ICD-10-CM

## 2019-04-16 DIAGNOSIS — Z12.11 ENCOUNTER FOR SCREENING FOR MALIGNANT NEOPLASM OF COLON: ICD-10-CM

## 2019-04-16 DIAGNOSIS — R13.10 DYSPHAGIA, UNSPECIFIED TYPE: Primary | ICD-10-CM

## 2019-04-16 PROCEDURE — 99212 OFFICE O/P EST SF 10 MIN: CPT | Performed by: ORTHOPAEDIC SURGERY

## 2019-04-16 PROCEDURE — 99214 OFFICE O/P EST MOD 30 MIN: CPT | Performed by: NURSE PRACTITIONER

## 2019-04-16 RX ORDER — DEXTROSE AND SODIUM CHLORIDE 5; .45 G/100ML; G/100ML
30 INJECTION, SOLUTION INTRAVENOUS CONTINUOUS PRN
Status: CANCELLED | OUTPATIENT
Start: 2019-05-10

## 2019-04-16 NOTE — PROGRESS NOTES
"Zak Lutz is a 58 y.o. male returns for one year Left TKA follow up.  X-rays done today.     Chief Complaint   Patient presents with   • Left Knee - Follow-up       HISTORY OF PRESENT ILLNESS:  He had 2 bulging disks in his back and has had multiple injections and stopped doing exercises in regards to his knees.  Now he is returning to activity and his back is doing much better.  He complains of some mild stiffness in his knee but it has been very happy with his result.  No fevers or chills.  No numbness or tingling.     CONCURRENT MEDICAL HISTORY:    The following portions of the patient's history were reviewed and updated as appropriate: allergies, current medications, past family history, past medical history, past social history, past surgical history and problem list.     ROS  No fevers or chills.  No chest pain or shortness of air.  No GI or  disturbances.    PHYSICAL EXAMINATION:       Ht 185.4 cm (73\")   Wt 78.8 kg (173 lb 11.2 oz)   BMI 22.92 kg/m²     Physical Exam   Constitutional: He is oriented to person, place, and time. He appears well-developed and well-nourished.   Musculoskeletal:        Left knee: He exhibits no effusion.   Neurological: He is alert and oriented to person, place, and time.   Psychiatric: He has a normal mood and affect. His behavior is normal. Judgment and thought content normal.       GAIT:     [x]  Normal  []  Antalgic    Assistive device: [x]  None  []  Walker     []  Crutches  []  Cane     []  Wheelchair  []  Stretcher    Left Knee Exam     Muscle Strength   The patient has normal left knee strength.    Tenderness   The patient is experiencing no tenderness.     Range of Motion   Extension: 0   Flexion: 120     Tests   Varus: negative Valgus: negative  Drawer:  Anterior - negative         Other   Erythema: absent  Scars: present  Sensation: normal  Pulse: present  Swelling: none  Effusion: no effusion present              Xr Knee 1 Or 2 View Left    Result Date: " 4/16/2019  Narrative: Ordering Provider:  Jeramie Rai MD Ordering Diagnosis/Indication:  Status post left knee replacement Procedure:  XR KNEE 1 OR 2 VW LEFT Exam Date:  4/16/19 COMPARISON:  Todays X-rays were compared to previous images dated August 9, 2018.     Impression:  AP bilateral standing of the knees with lateral of the left knee shows acceptable position and alignment of a left total knee arthroplasty.  Appropriate sizing is noted on both views.  No indication of loosening or failure is noted.  There is a series of metallic loose bodies noted in the proximal aspect of the thigh unchanged from prior x-ray.  The right knee shows mild medial joint space narrowing and evidence of a remote proximal fibula fracture with complete bony consolidation.  No interval change from prior x-ray. Jeramie Rai MD 4/16/19     Xr Knee Bilateral Ap Standing    Result Date: 4/16/2019  Narrative: Ordering Provider:  Jeramie Rai MD Ordering Diagnosis/Indication:  Status post left knee replacement Procedure:  XR KNEE BILATERAL AP STANDING Exam Date:  4/16/19 COMPARISON:  Todays X-rays were compared to previous images dated August 9, 2018.     Impression:  AP bilateral standing of the knees with lateral of the left knee shows acceptable position and alignment of a left total knee arthroplasty.  Appropriate sizing is noted on both views.  No indication of loosening or failure is noted.  There is a series of metallic loose bodies noted in the proximal aspect of the thigh unchanged from prior x-ray.  The right knee shows mild medial joint space narrowing and evidence of a remote proximal fibula fracture with complete bony consolidation.  No interval change from prior x-ray. Jeramie Rai MD 4/16/19             ASSESSMENT:    Diagnoses and all orders for this visit:    Status post left knee replacement    Essential hypertension    Chronic obstructive pulmonary disease, unspecified COPD type  (CMS/[facility])          PLAN    Continue with strengthening and conditioning exercises.  Activity as tolerated.  No restrictions.  We discussed following up on an as-needed basis and repeat x-rays if necessary.    Return if symptoms worsen or fail to improve, for recheck.    Jeramie Rai MD

## 2019-04-16 NOTE — PATIENT INSTRUCTIONS
Dysphagia  Dysphagia is trouble swallowing. This condition occurs when solids and liquids stick in a person's throat on the way down to the stomach, or when food takes longer to get to the stomach. You may have problems swallowing food, liquids, or both. You may also have pain while trying to swallow. It may take you more time and effort to swallow something.  What are the causes?  This condition is caused by:  · Problems with the muscles. They may make it difficult for you to move food and liquids through the tube that connects your mouth to your stomach (esophagus). You may have ulcers, scar tissue, or inflammation that blocks the normal passage of food and liquids. Causes of these problems include:  ? Acid reflux from your stomach into your esophagus (gastroesophageal reflux).  ? Infections.  ? Radiation treatment for cancer.  ? Medicines taken without enough fluids to wash them down into your stomach.  · Nerve problems. These prevent signals from being sent to the muscles of your esophagus to squeeze (contract) and move what you swallow down to your stomach.  · Globus pharyngeus. This is a common problem that involves feeling like something is stuck in the throat or a sense of trouble with swallowing even though nothing is wrong with the swallowing passages.  · Stroke. This can affect the nerves and make it difficult to swallow.  · Certain conditions, such as cerebral palsy or Parkinson disease.    What are the signs or symptoms?  Common symptoms of this condition include:  · A feeling that solids or liquids are stuck in your throat on the way down to the stomach.  · Food taking too long to get to the stomach.    Other symptoms include:  · Food moving back from your stomach to your mouth (regurgitation).  · Noises coming from your throat.  · Chest discomfort with swallowing.  · A feeling of fullness when swallowing.  · Drooling, especially when the throat is blocked.  · Pain while  swallowing.  · Heartburn.  · Coughing or gagging while trying to swallow.    How is this diagnosed?  This condition is diagnosed by:  · Barium X-ray. In this test, you swallow a white substance (contrast medium)that sticks to the inside of your esophagus. X-ray images are then taken.  · Endoscopy. In this test, a flexible telescope is inserted down your throat to look at your esophagus and your stomach.  · CT scans and MRI.    How is this treated?  Treatment for dysphagia depends on the cause of the condition:  · If the dysphagia is caused by acid reflux or infection, medicines may be used. They may include antibiotics and heartburn medicines.  · If the dysphagia is caused by problems with your muscles, swallowing therapy may be used to help you strengthen your swallowing muscles. You may have to do specific exercises to strengthen the muscles or stretch them.  · If the dysphagia is caused by a blockage or mass, procedures to remove the blockage may be done. You may need surgery and a feeding tube.    You may need to make diet changes. Ask your health care provider for specific instructions.  Follow these instructions at home:  Eating and drinking  · Try to eat soft food that is easier to swallow.  · Follow any diet changes as told by your health care provider.  · Cut your food into small pieces and eat slowly.  · Eat and drink only when you are sitting upright.  · Do not drink alcohol or caffeine. If you need help quitting, ask your health care provider.  General instructions  · Check your weight every day to make sure you are not losing weight.  · Take over-the-counter and prescription medicines only as told by your health care provider.  · If you were prescribed an antibiotic medicine, take it as told by your health care provider. Do not stop taking the antibiotic even if you start to feel better.  · Do not use any products that contain nicotine or tobacco, such as cigarettes and e-cigarettes. If you need help  quitting, ask your health care provider.  · Keep all follow-up visits as told by your health care provider. This is important.  Contact a health care provider if:  · You lose weight because you cannot swallow.  · You cough when you drink liquids (aspiration).  · You cough up partially digested food.  Get help right away if:  · You cannot swallow your saliva.  · You have shortness of breath or a fever, or both.  · You have a hoarse voice and also have trouble swallowing.  Summary  · Dysphagia is trouble swallowing. This condition occurs when solids and liquids stick in a person's throat on the way down to the stomach, or when food takes longer to get to the stomach.  · Dysphagia has many possible causes and symptoms.  · Treatment for dysphagia depends on the cause of the condition.  This information is not intended to replace advice given to you by your health care provider. Make sure you discuss any questions you have with your health care provider.  Document Released: 12/15/2001 Document Revised: 12/07/2017 Document Reviewed: 12/07/2017  [employer] Patient Education © 2019 [employer]
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
## 2019-04-17 PROBLEM — Z12.11 ENCOUNTER FOR SCREENING FOR MALIGNANT NEOPLASM OF COLON: Status: ACTIVE | Noted: 2019-04-17

## 2019-04-17 PROBLEM — R13.10 DYSPHAGIA: Status: ACTIVE | Noted: 2019-04-17

## 2019-05-08 DIAGNOSIS — K21.9 GASTROESOPHAGEAL REFLUX DISEASE WITHOUT ESOPHAGITIS: ICD-10-CM

## 2019-05-08 RX ORDER — RANITIDINE 150 MG/1
TABLET ORAL
Qty: 30 TABLET | Refills: 2 | Status: SHIPPED | OUTPATIENT
Start: 2019-05-08 | End: 2019-06-17

## 2019-05-10 ENCOUNTER — ANESTHESIA (OUTPATIENT)
Dept: GASTROENTEROLOGY | Facility: HOSPITAL | Age: 59
End: 2019-05-10

## 2019-05-10 ENCOUNTER — ANESTHESIA EVENT (OUTPATIENT)
Dept: GASTROENTEROLOGY | Facility: HOSPITAL | Age: 59
End: 2019-05-10

## 2019-05-10 ENCOUNTER — HOSPITAL ENCOUNTER (OUTPATIENT)
Facility: HOSPITAL | Age: 59
Setting detail: HOSPITAL OUTPATIENT SURGERY
Discharge: HOME | End: 2019-05-10
Attending: INTERNAL MEDICINE | Admitting: INTERNAL MEDICINE

## 2019-05-10 VITALS
SYSTOLIC BLOOD PRESSURE: 115 MMHG | BODY MASS INDEX: 22.91 KG/M2 | OXYGEN SATURATION: 99 % | DIASTOLIC BLOOD PRESSURE: 71 MMHG | WEIGHT: 172.84 LBS | RESPIRATION RATE: 18 BRPM | HEART RATE: 74 BPM | HEIGHT: 73 IN | TEMPERATURE: 97.7 F

## 2019-05-10 DIAGNOSIS — K21.9 GASTROESOPHAGEAL REFLUX DISEASE, ESOPHAGITIS PRESENCE NOT SPECIFIED: ICD-10-CM

## 2019-05-10 DIAGNOSIS — Z12.11 ENCOUNTER FOR SCREENING FOR MALIGNANT NEOPLASM OF COLON: ICD-10-CM

## 2019-05-10 DIAGNOSIS — R13.10 DYSPHAGIA, UNSPECIFIED TYPE: ICD-10-CM

## 2019-05-10 PROCEDURE — 43239 EGD BIOPSY SINGLE/MULTIPLE: CPT | Performed by: INTERNAL MEDICINE

## 2019-05-10 PROCEDURE — 25010000002 PROPOFOL 10 MG/ML EMULSION: Performed by: NURSE ANESTHETIST, CERTIFIED REGISTERED

## 2019-05-10 PROCEDURE — 45378 DIAGNOSTIC COLONOSCOPY: CPT | Performed by: INTERNAL MEDICINE

## 2019-05-10 PROCEDURE — 88305 TISSUE EXAM BY PATHOLOGIST: CPT | Performed by: INTERNAL MEDICINE

## 2019-05-10 PROCEDURE — 88305 TISSUE EXAM BY PATHOLOGIST: CPT | Performed by: PATHOLOGY

## 2019-05-10 RX ORDER — DEXTROSE AND SODIUM CHLORIDE 5; .45 G/100ML; G/100ML
30 INJECTION, SOLUTION INTRAVENOUS CONTINUOUS PRN
Status: DISCONTINUED | OUTPATIENT
Start: 2019-05-10 | End: 2019-05-10 | Stop reason: HOSPADM

## 2019-05-10 RX ORDER — PROPOFOL 10 MG/ML
VIAL (ML) INTRAVENOUS AS NEEDED
Status: DISCONTINUED | OUTPATIENT
Start: 2019-05-10 | End: 2019-05-10 | Stop reason: SURG

## 2019-05-10 RX ORDER — LIDOCAINE HYDROCHLORIDE 10 MG/ML
INJECTION, SOLUTION INFILTRATION; PERINEURAL AS NEEDED
Status: DISCONTINUED | OUTPATIENT
Start: 2019-05-10 | End: 2019-05-10 | Stop reason: SURG

## 2019-05-10 RX ADMIN — PROPOFOL 30 MG: 10 INJECTION, EMULSION INTRAVENOUS at 12:30

## 2019-05-10 RX ADMIN — PROPOFOL 30 MG: 10 INJECTION, EMULSION INTRAVENOUS at 12:40

## 2019-05-10 RX ADMIN — LIDOCAINE HYDROCHLORIDE 60 MG: 10 INJECTION, SOLUTION INFILTRATION; PERINEURAL at 12:27

## 2019-05-10 RX ADMIN — PROPOFOL 50 MG: 10 INJECTION, EMULSION INTRAVENOUS at 12:35

## 2019-05-10 RX ADMIN — DEXTROSE AND SODIUM CHLORIDE 30 ML/HR: 5; 450 INJECTION, SOLUTION INTRAVENOUS at 11:46

## 2019-05-10 RX ADMIN — PROPOFOL 70 MG: 10 INJECTION, EMULSION INTRAVENOUS at 12:27

## 2019-05-10 NOTE — ANESTHESIA PREPROCEDURE EVALUATION
Anesthesia Evaluation     Patient summary reviewed and Nursing notes reviewed   NPO Solid Status: > 8 hours  NPO Liquid Status: > 8 hours           Airway   No difficulty expected  Dental      Pulmonary - normal exam   (+) COPD moderate, asthma,   Cardiovascular - normal exam    (+) hypertension less than 2 medications, hyperlipidemia,       Neuro/Psych  GI/Hepatic/Renal/Endo    (+)  GERD,      Musculoskeletal     (+) neck pain,   Abdominal  - normal exam   Substance History      OB/GYN          Other   (+) arthritis                     Anesthesia Plan    ASA 2     MAC     intravenous induction   Anesthetic plan, all risks, benefits, and alternatives have been provided, discussed and informed consent has been obtained with: patient.    Plan discussed with CRNA.

## 2019-05-10 NOTE — ANESTHESIA POSTPROCEDURE EVALUATION
Patient: Zak Lutz    Procedure Summary     Date:  05/10/19 Room / Location:  [facility] ENDOSCOPY 3 / [facility] ENDOSCOPY    Anesthesia Start:  1226 Anesthesia Stop:  1242    Procedures:       ESOPHAGOGASTRODUODENOSCOPY (N/A )      COLONOSCOPY (N/A ) Diagnosis:       Dysphagia, unspecified type      Encounter for screening for malignant neoplasm of colon      Gastroesophageal reflux disease, esophagitis presence not specified      (Dysphagia, unspecified type [R13.10])      (Encounter for screening for malignant neoplasm of colon [Z12.11])      (Gastroesophageal reflux disease, esophagitis presence not specified [K21.9])    Surgeon:  Chidi Alex MD Provider:  Miah Morataya CRNA    Anesthesia Type:  MAC ASA Status:  2          Anesthesia Type: MAC  Last vitals  BP   144/91 (05/10/19 1131)   Temp   97.3 °F (36.3 °C) (05/10/19 1131)   Pulse   76 (05/10/19 1131)   Resp   18 (05/10/19 1131)     SpO2   98 % (05/10/19 1131)     Post Anesthesia Care and Evaluation    Patient location during evaluation: bedside  Patient participation: complete - patient participated  Level of consciousness: awake and awake and alert  Pain score: 0  Pain management: satisfactory to patient  Airway patency: patent  Anesthetic complications: No anesthetic complications  PONV Status: none  Cardiovascular status: acceptable and stable  Respiratory status: acceptable, room air and spontaneous ventilation  Hydration status: acceptable

## 2019-05-12 DIAGNOSIS — G89.29 CHRONIC PAIN OF LEFT KNEE: ICD-10-CM

## 2019-05-12 DIAGNOSIS — M25.562 CHRONIC PAIN OF LEFT KNEE: ICD-10-CM

## 2019-05-13 LAB
LAB AP CASE REPORT: NORMAL
PATH REPORT.FINAL DX SPEC: NORMAL
PATH REPORT.GROSS SPEC: NORMAL

## 2019-05-13 RX ORDER — MELOXICAM 15 MG/1
TABLET ORAL
Qty: 30 TABLET | Refills: 1 | OUTPATIENT
Start: 2019-05-13

## 2019-05-13 NOTE — TELEPHONE ENCOUNTER
Requested Medications      Name from pharmacy: MELOXICAM 15 MG TABLET         Will file in chart as: meloxicam (MOBIC) 15 MG tablet    The source prescription was discontinued on 4/16/2019 by Jayla Stewart for the following reason: *Therapy completed.    Sig: TAKE 1 TABLET BY MOUTH EVERY DAY    Disp:  30 tablet    Refills:  1    Start: 5/12/2019    Class: Normal    For: Chronic pain of left knee    Requested on: 9/19/2018    Last ordered: 7 months ago by Noelle Long MD Last refill: 11/9/2018    Rx #: [id number]       To be filled at: [facility]/pharmacy #[id number] - [place], [place] - [address] [phone] [facility] [phone] FX

## 2019-05-14 RX ORDER — AMLODIPINE BESYLATE 5 MG/1
TABLET ORAL
Qty: 30 TABLET | Refills: 2 | Status: SHIPPED | OUTPATIENT
Start: 2019-05-14 | End: 2019-08-09 | Stop reason: SDUPTHER

## 2019-05-16 ENCOUNTER — OFFICE VISIT (OUTPATIENT)
Dept: GASTROENTEROLOGY | Facility: CLINIC | Age: 59
End: 2019-05-16

## 2019-05-16 VITALS
WEIGHT: 171.8 LBS | HEART RATE: 82 BPM | BODY MASS INDEX: 22.77 KG/M2 | SYSTOLIC BLOOD PRESSURE: 172 MMHG | DIASTOLIC BLOOD PRESSURE: 82 MMHG | HEIGHT: 73 IN

## 2019-05-16 DIAGNOSIS — B96.81 HELICOBACTER PYLORI GASTRITIS: Primary | ICD-10-CM

## 2019-05-16 DIAGNOSIS — K29.70 HELICOBACTER PYLORI GASTRITIS: Primary | ICD-10-CM

## 2019-05-16 DIAGNOSIS — K57.30 DIVERTICULOSIS OF LARGE INTESTINE WITHOUT HEMORRHAGE: ICD-10-CM

## 2019-05-16 DIAGNOSIS — K21.00 GASTROESOPHAGEAL REFLUX DISEASE WITH ESOPHAGITIS: ICD-10-CM

## 2019-05-16 PROCEDURE — 99214 OFFICE O/P EST MOD 30 MIN: CPT | Performed by: NURSE PRACTITIONER

## 2019-05-16 RX ORDER — CLARITHROMYCIN 500 MG/1
500 TABLET, COATED ORAL 2 TIMES DAILY
Qty: 28 TABLET | Refills: 0 | Status: SHIPPED | OUTPATIENT
Start: 2019-05-16 | End: 2019-06-17

## 2019-05-16 RX ORDER — AMOXICILLIN 500 MG/1
1000 CAPSULE ORAL 2 TIMES DAILY
Qty: 56 CAPSULE | Refills: 0 | Status: SHIPPED | OUTPATIENT
Start: 2019-05-16 | End: 2019-06-17

## 2019-05-16 RX ORDER — ESOMEPRAZOLE MAGNESIUM 40 MG/1
40 CAPSULE, DELAYED RELEASE ORAL
Qty: 14 CAPSULE | Refills: 0 | Status: SHIPPED | OUTPATIENT
Start: 2019-05-16 | End: 2019-07-11
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
## 2019-05-16 NOTE — PATIENT INSTRUCTIONS
Dysphagia  Dysphagia is trouble swallowing. This condition occurs when solids and liquids stick in a person's throat on the way down to the stomach, or when food takes longer to get to the stomach. You may have problems swallowing food, liquids, or both. You may also have pain while trying to swallow. It may take you more time and effort to swallow something.  What are the causes?  This condition is caused by:  · Problems with the muscles. They may make it difficult for you to move food and liquids through the tube that connects your mouth to your stomach (esophagus). You may have ulcers, scar tissue, or inflammation that blocks the normal passage of food and liquids. Causes of these problems include:  ? Acid reflux from your stomach into your esophagus (gastroesophageal reflux).  ? Infections.  ? Radiation treatment for cancer.  ? Medicines taken without enough fluids to wash them down into your stomach.  · Nerve problems. These prevent signals from being sent to the muscles of your esophagus to squeeze (contract) and move what you swallow down to your stomach.  · Globus pharyngeus. This is a common problem that involves feeling like something is stuck in the throat or a sense of trouble with swallowing even though nothing is wrong with the swallowing passages.  · Stroke. This can affect the nerves and make it difficult to swallow.  · Certain conditions, such as cerebral palsy or Parkinson disease.    What are the signs or symptoms?  Common symptoms of this condition include:  · A feeling that solids or liquids are stuck in your throat on the way down to the stomach.  · Food taking too long to get to the stomach.    Other symptoms include:  · Food moving back from your stomach to your mouth (regurgitation).  · Noises coming from your throat.  · Chest discomfort with swallowing.  · A feeling of fullness when swallowing.  · Drooling, especially when the throat is blocked.  · Pain while  swallowing.  · Heartburn.  · Coughing or gagging while trying to swallow.    How is this diagnosed?  This condition is diagnosed by:  · Barium X-ray. In this test, you swallow a white substance (contrast medium)that sticks to the inside of your esophagus. X-ray images are then taken.  · Endoscopy. In this test, a flexible telescope is inserted down your throat to look at your esophagus and your stomach.  · CT scans and MRI.    How is this treated?  Treatment for dysphagia depends on the cause of the condition:  · If the dysphagia is caused by acid reflux or infection, medicines may be used. They may include antibiotics and heartburn medicines.  · If the dysphagia is caused by problems with your muscles, swallowing therapy may be used to help you strengthen your swallowing muscles. You may have to do specific exercises to strengthen the muscles or stretch them.  · If the dysphagia is caused by a blockage or mass, procedures to remove the blockage may be done. You may need surgery and a feeding tube.    You may need to make diet changes. Ask your health care provider for specific instructions.  Follow these instructions at home:  Eating and drinking  · Try to eat soft food that is easier to swallow.  · Follow any diet changes as told by your health care provider.  · Cut your food into small pieces and eat slowly.  · Eat and drink only when you are sitting upright.  · Do not drink alcohol or caffeine. If you need help quitting, ask your health care provider.  General instructions  · Check your weight every day to make sure you are not losing weight.  · Take over-the-counter and prescription medicines only as told by your health care provider.  · If you were prescribed an antibiotic medicine, take it as told by your health care provider. Do not stop taking the antibiotic even if you start to feel better.  · Do not use any products that contain nicotine or tobacco, such as cigarettes and e-cigarettes. If you need help  quitting, ask your health care provider.  · Keep all follow-up visits as told by your health care provider. This is important.  Contact a health care provider if:  · You lose weight because you cannot swallow.  · You cough when you drink liquids (aspiration).  · You cough up partially digested food.  Get help right away if:  · You cannot swallow your saliva.  · You have shortness of breath or a fever, or both.  · You have a hoarse voice and also have trouble swallowing.  Summary  · Dysphagia is trouble swallowing. This condition occurs when solids and liquids stick in a person's throat on the way down to the stomach, or when food takes longer to get to the stomach.  · Dysphagia has many possible causes and symptoms.  · Treatment for dysphagia depends on the cause of the condition.  This information is not intended to replace advice given to you by your health care provider. Make sure you discuss any questions you have with your health care provider.  Document Released: 12/15/2001 Document Revised: 12/07/2017 Document Reviewed: 12/07/2017  [employer] Interactive Patient Education © 2019 [employer]

## 2019-06-03 PROBLEM — M51.36 DEGENERATION OF LUMBAR INTERVERTEBRAL DISC: Status: ACTIVE | Noted: 2019-02-04

## 2019-06-03 PROBLEM — M47.816 LUMBAR SPONDYLOSIS: Status: ACTIVE | Noted: 2019-06-03

## 2019-06-11 DIAGNOSIS — J30.2 SEASONAL ALLERGIES: ICD-10-CM

## 2019-06-11 RX ORDER — CETIRIZINE HYDROCHLORIDE 10 MG/1
TABLET ORAL
Qty: 30 TABLET | Refills: 2 | Status: SHIPPED | OUTPATIENT
Start: 2019-06-11 | End: 2019-09-06 | Stop reason: SDUPTHER

## 2019-06-17 ENCOUNTER — OFFICE VISIT (OUTPATIENT)
Dept: FAMILY MEDICINE CLINIC | Facility: CLINIC | Age: 59
End: 2019-06-17

## 2019-06-17 ENCOUNTER — TELEPHONE (OUTPATIENT)
Dept: ORTHOPEDIC SURGERY | Facility: CLINIC | Age: 59
End: 2019-06-17

## 2019-06-17 VITALS
SYSTOLIC BLOOD PRESSURE: 134 MMHG | HEIGHT: 73 IN | BODY MASS INDEX: 22.2 KG/M2 | DIASTOLIC BLOOD PRESSURE: 72 MMHG | HEART RATE: 102 BPM | WEIGHT: 167.5 LBS | OXYGEN SATURATION: 98 %

## 2019-06-17 DIAGNOSIS — J30.2 SEASONAL ALLERGIC RHINITIS, UNSPECIFIED TRIGGER: ICD-10-CM

## 2019-06-17 DIAGNOSIS — F17.200 TOBACCO DEPENDENCE SYNDROME: ICD-10-CM

## 2019-06-17 DIAGNOSIS — M54.41 ACUTE RIGHT-SIDED LOW BACK PAIN WITH RIGHT-SIDED SCIATICA: Primary | ICD-10-CM

## 2019-06-17 DIAGNOSIS — B35.3 TINEA PEDIS OF BOTH FEET: ICD-10-CM

## 2019-06-17 PROCEDURE — 99213 OFFICE O/P EST LOW 20 MIN: CPT | Performed by: FAMILY MEDICINE

## 2019-06-17 PROCEDURE — 99406 BEHAV CHNG SMOKING 3-10 MIN: CPT | Performed by: FAMILY MEDICINE

## 2019-06-17 RX ORDER — GABAPENTIN 300 MG/1
300 CAPSULE ORAL DAILY
Qty: 30 CAPSULE | Refills: 0 | Status: SHIPPED | OUTPATIENT
Start: 2019-06-17 | End: 2019-07-29 | Stop reason: SDUPTHER

## 2019-06-17 RX ORDER — FLUTICASONE PROPIONATE 50 MCG
2 SPRAY, SUSPENSION (ML) NASAL DAILY
Qty: 16 ML | Refills: 2 | Status: SHIPPED | OUTPATIENT
Start: 2019-06-17 | End: 2020-01-17

## 2019-06-17 RX ORDER — NICOTINE 21 MG/24HR
1 PATCH, TRANSDERMAL 24 HOURS TRANSDERMAL EVERY 24 HOURS
Qty: 28 EACH | Refills: 3 | Status: SHIPPED | OUTPATIENT
Start: 2019-06-17 | End: 2019-12-30
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
## 2019-07-11 ENCOUNTER — OFFICE VISIT (OUTPATIENT)
Dept: GASTROENTEROLOGY | Facility: CLINIC | Age: 59
End: 2019-07-11

## 2019-07-11 VITALS
WEIGHT: 167.8 LBS | HEIGHT: 73 IN | HEART RATE: 86 BPM | BODY MASS INDEX: 22.24 KG/M2 | SYSTOLIC BLOOD PRESSURE: 142 MMHG | DIASTOLIC BLOOD PRESSURE: 82 MMHG

## 2019-07-11 DIAGNOSIS — K21.00 GASTROESOPHAGEAL REFLUX DISEASE WITH ESOPHAGITIS: Primary | ICD-10-CM

## 2019-07-11 PROCEDURE — 99213 OFFICE O/P EST LOW 20 MIN: CPT | Performed by: NURSE PRACTITIONER

## 2019-07-11 RX ORDER — RANITIDINE 150 MG/1
150 TABLET ORAL 2 TIMES DAILY
COMMUNITY
End: 2019-08-08 | Stop reason: SDUPTHER
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
## 2019-07-11 NOTE — PATIENT INSTRUCTIONS
Heartburn  Heartburn is a type of pain or discomfort that can happen in the throat or chest. It is often described as a burning pain. It may also cause a bad taste in the mouth. Heartburn may feel worse when you lie down or bend over. It may be caused by stomach contents that move back up (reflux) into the tube that connects the mouth with the stomach (esophagus).  Follow these instructions at home:  Take these actions to lessen your discomfort and to help avoid problems.  Diet  · Follow a diet as told by your doctor. You may need to avoid foods and drinks such as:  ? Coffee and tea (with or without caffeine).  ? Drinks that contain alcohol.  ? Energy drinks and sports drinks.  ? Carbonated drinks or sodas.  ? Chocolate and cocoa.  ? Peppermint and mint flavorings.  ? Garlic and onions.  ? Horseradish.  ? Spicy and acidic foods, such as peppers, chili powder, lee powder, vinegar, hot sauces, and BBQ sauce.  ? Citrus fruit juices and citrus fruits, such as oranges, bee, and limes.  ? Tomato-based foods, such as red sauce, chili, salsa, and pizza with red sauce.  ? Fried and fatty foods, such as donuts, french fries, potato chips, and high-fat dressings.  ? High-fat meats, such as hot dogs, rib eye steak, sausage, ham, and villegas.  ? High-fat dairy items, such as whole milk, butter, and cream cheese.  · Eat small meals often. Avoid eating large meals.  · Avoid drinking large amounts of liquid with your meals.  · Avoid eating meals during the 2-3 hours before bedtime.  · Avoid lying down right after you eat.  · Do not exercise right after you eat.  General instructions  · Pay attention to any changes in your symptoms.  · Take over-the-counter and prescription medicines only as told by your doctor. Do not take aspirin, ibuprofen, or other NSAIDs unless your doctor says it is okay.  · Do not use any tobacco products, including cigarettes, chewing tobacco, and e-cigarettes. If you need help quitting, ask your  doctor.  · Wear loose clothes. Do not wear anything tight around your waist.  · Raise (elevate) the head of your bed about 6 inches (15 cm).  · Try to lower your stress. If you need help doing this, ask your doctor.  · If you are overweight, lose an amount of weight that is healthy for you. Ask your doctor about a safe weight loss goal.  · Keep all follow-up visits as told by your doctor. This is important.  Contact a doctor if:  · You have new symptoms.  · You lose weight and you do not know why it is happening.  · You have trouble swallowing, or it hurts to swallow.  · You have wheezing or a cough that keeps happening.  · Your symptoms do not get better with treatment.  · You have heartburn often for more than two weeks.  Get help right away if:  · You have pain in your arms, neck, jaw, teeth, or back.  · You feel sweaty, dizzy, or light-headed.  · You have chest pain or shortness of breath.  · You throw up (vomit) and your throw up looks like blood or coffee grounds.  · Your poop (stool) is bloody or black.  This information is not intended to replace advice given to you by your health care provider. Make sure you discuss any questions you have with your health care provider.  Document Released: 08/29/2012 Document Revised: 05/25/2017 Document Reviewed: 04/13/2016  [employer] Interactive Patient Education © 2019 [employer]

## 2019-07-28 DIAGNOSIS — M54.41 ACUTE RIGHT-SIDED LOW BACK PAIN WITH RIGHT-SIDED SCIATICA: ICD-10-CM

## 2019-07-28 RX ORDER — GABAPENTIN 300 MG/1
300 CAPSULE ORAL DAILY
Qty: 30 CAPSULE | Refills: 0 | Status: CANCELLED | OUTPATIENT
Start: 2019-07-28

## 2019-07-29 DIAGNOSIS — M54.41 ACUTE RIGHT-SIDED LOW BACK PAIN WITH RIGHT-SIDED SCIATICA: ICD-10-CM

## 2019-07-30 DIAGNOSIS — M54.41 ACUTE RIGHT-SIDED LOW BACK PAIN WITH RIGHT-SIDED SCIATICA: ICD-10-CM

## 2019-07-30 RX ORDER — GABAPENTIN 300 MG/1
300 CAPSULE ORAL DAILY
Qty: 30 CAPSULE | Refills: 0 | OUTPATIENT
Start: 2019-07-30

## 2019-07-31 RX ORDER — GABAPENTIN 300 MG/1
300 CAPSULE ORAL DAILY
Qty: 30 CAPSULE | Refills: 0 | Status: SHIPPED | OUTPATIENT
Start: 2019-07-31 | End: 2019-08-14 | Stop reason: SDUPTHER

## 2019-08-08 DIAGNOSIS — K21.9 GASTROESOPHAGEAL REFLUX DISEASE WITHOUT ESOPHAGITIS: ICD-10-CM

## 2019-08-08 RX ORDER — RANITIDINE 150 MG/1
TABLET ORAL
Qty: 30 TABLET | Refills: 2 | Status: SHIPPED | OUTPATIENT
Start: 2019-08-08 | End: 2019-11-08 | Stop reason: SDUPTHER

## 2019-08-09 DIAGNOSIS — I10 HYPERTENSION, UNSPECIFIED TYPE: Primary | ICD-10-CM

## 2019-08-09 RX ORDER — AMLODIPINE BESYLATE 5 MG/1
TABLET ORAL
Qty: 30 TABLET | Refills: 2 | Status: SHIPPED | OUTPATIENT
Start: 2019-08-09 | End: 2020-01-30

## 2019-08-09 RX ORDER — AMLODIPINE BESYLATE 5 MG/1
TABLET ORAL
Qty: 30 TABLET | Refills: 2 | Status: CANCELLED | OUTPATIENT
Start: 2019-08-09

## 2019-08-14 DIAGNOSIS — M54.41 ACUTE RIGHT-SIDED LOW BACK PAIN WITH RIGHT-SIDED SCIATICA: ICD-10-CM

## 2019-08-14 RX ORDER — GABAPENTIN 300 MG/1
300 CAPSULE ORAL DAILY
Qty: 30 CAPSULE | Refills: 0 | Status: CANCELLED | OUTPATIENT
Start: 2019-08-14

## 2019-08-14 RX ORDER — GABAPENTIN 300 MG/1
300 CAPSULE ORAL DAILY
Qty: 30 CAPSULE | Refills: 2 | Status: SHIPPED | OUTPATIENT
Start: 2019-08-14 | End: 2019-08-14 | Stop reason: SDUPTHER

## 2019-08-14 RX ORDER — GABAPENTIN 300 MG/1
300 CAPSULE ORAL DAILY
Qty: 30 CAPSULE | Refills: 2 | Status: SHIPPED | OUTPATIENT
Start: 2019-08-14 | End: 2020-01-17

## 2019-09-06 DIAGNOSIS — J30.2 SEASONAL ALLERGIES: ICD-10-CM

## 2019-09-06 RX ORDER — CETIRIZINE HYDROCHLORIDE 10 MG/1
10 TABLET ORAL DAILY
Qty: 30 TABLET | Refills: 2 | Status: SHIPPED | OUTPATIENT
Start: 2019-09-06 | End: 2019-12-11 | Stop reason: SDUPTHER

## 2019-11-08 DIAGNOSIS — K21.9 GASTROESOPHAGEAL REFLUX DISEASE WITHOUT ESOPHAGITIS: ICD-10-CM

## 2019-11-08 RX ORDER — RANITIDINE 150 MG/1
150 TABLET ORAL
Qty: 30 TABLET | Refills: 2 | Status: SHIPPED | OUTPATIENT
Start: 2019-11-08 | End: 2020-05-18

## 2019-11-27 ENCOUNTER — OFFICE VISIT (OUTPATIENT)
Dept: FAMILY MEDICINE CLINIC | Facility: CLINIC | Age: 59
End: 2019-11-27

## 2019-11-27 VITALS
HEART RATE: 102 BPM | RESPIRATION RATE: 17 BRPM | DIASTOLIC BLOOD PRESSURE: 62 MMHG | BODY MASS INDEX: 22.85 KG/M2 | WEIGHT: 172.38 LBS | TEMPERATURE: 98.5 F | HEIGHT: 73 IN | SYSTOLIC BLOOD PRESSURE: 122 MMHG | OXYGEN SATURATION: 99 %

## 2019-11-27 DIAGNOSIS — R10.84 GENERALIZED ABDOMINAL PAIN: Primary | ICD-10-CM

## 2019-11-27 DIAGNOSIS — Z71.6 ENCOUNTER FOR SMOKING CESSATION COUNSELING: ICD-10-CM

## 2019-11-27 PROCEDURE — 99213 OFFICE O/P EST LOW 20 MIN: CPT | Performed by: STUDENT IN AN ORGANIZED HEALTH CARE EDUCATION/TRAINING PROGRAM

## 2019-11-27 RX ORDER — VARENICLINE TARTRATE 0.5 MG/1
0.5 TABLET, FILM COATED ORAL
Status: CANCELLED | OUTPATIENT
Start: 2019-11-27

## 2019-11-27 RX ORDER — VARENICLINE TARTRATE 1 MG/1
1 TABLET, FILM COATED ORAL 2 TIMES DAILY
Qty: 60 TABLET | Refills: 1 | Status: SHIPPED | OUTPATIENT
Start: 2019-11-27 | End: 2020-01-26
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
## 2019-12-11 DIAGNOSIS — J30.2 SEASONAL ALLERGIES: ICD-10-CM

## 2019-12-11 RX ORDER — CETIRIZINE HYDROCHLORIDE 10 MG/1
10 TABLET ORAL DAILY
Qty: 30 TABLET | Refills: 2 | Status: SHIPPED | OUTPATIENT
Start: 2019-12-11 | End: 2020-05-08 | Stop reason: SDUPTHER

## 2019-12-30 ENCOUNTER — OFFICE VISIT (OUTPATIENT)
Dept: FAMILY MEDICINE CLINIC | Facility: CLINIC | Age: 59
End: 2019-12-30

## 2019-12-30 VITALS
HEART RATE: 88 BPM | SYSTOLIC BLOOD PRESSURE: 122 MMHG | OXYGEN SATURATION: 98 % | BODY MASS INDEX: 22.8 KG/M2 | DIASTOLIC BLOOD PRESSURE: 80 MMHG | WEIGHT: 172 LBS | HEIGHT: 73 IN

## 2019-12-30 DIAGNOSIS — M15.9 PRIMARY OSTEOARTHRITIS INVOLVING MULTIPLE JOINTS: Primary | ICD-10-CM

## 2019-12-30 PROCEDURE — 99213 OFFICE O/P EST LOW 20 MIN: CPT | Performed by: STUDENT IN AN ORGANIZED HEALTH CARE EDUCATION/TRAINING PROGRAM

## 2019-12-30 RX ORDER — POLYMYXIN B SULFATE AND TRIMETHOPRIM 1; 10000 MG/ML; [USP'U]/ML
SOLUTION OPHTHALMIC
COMMUNITY
End: 2020-03-17 | Stop reason: SDUPTHER

## 2019-12-30 RX ORDER — KETOTIFEN FUMARATE 0.35 MG/ML
SOLUTION/ DROPS OPHTHALMIC
COMMUNITY
End: 2020-01-17

## 2019-12-30 RX ORDER — MELOXICAM 7.5 MG/1
7.5 TABLET ORAL DAILY
Qty: 30 TABLET | Refills: 1 | Status: SHIPPED | OUTPATIENT
Start: 2019-12-30 | End: 2020-02-25

## 2019-12-31 DIAGNOSIS — M25.561 PAIN IN BOTH KNEES, UNSPECIFIED CHRONICITY: Primary | ICD-10-CM

## 2019-12-31 DIAGNOSIS — M25.562 PAIN IN BOTH KNEES, UNSPECIFIED CHRONICITY: Primary | ICD-10-CM
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
## 2020-01-06 NOTE — PROGRESS NOTES
I have seen the patient.  I have reviewed the notes, assessments, and/or procedures performed by dr Interiano, I concur with her/his documentation and assessment and plan for Zak Lutz.               This document has been electronically signed by Dhiraj Watkins MD on January 6, 2020 8:39 AM

## 2020-01-09 ENCOUNTER — HOSPITAL ENCOUNTER (OUTPATIENT)
Dept: PHYSICAL THERAPY | Facility: HOSPITAL | Age: 60
Setting detail: THERAPIES SERIES
Discharge: HOME | End: 2020-01-09

## 2020-01-09 DIAGNOSIS — G89.29 CHRONIC BILATERAL LOW BACK PAIN WITHOUT SCIATICA: Primary | ICD-10-CM

## 2020-01-09 DIAGNOSIS — M54.50 CHRONIC BILATERAL LOW BACK PAIN WITHOUT SCIATICA: Primary | ICD-10-CM

## 2020-01-09 PROCEDURE — 97535 SELF CARE MNGMENT TRAINING: CPT

## 2020-01-09 PROCEDURE — 97112 NEUROMUSCULAR REEDUCATION: CPT

## 2020-01-09 PROCEDURE — 97161 PT EVAL LOW COMPLEX 20 MIN: CPT
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
## 2020-01-16 ENCOUNTER — HOSPITAL ENCOUNTER (OUTPATIENT)
Dept: PHYSICAL THERAPY | Facility: HOSPITAL | Age: 60
Setting detail: THERAPIES SERIES
Discharge: HOME | End: 2020-01-16

## 2020-01-16 DIAGNOSIS — G89.29 CHRONIC BILATERAL LOW BACK PAIN WITHOUT SCIATICA: Primary | ICD-10-CM

## 2020-01-16 DIAGNOSIS — M54.50 CHRONIC BILATERAL LOW BACK PAIN WITHOUT SCIATICA: Primary | ICD-10-CM

## 2020-01-16 PROCEDURE — 97110 THERAPEUTIC EXERCISES: CPT

## 2020-01-16 PROCEDURE — 97112 NEUROMUSCULAR REEDUCATION: CPT
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
## 2020-01-17 ENCOUNTER — OFFICE VISIT (OUTPATIENT)
Dept: ORTHOPEDIC SURGERY | Facility: CLINIC | Age: 60
End: 2020-01-17

## 2020-01-17 VITALS — HEIGHT: 73 IN | BODY MASS INDEX: 22.94 KG/M2 | WEIGHT: 173.1 LBS

## 2020-01-17 DIAGNOSIS — M23.92 INTERNAL DERANGEMENT OF KNEE, LEFT: ICD-10-CM

## 2020-01-17 DIAGNOSIS — G89.29 CHRONIC PAIN OF RIGHT KNEE: ICD-10-CM

## 2020-01-17 DIAGNOSIS — M17.11 PRIMARY OSTEOARTHRITIS OF RIGHT KNEE: ICD-10-CM

## 2020-01-17 DIAGNOSIS — M25.561 CHRONIC PAIN OF RIGHT KNEE: ICD-10-CM

## 2020-01-17 DIAGNOSIS — Z96.652 STATUS POST LEFT KNEE REPLACEMENT: Primary | ICD-10-CM

## 2020-01-17 PROCEDURE — 99213 OFFICE O/P EST LOW 20 MIN: CPT | Performed by: ORTHOPAEDIC SURGERY

## 2020-01-17 NOTE — PROGRESS NOTES
"Zak Lutz is a 59 y.o. male returns for     Chief Complaint   Patient presents with   • Left Knee - Follow-up   • Right Knee - Follow-up       HISTORY OF PRESENT ILLNESS:  Follow up bilateral knee pain.  Xray today.  Patient states left knee is doing good.  Just as little popping occasionally.  Right knee pain 2 today.  Right knee gives way while walking.  No fever or chllls  Intermittent pain in right knee  Occasional popping in left knee.            04/30/18 (21m) Jeramie Rai MD    TOTAL KNEE ARTHROPLASTY ATTUNE with adductor canal block tc-3 for backup - Left       CONCURRENT MEDICAL HISTORY:    The following portions of the patient's history were reviewed and updated as appropriate: allergies, current medications, past family history, past medical history, past social history, past surgical history and problem list.     ROS  No fevers or chills.  No chest pain or shortness of air.  No GI or  disturbances.    PHYSICAL EXAMINATION:       Ht 185.4 cm (73\")   Wt 78.5 kg (173 lb 1.6 oz)   BMI 22.84 kg/m²     Physical Exam   Constitutional: He is oriented to person, place, and time. He appears well-developed and well-nourished.   Musculoskeletal:        Right knee: He exhibits no effusion.        Left knee: He exhibits no effusion.   Neurological: He is alert and oriented to person, place, and time.   Psychiatric: He has a normal mood and affect. His behavior is normal. Judgment and thought content normal.       GAIT:     [x]  Normal  []  Antalgic    Assistive device: [x]  None  []  Walker     []  Crutches  []  Cane     []  Wheelchair  []  Stretcher    Right Knee Exam     Muscle Strength   The patient has normal right knee strength.    Tenderness   Right knee tenderness location: mild diffuse tenderness.    Range of Motion   Extension: 0   Flexion: 130     Tests   Varus: negative Valgus: negative  Drawer:  Anterior - negative        Other   Erythema: absent  Sensation: normal  Pulse: " present  Swelling: none  Effusion: no effusion present      Left Knee Exam     Muscle Strength   The patient has normal left knee strength.    Tenderness   The patient is experiencing no tenderness.     Range of Motion   Extension: 0   Flexion: 120     Tests   Varus: negative Valgus: negative  Drawer:  Anterior - negative         Other   Erythema: absent  Scars: present  Sensation: normal  Pulse: present  Swelling: none  Effusion: no effusion present              Xr Knee Bilateral Ap Standing    Result Date: 1/17/2020  Narrative: Ordering Provider:  Jeramie Rai MD Ordering Diagnosis/Indication:  Pain in both knees, unspecified chronicity Procedure:  XR KNEE BILATERAL AP STANDING Exam Date:  1/17/20 COMPARISON:  Todays X-rays were compared to previous images dated April 16, 2019.     Impression:  AP bilateral standing of the knees with lateral of each knee shows acceptable position and alignment of a left total knee arthroplasty.  There are some radio opaque objects in the lateral aspect of the thigh unchanged from prior x-ray.  No sign of implant loosening or failure is noted.  Appropriate sizing noted on each view.  The right knee shows mild medial joint space narrowing but maintenance of joint space and smooth articular surface.  No acute bony abnormality is noted.  On the lateral of each knee shows appropriate sizing of the left knee and no significant arthritic change in the right knee with maintenance of joint space in the patellofemoral compartment.  There is evidence of an old fibula fracture at the junction of the metaphysis and head of the fibula.  This appears to be healed and unchanged from prior x-ray. Jeramie Rai MD 1/17/20     Xr Knee 1 Or 2 View Bilateral    Result Date: 1/17/2020  Narrative: Ordering Provider:  Jeramie Rai MD Ordering Diagnosis/Indication:  Pain in both knees, unspecified chronicity Procedure:  XR KNEE 1 OR 2 VW BILATERAL Exam Date:  1/17/20  COMPARISON:  Todays X-rays were compared to previous images dated April 16, 2019.     Impression:  AP bilateral standing of the knees with lateral of each knee shows acceptable position and alignment of a left total knee arthroplasty.  There are some radio opaque objects in the lateral aspect of the thigh unchanged from prior x-ray.  No sign of implant loosening or failure is noted.  Appropriate sizing noted on each view.  The right knee shows mild medial joint space narrowing but maintenance of joint space and smooth articular surface.  No acute bony abnormality is noted.  On the lateral of each knee shows appropriate sizing of the left knee and no significant arthritic change in the right knee with maintenance of joint space in the patellofemoral compartment.  There is evidence of an old fibula fracture at the junction of the metaphysis and head of the fibula.  This appears to be healed and unchanged from prior x-ray. Jeramie Rai MD 1/17/20             ASSESSMENT:    Diagnoses and all orders for this visit:    Status post left knee replacement    Internal derangement of knee, left    Chronic pain of right knee    Primary osteoarthritis of right knee          PLAN    Continue strength and conditioning as tolerated  No restrictions  Activity as pain allows  Discussed further eval of right knee if necessary.  F/u as needed.    Patient's Body mass index is 22.84 kg/m². BMI is within normal parameters. No follow-up required..      Return if symptoms worsen or fail to improve, for recheck.    Jeramie Rai MD

## 2020-01-22 ENCOUNTER — HOSPITAL ENCOUNTER (OUTPATIENT)
Dept: PHYSICAL THERAPY | Facility: HOSPITAL | Age: 60
Setting detail: THERAPIES SERIES
Discharge: HOME | End: 2020-01-22

## 2020-01-22 DIAGNOSIS — M54.41 ACUTE RIGHT-SIDED LOW BACK PAIN WITH RIGHT-SIDED SCIATICA: ICD-10-CM

## 2020-01-22 DIAGNOSIS — G89.29 CHRONIC BILATERAL LOW BACK PAIN WITHOUT SCIATICA: Primary | ICD-10-CM

## 2020-01-22 DIAGNOSIS — M54.50 CHRONIC BILATERAL LOW BACK PAIN WITHOUT SCIATICA: Primary | ICD-10-CM

## 2020-01-22 PROCEDURE — 97110 THERAPEUTIC EXERCISES: CPT
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
## 2020-01-24 ENCOUNTER — HOSPITAL ENCOUNTER (OUTPATIENT)
Dept: PHYSICAL THERAPY | Facility: HOSPITAL | Age: 60
Setting detail: THERAPIES SERIES
Discharge: HOME | End: 2020-01-24

## 2020-01-24 DIAGNOSIS — M54.50 CHRONIC BILATERAL LOW BACK PAIN WITHOUT SCIATICA: Primary | ICD-10-CM

## 2020-01-24 DIAGNOSIS — G89.29 CHRONIC BILATERAL LOW BACK PAIN WITHOUT SCIATICA: Primary | ICD-10-CM

## 2020-01-24 DIAGNOSIS — M54.41 ACUTE RIGHT-SIDED LOW BACK PAIN WITH RIGHT-SIDED SCIATICA: ICD-10-CM

## 2020-01-24 PROCEDURE — 97110 THERAPEUTIC EXERCISES: CPT
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
## 2020-01-28 ENCOUNTER — APPOINTMENT (OUTPATIENT)
Dept: PHYSICAL THERAPY | Facility: HOSPITAL | Age: 60
End: 2020-01-28

## 2020-01-30 ENCOUNTER — HOSPITAL ENCOUNTER (OUTPATIENT)
Dept: PHYSICAL THERAPY | Facility: HOSPITAL | Age: 60
Setting detail: THERAPIES SERIES
Discharge: HOME | End: 2020-01-30

## 2020-01-30 ENCOUNTER — OFFICE VISIT (OUTPATIENT)
Dept: FAMILY MEDICINE CLINIC | Facility: CLINIC | Age: 60
End: 2020-01-30

## 2020-01-30 VITALS
SYSTOLIC BLOOD PRESSURE: 138 MMHG | BODY MASS INDEX: 23.31 KG/M2 | WEIGHT: 175.9 LBS | OXYGEN SATURATION: 98 % | DIASTOLIC BLOOD PRESSURE: 88 MMHG | HEART RATE: 84 BPM | HEIGHT: 73 IN

## 2020-01-30 DIAGNOSIS — M54.50 CHRONIC BILATERAL LOW BACK PAIN WITHOUT SCIATICA: Primary | ICD-10-CM

## 2020-01-30 DIAGNOSIS — I10 HYPERTENSION, UNSPECIFIED TYPE: ICD-10-CM

## 2020-01-30 DIAGNOSIS — M54.41 ACUTE RIGHT-SIDED LOW BACK PAIN WITH RIGHT-SIDED SCIATICA: ICD-10-CM

## 2020-01-30 DIAGNOSIS — G89.29 CHRONIC BILATERAL LOW BACK PAIN WITHOUT SCIATICA: Primary | ICD-10-CM

## 2020-01-30 DIAGNOSIS — I16.0 HYPERTENSIVE URGENCY: Primary | ICD-10-CM

## 2020-01-30 PROCEDURE — 97110 THERAPEUTIC EXERCISES: CPT | Performed by: PHYSICAL THERAPIST

## 2020-01-30 PROCEDURE — 99213 OFFICE O/P EST LOW 20 MIN: CPT | Performed by: STUDENT IN AN ORGANIZED HEALTH CARE EDUCATION/TRAINING PROGRAM

## 2020-01-30 RX ORDER — AMLODIPINE BESYLATE 10 MG/1
TABLET ORAL
Qty: 30 TABLET | Refills: 6 | Status: SHIPPED | OUTPATIENT
Start: 2020-01-30 | End: 2020-04-08

## 2020-01-30 RX ORDER — CLONIDINE HYDROCHLORIDE 0.1 MG/1
0.1 TABLET ORAL EVERY 12 HOURS SCHEDULED
Status: DISCONTINUED | OUTPATIENT
Start: 2020-01-30 | End: 2020-02-11
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
## 2020-01-30 NOTE — PATIENT INSTRUCTIONS
Start taking your blood pressure every morning before you take your medicine and every night. Record your BP on the chart I gave you. If you BP is less than 120/70, wait to take your medicine until your blood pressure goes above 120/70. If your blood pressure stays low (less than 120/70), please make another appointment so we can readjust your medicine. If you are getting high readings, above 160/90, consistently, after starting the new medicine dosing, please call office so we can adjust medication over the phone. Please return in 2 weeks with your completed chart so I can review it.   Hypertension, Adult  Hypertension is another name for high blood pressure. High blood pressure forces your heart to work harder to pump blood. This can cause problems over time.  There are two numbers in a blood pressure reading. There is a top number (systolic) over a bottom number (diastolic). It is best to have a blood pressure that is below 120/80. Healthy choices can help lower your blood pressure, or you may need medicine to help lower it.  What are the causes?  The cause of this condition is not known. Some conditions may be related to high blood pressure.  What increases the risk?  · Smoking.  · Having type 2 diabetes mellitus, high cholesterol, or both.  · Not getting enough exercise or physical activity.  · Being overweight.  · Having too much fat, sugar, calories, or salt (sodium) in your diet.  · Drinking too much alcohol.  · Having long-term (chronic) kidney disease.  · Having a family history of high blood pressure.  · Age. Risk increases with age.  · Race. You may be at higher risk if you are .  · Gender. Men are at higher risk than women before age 45. After age 65, women are at higher risk than men.  · Having obstructive sleep apnea.  · Stress.  What are the signs or symptoms?  · High blood pressure may not cause symptoms. Very high blood pressure (hypertensive crisis) may  cause:  ? Headache.  ? Feelings of worry or nervousness (anxiety).  ? Shortness of breath.  ? Nosebleed.  ? A feeling of being sick to your stomach (nausea).  ? Throwing up (vomiting).  ? Changes in how you see.  ? Very bad chest pain.  ? Seizures.  How is this treated?  · This condition is treated by making healthy lifestyle changes, such as:  ? Eating healthy foods.  ? Exercising more.  ? Drinking less alcohol.  · Your health care provider may prescribe medicine if lifestyle changes are not enough to get your blood pressure under control, and if:  ? Your top number is above 130.  ? Your bottom number is above 80.  · Your personal target blood pressure may vary.  Follow these instructions at home:  Eating and drinking    · If told, follow the DASH eating plan. To follow this plan:  ? Fill one half of your plate at each meal with fruits and vegetables.  ? Fill one fourth of your plate at each meal with whole grains. Whole grains include whole-wheat pasta, brown rice, and whole-grain bread.  ? Eat or drink low-fat dairy products, such as skim milk or low-fat yogurt.  ? Fill one fourth of your plate at each meal with low-fat (lean) proteins. Low-fat proteins include fish, chicken without skin, eggs, beans, and tofu.  ? Avoid fatty meat, cured and processed meat, or chicken with skin.  ? Avoid pre-made or processed food.  · Eat less than 1,500 mg of salt each day.  · Do not drink alcohol if:  ? Your doctor tells you not to drink.  ? You are pregnant, may be pregnant, or are planning to become pregnant.  · If you drink alcohol:  ? Limit how much you use to:  § 0-1 drink a day for women.  § 0-2 drinks a day for men.  ? Be aware of how much alcohol is in your drink. In the [place] one drink equals one 12 oz bottle of beer (355 mL), one 5 oz glass of wine (148 mL), or one 1½ oz glass of hard liquor (44 mL).  Lifestyle    · Work with your doctor to stay at a healthy weight or to lose weight. Ask your doctor what the best  weight is for you.  · Get at least 30 minutes of exercise most days of the week. This may include walking, swimming, or biking.  · Get at least 30 minutes of exercise that strengthens your muscles (resistance exercise) at least 3 days a week. This may include lifting weights or doing Pilates.  · Do not use any products that contain nicotine or tobacco, such as cigarettes, e-cigarettes, and chewing tobacco. If you need help quitting, ask your doctor.  · Check your blood pressure at home as told by your doctor.  · Keep all follow-up visits as told by your doctor. This is important.  Medicines  · Take over-the-counter and prescription medicines only as told by your doctor. Follow directions carefully.  · Do not skip doses of blood pressure medicine. The medicine does not work as well if you skip doses. Skipping doses also puts you at risk for problems.  · Ask your doctor about side effects or reactions to medicines that you should watch for.  Contact a doctor if you:  · Think you are having a reaction to the medicine you are taking.  · Have headaches that keep coming back (recurring).  · Feel dizzy.  · Have swelling in your ankles.  · Have trouble with your vision.  Get help right away if you:  · Get a very bad headache.  · Start to feel mixed up (confused).  · Feel weak or numb.  · Feel faint.  · Have very bad pain in your:  ? Chest.  ? Belly (abdomen).  · Throw up more than once.  · Have trouble breathing.  Summary  · Hypertension is another name for high blood pressure.  · High blood pressure forces your heart to work harder to pump blood.  · For most people, a normal blood pressure is less than 120/80.  · Making healthy choices can help lower blood pressure. If your blood pressure does not get lower with healthy choices, you may need to take medicine.  This information is not intended to replace advice given to you by your health care provider. Make sure you discuss any questions you have with your health care  provider.  Document Released: 06/05/2009 Document Revised: 08/28/2019 Document Reviewed: 08/28/2019  [employer] Interactive Patient Education © 2019 [employer]
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
## 2020-01-31 ENCOUNTER — HOSPITAL ENCOUNTER (OUTPATIENT)
Dept: PHYSICAL THERAPY | Facility: HOSPITAL | Age: 60
Setting detail: THERAPIES SERIES
Discharge: HOME | End: 2020-01-31

## 2020-01-31 DIAGNOSIS — G89.29 CHRONIC BILATERAL LOW BACK PAIN WITHOUT SCIATICA: Primary | ICD-10-CM

## 2020-01-31 DIAGNOSIS — M54.41 ACUTE RIGHT-SIDED LOW BACK PAIN WITH RIGHT-SIDED SCIATICA: ICD-10-CM

## 2020-01-31 DIAGNOSIS — M54.50 CHRONIC BILATERAL LOW BACK PAIN WITHOUT SCIATICA: Primary | ICD-10-CM

## 2020-01-31 PROCEDURE — 97110 THERAPEUTIC EXERCISES: CPT | Performed by: PHYSICAL THERAPIST

## 2020-02-05 ENCOUNTER — HOSPITAL ENCOUNTER (OUTPATIENT)
Dept: PHYSICAL THERAPY | Facility: HOSPITAL | Age: 60
Setting detail: THERAPIES SERIES
Discharge: HOME | End: 2020-02-05

## 2020-02-05 ENCOUNTER — APPOINTMENT (OUTPATIENT)
Dept: PHYSICAL THERAPY | Facility: HOSPITAL | Age: 60
End: 2020-02-05

## 2020-02-05 DIAGNOSIS — G89.29 CHRONIC BILATERAL LOW BACK PAIN WITHOUT SCIATICA: Primary | ICD-10-CM

## 2020-02-05 DIAGNOSIS — M54.50 CHRONIC BILATERAL LOW BACK PAIN WITHOUT SCIATICA: Primary | ICD-10-CM

## 2020-02-05 PROCEDURE — 97110 THERAPEUTIC EXERCISES: CPT | Performed by: PHYSICAL THERAPIST
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
## 2020-02-06 ENCOUNTER — HOSPITAL ENCOUNTER (OUTPATIENT)
Dept: PHYSICAL THERAPY | Facility: HOSPITAL | Age: 60
Setting detail: THERAPIES SERIES
Discharge: HOME | End: 2020-02-06

## 2020-02-06 DIAGNOSIS — G89.29 CHRONIC BILATERAL LOW BACK PAIN WITHOUT SCIATICA: Primary | ICD-10-CM

## 2020-02-06 DIAGNOSIS — M54.50 CHRONIC BILATERAL LOW BACK PAIN WITHOUT SCIATICA: Primary | ICD-10-CM

## 2020-02-06 PROCEDURE — 97110 THERAPEUTIC EXERCISES: CPT
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
## 2020-02-10 ENCOUNTER — HOSPITAL ENCOUNTER (OUTPATIENT)
Dept: PHYSICAL THERAPY | Facility: HOSPITAL | Age: 60
Setting detail: THERAPIES SERIES
Discharge: HOME | End: 2020-02-10

## 2020-02-10 DIAGNOSIS — G89.29 CHRONIC BILATERAL LOW BACK PAIN WITHOUT SCIATICA: Primary | ICD-10-CM

## 2020-02-10 DIAGNOSIS — M54.41 ACUTE RIGHT-SIDED LOW BACK PAIN WITH RIGHT-SIDED SCIATICA: ICD-10-CM

## 2020-02-10 DIAGNOSIS — M54.50 CHRONIC BILATERAL LOW BACK PAIN WITHOUT SCIATICA: Primary | ICD-10-CM

## 2020-02-10 PROCEDURE — 97110 THERAPEUTIC EXERCISES: CPT
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
## 2020-02-11 ENCOUNTER — APPOINTMENT (OUTPATIENT)
Dept: LAB | Facility: HOSPITAL | Age: 60
End: 2020-02-11

## 2020-02-11 ENCOUNTER — OFFICE VISIT (OUTPATIENT)
Dept: FAMILY MEDICINE CLINIC | Facility: CLINIC | Age: 60
End: 2020-02-11

## 2020-02-11 VITALS
TEMPERATURE: 97.2 F | HEART RATE: 78 BPM | OXYGEN SATURATION: 98 % | DIASTOLIC BLOOD PRESSURE: 92 MMHG | HEIGHT: 73 IN | SYSTOLIC BLOOD PRESSURE: 142 MMHG | BODY MASS INDEX: 23.51 KG/M2 | WEIGHT: 177.4 LBS

## 2020-02-11 DIAGNOSIS — I10 HYPERTENSION, UNSPECIFIED TYPE: Primary | ICD-10-CM

## 2020-02-11 PROCEDURE — 84443 ASSAY THYROID STIM HORMONE: CPT | Performed by: STUDENT IN AN ORGANIZED HEALTH CARE EDUCATION/TRAINING PROGRAM

## 2020-02-11 PROCEDURE — 36415 COLL VENOUS BLD VENIPUNCTURE: CPT | Performed by: STUDENT IN AN ORGANIZED HEALTH CARE EDUCATION/TRAINING PROGRAM

## 2020-02-11 PROCEDURE — 80053 COMPREHEN METABOLIC PANEL: CPT | Performed by: STUDENT IN AN ORGANIZED HEALTH CARE EDUCATION/TRAINING PROGRAM

## 2020-02-11 PROCEDURE — 80061 LIPID PANEL: CPT | Performed by: STUDENT IN AN ORGANIZED HEALTH CARE EDUCATION/TRAINING PROGRAM

## 2020-02-11 PROCEDURE — 99213 OFFICE O/P EST LOW 20 MIN: CPT | Performed by: STUDENT IN AN ORGANIZED HEALTH CARE EDUCATION/TRAINING PROGRAM

## 2020-02-11 PROCEDURE — 84439 ASSAY OF FREE THYROXINE: CPT | Performed by: STUDENT IN AN ORGANIZED HEALTH CARE EDUCATION/TRAINING PROGRAM

## 2020-02-11 RX ORDER — LISINOPRIL 20 MG/1
20 TABLET ORAL DAILY
Qty: 30 TABLET | Refills: 5 | Status: SHIPPED | OUTPATIENT
Start: 2020-02-11 | End: 2020-08-06

## 2020-02-12 LAB
ALBUMIN SERPL-MCNC: 4.8 G/DL (ref 3.5–5.2)
ALBUMIN/GLOB SERPL: 1.7 G/DL
ALP SERPL-CCNC: 56 U/L (ref 39–117)
ALT SERPL W P-5'-P-CCNC: 20 U/L (ref 1–41)
ANION GAP SERPL CALCULATED.3IONS-SCNC: 14 MMOL/L (ref 5–15)
AST SERPL-CCNC: 26 U/L (ref 1–40)
BILIRUB SERPL-MCNC: 0.2 MG/DL (ref 0.2–1.2)
BUN BLD-MCNC: 13 MG/DL (ref 8–23)
BUN/CREAT SERPL: 13.5 (ref 7–25)
CALCIUM SPEC-SCNC: 10 MG/DL (ref 8.6–10.5)
CHLORIDE SERPL-SCNC: 99 MMOL/L (ref 98–107)
CHOLEST SERPL-MCNC: 179 MG/DL (ref 0–200)
CO2 SERPL-SCNC: 26 MMOL/L (ref 22–29)
CREAT BLD-MCNC: 0.96 MG/DL (ref 0.76–1.27)
GFR SERPL CREATININE-BSD FRML MDRD: 97 ML/MIN/1.73
GLOBULIN UR ELPH-MCNC: 2.8 GM/DL
GLUCOSE BLD-MCNC: 85 MG/DL (ref 65–99)
HDLC SERPL-MCNC: 72 MG/DL (ref 40–60)
LDLC SERPL CALC-MCNC: 72 MG/DL (ref 0–100)
LDLC/HDLC SERPL: 0.99 {RATIO}
POTASSIUM BLD-SCNC: 4.7 MMOL/L (ref 3.5–5.2)
PROT SERPL-MCNC: 7.6 G/DL (ref 6–8.5)
SODIUM BLD-SCNC: 139 MMOL/L (ref 136–145)
T4 FREE SERPL-MCNC: 1.03 NG/DL (ref 0.93–1.7)
TRIGL SERPL-MCNC: 177 MG/DL (ref 0–150)
TSH SERPL DL<=0.05 MIU/L-ACNC: 1.18 UIU/ML (ref 0.27–4.2)
VLDLC SERPL-MCNC: 35.4 MG/DL (ref 5–40)

## 2020-02-22 DIAGNOSIS — M15.9 PRIMARY OSTEOARTHRITIS INVOLVING MULTIPLE JOINTS: ICD-10-CM

## 2020-02-25 RX ORDER — MELOXICAM 7.5 MG/1
TABLET ORAL
Qty: 30 TABLET | Refills: 1 | Status: SHIPPED | OUTPATIENT
Start: 2020-02-25 | End: 2020-04-24
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
## 2020-02-26 NOTE — PROGRESS NOTES
I have seen the patient.  I have reviewed the notes, assessments, and/or procedures performed by Jasmyn Interiano MD, I concur with her/his documentation and assessment and plan for Zak Frederick Bolivar.               This document has been electronically signed by Dhiraj Watkins MD on February 26, 2020 5:08 PM

## 2020-03-17 ENCOUNTER — OFFICE VISIT (OUTPATIENT)
Dept: FAMILY MEDICINE CLINIC | Facility: CLINIC | Age: 60
End: 2020-03-17

## 2020-03-17 VITALS
DIASTOLIC BLOOD PRESSURE: 82 MMHG | OXYGEN SATURATION: 98 % | HEIGHT: 73 IN | SYSTOLIC BLOOD PRESSURE: 148 MMHG | WEIGHT: 172.8 LBS | HEART RATE: 76 BPM | TEMPERATURE: 97.2 F | BODY MASS INDEX: 22.9 KG/M2 | RESPIRATION RATE: 20 BRPM

## 2020-03-17 DIAGNOSIS — Z76.0 ENCOUNTER FOR MEDICATION REFILL: ICD-10-CM

## 2020-03-17 DIAGNOSIS — I10 ESSENTIAL HYPERTENSION: Primary | ICD-10-CM

## 2020-03-17 PROCEDURE — 99213 OFFICE O/P EST LOW 20 MIN: CPT | Performed by: STUDENT IN AN ORGANIZED HEALTH CARE EDUCATION/TRAINING PROGRAM

## 2020-03-17 RX ORDER — POLYMYXIN B SULFATE AND TRIMETHOPRIM 1; 10000 MG/ML; [USP'U]/ML
1 SOLUTION OPHTHALMIC EVERY 6 HOURS
Qty: 10 ML | Refills: 0 | Status: SHIPPED | OUTPATIENT
Start: 2020-03-17 | End: 2020-04-08

## 2020-03-17 RX ORDER — ALBUTEROL SULFATE 90 UG/1
2 AEROSOL, METERED RESPIRATORY (INHALATION) EVERY 4 HOURS PRN
Qty: 18 INHALER | Refills: 2 | Status: SHIPPED | OUTPATIENT
Start: 2020-03-17 | End: 2020-05-18 | Stop reason: SDUPTHER
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
## 2020-03-19 NOTE — PROGRESS NOTES
I have seen the patient.  I have reviewed the notes, assessments, and/or procedures performed by Jasmyn Interiano MD, I concur with her/his documentation and assessment and plan for Zak Frederick Bolivar.               This document has been electronically signed by Dhiraj Watkins MD on March 19, 2020 09:54

## 2020-03-31 ENCOUNTER — TELEPHONE (OUTPATIENT)
Dept: FAMILY MEDICINE CLINIC | Facility: CLINIC | Age: 60
End: 2020-03-31

## 2020-03-31 DIAGNOSIS — H10.13 ALLERGIC CONJUNCTIVITIS OF BOTH EYES: Primary | ICD-10-CM

## 2020-03-31 RX ORDER — OLOPATADINE HYDROCHLORIDE 2 MG/ML
1 SOLUTION/ DROPS OPHTHALMIC DAILY
Qty: 2.5 ML | Refills: 1 | Status: SHIPPED | OUTPATIENT
Start: 2020-03-31 | End: 2020-04-01

## 2020-03-31 NOTE — TELEPHONE ENCOUNTER
Patient called advising he is having some issues with his eyes when he uses his medication trimethoprim-polymyxin b (POLYTRIM) 76672-2.1 UNIT/ML-% ophthalmic solution.  He is experiencing itching on his eyelid.     He needs someone to call him please [phone]    Thank you

## 2020-03-31 NOTE — TELEPHONE ENCOUNTER
Patient called advising he is having some issues with his eyes when he uses his medication trimethoprim-polymyxin b (POLYTRIM) 78960-3.1 UNIT/ML-% ophthalmic solution.  He is experiencing itching on his eyelid.      He needs someone to call him please [phone]     Thank you

## 2020-03-31 NOTE — TELEPHONE ENCOUNTER
Called patient to discuss concern. He states that he has been mowing his yard and since starting that with these eyedrops, he has had redness and itching of both eyes with some discharge from each eye after starting the antibiotic eyedrop. This is most likely due to allergies, and will try antiallergy eye drop. Discussed with him to stop his antibiotic eye drop. If this does not improve with allergy eye drops, will need to see in office or via video visit to examine eye lids.        Jasmyn Interiano MD PGY3  [facility]  This document has been electronically signed by Jasmyn Interiano MD on March 31, 2020 14:52

## 2020-04-01 ENCOUNTER — TELEPHONE (OUTPATIENT)
Dept: FAMILY MEDICINE CLINIC | Facility: CLINIC | Age: 60
End: 2020-04-01

## 2020-04-01 DIAGNOSIS — H10.13 ALLERGIC CONJUNCTIVITIS OF BOTH EYES: Primary | ICD-10-CM

## 2020-04-01 RX ORDER — AZELASTINE HYDROCHLORIDE 0.5 MG/ML
1 SOLUTION/ DROPS OPHTHALMIC DAILY
Qty: 1 EACH | Refills: 12 | Status: SHIPPED | OUTPATIENT
Start: 2020-04-01 | End: 2020-04-02

## 2020-04-01 NOTE — TELEPHONE ENCOUNTER
[facility] called to advise     azelastine (OPTIVAR) 0.05 % ophthalmic solution     Is not covered under patients insurance    Can something else be sent??    Please Call [facility]  [phone]    Thank you

## 2020-04-02 DIAGNOSIS — H10.13 ALLERGIC CONJUNCTIVITIS OF BOTH EYES: Primary | ICD-10-CM

## 2020-04-02 RX ORDER — OLOPATADINE HYDROCHLORIDE 1 MG/ML
1 SOLUTION/ DROPS OPHTHALMIC 2 TIMES DAILY
Qty: 5 ML | Refills: 12 | Status: SHIPPED | OUTPATIENT
Start: 2020-04-02 | End: 2020-04-08

## 2020-04-08 ENCOUNTER — OFFICE VISIT (OUTPATIENT)
Dept: FAMILY MEDICINE CLINIC | Facility: CLINIC | Age: 60
End: 2020-04-08

## 2020-04-08 DIAGNOSIS — J30.2 SEASONAL ALLERGIES: ICD-10-CM

## 2020-04-08 DIAGNOSIS — H10.13 ALLERGIC CONJUNCTIVITIS OF BOTH EYES: Primary | ICD-10-CM

## 2020-04-08 DIAGNOSIS — R22.0 LIP SWELLING: ICD-10-CM

## 2020-04-08 PROCEDURE — 99212 OFFICE O/P EST SF 10 MIN: CPT | Performed by: STUDENT IN AN ORGANIZED HEALTH CARE EDUCATION/TRAINING PROGRAM

## 2020-04-08 RX ORDER — MONTELUKAST SODIUM 10 MG/1
10 TABLET ORAL NIGHTLY
Qty: 30 TABLET | Refills: 1 | Status: SHIPPED | OUTPATIENT
Start: 2020-04-08 | End: 2021-06-11 | Stop reason: SDUPTHER

## 2020-04-08 RX ORDER — KETOTIFEN FUMARATE 0.35 MG/ML
1 SOLUTION/ DROPS OPHTHALMIC 2 TIMES DAILY
Qty: 1 EACH | Refills: 1 | Status: SHIPPED | OUTPATIENT
Start: 2020-04-08 | End: 2023-01-17

## 2020-04-08 NOTE — PROGRESS NOTES
I have soken with the patient.  I have reviewed the notes, assessments, and/or procedures performed by Jasmyn Interiano MD, I concur with her/his documentation and assessment and plan for Zak Lutz.               This document has been electronically signed by Dhiraj Watkins MD on April 8, 2020 11:06
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
## 2020-04-24 DIAGNOSIS — M15.9 PRIMARY OSTEOARTHRITIS INVOLVING MULTIPLE JOINTS: ICD-10-CM

## 2020-04-24 RX ORDER — MELOXICAM 7.5 MG/1
TABLET ORAL
Qty: 30 TABLET | Refills: 1 | Status: SHIPPED | OUTPATIENT
Start: 2020-04-24 | End: 2020-05-21

## 2020-05-08 DIAGNOSIS — J30.2 SEASONAL ALLERGIES: ICD-10-CM

## 2020-05-08 RX ORDER — CETIRIZINE HYDROCHLORIDE 10 MG/1
10 TABLET ORAL DAILY
Qty: 30 TABLET | Refills: 2 | Status: SHIPPED | OUTPATIENT
Start: 2020-05-08 | End: 2020-08-06

## 2020-05-15 ENCOUNTER — TELEPHONE (OUTPATIENT)
Dept: FAMILY MEDICINE CLINIC | Facility: CLINIC | Age: 60
End: 2020-05-15

## 2020-05-15 NOTE — TELEPHONE ENCOUNTER
TRIED TO CALL PATIENT BACK. HE WANTED HIS APPT CANCELED FOR TODAY AND RESCHEDULED. NO VOICEMAIL.      THANK YOU,    QUINCY

## 2020-05-18 ENCOUNTER — OFFICE VISIT (OUTPATIENT)
Dept: FAMILY MEDICINE CLINIC | Facility: CLINIC | Age: 60
End: 2020-05-18

## 2020-05-18 VITALS — WEIGHT: 172 LBS | BODY MASS INDEX: 22.8 KG/M2 | HEIGHT: 73 IN

## 2020-05-18 DIAGNOSIS — R07.82 INTERCOSTAL PAIN: Primary | ICD-10-CM

## 2020-05-18 DIAGNOSIS — J30.2 SEASONAL ALLERGIES: ICD-10-CM

## 2020-05-18 DIAGNOSIS — I10 ESSENTIAL HYPERTENSION: ICD-10-CM

## 2020-05-18 DIAGNOSIS — Z76.0 ENCOUNTER FOR MEDICATION REFILL: ICD-10-CM

## 2020-05-18 DIAGNOSIS — Z71.6 ENCOUNTER FOR SMOKING CESSATION COUNSELING: ICD-10-CM

## 2020-05-18 PROCEDURE — 99441 PR PHYS/QHP TELEPHONE EVALUATION 5-10 MIN: CPT | Performed by: STUDENT IN AN ORGANIZED HEALTH CARE EDUCATION/TRAINING PROGRAM

## 2020-05-18 RX ORDER — ALBUTEROL SULFATE 90 UG/1
2 AEROSOL, METERED RESPIRATORY (INHALATION) EVERY 4 HOURS PRN
Qty: 18 INHALER | Refills: 2 | Status: SHIPPED | OUTPATIENT
Start: 2020-05-18 | End: 2021-02-02 | Stop reason: SDUPTHER

## 2020-05-18 RX ORDER — FLUTICASONE PROPIONATE 50 MCG
2 SPRAY, SUSPENSION (ML) NASAL DAILY
Qty: 1 BOTTLE | Refills: 4 | Status: SHIPPED | OUTPATIENT
Start: 2020-05-18 | End: 2021-02-18 | Stop reason: SDUPTHER

## 2020-05-18 RX ORDER — AMLODIPINE BESYLATE 10 MG/1
10 TABLET ORAL DAILY
COMMUNITY
Start: 2020-04-25 | End: 2020-05-18
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
## 2020-05-21 ENCOUNTER — OFFICE VISIT (OUTPATIENT)
Dept: FAMILY MEDICINE CLINIC | Facility: CLINIC | Age: 60
End: 2020-05-21

## 2020-05-21 VITALS — HEIGHT: 73 IN | BODY MASS INDEX: 22.69 KG/M2

## 2020-05-21 DIAGNOSIS — R07.82 INTERCOSTAL PAIN: Primary | ICD-10-CM

## 2020-05-21 PROCEDURE — 99441 PR PHYS/QHP TELEPHONE EVALUATION 5-10 MIN: CPT | Performed by: STUDENT IN AN ORGANIZED HEALTH CARE EDUCATION/TRAINING PROGRAM

## 2020-05-21 RX ORDER — MELOXICAM 15 MG/1
15 TABLET ORAL DAILY
Qty: 30 TABLET | Refills: 0 | Status: SHIPPED | OUTPATIENT
Start: 2020-05-21 | End: 2020-06-17
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
## 2020-05-22 ENCOUNTER — TELEPHONE (OUTPATIENT)
Dept: FAMILY MEDICINE CLINIC | Facility: CLINIC | Age: 60
End: 2020-05-22

## 2020-05-22 NOTE — PROGRESS NOTES
I have spoken to the patient via telephone during the encounter. I have reviewed the notes, assessments, and/or procedures performed by Dr. Interiano, I concur with her/his documentation and assessment and plan for Zak Lutz.       This document has been electronically signed by Parminder Govea MD on May 22, 2020 08:41

## 2020-05-26 NOTE — PROGRESS NOTES
I have spoken with the patient.  I have reviewed the notes, assessments, and/or procedures performed by Dr. CARINA Jerez, I concur with her/his documentation and assessment and plan for Zak Luzt.               This document has been electronically signed by Dhiraj Watkins MD on May 26, 2020 16:15

## 2020-06-13 DIAGNOSIS — R07.82 INTERCOSTAL PAIN: ICD-10-CM

## 2020-06-17 RX ORDER — MELOXICAM 15 MG/1
TABLET ORAL
Qty: 30 TABLET | Refills: 0 | Status: SHIPPED | OUTPATIENT
Start: 2020-06-17 | End: 2020-07-17

## 2020-07-14 ENCOUNTER — TELEPHONE (OUTPATIENT)
Dept: FAMILY MEDICINE CLINIC | Facility: CLINIC | Age: 60
End: 2020-07-14

## 2020-07-14 NOTE — TELEPHONE ENCOUNTER
PATIENT CALLED ASKING FOR A SCRIPT FOR  AMMOXICILLIN . PATIENT STATED THAT HIS FORMER PCP WAS GOING TO SEND IN SOME FOR HIM FOR HIS DENTIST APT HOWEVER SHE HAS LEFT AND THE MEDS DID NOT GET CALLED IN. HE WAS WONDERING IF NEW PCP WOULD BE WILLING TO DO THAT FOR HIM .    PATIENT HAS AN APT TODAY AT NOON.    THANKS,  TOMASA

## 2020-07-17 DIAGNOSIS — R07.82 INTERCOSTAL PAIN: ICD-10-CM

## 2020-07-17 RX ORDER — MELOXICAM 15 MG/1
TABLET ORAL
Qty: 30 TABLET | Refills: 0 | Status: SHIPPED | OUTPATIENT
Start: 2020-07-17 | End: 2020-12-01

## 2020-08-06 DIAGNOSIS — J30.2 SEASONAL ALLERGIES: ICD-10-CM

## 2020-08-06 DIAGNOSIS — I10 HYPERTENSION, UNSPECIFIED TYPE: ICD-10-CM

## 2020-08-06 RX ORDER — CETIRIZINE HYDROCHLORIDE 10 MG/1
TABLET ORAL
Qty: 30 TABLET | Refills: 2 | Status: SHIPPED | OUTPATIENT
Start: 2020-08-06 | End: 2023-01-17

## 2020-08-06 RX ORDER — LISINOPRIL 20 MG/1
TABLET ORAL
Qty: 30 TABLET | Refills: 5 | Status: SHIPPED | OUTPATIENT
Start: 2020-08-06 | End: 2021-02-26 | Stop reason: SDUPTHER

## 2020-08-10 ENCOUNTER — OFFICE VISIT (OUTPATIENT)
Dept: FAMILY MEDICINE CLINIC | Facility: CLINIC | Age: 60
End: 2020-08-10

## 2020-08-10 ENCOUNTER — LAB (OUTPATIENT)
Dept: LAB | Facility: HOSPITAL | Age: 60
End: 2020-08-10

## 2020-08-10 VITALS
OXYGEN SATURATION: 99 % | WEIGHT: 167.4 LBS | SYSTOLIC BLOOD PRESSURE: 128 MMHG | BODY MASS INDEX: 22.19 KG/M2 | HEIGHT: 73 IN | DIASTOLIC BLOOD PRESSURE: 82 MMHG | HEART RATE: 80 BPM

## 2020-08-10 DIAGNOSIS — R20.0 NUMBNESS AND TINGLING OF FOOT: Primary | ICD-10-CM

## 2020-08-10 DIAGNOSIS — R20.2 NUMBNESS AND TINGLING OF FOOT: Primary | ICD-10-CM

## 2020-08-10 DIAGNOSIS — Z00.00 HEALTHCARE MAINTENANCE: ICD-10-CM

## 2020-08-10 DIAGNOSIS — R07.9 CHEST PAIN, UNSPECIFIED TYPE: ICD-10-CM

## 2020-08-10 DIAGNOSIS — R20.2 NUMBNESS AND TINGLING OF FOOT: ICD-10-CM

## 2020-08-10 DIAGNOSIS — R20.0 NUMBNESS AND TINGLING OF FOOT: ICD-10-CM

## 2020-08-10 LAB
BASOPHILS # BLD AUTO: 0.05 10*3/MM3 (ref 0–0.2)
BASOPHILS NFR BLD AUTO: 0.7 % (ref 0–1.5)
DEPRECATED RDW RBC AUTO: 43.9 FL (ref 37–54)
EOSINOPHIL # BLD AUTO: 0.17 10*3/MM3 (ref 0–0.4)
EOSINOPHIL NFR BLD AUTO: 2.5 % (ref 0.3–6.2)
ERYTHROCYTE [DISTWIDTH] IN BLOOD BY AUTOMATED COUNT: 13.2 % (ref 12.3–15.4)
HBA1C MFR BLD: 5.46 % (ref 4.8–5.6)
HCT VFR BLD AUTO: 39.4 % (ref 37.5–51)
HGB BLD-MCNC: 14.1 G/DL (ref 13–17.7)
IMM GRANULOCYTES # BLD AUTO: 0.03 10*3/MM3 (ref 0–0.05)
IMM GRANULOCYTES NFR BLD AUTO: 0.4 % (ref 0–0.5)
LYMPHOCYTES # BLD AUTO: 1.87 10*3/MM3 (ref 0.7–3.1)
LYMPHOCYTES NFR BLD AUTO: 27.3 % (ref 19.6–45.3)
MCH RBC QN AUTO: 32.6 PG (ref 26.6–33)
MCHC RBC AUTO-ENTMCNC: 35.8 G/DL (ref 31.5–35.7)
MCV RBC AUTO: 91.2 FL (ref 79–97)
MONOCYTES # BLD AUTO: 0.78 10*3/MM3 (ref 0.1–0.9)
MONOCYTES NFR BLD AUTO: 11.4 % (ref 5–12)
NEUTROPHILS NFR BLD AUTO: 3.96 10*3/MM3 (ref 1.7–7)
NEUTROPHILS NFR BLD AUTO: 57.7 % (ref 42.7–76)
NRBC BLD AUTO-RTO: 0 /100 WBC (ref 0–0.2)
PLATELET # BLD AUTO: 248 10*3/MM3 (ref 140–450)
PMV BLD AUTO: 11.5 FL (ref 6–12)
RBC # BLD AUTO: 4.32 10*6/MM3 (ref 4.14–5.8)
WBC # BLD AUTO: 6.86 10*3/MM3 (ref 3.4–10.8)

## 2020-08-10 PROCEDURE — 85025 COMPLETE CBC W/AUTO DIFF WBC: CPT

## 2020-08-10 PROCEDURE — 99213 OFFICE O/P EST LOW 20 MIN: CPT | Performed by: STUDENT IN AN ORGANIZED HEALTH CARE EDUCATION/TRAINING PROGRAM

## 2020-08-10 PROCEDURE — 83036 HEMOGLOBIN GLYCOSYLATED A1C: CPT

## 2020-08-10 PROCEDURE — 36415 COLL VENOUS BLD VENIPUNCTURE: CPT
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
## 2020-08-11 NOTE — PROGRESS NOTES
I have spoken with the patient.  I have reviewed the notes, assessments, and/or procedures performed by Dr. Gabriel Jerez, I concur with his  documentation and assessment and plan for Zak Lutz.          This document has been electronically signed by Nitin Corbin MD on August 11, 2020 11:06

## 2020-08-18 ENCOUNTER — TELEPHONE (OUTPATIENT)
Dept: FAMILY MEDICINE CLINIC | Facility: CLINIC | Age: 60
End: 2020-08-18

## 2020-08-18 NOTE — TELEPHONE ENCOUNTER
Patient called advising he had LABS and CXR done on 08/10/2020. He is requesting Dr CARINA Jerez to call him with his results ASAP because he is still experiencing numbness in his toes.      Please call [phone]

## 2020-08-18 NOTE — TELEPHONE ENCOUNTER
Patient called advising he had LABS and CXR done on 08/10/2020. He is requesting Dr CARINA Jerez to call him with his results ASAP because he is still experiencing numbness in his toes.     Please call [phone]    Thank you

## 2020-09-01 ENCOUNTER — TELEPHONE (OUTPATIENT)
Dept: FAMILY MEDICINE CLINIC | Facility: CLINIC | Age: 60
End: 2020-09-01

## 2020-09-01 DIAGNOSIS — M79.675 CHRONIC TOE PAIN, LEFT FOOT: Primary | ICD-10-CM

## 2020-09-01 DIAGNOSIS — G89.29 CHRONIC TOE PAIN, LEFT FOOT: Primary | ICD-10-CM

## 2020-09-01 NOTE — TELEPHONE ENCOUNTER
PATIENT CALLED AND SAW DR SANDRA BURLESON REGARDING HIS TOE GOING NUMB AND HE HAD MENTIONED ABOUT REFERRING HIM TO A PODIATRIST AND HASNT HEARD ANYTHING ABOUT IT YET. HIS NUMBER TO CALL BACK [phone].

## 2020-09-01 NOTE — TELEPHONE ENCOUNTER
PATIENT CALLED AND SAW DR SANDRA BURLESON REGARDING HIS TOE GOING NUMB AND HE HAD MENTIONED ABOUT REFERRING HIM TO A PODIATRIST AND HASNT HEARD ANYTHING ABOUT IT YET. HIS NUMBER TO CALL BACK [phone].        THANK YOU,      QUINCY

## 2020-09-23 ENCOUNTER — LAB (OUTPATIENT)
Dept: LAB | Facility: HOSPITAL | Age: 60
End: 2020-09-23

## 2020-09-23 ENCOUNTER — OFFICE VISIT (OUTPATIENT)
Dept: PODIATRY | Facility: CLINIC | Age: 60
End: 2020-09-23

## 2020-09-23 VITALS — OXYGEN SATURATION: 99 % | HEIGHT: 73 IN | WEIGHT: 167 LBS | HEART RATE: 83 BPM | BODY MASS INDEX: 22.13 KG/M2

## 2020-09-23 DIAGNOSIS — M79.671 BILATERAL FOOT PAIN: ICD-10-CM

## 2020-09-23 DIAGNOSIS — M79.672 BILATERAL FOOT PAIN: ICD-10-CM

## 2020-09-23 DIAGNOSIS — R20.0 NUMBNESS OF TOES: Primary | ICD-10-CM

## 2020-09-23 LAB — VIT B12 BLD-MCNC: 228 PG/ML (ref 211–946)

## 2020-09-23 PROCEDURE — 36415 COLL VENOUS BLD VENIPUNCTURE: CPT

## 2020-09-23 PROCEDURE — 82607 VITAMIN B-12: CPT

## 2020-09-23 PROCEDURE — 99203 OFFICE O/P NEW LOW 30 MIN: CPT | Performed by: PODIATRIST

## 2020-09-23 RX ORDER — AMOXICILLIN 500 MG/1
CAPSULE ORAL
COMMUNITY
Start: 2020-07-14 | End: 2020-11-20 | Stop reason: SDUPTHER
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
## 2020-09-24 ENCOUNTER — TELEPHONE (OUTPATIENT)
Dept: PODIATRY | Facility: CLINIC | Age: 60
End: 2020-09-24
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
## 2020-11-20 ENCOUNTER — OFFICE VISIT (OUTPATIENT)
Dept: FAMILY MEDICINE CLINIC | Facility: CLINIC | Age: 60
End: 2020-11-20

## 2020-11-20 VITALS
SYSTOLIC BLOOD PRESSURE: 142 MMHG | BODY MASS INDEX: 22.45 KG/M2 | HEIGHT: 73 IN | HEART RATE: 86 BPM | WEIGHT: 169.38 LBS | OXYGEN SATURATION: 99 % | DIASTOLIC BLOOD PRESSURE: 60 MMHG | TEMPERATURE: 96.4 F

## 2020-11-20 DIAGNOSIS — G62.9 NEUROPATHY: Primary | ICD-10-CM

## 2020-11-20 PROCEDURE — 90471 IMMUNIZATION ADMIN: CPT | Performed by: STUDENT IN AN ORGANIZED HEALTH CARE EDUCATION/TRAINING PROGRAM

## 2020-11-20 PROCEDURE — 90686 IIV4 VACC NO PRSV 0.5 ML IM: CPT | Performed by: STUDENT IN AN ORGANIZED HEALTH CARE EDUCATION/TRAINING PROGRAM

## 2020-11-20 PROCEDURE — 99213 OFFICE O/P EST LOW 20 MIN: CPT | Performed by: STUDENT IN AN ORGANIZED HEALTH CARE EDUCATION/TRAINING PROGRAM

## 2020-11-20 RX ORDER — GABAPENTIN 100 MG/1
100 CAPSULE ORAL 3 TIMES DAILY
Qty: 90 CAPSULE | Refills: 0 | Status: SHIPPED | OUTPATIENT
Start: 2020-11-20 | End: 2021-03-03

## 2020-11-23 NOTE — PROGRESS NOTES
I have seen the patient.  I have reviewed the notes, assessments, and/or procedures performed by Nura Mcmillan MD, I concur with her/his documentation and assessment and plan for Zak Mack Lutz.               This document has been electronically signed by Dhiraj Watkins MD on November 23, 2020 11:54 CST

## 2020-12-01 DIAGNOSIS — R07.82 INTERCOSTAL PAIN: ICD-10-CM

## 2020-12-01 RX ORDER — MELOXICAM 15 MG/1
TABLET ORAL
Qty: 30 TABLET | Refills: 0 | Status: SHIPPED | OUTPATIENT
Start: 2020-12-01 | End: 2020-12-31

## 2020-12-18 ENCOUNTER — OFFICE VISIT (OUTPATIENT)
Dept: FAMILY MEDICINE CLINIC | Facility: CLINIC | Age: 60
End: 2020-12-18

## 2020-12-18 VITALS — HEIGHT: 73 IN | BODY MASS INDEX: 22.35 KG/M2

## 2020-12-18 DIAGNOSIS — J06.9 VIRAL URI WITH COUGH: Primary | ICD-10-CM

## 2020-12-18 PROCEDURE — 99441 PR PHYS/QHP TELEPHONE EVALUATION 5-10 MIN: CPT | Performed by: STUDENT IN AN ORGANIZED HEALTH CARE EDUCATION/TRAINING PROGRAM

## 2020-12-18 RX ORDER — GUAIFENESIN 600 MG/1
600 TABLET, EXTENDED RELEASE ORAL 2 TIMES DAILY
Qty: 14 TABLET | Refills: 0 | Status: SHIPPED | OUTPATIENT
Start: 2020-12-18 | End: 2020-12-25

## 2020-12-18 RX ORDER — BENZONATATE 100 MG/1
100 CAPSULE ORAL 3 TIMES DAILY PRN
Qty: 45 CAPSULE | Refills: 0 | Status: SHIPPED | OUTPATIENT
Start: 2020-12-18 | End: 2020-12-31 | Stop reason: SDUPTHER
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
## 2020-12-23 NOTE — PROGRESS NOTES
Present onsite throughout the encounter and discussed the patient's presentation and plan of care with Dr. Mcmillan.  I have seen the patient.  I have reviewed the notes, assessments, and/or procedures performed by Nura Mcmillan MD, I concur with her/his documentation and assessment and plan for Zak Lutz.               This document has been electronically signed by Dhiraj Watkins MD on December 23, 2020 09:55 CST

## 2020-12-29 ENCOUNTER — TELEPHONE (OUTPATIENT)
Dept: FAMILY MEDICINE CLINIC | Facility: CLINIC | Age: 60
End: 2020-12-29

## 2020-12-29 DIAGNOSIS — R05.9 COUGH: Primary | ICD-10-CM

## 2020-12-29 NOTE — TELEPHONE ENCOUNTER
PATIENT CALLED REGARDING HIS COUGH THAT HE WAS SEEN FOR ON THE 18TH. PATIENT STATED THAT HIS COUGHING IS GETTING WORSE AND THE MEDICATION IS NOT WORKING. HE MENTIONED THAT PCP SAID THAT IF THE COUGH CONTINUED OR GOT WORSE HE MAY HAVE TO HAVE A CT SCAN TO SEE WHAT IS CAUSING IT.  PATIENT SAID THAT THE PAIN IS CAUSING MIGRAINES AND THE PAIN SHOOTS THROUGH HIS BODY.    CALL BACK NUMBER FOR HIM [phone].    THANKS,  TOMASA

## 2020-12-30 DIAGNOSIS — R07.82 INTERCOSTAL PAIN: ICD-10-CM

## 2020-12-31 ENCOUNTER — OFFICE VISIT (OUTPATIENT)
Dept: FAMILY MEDICINE CLINIC | Facility: CLINIC | Age: 60
End: 2020-12-31

## 2020-12-31 DIAGNOSIS — R05.8 POST-VIRAL COUGH SYNDROME: ICD-10-CM

## 2020-12-31 PROCEDURE — 99441 PR PHYS/QHP TELEPHONE EVALUATION 5-10 MIN: CPT | Performed by: STUDENT IN AN ORGANIZED HEALTH CARE EDUCATION/TRAINING PROGRAM

## 2020-12-31 RX ORDER — MELOXICAM 15 MG/1
TABLET ORAL
Qty: 30 TABLET | Refills: 0 | Status: SHIPPED | OUTPATIENT
Start: 2020-12-31 | End: 2021-01-25

## 2020-12-31 RX ORDER — BENZONATATE 100 MG/1
100 CAPSULE ORAL 3 TIMES DAILY PRN
Qty: 45 CAPSULE | Refills: 0 | Status: SHIPPED | OUTPATIENT
Start: 2020-12-31 | End: 2021-01-15

## 2020-12-31 NOTE — PROGRESS NOTES
I have seen the patient.  I have reviewed the notes, assessments, and/or procedures performed by *Nura Mcmillan MD  ** during office visit I concur with her/his documentation and assessment and plan for Zak Mack Lutz.              This document has been electronically signed by Tyshawn Pearce MD on December 31, 2020 10:42 CST
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
## 2021-01-13 ENCOUNTER — HOSPITAL ENCOUNTER (OUTPATIENT)
Dept: CT IMAGING | Facility: HOSPITAL | Age: 61
Discharge: HOME | End: 2021-01-13
Admitting: FAMILY MEDICINE

## 2021-01-13 DIAGNOSIS — R05.9 COUGH: ICD-10-CM

## 2021-01-13 PROCEDURE — 71250 CT THORAX DX C-: CPT

## 2021-01-18 ENCOUNTER — TELEPHONE (OUTPATIENT)
Dept: FAMILY MEDICINE CLINIC | Facility: CLINIC | Age: 61
End: 2021-01-18

## 2021-01-20 ENCOUNTER — DOCUMENTATION (OUTPATIENT)
Dept: FAMILY MEDICINE CLINIC | Facility: CLINIC | Age: 61
End: 2021-01-20

## 2021-01-20 NOTE — PROGRESS NOTES
Called patient to give results of CT chest on 1/20/21 at 4:44 pm    Signed,   Nura Mcmillan MD  Family Medicine Resident PGY3  [facility]        This document has been electronically signed by Nura Mcmillan MD on January 20, 2021 16:44 CST      
age(85 years old or older)

## 2021-01-22 ENCOUNTER — OFFICE VISIT (OUTPATIENT)
Dept: FAMILY MEDICINE CLINIC | Facility: CLINIC | Age: 61
End: 2021-01-22

## 2021-01-22 VITALS — HEIGHT: 73 IN | BODY MASS INDEX: 22.35 KG/M2

## 2021-01-22 DIAGNOSIS — R09.1 PLEURISY: Primary | ICD-10-CM

## 2021-01-22 DIAGNOSIS — J44.9 CHRONIC OBSTRUCTIVE PULMONARY DISEASE, UNSPECIFIED COPD TYPE (HCC): ICD-10-CM

## 2021-01-22 DIAGNOSIS — R05.8 POST-VIRAL COUGH SYNDROME: ICD-10-CM

## 2021-01-22 PROCEDURE — 99442 PR PHYS/QHP TELEPHONE EVALUATION 11-20 MIN: CPT | Performed by: STUDENT IN AN ORGANIZED HEALTH CARE EDUCATION/TRAINING PROGRAM
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
## 2021-01-23 DIAGNOSIS — R07.82 INTERCOSTAL PAIN: ICD-10-CM

## 2021-01-25 RX ORDER — MELOXICAM 15 MG/1
TABLET ORAL
Qty: 30 TABLET | Refills: 0 | Status: SHIPPED | OUTPATIENT
Start: 2021-01-25 | End: 2021-02-22

## 2021-01-28 ENCOUNTER — PROCEDURE VISIT (OUTPATIENT)
Dept: PULMONOLOGY | Facility: CLINIC | Age: 61
End: 2021-01-28

## 2021-01-28 DIAGNOSIS — J44.9 CHRONIC OBSTRUCTIVE PULMONARY DISEASE, UNSPECIFIED COPD TYPE (HCC): ICD-10-CM

## 2021-01-28 PROCEDURE — 94060 EVALUATION OF WHEEZING: CPT | Performed by: INTERNAL MEDICINE

## 2021-01-28 PROCEDURE — 94727 GAS DIL/WSHOT DETER LNG VOL: CPT | Performed by: INTERNAL MEDICINE

## 2021-01-28 PROCEDURE — 94729 DIFFUSING CAPACITY: CPT | Performed by: INTERNAL MEDICINE

## 2021-02-02 ENCOUNTER — TELEPHONE (OUTPATIENT)
Dept: FAMILY MEDICINE CLINIC | Facility: CLINIC | Age: 61
End: 2021-02-02

## 2021-02-02 DIAGNOSIS — Z76.0 ENCOUNTER FOR MEDICATION REFILL: ICD-10-CM

## 2021-02-02 DIAGNOSIS — R06.2 WHEEZING: Primary | ICD-10-CM

## 2021-02-02 RX ORDER — ALBUTEROL SULFATE 90 UG/1
2 AEROSOL, METERED RESPIRATORY (INHALATION) EVERY 4 HOURS PRN
Qty: 6.7 G | Refills: 3 | Status: SHIPPED | OUTPATIENT
Start: 2021-02-02 | End: 2021-03-04

## 2021-02-02 NOTE — TELEPHONE ENCOUNTER
Patient called needing the results of his PFT.    He advised his breathing has gotten worse and he needs an inhaler.     Please call patient [phone]    Thank you

## 2021-02-02 NOTE — TELEPHONE ENCOUNTER
Spoke with Pt- patient stated he is feeling better  -refilled albuterol and sent referral to Pulmonologist     Signed,   Nura Mcmillan MD  Family Medicine Resident PGY3  [facility]        This document has been electronically signed by Nura Mcmillan MD on February 2, 2021 15:49 CST

## 2021-02-18 ENCOUNTER — OFFICE VISIT (OUTPATIENT)
Dept: FAMILY MEDICINE CLINIC | Facility: CLINIC | Age: 61
End: 2021-02-18

## 2021-02-18 VITALS
DIASTOLIC BLOOD PRESSURE: 88 MMHG | HEIGHT: 73 IN | BODY MASS INDEX: 22.29 KG/M2 | HEART RATE: 105 BPM | WEIGHT: 168.2 LBS | SYSTOLIC BLOOD PRESSURE: 122 MMHG | OXYGEN SATURATION: 96 %

## 2021-02-18 DIAGNOSIS — R09.82 POST-NASAL DRIP: ICD-10-CM

## 2021-02-18 DIAGNOSIS — K21.9 GASTROESOPHAGEAL REFLUX DISEASE, UNSPECIFIED WHETHER ESOPHAGITIS PRESENT: Primary | ICD-10-CM

## 2021-02-18 PROCEDURE — 99213 OFFICE O/P EST LOW 20 MIN: CPT | Performed by: STUDENT IN AN ORGANIZED HEALTH CARE EDUCATION/TRAINING PROGRAM

## 2021-02-18 RX ORDER — OMEPRAZOLE 20 MG/1
40 TABLET, DELAYED RELEASE ORAL DAILY
Qty: 60 TABLET | Refills: 3 | Status: CANCELLED | OUTPATIENT
Start: 2021-02-18 | End: 2021-03-20

## 2021-02-18 RX ORDER — TRIAMCINOLONE ACETONIDE 55 UG/1
2 SPRAY, METERED NASAL DAILY
Qty: 16.5 G | Refills: 11 | Status: SHIPPED | OUTPATIENT
Start: 2021-02-18 | End: 2021-06-11 | Stop reason: SDUPTHER

## 2021-02-18 RX ORDER — OMEPRAZOLE 40 MG/1
40 CAPSULE, DELAYED RELEASE ORAL DAILY
Qty: 30 CAPSULE | Refills: 3 | Status: SHIPPED | OUTPATIENT
Start: 2021-02-18 | End: 2021-03-20
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
## 2021-02-20 DIAGNOSIS — R07.82 INTERCOSTAL PAIN: ICD-10-CM

## 2021-02-22 RX ORDER — MELOXICAM 15 MG/1
TABLET ORAL
Qty: 30 TABLET | Refills: 0 | Status: SHIPPED | OUTPATIENT
Start: 2021-02-22 | End: 2021-04-20 | Stop reason: SDUPTHER

## 2021-02-24 NOTE — PROGRESS NOTES
I have seen the patient.  I have reviewed the notes, assessments, and/or procedures performed by Nura Mcmillan MD, I concur with her/his documentation and assessment and plan for Zak Lutz.               This document has been electronically signed by Dhiraj Watkins MD on February 24, 2021 14:57 CST

## 2021-02-26 ENCOUNTER — PATIENT OUTREACH (OUTPATIENT)
Dept: FAMILY MEDICINE CLINIC | Facility: CLINIC | Age: 61
End: 2021-02-26

## 2021-02-26 DIAGNOSIS — I10 HYPERTENSION, UNSPECIFIED TYPE: ICD-10-CM

## 2021-02-26 RX ORDER — LISINOPRIL 20 MG/1
20 TABLET ORAL DAILY
Qty: 30 TABLET | Refills: 11 | Status: SHIPPED | OUTPATIENT
Start: 2021-02-26 | End: 2021-04-27 | Stop reason: SDUPTHER

## 2021-03-03 ENCOUNTER — OFFICE VISIT (OUTPATIENT)
Dept: FAMILY MEDICINE CLINIC | Facility: CLINIC | Age: 61
End: 2021-03-03

## 2021-03-03 VITALS — WEIGHT: 172 LBS | HEIGHT: 73 IN | BODY MASS INDEX: 22.8 KG/M2

## 2021-03-03 DIAGNOSIS — R05.8 PRODUCTIVE COUGH: ICD-10-CM

## 2021-03-03 DIAGNOSIS — J06.9 VIRAL URI WITH COUGH: Primary | ICD-10-CM

## 2021-03-03 PROCEDURE — 99441 PR PHYS/QHP TELEPHONE EVALUATION 5-10 MIN: CPT | Performed by: STUDENT IN AN ORGANIZED HEALTH CARE EDUCATION/TRAINING PROGRAM

## 2021-03-03 PROCEDURE — 87635 SARS-COV-2 COVID-19 AMP PRB: CPT | Performed by: FAMILY MEDICINE
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
## 2021-03-05 ENCOUNTER — TELEPHONE (OUTPATIENT)
Dept: FAMILY MEDICINE CLINIC | Facility: CLINIC | Age: 61
End: 2021-03-05

## 2021-03-05 NOTE — TELEPHONE ENCOUNTER
PATIENT CALLED TO LET PCP KNOW THAT HIS COVID TEST CAME BACK NEGATIVE.  PATIENT HAS A TELEPHONE VISIT ON Tuesday.  PATIENT IS STILL EXPERIENCING A COUGH AND HEADACHES.    CALL BACK NUMBER FOR HIM [phone].    THANKS,  TOMASA

## 2021-03-07 ENCOUNTER — APPOINTMENT (OUTPATIENT)
Dept: GENERAL RADIOLOGY | Facility: HOSPITAL | Age: 61
End: 2021-03-07

## 2021-03-07 ENCOUNTER — HOSPITAL ENCOUNTER (EMERGENCY)
Facility: HOSPITAL | Age: 61
Discharge: HOME | End: 2021-03-07
Attending: FAMILY MEDICINE | Admitting: FAMILY MEDICINE

## 2021-03-07 VITALS
SYSTOLIC BLOOD PRESSURE: 119 MMHG | HEIGHT: 73 IN | WEIGHT: 172 LBS | RESPIRATION RATE: 18 BRPM | DIASTOLIC BLOOD PRESSURE: 78 MMHG | HEART RATE: 82 BPM | BODY MASS INDEX: 22.8 KG/M2 | TEMPERATURE: 97.6 F | OXYGEN SATURATION: 99 %

## 2021-03-07 DIAGNOSIS — R51.9 NONINTRACTABLE HEADACHE, UNSPECIFIED CHRONICITY PATTERN, UNSPECIFIED HEADACHE TYPE: ICD-10-CM

## 2021-03-07 DIAGNOSIS — R05.9 COUGH: Primary | ICD-10-CM

## 2021-03-07 DIAGNOSIS — R09.82 POST-NASAL DRIP: ICD-10-CM

## 2021-03-07 LAB
ALBUMIN SERPL-MCNC: 4.2 G/DL (ref 3.5–5.2)
ALBUMIN/GLOB SERPL: 1.4 G/DL
ALP SERPL-CCNC: 52 U/L (ref 39–117)
ALT SERPL W P-5'-P-CCNC: 20 U/L (ref 1–41)
ANION GAP SERPL CALCULATED.3IONS-SCNC: 12 MMOL/L (ref 5–15)
AST SERPL-CCNC: 36 U/L (ref 1–40)
BASOPHILS # BLD AUTO: 0.04 10*3/MM3 (ref 0–0.2)
BASOPHILS NFR BLD AUTO: 0.8 % (ref 0–1.5)
BILIRUB SERPL-MCNC: 0.8 MG/DL (ref 0–1.2)
BUN SERPL-MCNC: 17 MG/DL (ref 8–23)
BUN/CREAT SERPL: 14.9 (ref 7–25)
CALCIUM SPEC-SCNC: 9 MG/DL (ref 8.6–10.5)
CHLORIDE SERPL-SCNC: 104 MMOL/L (ref 98–107)
CO2 SERPL-SCNC: 27 MMOL/L (ref 22–29)
CREAT SERPL-MCNC: 1.14 MG/DL (ref 0.76–1.27)
DEPRECATED RDW RBC AUTO: 41.8 FL (ref 37–54)
EOSINOPHIL # BLD AUTO: 0.41 10*3/MM3 (ref 0–0.4)
EOSINOPHIL NFR BLD AUTO: 7.7 % (ref 0.3–6.2)
ERYTHROCYTE [DISTWIDTH] IN BLOOD BY AUTOMATED COUNT: 12.7 % (ref 12.3–15.4)
GFR SERPL CREATININE-BSD FRML MDRD: 79 ML/MIN/1.73
GLOBULIN UR ELPH-MCNC: 2.9 GM/DL
GLUCOSE SERPL-MCNC: 118 MG/DL (ref 65–99)
HCT VFR BLD AUTO: 39.9 % (ref 37.5–51)
HGB BLD-MCNC: 13.8 G/DL (ref 13–17.7)
HOLD SPECIMEN: NORMAL
IMM GRANULOCYTES # BLD AUTO: 0 10*3/MM3 (ref 0–0.05)
IMM GRANULOCYTES NFR BLD AUTO: 0 % (ref 0–0.5)
LYMPHOCYTES # BLD AUTO: 2.57 10*3/MM3 (ref 0.7–3.1)
LYMPHOCYTES NFR BLD AUTO: 48.3 % (ref 19.6–45.3)
MCH RBC QN AUTO: 31 PG (ref 26.6–33)
MCHC RBC AUTO-ENTMCNC: 34.6 G/DL (ref 31.5–35.7)
MCV RBC AUTO: 89.7 FL (ref 79–97)
MONOCYTES # BLD AUTO: 0.42 10*3/MM3 (ref 0.1–0.9)
MONOCYTES NFR BLD AUTO: 7.9 % (ref 5–12)
NEUTROPHILS NFR BLD AUTO: 1.88 10*3/MM3 (ref 1.7–7)
NEUTROPHILS NFR BLD AUTO: 35.3 % (ref 42.7–76)
NRBC BLD AUTO-RTO: 0 /100 WBC (ref 0–0.2)
NT-PROBNP SERPL-MCNC: 16.8 PG/ML (ref 0–900)
PLATELET # BLD AUTO: 277 10*3/MM3 (ref 140–450)
PMV BLD AUTO: 9.3 FL (ref 6–12)
POTASSIUM SERPL-SCNC: 3.6 MMOL/L (ref 3.5–5.2)
PROT SERPL-MCNC: 7.1 G/DL (ref 6–8.5)
RBC # BLD AUTO: 4.45 10*6/MM3 (ref 4.14–5.8)
SODIUM SERPL-SCNC: 143 MMOL/L (ref 136–145)
WBC # BLD AUTO: 5.32 10*3/MM3 (ref 3.4–10.8)
WHOLE BLOOD HOLD SPECIMEN: NORMAL

## 2021-03-07 PROCEDURE — 80053 COMPREHEN METABOLIC PANEL: CPT | Performed by: NURSE PRACTITIONER

## 2021-03-07 PROCEDURE — 85025 COMPLETE CBC W/AUTO DIFF WBC: CPT | Performed by: NURSE PRACTITIONER

## 2021-03-07 PROCEDURE — 71045 X-RAY EXAM CHEST 1 VIEW: CPT

## 2021-03-07 PROCEDURE — 83880 ASSAY OF NATRIURETIC PEPTIDE: CPT | Performed by: NURSE PRACTITIONER

## 2021-03-07 PROCEDURE — 99283 EMERGENCY DEPT VISIT LOW MDM: CPT

## 2021-03-07 RX ORDER — HYDROCODONE BITARTRATE AND ACETAMINOPHEN 7.5; 325 MG/1; MG/1
1 TABLET ORAL ONCE
Status: COMPLETED | OUTPATIENT
Start: 2021-03-07 | End: 2021-03-07

## 2021-03-07 RX ORDER — DIPHENHYDRAMINE HCL 25 MG
25 CAPSULE ORAL EVERY 6 HOURS PRN
Qty: 12 CAPSULE | Refills: 0 | Status: SHIPPED | OUTPATIENT
Start: 2021-03-07 | End: 2021-03-10

## 2021-03-07 RX ORDER — DEXTROMETHORPHAN HYDROBROMIDE AND PROMETHAZINE HYDROCHLORIDE 15; 6.25 MG/5ML; MG/5ML
5 SYRUP ORAL 4 TIMES DAILY PRN
Qty: 180 ML | Refills: 0 | Status: SHIPPED | OUTPATIENT
Start: 2021-03-07 | End: 2021-03-10

## 2021-03-07 RX ORDER — IBUPROFEN 800 MG/1
800 TABLET ORAL ONCE
Status: COMPLETED | OUTPATIENT
Start: 2021-03-07 | End: 2021-03-07

## 2021-03-07 RX ADMIN — HYDROCODONE BITARTRATE AND ACETAMINOPHEN 1 TABLET: 7.5; 325 TABLET ORAL at 13:10

## 2021-03-07 RX ADMIN — IBUPROFEN 800 MG: 800 TABLET, FILM COATED ORAL at 12:17
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
## 2021-03-09 NOTE — PROGRESS NOTES
I have seen the patient.  I have reviewed the notes, assessments, and/or procedures performed by Gabriel Jerez MD during office visit I concur with her/his documentation and assessment and plan for Zak Frederick Bolivar.            This document has been electronically signed by Inocente Gage MD on March 9, 2021 09:23 CST

## 2021-03-15 ENCOUNTER — TELEPHONE (OUTPATIENT)
Dept: FAMILY MEDICINE CLINIC | Facility: CLINIC | Age: 61
End: 2021-03-15
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
## 2021-03-17 ENCOUNTER — IMMUNIZATION (OUTPATIENT)
Dept: VACCINE CLINIC | Facility: HOSPITAL | Age: 61
End: 2021-03-17

## 2021-03-17 PROCEDURE — 91300 HC SARSCOV02 VAC 30MCG/0.3ML IM: CPT | Performed by: THORACIC SURGERY (CARDIOTHORACIC VASCULAR SURGERY)

## 2021-03-17 PROCEDURE — 0001A: CPT | Performed by: THORACIC SURGERY (CARDIOTHORACIC VASCULAR SURGERY)

## 2021-03-18 ENCOUNTER — OFFICE VISIT (OUTPATIENT)
Dept: GASTROENTEROLOGY | Facility: CLINIC | Age: 61
End: 2021-03-18

## 2021-03-18 VITALS
HEIGHT: 73 IN | WEIGHT: 166 LBS | SYSTOLIC BLOOD PRESSURE: 144 MMHG | DIASTOLIC BLOOD PRESSURE: 94 MMHG | HEART RATE: 124 BPM | BODY MASS INDEX: 22 KG/M2

## 2021-03-18 DIAGNOSIS — R13.19 OTHER DYSPHAGIA: Primary | ICD-10-CM

## 2021-03-18 PROCEDURE — 99214 OFFICE O/P EST MOD 30 MIN: CPT | Performed by: INTERNAL MEDICINE

## 2021-03-18 RX ORDER — DEXTROSE AND SODIUM CHLORIDE 5; .45 G/100ML; G/100ML
30 INJECTION, SOLUTION INTRAVENOUS CONTINUOUS PRN
Status: CANCELLED | OUTPATIENT
Start: 2021-03-24
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
## 2021-03-18 NOTE — PATIENT INSTRUCTIONS
MyPlate from USDA    MyPlate is an outline of a general healthy diet based on the 2010 Dietary Guidelines for Americans, from the U.S. Department of Agriculture (USDA). It sets guidelines for how much food you should eat from each food group based on your age, sex, and level of physical activity.  What are tips for following MyPlate?  To follow MyPlate recommendations:  · Eat a wide variety of fruits and vegetables, grains, and protein foods.  · Serve smaller portions and eat less food throughout the day.  · Limit portion sizes to avoid overeating.  · Enjoy your food.  · Get at least 150 minutes of exercise every week. This is about 30 minutes each day, 5 or more days per week.  It can be difficult to have every meal look like MyPlate. Think about MyPlate as eating guidelines for an entire day, rather than each individual meal.  Fruits and vegetables  · Make half of your plate fruits and vegetables.  · Eat many different colors of fruits and vegetables each day.  · For a 2,000 calorie daily food plan, eat:  ? 2½ cups of vegetables every day.  ? 2 cups of fruit every day.  · 1 cup is equal to:  ? 1 cup raw or cooked vegetables.  ? 1 cup raw fruit.  ? 1 medium-sized orange, apple, or banana.  ? 1 cup 100% fruit or vegetable juice.  ? 2 cups raw leafy greens, such as lettuce, spinach, or kale.  ? ½ cup dried fruit.  Grains  · One fourth of your plate should be grains.  · Make at least half of the grains you eat each day whole grains.  · For a 2,000 calorie daily food plan, eat 6 oz of grains every day.  · 1 oz is equal to:  ? 1 slice bread.  ? 1 cup cereal.  ? ½ cup cooked rice, cereal, or pasta.  Protein  · One fourth of your plate should be protein.  · Eat a wide variety of protein foods, including meat, poultry, fish, eggs, beans, nuts, and tofu.  · For a 2,000 calorie daily food plan, eat 5½ oz of protein every day.  · 1 oz is equal to:  ? 1 oz meat, poultry, or fish.  ? ¼ cup cooked beans.  ? 1 egg.  ? ½ oz nuts  or seeds.  ? 1 Tbsp peanut butter.  Dairy  · Drink fat-free or low-fat (1%) milk.  · Eat or drink dairy as a side to meals.  · For a 2,000 calorie daily food plan, eat or drink 3 cups of dairy every day.  · 1 cup is equal to:  ? 1 cup milk, yogurt, cottage cheese, or soy milk (soy beverage).  ? 2 oz processed cheese.  ? 1½ oz natural cheese.  Fats, oils, salt, and sugars  · Only small amounts of oils are recommended.  · Avoid foods that are high in calories and low in nutritional value (empty calories), like foods high in fat or added sugars.  · Choose foods that are low in salt (sodium). Choose foods that have less than 140 milligrams (mg) of sodium per serving.  · Drink water instead of sugary drinks. Drink enough water each day to keep your urine pale yellow.  Where to find support  · Work with your health care provider or a nutrition specialist (dietitian) to develop a customized eating plan that is right for you.  · Download an sebastian (mobile application) to help you track your daily food intake.  Where to find more information  · Go to ChooseMyPlate.gov for more information.  Summary  · MyPlate is a general guideline for healthy eating from the USDA. It is based on the 2010 Dietary Guidelines for Americans.  · In general, fruits and vegetables should take up ½ of your plate, grains should take up ¼ of your plate, and protein should take up ¼ of your plate.  This information is not intended to replace advice given to you by your health care provider. Make sure you discuss any questions you have with your health care provider.  Document Revised: 05/21/2020 Document Reviewed: 03/19/2018  Elsevier Patient Education © 2021 [employer]  BMI for Adults  What is BMI?  Body mass index (BMI) is a number that is calculated from a person's weight and height. BMI can help estimate how much of a person's weight is composed of fat. BMI does not measure body fat directly. Rather, it is an alternative to procedures that  "directly measure body fat, which can be difficult and expensive.  BMI can help identify people who may be at higher risk for certain medical problems.  What are BMI measurements used for?  BMI is used as a screening tool to identify possible weight problems. It helps determine whether a person is obese, overweight, a healthy weight, or underweight.  BMI is useful for:  · Identifying a weight problem that may be related to a medical condition or may increase the risk for medical problems.  · Promoting changes, such as changes in diet and exercise, to help reach a healthy weight. BMI screening can be repeated to see if these changes are working.  How is BMI calculated?  BMI involves measuring your weight in relation to your height. Both height and weight are measured, and the BMI is calculated from those numbers. This can be done either in English ([place] or metric measurements. Note that charts and online BMI calculators are available to help you find your BMI quickly and easily without having to do these calculations yourself.  To calculate your BMI in English ([place].S[place] measurements:    1. Measure your weight in pounds (lb).  2. Multiply the number of pounds by 703.  ? For example, for a person who weighs 180 lb, multiply that number by 703, which equals 126,540.  3. Measure your height in inches. Then multiply that number by itself to get a measurement called \"inches squared.\"  ? For example, for a person who is 70 inches tall, the \"inches squared\" measurement is 70 inches x 70 inches, which equals 4,900 inches squared.  4. Divide the total from step 2 (number of lb x 703) by the total from step 3 (inches squared): 126,540 ÷ 4,900 = 25.8. This is your BMI.  To calculate your BMI in metric measurements:  1. Measure your weight in kilograms (kg).  2. Measure your height in meters (m). Then multiply that number by itself to get a measurement called \"meters squared.\"  ? For example, for a person who is 1.75 m tall, the " "\"meters squared\" measurement is 1.75 m x 1.75 m, which is equal to 3.1 meters squared.  3. Divide the number of kilograms (your weight) by the meters squared number. In this example: 70 ÷ 3.1 = 22.6. This is your BMI.  What do the results mean?  BMI charts are used to identify whether you are underweight, normal weight, overweight, or obese. The following guidelines will be used:  · Underweight: BMI less than 18.5.  · Normal weight: BMI between 18.5 and 24.9.  · Overweight: BMI between 25 and 29.9.  · Obese: BMI of 30 or above.  Keep these notes in mind:  · Weight includes both fat and muscle, so someone with a muscular build, such as an athlete, may have a BMI that is higher than 24.9. In cases like these, BMI is not an accurate measure of body fat.  · To determine if excess body fat is the cause of a BMI of 25 or higher, further assessments may need to be done by a health care provider.  · BMI is usually interpreted in the same way for men and women.  Where to find more information  For more information about BMI, including tools to quickly calculate your BMI, go to these websites:  · Centers for Disease Control and Prevention: www.cdc.gov  · American Heart Association: www.heart.org  · National Heart, Lung, and Blood [place]: www.nhlbi.nih.gov  Summary  · Body mass index (BMI) is a number that is calculated from a person's weight and height.  · BMI may help estimate how much of a person's weight is composed of fat. BMI can help identify those who may be at higher risk for certain medical problems.  · BMI can be measured using English measurements or metric measurements.  · BMI charts are used to identify whether you are underweight, normal weight, overweight, or obese.  This information is not intended to replace advice given to you by your health care provider. Make sure you discuss any questions you have with your health care provider.  Document Revised: 09/09/2020 Document Reviewed: 07/17/2020  [place] " Patient Education © 2021 [employer]
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
## 2021-03-21 ENCOUNTER — LAB (OUTPATIENT)
Dept: LAB | Facility: HOSPITAL | Age: 61
End: 2021-03-21

## 2021-03-21 DIAGNOSIS — Z01.818 PREOP TESTING: Primary | ICD-10-CM

## 2021-03-21 PROCEDURE — U0004 COV-19 TEST NON-CDC HGH THRU: HCPCS

## 2021-03-21 PROCEDURE — C9803 HOPD COVID-19 SPEC COLLECT: HCPCS

## 2021-03-22 LAB — SARS-COV-2 ORF1AB RESP QL NAA+PROBE: NOT DETECTED

## 2021-03-24 ENCOUNTER — ANESTHESIA EVENT (OUTPATIENT)
Dept: GASTROENTEROLOGY | Facility: HOSPITAL | Age: 61
End: 2021-03-24

## 2021-03-24 ENCOUNTER — HOSPITAL ENCOUNTER (OUTPATIENT)
Facility: HOSPITAL | Age: 61
Setting detail: HOSPITAL OUTPATIENT SURGERY
Discharge: HOME | End: 2021-03-24
Attending: INTERNAL MEDICINE | Admitting: INTERNAL MEDICINE

## 2021-03-24 ENCOUNTER — ANESTHESIA (OUTPATIENT)
Dept: GASTROENTEROLOGY | Facility: HOSPITAL | Age: 61
End: 2021-03-24

## 2021-03-24 VITALS
OXYGEN SATURATION: 98 % | DIASTOLIC BLOOD PRESSURE: 105 MMHG | WEIGHT: 167 LBS | HEIGHT: 73 IN | HEART RATE: 78 BPM | RESPIRATION RATE: 18 BRPM | BODY MASS INDEX: 22.13 KG/M2 | SYSTOLIC BLOOD PRESSURE: 180 MMHG | TEMPERATURE: 96.7 F

## 2021-03-24 DIAGNOSIS — R13.19 OTHER DYSPHAGIA: ICD-10-CM

## 2021-03-24 PROCEDURE — 43239 EGD BIOPSY SINGLE/MULTIPLE: CPT | Performed by: INTERNAL MEDICINE

## 2021-03-24 PROCEDURE — 43248 EGD GUIDE WIRE INSERTION: CPT | Performed by: INTERNAL MEDICINE

## 2021-03-24 PROCEDURE — 25010000002 PROPOFOL 10 MG/ML EMULSION: Performed by: NURSE ANESTHETIST, CERTIFIED REGISTERED

## 2021-03-24 RX ORDER — LIDOCAINE HYDROCHLORIDE 20 MG/ML
INJECTION, SOLUTION INTRAVENOUS AS NEEDED
Status: DISCONTINUED | OUTPATIENT
Start: 2021-03-24 | End: 2021-03-24 | Stop reason: SURG

## 2021-03-24 RX ORDER — DEXTROSE AND SODIUM CHLORIDE 5; .45 G/100ML; G/100ML
30 INJECTION, SOLUTION INTRAVENOUS CONTINUOUS PRN
Status: DISCONTINUED | OUTPATIENT
Start: 2021-03-24 | End: 2021-03-24 | Stop reason: HOSPADM

## 2021-03-24 RX ORDER — PROPOFOL 10 MG/ML
VIAL (ML) INTRAVENOUS AS NEEDED
Status: DISCONTINUED | OUTPATIENT
Start: 2021-03-24 | End: 2021-03-24 | Stop reason: SURG

## 2021-03-24 RX ADMIN — PROPOFOL 50 MG: 10 INJECTION, EMULSION INTRAVENOUS at 14:24

## 2021-03-24 RX ADMIN — PROPOFOL 50 MG: 10 INJECTION, EMULSION INTRAVENOUS at 14:28

## 2021-03-24 RX ADMIN — DEXTROSE AND SODIUM CHLORIDE: 5; 450 INJECTION, SOLUTION INTRAVENOUS at 14:22

## 2021-03-24 RX ADMIN — PROPOFOL 50 MG: 10 INJECTION, EMULSION INTRAVENOUS at 14:26

## 2021-03-24 RX ADMIN — LIDOCAINE HYDROCHLORIDE 100 MG: 20 INJECTION, SOLUTION INTRAVENOUS at 14:22

## 2021-03-24 RX ADMIN — PROPOFOL 100 MG: 10 INJECTION, EMULSION INTRAVENOUS at 14:22

## 2021-03-24 NOTE — ANESTHESIA PREPROCEDURE EVALUATION
Anesthesia Evaluation     Patient summary reviewed and Nursing notes reviewed   NPO Solid Status: > 8 hours  NPO Liquid Status: > 8 hours           Airway   Mallampati: II  TM distance: >3 FB  Neck ROM: full  No difficulty expected  Dental          Pulmonary - normal exam   (+) a smoker Current, COPD mild, asthma,  Cardiovascular - normal exam    (+) hypertension well controlled less than 2 medications, hyperlipidemia,       Neuro/Psych  (+) numbness,     GI/Hepatic/Renal/Endo    (+)  GERD,      Musculoskeletal     (+) neck pain,   Abdominal  - normal exam   Substance History - negative use     OB/GYN negative ob/gyn ROS         Other   arthritis,                      Anesthesia Plan    ASA 3     MAC     intravenous induction     Anesthetic plan, all risks, benefits, and alternatives have been provided, discussed and informed consent has been obtained with: patient.

## 2021-03-24 NOTE — ANESTHESIA POSTPROCEDURE EVALUATION
Patient: Zak Lutz    Procedure Summary     Date: 03/24/21 Room / Location: [facility] ENDOSCOPY 1 / [facility] ENDOSCOPY    Anesthesia Start: 1422 Anesthesia Stop: 1433    Procedure: ESOPHAGOGASTRODUODENOSCOPY (N/A ) Diagnosis:       Other dysphagia      (Other dysphagia [R13.19])    Surgeons: Chidi Alex MD Provider: Lisandra Lugo CRNA    Anesthesia Type: MAC ASA Status: 3          Anesthesia Type: MAC    Vitals  No vitals data found for the desired time range.          Post Anesthesia Care and Evaluation    Patient location during evaluation: bedside  Patient participation: waiting for patient participation  Level of consciousness: sleepy but conscious  Pain score: 0  Pain management: adequate  Airway patency: patent  Anesthetic complications: No anesthetic complications  PONV Status: none  Cardiovascular status: acceptable  Respiratory status: acceptable  Hydration status: acceptable

## 2021-03-29 LAB
LAB AP CASE REPORT: NORMAL
PATH REPORT.FINAL DX SPEC: NORMAL

## 2021-04-01 ENCOUNTER — OFFICE VISIT (OUTPATIENT)
Dept: GASTROENTEROLOGY | Facility: CLINIC | Age: 61
End: 2021-04-01

## 2021-04-01 VITALS
WEIGHT: 169.6 LBS | HEART RATE: 83 BPM | DIASTOLIC BLOOD PRESSURE: 74 MMHG | BODY MASS INDEX: 22.48 KG/M2 | SYSTOLIC BLOOD PRESSURE: 128 MMHG | HEIGHT: 73 IN

## 2021-04-01 DIAGNOSIS — R13.19 OTHER DYSPHAGIA: ICD-10-CM

## 2021-04-01 DIAGNOSIS — A04.8 H. PYLORI INFECTION: Primary | ICD-10-CM

## 2021-04-01 PROCEDURE — 99214 OFFICE O/P EST MOD 30 MIN: CPT | Performed by: INTERNAL MEDICINE

## 2021-04-01 RX ORDER — CLARITHROMYCIN 500 MG/1
500 TABLET, COATED ORAL 2 TIMES DAILY
Qty: 28 TABLET | Refills: 0 | Status: SHIPPED | OUTPATIENT
Start: 2021-04-01 | End: 2021-06-11

## 2021-04-01 RX ORDER — OMEPRAZOLE 20 MG/1
20 TABLET, DELAYED RELEASE ORAL 2 TIMES DAILY
Qty: 60 TABLET | Refills: 0 | Status: SHIPPED | OUTPATIENT
Start: 2021-04-01 | End: 2021-06-21

## 2021-04-01 RX ORDER — AMOXICILLIN 500 MG/1
1000 CAPSULE ORAL 2 TIMES DAILY
Qty: 56 CAPSULE | Refills: 0 | Status: SHIPPED | OUTPATIENT
Start: 2021-04-01 | End: 2021-05-25
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
## 2021-04-01 NOTE — PATIENT INSTRUCTIONS
Dysphagia    Dysphagia is trouble swallowing. This condition occurs when solids and liquids stick in a person's throat on the way down to the stomach, or when food takes longer to get to the stomach than usual.  You may have problems swallowing food, liquids, or both. You may also have pain while trying to swallow. It may take you more time and effort to swallow something.  What are the causes?  This condition may be caused by:  · Muscle problems. They may make it difficult for you to move food and liquids through the esophagus, which is the tube that connects your mouth to your stomach.  · Blockages. You may have ulcers, scar tissue, or inflammation that blocks the normal passage of food and liquids. Causes of these problems include:  ? Acid reflux from your stomach into your esophagus (gastroesophageal reflux).  ? Infections.  ? Radiation treatment for cancer.  ? Medicines taken without enough fluids to wash them down into your stomach.  · Stroke. This can affect the nerves and make it difficult to swallow.  · Nerve problems. These prevent signals from being sent to the muscles of your esophagus to squeeze (contract) and move what you swallow down to your stomach.  · Globus pharyngeus. This is a common problem that involves a feeling like something is stuck in your throat or a sense of trouble with swallowing, even though nothing is wrong with the swallowing passages.  · Certain conditions, such as cerebral palsy or Parkinson's disease.  What are the signs or symptoms?  Common symptoms of this condition include:  · A feeling that solids or liquids are stuck in your throat on the way down to the stomach.  · Pain while swallowing.  · Coughing or gagging while trying to swallow.  Other symptoms include:  · Food moving back from your stomach to your mouth (regurgitation).  · Noises coming from your throat.  · Chest discomfort with swallowing.  · A feeling of fullness when swallowing.  · Drooling, especially when the  throat is blocked.  · Heartburn.  How is this diagnosed?  This condition may be diagnosed by:  · Barium X-ray. In this test, you will swallow a white liquid that sticks to the inside of your esophagus. X-ray images are then taken.  · Endoscopy. In this test, a flexible telescope is inserted down your throat to look at your esophagus and your stomach.  · CT scans and an MRI.  How is this treated?  Treatment for dysphagia depends on the cause of this condition, such as:  · If the dysphagia is caused by acid reflux or infection, medicines may be used. They may include antibiotics and heartburn medicines.  · If the dysphagia is caused by problems with the muscles, swallowing therapy may be used to help you strengthen your swallowing muscles. You may have to do specific exercises to strengthen the muscles or stretch them.  · If the dysphagia is caused by a blockage or mass, procedures to remove the blockage may be done. You may need surgery and a feeding tube.  You may need to make diet changes. Ask your health care provider for specific instructions.  Follow these instructions at home:  Medicines  · Take over-the-counter and prescription medicines only as told by your health care provider.  · If you were prescribed an antibiotic medicine, take it as told by your health care provider. Do not stop taking the antibiotic even if you start to feel better.  Eating and drinking    · Follow any diet changes as told by your health care provider.  · Work with a diet and nutrition specialist (dietitian) to create an eating plan that will help you get the nutrients you need in order to stay healthy.  · Eat soft foods that are easier to swallow.  · Cut your food into small pieces and eat slowly. Take small bites.  · Eat and drink only when you are sitting upright.  · Do not drink alcohol or caffeine. If you need help quitting, ask your health care provider.  General instructions  · Check your weight every day to make sure you are  not losing weight.  · Do not use any products that contain nicotine or tobacco, such as cigarettes, e-cigarettes, and chewing tobacco. If you need help quitting, ask your health care provider.  · Keep all follow-up visits as told by your health care provider. This is important.  Contact a health care provider if you:  · Lose weight because you cannot swallow.  · Cough when you drink liquids.  · Cough up partially digested food.  Get help right away if you:  · Cannot swallow your saliva.  · Have shortness of breath, a fever, or both.  · Have a hoarse voice and also have trouble swallowing.  Summary  · Dysphagia is trouble swallowing. This condition occurs when solids and liquids stick in a person's throat on the way down to the stomach. You may cough or gag while trying to swallow.  · Dysphagia has many possible causes.  · Treatment for dysphagia depends on the cause of the condition.  · Keep all follow-up visits as told by your health care provider. This is important.  This information is not intended to replace advice given to you by your health care provider. Make sure you discuss any questions you have with your health care provider.  Document Revised: 05/13/2020 Document Reviewed: 05/13/2020  [employer] Patient Education © 2021 [employer]

## 2021-04-07 ENCOUNTER — IMMUNIZATION (OUTPATIENT)
Dept: VACCINE CLINIC | Facility: HOSPITAL | Age: 61
End: 2021-04-07

## 2021-04-07 PROCEDURE — 91300 HC SARSCOV02 VAC 30MCG/0.3ML IM: CPT | Performed by: THORACIC SURGERY (CARDIOTHORACIC VASCULAR SURGERY)

## 2021-04-07 PROCEDURE — 0002A: CPT | Performed by: THORACIC SURGERY (CARDIOTHORACIC VASCULAR SURGERY)

## 2021-04-09 ENCOUNTER — TELEPHONE (OUTPATIENT)
Dept: GASTROENTEROLOGY | Facility: CLINIC | Age: 61
End: 2021-04-09

## 2021-04-09 NOTE — TELEPHONE ENCOUNTER
04/09/20212, 0928 - Facsimile received from [facility] stating patient's insurance will only provide benefit coverage for Omeprazole 20 MG Delayed Release Capsules once daily not twice daily.  Patient diagnosed with H. Pylori 04/01/2021 per Dr. Chidi Sloan M.D.  [facility] telephoned per this staff member [phone].  Spoke with pharmacy personnelHoney.  Per ARTURO Sloan, patient to purchase Over-The-Counter Omeprazole to supplement to equal twice per day dosing X 30 days.        04/09/2021, 0932 - Patient telephoned per this [employer] member [phone] with notification insurance will only provide benefit coverage for Omeprazole 20 MG Delayed Release Capsule once daily not twice daily.  Patient made aware he is to purchase Over-The-Counter Omeprazole to supplement dosing to equal twice per day dosing X 30 days.  Patient made aware this staff member has spoken to [facility] regarding matter.  Patient verbalized understanding.

## 2021-04-20 DIAGNOSIS — R07.82 INTERCOSTAL PAIN: ICD-10-CM

## 2021-04-20 RX ORDER — MELOXICAM 15 MG/1
15 TABLET ORAL DAILY
Qty: 30 TABLET | Refills: 11 | Status: SHIPPED | OUTPATIENT
Start: 2021-04-20 | End: 2022-01-14

## 2021-04-27 ENCOUNTER — OFFICE VISIT (OUTPATIENT)
Dept: FAMILY MEDICINE CLINIC | Facility: CLINIC | Age: 61
End: 2021-04-27

## 2021-04-27 VITALS
BODY MASS INDEX: 22.17 KG/M2 | HEIGHT: 73 IN | DIASTOLIC BLOOD PRESSURE: 110 MMHG | TEMPERATURE: 97.7 F | HEART RATE: 86 BPM | OXYGEN SATURATION: 96 % | WEIGHT: 167.3 LBS | SYSTOLIC BLOOD PRESSURE: 200 MMHG

## 2021-04-27 DIAGNOSIS — M25.561 RIGHT KNEE PAIN, UNSPECIFIED CHRONICITY: ICD-10-CM

## 2021-04-27 DIAGNOSIS — I10 HYPERTENSION, UNSPECIFIED TYPE: ICD-10-CM

## 2021-04-27 DIAGNOSIS — Z00.00 HEALTH CARE MAINTENANCE: Primary | ICD-10-CM

## 2021-04-27 PROCEDURE — 99213 OFFICE O/P EST LOW 20 MIN: CPT | Performed by: STUDENT IN AN ORGANIZED HEALTH CARE EDUCATION/TRAINING PROGRAM

## 2021-04-27 RX ORDER — LISINOPRIL 30 MG/1
30 TABLET ORAL DAILY
Qty: 30 TABLET | Refills: 0 | Status: SHIPPED | OUTPATIENT
Start: 2021-04-27 | End: 2021-05-20

## 2021-04-27 NOTE — PROGRESS NOTES
I have seen the patient.  I have reviewed the notes, assessments, and/or procedures performed by Dr. Mcmillan, I concur with her/his documentation and assessment and plan for Zak Lutz.       This document has been electronically signed by Parminder Govea MD on April 27, 2021 16:58 CDT
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
## 2021-05-04 ENCOUNTER — HOSPITAL ENCOUNTER (OUTPATIENT)
Dept: PHYSICAL THERAPY | Facility: HOSPITAL | Age: 61
Setting detail: THERAPIES SERIES
Discharge: HOME | End: 2021-05-04

## 2021-05-04 DIAGNOSIS — M25.561 CHRONIC PAIN OF RIGHT KNEE: Primary | ICD-10-CM

## 2021-05-04 DIAGNOSIS — G89.29 CHRONIC PAIN OF RIGHT KNEE: Primary | ICD-10-CM

## 2021-05-04 PROCEDURE — 97162 PT EVAL MOD COMPLEX 30 MIN: CPT | Performed by: PHYSICAL THERAPIST
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
## 2021-05-11 ENCOUNTER — HOSPITAL ENCOUNTER (OUTPATIENT)
Dept: PHYSICAL THERAPY | Facility: HOSPITAL | Age: 61
Setting detail: THERAPIES SERIES
Discharge: HOME | End: 2021-05-11

## 2021-05-11 ENCOUNTER — OFFICE VISIT (OUTPATIENT)
Dept: FAMILY MEDICINE CLINIC | Facility: CLINIC | Age: 61
End: 2021-05-11

## 2021-05-11 VITALS
HEART RATE: 82 BPM | DIASTOLIC BLOOD PRESSURE: 78 MMHG | HEIGHT: 73 IN | WEIGHT: 167 LBS | SYSTOLIC BLOOD PRESSURE: 168 MMHG | BODY MASS INDEX: 22.13 KG/M2 | OXYGEN SATURATION: 99 %

## 2021-05-11 DIAGNOSIS — I10 ESSENTIAL HYPERTENSION: ICD-10-CM

## 2021-05-11 DIAGNOSIS — G89.29 CHRONIC PAIN OF RIGHT KNEE: Primary | ICD-10-CM
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
## 2021-05-11 DIAGNOSIS — S40.861A TICK BITE OF AXILLARY REGION, RIGHT, INITIAL ENCOUNTER: Primary | ICD-10-CM

## 2021-05-11 DIAGNOSIS — M25.561 CHRONIC PAIN OF RIGHT KNEE: Primary | ICD-10-CM

## 2021-05-11 PROCEDURE — 99213 OFFICE O/P EST LOW 20 MIN: CPT | Performed by: STUDENT IN AN ORGANIZED HEALTH CARE EDUCATION/TRAINING PROGRAM

## 2021-05-11 PROCEDURE — 97110 THERAPEUTIC EXERCISES: CPT

## 2021-05-11 RX ORDER — AMLODIPINE BESYLATE 5 MG/1
5 TABLET ORAL DAILY
Qty: 30 TABLET | Refills: 1 | Status: SHIPPED | OUTPATIENT
Start: 2021-05-11 | End: 2021-05-25 | Stop reason: SDUPTHER

## 2021-05-11 RX ORDER — DOXYCYCLINE 100 MG/1
100 CAPSULE ORAL 2 TIMES DAILY
Qty: 28 CAPSULE | Refills: 0 | Status: SHIPPED | OUTPATIENT
Start: 2021-05-11 | End: 2021-05-25
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
## 2021-05-11 NOTE — PROGRESS NOTES
I have seen the patient.  I have reviewed the notes, assessments, and/or procedures performed by dr Mckeon, I concur with her/his documentation and assessment and plan for Zak Lutz.               This document has been electronically signed by Dhiraj Watkins MD on May 11, 2021 13:53 CDT
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
## 2021-05-13 ENCOUNTER — HOSPITAL ENCOUNTER (OUTPATIENT)
Dept: PHYSICAL THERAPY | Facility: HOSPITAL | Age: 61
Setting detail: THERAPIES SERIES
Discharge: HOME | End: 2021-05-13

## 2021-05-13 DIAGNOSIS — G89.29 CHRONIC PAIN OF RIGHT KNEE: Primary | ICD-10-CM

## 2021-05-13 DIAGNOSIS — M25.561 CHRONIC PAIN OF RIGHT KNEE: Primary | ICD-10-CM

## 2021-05-13 PROCEDURE — 97112 NEUROMUSCULAR REEDUCATION: CPT | Performed by: PHYSICAL THERAPIST

## 2021-05-13 PROCEDURE — 97110 THERAPEUTIC EXERCISES: CPT | Performed by: PHYSICAL THERAPIST
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
## 2021-05-18 ENCOUNTER — HOSPITAL ENCOUNTER (OUTPATIENT)
Dept: PHYSICAL THERAPY | Facility: HOSPITAL | Age: 61
Setting detail: THERAPIES SERIES
Discharge: HOME | End: 2021-05-18

## 2021-05-18 DIAGNOSIS — G89.29 CHRONIC PAIN OF RIGHT KNEE: Primary | ICD-10-CM

## 2021-05-18 DIAGNOSIS — M25.561 CHRONIC PAIN OF RIGHT KNEE: Primary | ICD-10-CM

## 2021-05-18 PROCEDURE — 97110 THERAPEUTIC EXERCISES: CPT
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
## 2021-05-20 ENCOUNTER — HOSPITAL ENCOUNTER (OUTPATIENT)
Dept: PHYSICAL THERAPY | Facility: HOSPITAL | Age: 61
Setting detail: THERAPIES SERIES
Discharge: HOME | End: 2021-05-20

## 2021-05-20 DIAGNOSIS — I10 HYPERTENSION, UNSPECIFIED TYPE: ICD-10-CM

## 2021-05-20 DIAGNOSIS — G89.29 CHRONIC PAIN OF RIGHT KNEE: Primary | ICD-10-CM

## 2021-05-20 DIAGNOSIS — M25.561 CHRONIC PAIN OF RIGHT KNEE: Primary | ICD-10-CM

## 2021-05-20 PROCEDURE — 97110 THERAPEUTIC EXERCISES: CPT

## 2021-05-20 RX ORDER — LISINOPRIL 30 MG/1
TABLET ORAL
Qty: 30 TABLET | Refills: 2 | Status: SHIPPED | OUTPATIENT
Start: 2021-05-20 | End: 2021-05-25 | Stop reason: SDUPTHER
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
## 2021-05-25 ENCOUNTER — HOSPITAL ENCOUNTER (OUTPATIENT)
Dept: PHYSICAL THERAPY | Facility: HOSPITAL | Age: 61
Setting detail: THERAPIES SERIES
Discharge: HOME | End: 2021-05-25

## 2021-05-25 ENCOUNTER — OFFICE VISIT (OUTPATIENT)
Dept: FAMILY MEDICINE CLINIC | Facility: CLINIC | Age: 61
End: 2021-05-25

## 2021-05-25 VITALS
BODY MASS INDEX: 22.31 KG/M2 | HEIGHT: 73 IN | DIASTOLIC BLOOD PRESSURE: 88 MMHG | HEART RATE: 84 BPM | SYSTOLIC BLOOD PRESSURE: 150 MMHG | OXYGEN SATURATION: 94 % | WEIGHT: 168.3 LBS
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
## 2021-05-25 DIAGNOSIS — I10 ESSENTIAL HYPERTENSION: Primary | ICD-10-CM

## 2021-05-25 DIAGNOSIS — G89.29 CHRONIC PAIN OF RIGHT KNEE: Primary | ICD-10-CM

## 2021-05-25 DIAGNOSIS — M25.561 CHRONIC PAIN OF RIGHT KNEE: Primary | ICD-10-CM

## 2021-05-25 DIAGNOSIS — S30.861D TICK BITE OF ABDOMEN, SUBSEQUENT ENCOUNTER: ICD-10-CM

## 2021-05-25 PROCEDURE — 97110 THERAPEUTIC EXERCISES: CPT

## 2021-05-25 PROCEDURE — 99213 OFFICE O/P EST LOW 20 MIN: CPT | Performed by: STUDENT IN AN ORGANIZED HEALTH CARE EDUCATION/TRAINING PROGRAM

## 2021-05-25 RX ORDER — AMLODIPINE BESYLATE 10 MG/1
10 TABLET ORAL DAILY
Qty: 30 TABLET | Refills: 1 | Status: SHIPPED | OUTPATIENT
Start: 2021-05-25 | End: 2021-06-11 | Stop reason: SDUPTHER

## 2021-05-25 RX ORDER — OMEPRAZOLE 40 MG/1
CAPSULE, DELAYED RELEASE ORAL
COMMUNITY
Start: 2021-05-23 | End: 2021-06-21

## 2021-05-25 RX ORDER — LISINOPRIL 40 MG/1
40 TABLET ORAL DAILY
Qty: 30 TABLET | Refills: 1 | Status: SHIPPED | OUTPATIENT
Start: 2021-05-25 | End: 2021-06-11 | Stop reason: SDUPTHER
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
## 2021-05-27 ENCOUNTER — HOSPITAL ENCOUNTER (OUTPATIENT)
Dept: PHYSICAL THERAPY | Facility: HOSPITAL | Age: 61
Setting detail: THERAPIES SERIES
Discharge: HOME | End: 2021-05-27

## 2021-05-27 DIAGNOSIS — M25.561 CHRONIC PAIN OF RIGHT KNEE: Primary | ICD-10-CM

## 2021-05-27 DIAGNOSIS — G89.29 CHRONIC PAIN OF RIGHT KNEE: Primary | ICD-10-CM

## 2021-05-27 PROCEDURE — 97110 THERAPEUTIC EXERCISES: CPT | Performed by: PHYSICAL THERAPIST
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
## 2021-06-02 ENCOUNTER — HOSPITAL ENCOUNTER (OUTPATIENT)
Dept: PHYSICAL THERAPY | Facility: HOSPITAL | Age: 61
Setting detail: THERAPIES SERIES
Discharge: HOME | End: 2021-06-02

## 2021-06-02 DIAGNOSIS — G89.29 CHRONIC PAIN OF RIGHT KNEE: Primary | ICD-10-CM

## 2021-06-02 DIAGNOSIS — M25.561 CHRONIC PAIN OF RIGHT KNEE: Primary | ICD-10-CM

## 2021-06-02 PROCEDURE — 97110 THERAPEUTIC EXERCISES: CPT
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
## 2021-06-08 ENCOUNTER — APPOINTMENT (OUTPATIENT)
Dept: PHYSICAL THERAPY | Facility: HOSPITAL | Age: 61
End: 2021-06-08

## 2021-06-09 ENCOUNTER — APPOINTMENT (OUTPATIENT)
Dept: PHYSICAL THERAPY | Facility: HOSPITAL | Age: 61
End: 2021-06-09

## 2021-06-11 ENCOUNTER — OFFICE VISIT (OUTPATIENT)
Dept: FAMILY MEDICINE CLINIC | Facility: CLINIC | Age: 61
End: 2021-06-11

## 2021-06-11 VITALS
HEIGHT: 73 IN | DIASTOLIC BLOOD PRESSURE: 80 MMHG | OXYGEN SATURATION: 97 % | BODY MASS INDEX: 21.83 KG/M2 | WEIGHT: 164.7 LBS | HEART RATE: 80 BPM | SYSTOLIC BLOOD PRESSURE: 122 MMHG

## 2021-06-11 DIAGNOSIS — R09.82 POST-NASAL DRIP: ICD-10-CM

## 2021-06-11 DIAGNOSIS — I10 ESSENTIAL HYPERTENSION: Primary | ICD-10-CM

## 2021-06-11 DIAGNOSIS — J30.9 ALLERGIC RHINITIS, UNSPECIFIED SEASONALITY, UNSPECIFIED TRIGGER: ICD-10-CM

## 2021-06-11 PROBLEM — Z09 HOSPITAL DISCHARGE FOLLOW-UP: Status: RESOLVED | Noted: 2018-05-09 | Resolved: 2021-06-11

## 2021-06-11 PROBLEM — I16.0 HYPERTENSIVE URGENCY: Status: RESOLVED | Noted: 2020-01-30 | Resolved: 2021-06-11

## 2021-06-11 PROBLEM — Z51.81 ENCOUNTER FOR MEDICATION MONITORING: Status: RESOLVED | Noted: 2018-05-09 | Resolved: 2021-06-11

## 2021-06-11 PROCEDURE — 99213 OFFICE O/P EST LOW 20 MIN: CPT | Performed by: STUDENT IN AN ORGANIZED HEALTH CARE EDUCATION/TRAINING PROGRAM

## 2021-06-11 RX ORDER — MONTELUKAST SODIUM 10 MG/1
10 TABLET ORAL DAILY
Qty: 30 TABLET | Refills: 30 | Status: SHIPPED | OUTPATIENT
Start: 2021-06-11 | End: 2022-08-14

## 2021-06-11 RX ORDER — TRIAMCINOLONE ACETONIDE 55 UG/1
2 SPRAY, METERED NASAL DAILY
Qty: 16.5 G | Refills: 11 | Status: SHIPPED | OUTPATIENT
Start: 2021-06-11 | End: 2021-11-30

## 2021-06-11 RX ORDER — LISINOPRIL 40 MG/1
40 TABLET ORAL DAILY
Qty: 30 TABLET | Refills: 3 | Status: SHIPPED | OUTPATIENT
Start: 2021-06-11 | End: 2021-11-24 | Stop reason: HOSPADM

## 2021-06-11 RX ORDER — AMLODIPINE BESYLATE 10 MG/1
10 TABLET ORAL DAILY
Qty: 30 TABLET | Refills: 3 | Status: SHIPPED | OUTPATIENT
Start: 2021-06-11
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
## 2021-06-15 NOTE — PROGRESS NOTES
I have spoken with the patient .   I have reviewed the notes, assessments, and/or procedures performed by Dr. Nura Mcmillan, I concur with his  documentation and assessment and plan for Zak Lutz.          This document has been electronically signed by Nitin Corbin MD on Sanjana 15, 2021 11:10 CDT

## 2021-06-19 DIAGNOSIS — K21.9 GASTRO-ESOPHAGEAL REFLUX DISEASE WITHOUT ESOPHAGITIS: ICD-10-CM

## 2021-06-21 RX ORDER — OMEPRAZOLE 40 MG/1
CAPSULE, DELAYED RELEASE ORAL
Qty: 30 CAPSULE | Refills: 3 | Status: SHIPPED | OUTPATIENT
Start: 2021-06-21 | End: 2021-07-02 | Stop reason: SDUPTHER

## 2021-07-02 ENCOUNTER — OFFICE VISIT (OUTPATIENT)
Dept: GASTROENTEROLOGY | Facility: CLINIC | Age: 61
End: 2021-07-02

## 2021-07-02 VITALS
WEIGHT: 167 LBS | HEIGHT: 73 IN | BODY MASS INDEX: 22.13 KG/M2 | HEART RATE: 96 BPM | SYSTOLIC BLOOD PRESSURE: 141 MMHG | DIASTOLIC BLOOD PRESSURE: 76 MMHG

## 2021-07-02 DIAGNOSIS — K21.9 GASTRO-ESOPHAGEAL REFLUX DISEASE WITHOUT ESOPHAGITIS: ICD-10-CM

## 2021-07-02 PROCEDURE — 99213 OFFICE O/P EST LOW 20 MIN: CPT | Performed by: INTERNAL MEDICINE

## 2021-07-02 RX ORDER — OMEPRAZOLE 40 MG/1
40 CAPSULE, DELAYED RELEASE ORAL DAILY
Qty: 30 CAPSULE | Refills: 3 | Status: SHIPPED | OUTPATIENT
Start: 2021-07-02
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
## 2021-09-13 ENCOUNTER — OFFICE VISIT (OUTPATIENT)
Dept: FAMILY MEDICINE CLINIC | Facility: CLINIC | Age: 61
End: 2021-09-13

## 2021-09-13 VITALS
WEIGHT: 162.8 LBS | HEART RATE: 81 BPM | OXYGEN SATURATION: 98 % | BODY MASS INDEX: 21.57 KG/M2 | HEIGHT: 73 IN | DIASTOLIC BLOOD PRESSURE: 88 MMHG | SYSTOLIC BLOOD PRESSURE: 140 MMHG

## 2021-09-13 DIAGNOSIS — T78.40XD ALLERGY, SUBSEQUENT ENCOUNTER: Primary | ICD-10-CM

## 2021-09-13 DIAGNOSIS — Z71.6 ENCOUNTER FOR SMOKING CESSATION COUNSELING: ICD-10-CM

## 2021-09-13 PROCEDURE — 99213 OFFICE O/P EST LOW 20 MIN: CPT | Performed by: CHIROPRACTOR
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
## 2021-09-16 NOTE — PROGRESS NOTES
I have spoken with the patient .   I have reviewed the notes, assessments, and/or procedures performed by Dr. Jeramie Singh, I concur with his  documentation and assessment and plan for Zak Lutz.          This document has been electronically signed by Nitin Corbin MD on September 16, 2021 10:59 CDT

## 2021-11-08 ENCOUNTER — APPOINTMENT (OUTPATIENT)
Dept: CT IMAGING | Facility: HOSPITAL | Age: 61
End: 2021-11-08

## 2021-11-08 ENCOUNTER — HOSPITAL ENCOUNTER (INPATIENT)
Facility: HOSPITAL | Age: 61
LOS: 2 days | Discharge: HOME | End: 2021-11-10
Attending: STUDENT IN AN ORGANIZED HEALTH CARE EDUCATION/TRAINING PROGRAM | Admitting: FAMILY MEDICINE

## 2021-11-08 DIAGNOSIS — R10.12 LEFT UPPER QUADRANT ABDOMINAL PAIN: Primary | ICD-10-CM

## 2021-11-08 DIAGNOSIS — K76.9 LIVER LESION: ICD-10-CM

## 2021-11-08 DIAGNOSIS — M89.9 LESION OF FEMUR: ICD-10-CM

## 2021-11-08 DIAGNOSIS — R93.41 ABNORMAL CT SCAN, BLADDER: ICD-10-CM

## 2021-11-08 PROBLEM — D18.03 HEMANGIOMA OF LIVER: Status: ACTIVE | Noted: 2021-11-08

## 2021-11-08 PROBLEM — K85.90 ACUTE PANCREATITIS: Status: ACTIVE | Noted: 2021-11-08

## 2021-11-08 PROBLEM — F10.90 CHRONIC ALCOHOL USE: Status: ACTIVE | Noted: 2021-11-08

## 2021-11-08 LAB
ALBUMIN SERPL-MCNC: 4.4 G/DL (ref 3.5–5.2)
ALBUMIN/GLOB SERPL: 1.2 G/DL
ALP SERPL-CCNC: 73 U/L (ref 39–117)
ALT SERPL W P-5'-P-CCNC: 15 U/L (ref 1–41)
AMYLASE SERPL-CCNC: 94 U/L (ref 28–100)
ANION GAP SERPL CALCULATED.3IONS-SCNC: 12 MMOL/L (ref 5–15)
AST SERPL-CCNC: 22 U/L (ref 1–40)
BASOPHILS # BLD AUTO: 0.03 10*3/MM3 (ref 0–0.2)
BASOPHILS NFR BLD AUTO: 0.3 % (ref 0–1.5)
BILIRUB SERPL-MCNC: 1.3 MG/DL (ref 0–1.2)
BILIRUB UR QL STRIP: NEGATIVE
BUN SERPL-MCNC: 9 MG/DL (ref 8–23)
BUN/CREAT SERPL: 8 (ref 7–25)
CALCIUM SPEC-SCNC: 9.9 MG/DL (ref 8.6–10.5)
CHLORIDE SERPL-SCNC: 94 MMOL/L (ref 98–107)
CHOLEST SERPL-MCNC: 174 MG/DL (ref 0–200)
CLARITY UR: CLEAR
CO2 SERPL-SCNC: 28 MMOL/L (ref 22–29)
COLOR UR: YELLOW
CREAT SERPL-MCNC: 1.12 MG/DL (ref 0.76–1.27)
D-LACTATE SERPL-SCNC: 1.3 MMOL/L (ref 0.5–2)
DEPRECATED RDW RBC AUTO: 41.9 FL (ref 37–54)
EOSINOPHIL # BLD AUTO: 0.23 10*3/MM3 (ref 0–0.4)
EOSINOPHIL NFR BLD AUTO: 2.4 % (ref 0.3–6.2)
ERYTHROCYTE [DISTWIDTH] IN BLOOD BY AUTOMATED COUNT: 12.6 % (ref 12.3–15.4)
ETHANOL BLD-MCNC: <10 MG/DL (ref 0–10)
ETHANOL UR QL: <0.01 %
FLUAV RNA RESP QL NAA+PROBE: NOT DETECTED
FLUBV RNA RESP QL NAA+PROBE: NOT DETECTED
GFR SERPL CREATININE-BSD FRML MDRD: 80 ML/MIN/1.73
GLOBULIN UR ELPH-MCNC: 3.7 GM/DL
GLUCOSE SERPL-MCNC: 150 MG/DL (ref 65–99)
GLUCOSE UR STRIP-MCNC: NEGATIVE MG/DL
HBA1C MFR BLD: 5.7 % (ref 4.8–5.6)
HCT VFR BLD AUTO: 45.9 % (ref 37.5–51)
HDLC SERPL-MCNC: 65 MG/DL (ref 40–60)
HGB BLD-MCNC: 16.1 G/DL (ref 13–17.7)
HGB UR QL STRIP.AUTO: NEGATIVE
HOLD SPECIMEN: NORMAL
IMM GRANULOCYTES # BLD AUTO: 0.04 10*3/MM3 (ref 0–0.05)
IMM GRANULOCYTES NFR BLD AUTO: 0.4 % (ref 0–0.5)
KETONES UR QL STRIP: NEGATIVE
LDLC SERPL CALC-MCNC: 68 MG/DL (ref 0–100)
LDLC/HDLC SERPL: 0.89 {RATIO}
LEUKOCYTE ESTERASE UR QL STRIP.AUTO: NEGATIVE
LIPASE SERPL-CCNC: 116 U/L (ref 13–60)
LYMPHOCYTES # BLD AUTO: 1.53 10*3/MM3 (ref 0.7–3.1)
LYMPHOCYTES NFR BLD AUTO: 16 % (ref 19.6–45.3)
MAGNESIUM SERPL-MCNC: 1.8 MG/DL (ref 1.6–2.4)
MCH RBC QN AUTO: 32.1 PG (ref 26.6–33)
MCHC RBC AUTO-ENTMCNC: 35.1 G/DL (ref 31.5–35.7)
MCV RBC AUTO: 91.4 FL (ref 79–97)
MONOCYTES # BLD AUTO: 0.93 10*3/MM3 (ref 0.1–0.9)
MONOCYTES NFR BLD AUTO: 9.7 % (ref 5–12)
NEUTROPHILS NFR BLD AUTO: 6.82 10*3/MM3 (ref 1.7–7)
NEUTROPHILS NFR BLD AUTO: 71.2 % (ref 42.7–76)
NITRITE UR QL STRIP: NEGATIVE
NRBC BLD AUTO-RTO: 0 /100 WBC (ref 0–0.2)
NT-PROBNP SERPL-MCNC: 179.5 PG/ML (ref 0–900)
PH UR STRIP.AUTO: 7.5 [PH] (ref 5–9)
PLATELET # BLD AUTO: 225 10*3/MM3 (ref 140–450)
PMV BLD AUTO: 10.7 FL (ref 6–12)
POTASSIUM SERPL-SCNC: 3.4 MMOL/L (ref 3.5–5.2)
PROT SERPL-MCNC: 8.1 G/DL (ref 6–8.5)
PROT UR QL STRIP: NEGATIVE
RBC # BLD AUTO: 5.02 10*6/MM3 (ref 4.14–5.8)
SARS-COV-2 RNA RESP QL NAA+PROBE: NOT DETECTED
SODIUM SERPL-SCNC: 134 MMOL/L (ref 136–145)
SP GR UR STRIP: 1.01 (ref 1–1.03)
TRIGL SERPL-MCNC: 256 MG/DL (ref 0–150)
UROBILINOGEN UR QL STRIP: NORMAL
VLDLC SERPL-MCNC: 41 MG/DL (ref 5–40)
WBC # BLD AUTO: 9.58 10*3/MM3 (ref 3.4–10.8)

## 2021-11-08 PROCEDURE — 80053 COMPREHEN METABOLIC PANEL: CPT | Performed by: STUDENT IN AN ORGANIZED HEALTH CARE EDUCATION/TRAINING PROGRAM

## 2021-11-08 PROCEDURE — 83605 ASSAY OF LACTIC ACID: CPT | Performed by: STUDENT IN AN ORGANIZED HEALTH CARE EDUCATION/TRAINING PROGRAM

## 2021-11-08 PROCEDURE — 83690 ASSAY OF LIPASE: CPT | Performed by: STUDENT IN AN ORGANIZED HEALTH CARE EDUCATION/TRAINING PROGRAM

## 2021-11-08 PROCEDURE — 94640 AIRWAY INHALATION TREATMENT: CPT

## 2021-11-08 PROCEDURE — 83880 ASSAY OF NATRIURETIC PEPTIDE: CPT | Performed by: STUDENT IN AN ORGANIZED HEALTH CARE EDUCATION/TRAINING PROGRAM

## 2021-11-08 PROCEDURE — G0378 HOSPITAL OBSERVATION PER HR: HCPCS

## 2021-11-08 PROCEDURE — 85025 COMPLETE CBC W/AUTO DIFF WBC: CPT | Performed by: STUDENT IN AN ORGANIZED HEALTH CARE EDUCATION/TRAINING PROGRAM

## 2021-11-08 PROCEDURE — 74177 CT ABD & PELVIS W/CONTRAST: CPT

## 2021-11-08 PROCEDURE — 99221 1ST HOSP IP/OBS SF/LOW 40: CPT | Performed by: STUDENT IN AN ORGANIZED HEALTH CARE EDUCATION/TRAINING PROGRAM

## 2021-11-08 PROCEDURE — 83036 HEMOGLOBIN GLYCOSYLATED A1C: CPT | Performed by: STUDENT IN AN ORGANIZED HEALTH CARE EDUCATION/TRAINING PROGRAM

## 2021-11-08 PROCEDURE — 82150 ASSAY OF AMYLASE: CPT | Performed by: STUDENT IN AN ORGANIZED HEALTH CARE EDUCATION/TRAINING PROGRAM

## 2021-11-08 PROCEDURE — 25010000002 IOPAMIDOL 61 % SOLUTION: Performed by: STUDENT IN AN ORGANIZED HEALTH CARE EDUCATION/TRAINING PROGRAM

## 2021-11-08 PROCEDURE — 87636 SARSCOV2 & INF A&B AMP PRB: CPT | Performed by: STUDENT IN AN ORGANIZED HEALTH CARE EDUCATION/TRAINING PROGRAM

## 2021-11-08 PROCEDURE — 80061 LIPID PANEL: CPT | Performed by: STUDENT IN AN ORGANIZED HEALTH CARE EDUCATION/TRAINING PROGRAM

## 2021-11-08 PROCEDURE — 82077 ASSAY SPEC XCP UR&BREATH IA: CPT | Performed by: STUDENT IN AN ORGANIZED HEALTH CARE EDUCATION/TRAINING PROGRAM

## 2021-11-08 PROCEDURE — 83735 ASSAY OF MAGNESIUM: CPT | Performed by: STUDENT IN AN ORGANIZED HEALTH CARE EDUCATION/TRAINING PROGRAM

## 2021-11-08 PROCEDURE — 99284 EMERGENCY DEPT VISIT MOD MDM: CPT

## 2021-11-08 PROCEDURE — 25010000002 KETOROLAC TROMETHAMINE PER 15 MG: Performed by: STUDENT IN AN ORGANIZED HEALTH CARE EDUCATION/TRAINING PROGRAM

## 2021-11-08 PROCEDURE — 81003 URINALYSIS AUTO W/O SCOPE: CPT | Performed by: STUDENT IN AN ORGANIZED HEALTH CARE EDUCATION/TRAINING PROGRAM

## 2021-11-08 PROCEDURE — 87040 BLOOD CULTURE FOR BACTERIA: CPT | Performed by: STUDENT IN AN ORGANIZED HEALTH CARE EDUCATION/TRAINING PROGRAM

## 2021-11-08 RX ORDER — KETOROLAC TROMETHAMINE 30 MG/ML
30 INJECTION, SOLUTION INTRAMUSCULAR; INTRAVENOUS EVERY 4 HOURS PRN
Status: DISPENSED | OUTPATIENT
Start: 2021-11-08 | End: 2021-11-09

## 2021-11-08 RX ORDER — LORAZEPAM 2 MG/ML
2 INJECTION INTRAMUSCULAR
Status: DISCONTINUED | OUTPATIENT
Start: 2021-11-08 | End: 2021-11-10 | Stop reason: HOSPADM

## 2021-11-08 RX ORDER — ONDANSETRON 4 MG/1
4 TABLET, FILM COATED ORAL EVERY 6 HOURS PRN
Status: DISCONTINUED | OUTPATIENT
Start: 2021-11-08 | End: 2021-11-10 | Stop reason: HOSPADM

## 2021-11-08 RX ORDER — CALCIUM CARBONATE 200(500)MG
1 TABLET,CHEWABLE ORAL 2 TIMES DAILY PRN
Status: DISCONTINUED | OUTPATIENT
Start: 2021-11-08 | End: 2021-11-10 | Stop reason: HOSPADM

## 2021-11-08 RX ORDER — PANTOPRAZOLE SODIUM 40 MG/1
40 TABLET, DELAYED RELEASE ORAL EVERY MORNING
Refills: 3 | Status: DISCONTINUED | OUTPATIENT
Start: 2021-11-09 | End: 2021-11-10 | Stop reason: HOSPADM

## 2021-11-08 RX ORDER — KETOTIFEN FUMARATE 0.35 MG/ML
1 SOLUTION/ DROPS OPHTHALMIC 2 TIMES DAILY
Status: DISCONTINUED | OUTPATIENT
Start: 2021-11-08 | End: 2021-11-10 | Stop reason: HOSPADM

## 2021-11-08 RX ORDER — POLYETHYLENE GLYCOL 3350 17 G/17G
17 POWDER, FOR SOLUTION ORAL DAILY PRN
Status: DISCONTINUED | OUTPATIENT
Start: 2021-11-08 | End: 2021-11-10 | Stop reason: HOSPADM

## 2021-11-08 RX ORDER — AMLODIPINE BESYLATE 10 MG/1
10 TABLET ORAL DAILY
Status: DISCONTINUED | OUTPATIENT
Start: 2021-11-08 | End: 2021-11-10 | Stop reason: HOSPADM

## 2021-11-08 RX ORDER — IPRATROPIUM BROMIDE AND ALBUTEROL SULFATE 2.5; .5 MG/3ML; MG/3ML
3 SOLUTION RESPIRATORY (INHALATION)
Status: DISCONTINUED | OUTPATIENT
Start: 2021-11-08 | End: 2021-11-10 | Stop reason: HOSPADM

## 2021-11-08 RX ORDER — LISINOPRIL 40 MG/1
40 TABLET ORAL DAILY
Status: DISCONTINUED | OUTPATIENT
Start: 2021-11-09 | End: 2021-11-10 | Stop reason: HOSPADM

## 2021-11-08 RX ORDER — LORAZEPAM 2 MG/1
2 TABLET ORAL
Status: DISCONTINUED | OUTPATIENT
Start: 2021-11-08 | End: 2021-11-10 | Stop reason: HOSPADM

## 2021-11-08 RX ORDER — SODIUM CHLORIDE 0.9 % (FLUSH) 0.9 %
10 SYRINGE (ML) INJECTION EVERY 12 HOURS SCHEDULED
Status: DISCONTINUED | OUTPATIENT
Start: 2021-11-08 | End: 2021-11-10 | Stop reason: HOSPADM

## 2021-11-08 RX ORDER — KETOROLAC TROMETHAMINE 30 MG/ML
30 INJECTION, SOLUTION INTRAMUSCULAR; INTRAVENOUS EVERY 6 HOURS PRN
Status: DISCONTINUED | OUTPATIENT
Start: 2021-11-08 | End: 2021-11-08

## 2021-11-08 RX ORDER — LORAZEPAM 2 MG/ML
1 INJECTION INTRAMUSCULAR
Status: DISCONTINUED | OUTPATIENT
Start: 2021-11-08 | End: 2021-11-10 | Stop reason: HOSPADM

## 2021-11-08 RX ORDER — BISACODYL 5 MG/1
5 TABLET, DELAYED RELEASE ORAL DAILY PRN
Status: DISCONTINUED | OUTPATIENT
Start: 2021-11-08 | End: 2021-11-10 | Stop reason: HOSPADM

## 2021-11-08 RX ORDER — SODIUM CHLORIDE, SODIUM LACTATE, POTASSIUM CHLORIDE, CALCIUM CHLORIDE 600; 310; 30; 20 MG/100ML; MG/100ML; MG/100ML; MG/100ML
200 INJECTION, SOLUTION INTRAVENOUS CONTINUOUS
Status: DISCONTINUED | OUTPATIENT
Start: 2021-11-08 | End: 2021-11-10

## 2021-11-08 RX ORDER — SODIUM CHLORIDE 0.9 % (FLUSH) 0.9 %
10 SYRINGE (ML) INJECTION AS NEEDED
Status: DISCONTINUED | OUTPATIENT
Start: 2021-11-08 | End: 2021-11-10 | Stop reason: HOSPADM

## 2021-11-08 RX ORDER — NICOTINE 21 MG/24HR
1 PATCH, TRANSDERMAL 24 HOURS TRANSDERMAL EVERY 24 HOURS
Status: DISCONTINUED | OUTPATIENT
Start: 2021-11-08 | End: 2021-11-10 | Stop reason: HOSPADM

## 2021-11-08 RX ORDER — AMOXICILLIN 250 MG
2 CAPSULE ORAL 2 TIMES DAILY
Status: DISCONTINUED | OUTPATIENT
Start: 2021-11-08 | End: 2021-11-10 | Stop reason: HOSPADM

## 2021-11-08 RX ORDER — CETIRIZINE HYDROCHLORIDE 10 MG/1
10 TABLET ORAL NIGHTLY
Status: DISCONTINUED | OUTPATIENT
Start: 2021-11-08 | End: 2021-11-10 | Stop reason: HOSPADM

## 2021-11-08 RX ORDER — LORAZEPAM 1 MG/1
1 TABLET ORAL
Status: DISCONTINUED | OUTPATIENT
Start: 2021-11-08 | End: 2021-11-10 | Stop reason: HOSPADM

## 2021-11-08 RX ORDER — HYDROCODONE BITARTRATE AND ACETAMINOPHEN 10; 325 MG/1; MG/1
0.5 TABLET ORAL ONCE
Status: COMPLETED | OUTPATIENT
Start: 2021-11-08 | End: 2021-11-08

## 2021-11-08 RX ORDER — POTASSIUM CHLORIDE 1.5 G/1.77G
40 POWDER, FOR SOLUTION ORAL AS NEEDED
Status: DISCONTINUED | OUTPATIENT
Start: 2021-11-08 | End: 2021-11-10 | Stop reason: HOSPADM

## 2021-11-08 RX ORDER — HYDROCODONE BITARTRATE AND ACETAMINOPHEN 5; 325 MG/1; MG/1
1 TABLET ORAL ONCE
Status: COMPLETED | OUTPATIENT
Start: 2021-11-08 | End: 2021-11-08

## 2021-11-08 RX ORDER — POTASSIUM CHLORIDE 7.45 MG/ML
10 INJECTION INTRAVENOUS
Status: DISCONTINUED | OUTPATIENT
Start: 2021-11-08 | End: 2021-11-10 | Stop reason: HOSPADM

## 2021-11-08 RX ORDER — BISACODYL 10 MG
10 SUPPOSITORY, RECTAL RECTAL DAILY PRN
Status: DISCONTINUED | OUTPATIENT
Start: 2021-11-08 | End: 2021-11-10 | Stop reason: HOSPADM

## 2021-11-08 RX ORDER — LABETALOL HYDROCHLORIDE 5 MG/ML
10 INJECTION, SOLUTION INTRAVENOUS EVERY 4 HOURS PRN
Status: DISCONTINUED | OUTPATIENT
Start: 2021-11-08 | End: 2021-11-10 | Stop reason: HOSPADM

## 2021-11-08 RX ORDER — POTASSIUM CHLORIDE 750 MG/1
40 CAPSULE, EXTENDED RELEASE ORAL AS NEEDED
Status: DISCONTINUED | OUTPATIENT
Start: 2021-11-08 | End: 2021-11-10 | Stop reason: HOSPADM

## 2021-11-08 RX ORDER — ONDANSETRON 2 MG/ML
4 INJECTION INTRAMUSCULAR; INTRAVENOUS EVERY 6 HOURS PRN
Status: DISCONTINUED | OUTPATIENT
Start: 2021-11-08 | End: 2021-11-10 | Stop reason: HOSPADM

## 2021-11-08 RX ORDER — MONTELUKAST SODIUM 10 MG/1
10 TABLET ORAL DAILY
Status: DISCONTINUED | OUTPATIENT
Start: 2021-11-08 | End: 2021-11-10 | Stop reason: HOSPADM

## 2021-11-08 RX ADMIN — NICOTINE 1 PATCH: 21 PATCH, EXTENDED RELEASE TRANSDERMAL at 20:30

## 2021-11-08 RX ADMIN — HYDROCODONE BITARTRATE AND ACETAMINOPHEN 0.5 TABLET: 10; 325 TABLET ORAL at 15:34

## 2021-11-08 RX ADMIN — MONTELUKAST 10 MG: 10 TABLET, FILM COATED ORAL at 22:58

## 2021-11-08 RX ADMIN — DOCUSATE SODIUM 50 MG AND SENNOSIDES 8.6 MG 2 TABLET: 8.6; 5 TABLET, FILM COATED ORAL at 22:58

## 2021-11-08 RX ADMIN — SODIUM CHLORIDE, PRESERVATIVE FREE 10 ML: 5 INJECTION INTRAVENOUS at 23:00

## 2021-11-08 RX ADMIN — POTASSIUM CHLORIDE 40 MEQ: 750 CAPSULE, EXTENDED RELEASE ORAL at 22:58

## 2021-11-08 RX ADMIN — CETIRIZINE HYDROCHLORIDE 10 MG: 10 TABLET, FILM COATED ORAL at 22:58

## 2021-11-08 RX ADMIN — SODIUM CHLORIDE, POTASSIUM CHLORIDE, SODIUM LACTATE AND CALCIUM CHLORIDE 1000 ML: 600; 310; 30; 20 INJECTION, SOLUTION INTRAVENOUS at 12:33

## 2021-11-08 RX ADMIN — HYDROCODONE BITARTRATE AND ACETAMINOPHEN 0.5 TABLET: 10; 325 TABLET ORAL at 12:49

## 2021-11-08 RX ADMIN — IOPAMIDOL 90 ML: 612 INJECTION, SOLUTION INTRAVENOUS at 14:00

## 2021-11-08 RX ADMIN — HYDROCODONE BITARTRATE AND ACETAMINOPHEN 1 TABLET: 5; 325 TABLET ORAL at 22:58

## 2021-11-08 RX ADMIN — KETOTIFEN FUMARATE 1 DROP: 0.35 SOLUTION/ DROPS OPHTHALMIC at 21:17

## 2021-11-08 RX ADMIN — KETOROLAC TROMETHAMINE 30 MG: 30 INJECTION, SOLUTION INTRAMUSCULAR; INTRAVENOUS at 19:53

## 2021-11-08 RX ADMIN — SODIUM CHLORIDE, POTASSIUM CHLORIDE, SODIUM LACTATE AND CALCIUM CHLORIDE 200 ML/HR: 600; 310; 30; 20 INJECTION, SOLUTION INTRAVENOUS at 19:53

## 2021-11-08 RX ADMIN — IPRATROPIUM BROMIDE AND ALBUTEROL SULFATE 3 ML: 2.5; .5 SOLUTION RESPIRATORY (INHALATION) at 21:24

## 2021-11-08 RX ADMIN — LABETALOL HYDROCHLORIDE 10 MG: 5 INJECTION, SOLUTION INTRAVENOUS at 19:53

## 2021-11-08 RX ADMIN — AMLODIPINE BESYLATE 10 MG: 10 TABLET ORAL at 19:53
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
## 2021-11-09 ENCOUNTER — APPOINTMENT (OUTPATIENT)
Dept: CT IMAGING | Facility: HOSPITAL | Age: 61
End: 2021-11-09

## 2021-11-09 LAB
ALBUMIN SERPL-MCNC: 4 G/DL (ref 3.5–5.2)
ALBUMIN/GLOB SERPL: 1.4 G/DL
ALP SERPL-CCNC: 55 U/L (ref 39–117)
ALT SERPL W P-5'-P-CCNC: 11 U/L (ref 1–41)
ANION GAP SERPL CALCULATED.3IONS-SCNC: 9 MMOL/L (ref 5–15)
AST SERPL-CCNC: 19 U/L (ref 1–40)
BASOPHILS # BLD AUTO: 0.03 10*3/MM3 (ref 0–0.2)
BASOPHILS NFR BLD AUTO: 0.3 % (ref 0–1.5)
BILIRUB SERPL-MCNC: 1.1 MG/DL (ref 0–1.2)
BUN SERPL-MCNC: 6 MG/DL (ref 8–23)
BUN/CREAT SERPL: 5.8 (ref 7–25)
CALCIUM SPEC-SCNC: 9.4 MG/DL (ref 8.6–10.5)
CHLORIDE SERPL-SCNC: 98 MMOL/L (ref 98–107)
CO2 SERPL-SCNC: 28 MMOL/L (ref 22–29)
CREAT SERPL-MCNC: 1.03 MG/DL (ref 0.76–1.27)
DEPRECATED RDW RBC AUTO: 42.2 FL (ref 37–54)
EOSINOPHIL # BLD AUTO: 0.42 10*3/MM3 (ref 0–0.4)
EOSINOPHIL NFR BLD AUTO: 4.9 % (ref 0.3–6.2)
ERYTHROCYTE [DISTWIDTH] IN BLOOD BY AUTOMATED COUNT: 12.5 % (ref 12.3–15.4)
GFR SERPL CREATININE-BSD FRML MDRD: 88 ML/MIN/1.73
GLOBULIN UR ELPH-MCNC: 2.9 GM/DL
GLUCOSE SERPL-MCNC: 103 MG/DL (ref 65–99)
HCT VFR BLD AUTO: 41.2 % (ref 37.5–51)
HGB BLD-MCNC: 14 G/DL (ref 13–17.7)
IMM GRANULOCYTES # BLD AUTO: 0.04 10*3/MM3 (ref 0–0.05)
IMM GRANULOCYTES NFR BLD AUTO: 0.5 % (ref 0–0.5)
LYMPHOCYTES # BLD AUTO: 1.87 10*3/MM3 (ref 0.7–3.1)
LYMPHOCYTES NFR BLD AUTO: 21.7 % (ref 19.6–45.3)
MCH RBC QN AUTO: 31.5 PG (ref 26.6–33)
MCHC RBC AUTO-ENTMCNC: 34 G/DL (ref 31.5–35.7)
MCV RBC AUTO: 92.6 FL (ref 79–97)
MONOCYTES # BLD AUTO: 0.9 10*3/MM3 (ref 0.1–0.9)
MONOCYTES NFR BLD AUTO: 10.5 % (ref 5–12)
NEUTROPHILS NFR BLD AUTO: 5.34 10*3/MM3 (ref 1.7–7)
NEUTROPHILS NFR BLD AUTO: 62.1 % (ref 42.7–76)
NRBC BLD AUTO-RTO: 0 /100 WBC (ref 0–0.2)
PLATELET # BLD AUTO: 188 10*3/MM3 (ref 140–450)
PMV BLD AUTO: 10.6 FL (ref 6–12)
POTASSIUM SERPL-SCNC: 4.1 MMOL/L (ref 3.5–5.2)
PROT SERPL-MCNC: 6.9 G/DL (ref 6–8.5)
RBC # BLD AUTO: 4.45 10*6/MM3 (ref 4.14–5.8)
SODIUM SERPL-SCNC: 135 MMOL/L (ref 136–145)
WBC # BLD AUTO: 8.6 10*3/MM3 (ref 3.4–10.8)

## 2021-11-09 PROCEDURE — 25010000002 IOPAMIDOL 61 % SOLUTION: Performed by: FAMILY MEDICINE

## 2021-11-09 PROCEDURE — 25010000002 KETOROLAC TROMETHAMINE PER 15 MG: Performed by: STUDENT IN AN ORGANIZED HEALTH CARE EDUCATION/TRAINING PROGRAM

## 2021-11-09 PROCEDURE — 85025 COMPLETE CBC W/AUTO DIFF WBC: CPT | Performed by: STUDENT IN AN ORGANIZED HEALTH CARE EDUCATION/TRAINING PROGRAM

## 2021-11-09 PROCEDURE — 94799 UNLISTED PULMONARY SVC/PX: CPT

## 2021-11-09 PROCEDURE — 80053 COMPREHEN METABOLIC PANEL: CPT | Performed by: STUDENT IN AN ORGANIZED HEALTH CARE EDUCATION/TRAINING PROGRAM

## 2021-11-09 PROCEDURE — 74170 CT ABD WO CNTRST FLWD CNTRST: CPT

## 2021-11-09 PROCEDURE — 94760 N-INVAS EAR/PLS OXIMETRY 1: CPT

## 2021-11-09 PROCEDURE — 99231 SBSQ HOSP IP/OBS SF/LOW 25: CPT | Performed by: STUDENT IN AN ORGANIZED HEALTH CARE EDUCATION/TRAINING PROGRAM

## 2021-11-09 RX ADMIN — SODIUM CHLORIDE, PRESERVATIVE FREE 10 ML: 5 INJECTION INTRAVENOUS at 23:12

## 2021-11-09 RX ADMIN — SODIUM CHLORIDE, POTASSIUM CHLORIDE, SODIUM LACTATE AND CALCIUM CHLORIDE 200 ML/HR: 600; 310; 30; 20 INJECTION, SOLUTION INTRAVENOUS at 23:28

## 2021-11-09 RX ADMIN — IPRATROPIUM BROMIDE AND ALBUTEROL SULFATE 3 ML: 2.5; .5 SOLUTION RESPIRATORY (INHALATION) at 07:19

## 2021-11-09 RX ADMIN — IPRATROPIUM BROMIDE AND ALBUTEROL SULFATE 3 ML: 2.5; .5 SOLUTION RESPIRATORY (INHALATION) at 14:36

## 2021-11-09 RX ADMIN — IPRATROPIUM BROMIDE AND ALBUTEROL SULFATE 3 ML: 2.5; .5 SOLUTION RESPIRATORY (INHALATION) at 19:31

## 2021-11-09 RX ADMIN — KETOROLAC TROMETHAMINE 30 MG: 30 INJECTION, SOLUTION INTRAMUSCULAR; INTRAVENOUS at 08:11

## 2021-11-09 RX ADMIN — KETOROLAC TROMETHAMINE 30 MG: 30 INJECTION, SOLUTION INTRAMUSCULAR; INTRAVENOUS at 02:20

## 2021-11-09 RX ADMIN — KETOTIFEN FUMARATE 1 DROP: 0.35 SOLUTION/ DROPS OPHTHALMIC at 08:11

## 2021-11-09 RX ADMIN — KETOROLAC TROMETHAMINE 30 MG: 30 INJECTION, SOLUTION INTRAMUSCULAR; INTRAVENOUS at 22:42

## 2021-11-09 RX ADMIN — NICOTINE 1 PATCH: 21 PATCH, EXTENDED RELEASE TRANSDERMAL at 23:12

## 2021-11-09 RX ADMIN — SODIUM CHLORIDE, POTASSIUM CHLORIDE, SODIUM LACTATE AND CALCIUM CHLORIDE 200 ML/HR: 600; 310; 30; 20 INJECTION, SOLUTION INTRAVENOUS at 15:20

## 2021-11-09 RX ADMIN — CETIRIZINE HYDROCHLORIDE 10 MG: 10 TABLET, FILM COATED ORAL at 22:31

## 2021-11-09 RX ADMIN — POTASSIUM CHLORIDE 40 MEQ: 750 CAPSULE, EXTENDED RELEASE ORAL at 02:57

## 2021-11-09 RX ADMIN — PANTOPRAZOLE SODIUM 40 MG: 40 TABLET, DELAYED RELEASE ORAL at 06:13

## 2021-11-09 RX ADMIN — KETOTIFEN FUMARATE 1 DROP: 0.35 SOLUTION/ DROPS OPHTHALMIC at 22:37

## 2021-11-09 RX ADMIN — MONTELUKAST 10 MG: 10 TABLET, FILM COATED ORAL at 08:11

## 2021-11-09 RX ADMIN — DOCUSATE SODIUM 50 MG AND SENNOSIDES 8.6 MG 2 TABLET: 8.6; 5 TABLET, FILM COATED ORAL at 08:11

## 2021-11-09 RX ADMIN — AMLODIPINE BESYLATE 10 MG: 10 TABLET ORAL at 08:11

## 2021-11-09 RX ADMIN — SODIUM CHLORIDE, POTASSIUM CHLORIDE, SODIUM LACTATE AND CALCIUM CHLORIDE 200 ML/HR: 600; 310; 30; 20 INJECTION, SOLUTION INTRAVENOUS at 06:17

## 2021-11-09 RX ADMIN — LISINOPRIL 40 MG: 40 TABLET ORAL at 08:11

## 2021-11-09 RX ADMIN — KETOROLAC TROMETHAMINE 30 MG: 30 INJECTION, SOLUTION INTRAMUSCULAR; INTRAVENOUS at 12:44

## 2021-11-09 RX ADMIN — SODIUM CHLORIDE, PRESERVATIVE FREE 10 ML: 5 INJECTION INTRAVENOUS at 08:12

## 2021-11-09 RX ADMIN — KETOROLAC TROMETHAMINE 30 MG: 30 INJECTION, SOLUTION INTRAMUSCULAR; INTRAVENOUS at 17:39

## 2021-11-09 RX ADMIN — IOPAMIDOL 90 ML: 612 INJECTION, SOLUTION INTRAVENOUS at 09:32

## 2021-11-09 RX ADMIN — IPRATROPIUM BROMIDE AND ALBUTEROL SULFATE 3 ML: 2.5; .5 SOLUTION RESPIRATORY (INHALATION) at 10:44
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
## 2021-11-09 NOTE — PLAN OF CARE
Goal Outcome Evaluation:  Plan of Care Reviewed With: patient        Progress: no change  Outcome Summary: BP high at beginning of shift, BP controlled with prescribed prn BP medications, pain controlled with prescribed prn pain medications, potassium being replaced, no other complaints at this time, resting in between care, will continue to monitor.

## 2021-11-09 NOTE — PAYOR COMM NOTE
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 Axillary 83 18 182/104 Lying 97    11/08/21 1840 -- -- -- -- 160/102 -- --    11/08/21 1835 98.2 (36.8) Oral 84 18 189/114 Sitting 96    11/08/21 18:13:26 -- -- 87 17 160/86 -- 99    11/08/21 16:57:01 -- -- 88 17 151/100 -- 98    11/08/21 15:05:11 -- -- 87 17 136/89 Lying 96    11/08/21 13:45:08 -- -- 94 17 158/90 -- 99    11/08/21 1156 97.6 (36.4) Infrared 107 20 160/88 Sitting 99            Current Facility-Administered Medications   Medication Dose Route Frequency Provider Last Rate Last Admin   • amLODIPine (NORVASC) tablet 10 mg  10 mg Oral Daily Bonifacio Woodard MD   10 mg at 11/09/21 0811   • sennosides-docusate (PERICOLACE) 8.6-50 MG per tablet 2 tablet  2 tablet Oral BID Bonifacio Woodard MD   2 tablet at 11/09/21 0811    And   • polyethylene glycol (MIRALAX) packet 17 g  17 g Oral Daily PRN Bonifacio Woodard MD        And   • bisacodyl (DULCOLAX) EC tablet 5 mg  5 mg Oral Daily PRN Bonifacio Woodard MD        And   • bisacodyl (DULCOLAX) suppository 10 mg  10 mg Rectal Daily PRN Bonifacio Woodard MD       • calcium  carbonate (TUMS) chewable tablet 500 mg (200 mg elemental)  1 tablet Oral BID PRN Bonifacio Woodard MD       • cetirizine (zyrTEC) tablet 10 mg  10 mg Oral Nightly Bonifacio Woodard MD   10 mg at 11/08/21 2258   • ipratropium-albuterol (DUO-NEB) nebulizer solution 3 mL  3 mL Nebulization 4x Daily - RT Bonifacio Woodard MD   3 mL at 11/09/21 1436   • ketorolac (TORADOL) injection 30 mg  30 mg Intravenous Q4H PRN Bonifacio Woodard MD   30 mg at 11/09/21 1244   • ketotifen (ZADITOR) 0.025 % ophthalmic solution 1 drop  1 drop Both Eyes BID Bonifacio Woodard MD   1 drop at 11/09/21 0811   • labetalol (NORMODYNE,TRANDATE) injection 10 mg  10 mg Intravenous Q4H PRN Bonifacio Woodard MD   10 mg at 11/08/21 1953   • lactated ringers infusion  200 mL/hr Intravenous Continuous Bonifacio Woodard  mL/hr at 11/09/21 0617 200 mL/hr at 11/09/21 0617   • lisinopril (PRINIVIL,ZESTRIL) tablet 40 mg  40 mg Oral Daily Bonifacio Woodard MD   40 mg at 11/09/21 0811   • LORazepam (ATIVAN) tablet 1 mg  1 mg Oral Q2H PRN Bonifacio Woodard MD        Or   • LORazepam (ATIVAN) injection 1 mg  1 mg Intravenous Q2H PRN Bonifacio Woodard MD        Or   • LORazepam (ATIVAN) tablet 2 mg  2 mg Oral Q1H PRN Bonifacio Woodard MD        Or   • LORazepam (ATIVAN) injection 2 mg  2 mg Intravenous Q1H PRN Bonifacio Woodard MD        Or   • LORazepam (ATIVAN) injection 2 mg  2 mg Intravenous Q15 Min PRN Bonifacio Woodard MD        Or   • LORazepam (ATIVAN) injection 2 mg  2 mg Intramuscular Q15 Min PRN Bonifacio Woodard MD       • montelukast (SINGULAIR) tablet 10 mg  10 mg Oral Daily Bonifacio Woodard MD   10 mg at 11/09/21 0811   • nicotine (NICODERM CQ) 21 MG/24HR patch 1 patch  1 patch Transdermal Q24H Bonifacio Woodard MD   1 patch at 11/08/21 2030   • ondansetron (ZOFRAN) tablet 4 mg  4 mg Oral Q6H PRN Bonifacio Woodard MD        Or   • ondansetron (ZOFRAN) injection 4 mg  4 mg Intravenous Q6H PRN Yamilet  MD Bonifacio       • pantoprazole (PROTONIX) EC tablet 40 mg  40 mg Oral QAM Bonifacio Woodard MD   40 mg at 21 0613   • potassium chloride (MICRO-K) CR capsule 40 mEq  40 mEq Oral PRN Donya Melgar MD   40 mEq at 21 0257    Or   • potassium chloride (KLOR-CON) packet 40 mEq  40 mEq Oral PRN Donya Melgar MD        Or   • potassium chloride 10 mEq in 100 mL IVPB  10 mEq Intravenous Q1H PRN Donya Melgar MD       • sodium chloride 0.9 % flush 10 mL  10 mL Intravenous Q12H Bonifacio Woodard MD   10 mL at 21 0812   • sodium chloride 0.9 % flush 10 mL  10 mL Intravenous PRN Bonifacio Woodard MD            Physician Progress Notes (last 48 hours)      Tete Villarreal MD at 21 1221              FAMILY MEDICINE RESIDENCY SERVICE  DAILY PROGRESS NOTE    NAME: Zak Lutz  : 1960  MRN: [id number]      LOS: 0 days     PROVIDER OF SERVICE: Tete Villarreal MD    Chief Complaint: Acute pancreatitis    Subjective:     Interval History:  History taken from: patient  Patient states his abdominal pain is significantly improved since yesterday.  He states that his pain was 10 out of 10 yesterday and now 6 out of 10.  He states that he is no longer having nausea and vomiting.     Review of Systems:   Review of Systems   Constitutional: Negative.    HENT: Negative for congestion, dental problem, ear discharge, facial swelling, rhinorrhea and sore throat.    Eyes: Negative for pain and visual disturbance.   Respiratory: Negative for apnea, cough, chest tightness, shortness of breath and wheezing.    Cardiovascular: Negative for chest pain, palpitations and leg swelling.   Gastrointestinal: Positive for abdominal pain. Negative for blood in stool, constipation, diarrhea, nausea and vomiting.   Endocrine: Negative for cold intolerance, heat intolerance, polydipsia and polyuria.   Genitourinary: Negative for dysuria, flank pain and hematuria.   Musculoskeletal:  Negative for back pain and neck pain.   Skin: Negative for rash.   Neurological: Negative for dizziness, syncope, weakness and headaches.       Objective:     Vital Signs  Temp:  [97.2 °F (36.2 °C)-98.4 °F (36.9 °C)] 97.2 °F (36.2 °C)  Heart Rate:  [] 89  Resp:  [14-18] 16  BP: (122-189)/() 122/67   Body mass index is 20.85 kg/m².    Physical Exam  Physical Exam  Vitals reviewed.   Constitutional:       Appearance: He is obese.   HENT:      Head: Normocephalic and atraumatic.      Mouth/Throat:      Pharynx: Oropharynx is clear.   Eyes:      Pupils: Pupils are equal, round, and reactive to light.   Cardiovascular:      Rate and Rhythm: Normal rate and regular rhythm.      Pulses: Normal pulses.      Heart sounds: Normal heart sounds.   Pulmonary:      Effort: Pulmonary effort is normal.      Breath sounds: Normal breath sounds.   Abdominal:      General: Abdomen is flat.      Palpations: Abdomen is soft.      Tenderness: There is abdominal tenderness.   Musculoskeletal:         General: Normal range of motion.      Cervical back: Normal range of motion and neck supple.   Skin:     General: Skin is warm and dry.      Capillary Refill: Capillary refill takes less than 2 seconds.   Neurological:      General: No focal deficit present.      Mental Status: He is alert and oriented to person, place, and time. Mental status is at baseline.   Psychiatric:         Mood and Affect: Mood normal.         Behavior: Behavior normal.         Scheduled Meds   amLODIPine, 10 mg, Oral, Daily  cetirizine, 10 mg, Oral, Nightly  ipratropium-albuterol, 3 mL, Nebulization, 4x Daily - RT  ketotifen, 1 drop, Both Eyes, BID  lisinopril, 40 mg, Oral, Daily  montelukast, 10 mg, Oral, Daily  nicotine, 1 patch, Transdermal, Q24H  pantoprazole, 40 mg, Oral, QAM  senna-docusate sodium, 2 tablet, Oral, BID  sodium chloride, 10 mL, Intravenous, Q12H       PRN Meds   senna-docusate sodium **AND** polyethylene glycol **AND** bisacodyl  **AND** bisacodyl  •  calcium carbonate  •  ketorolac  •  labetalol  •  LORazepam **OR** LORazepam **OR** LORazepam **OR** LORazepam **OR** LORazepam **OR** LORazepam  •  ondansetron **OR** ondansetron  •  potassium chloride **OR** potassium chloride **OR** potassium chloride  •  sodium chloride      Diagnostic Data    Results from last 7 days   Lab Units 11/09/21  0558   WBC 10*3/mm3 8.60   HEMOGLOBIN g/dL 14.0   HEMATOCRIT % 41.2   PLATELETS 10*3/mm3 188   GLUCOSE mg/dL 103*   CREATININE mg/dL 1.03   BUN mg/dL 6*   SODIUM mmol/L 135*   POTASSIUM mmol/L 4.1   AST (SGOT) U/L 19   ALT (SGPT) U/L 11   ALK PHOS U/L 55   BILIRUBIN mg/dL 1.1   ANION GAP mmol/L 9.0       CT Abdomen With & Without Contrast    Result Date: 11/9/2021  Posterior right lobe liver lesion as enhancement characteristics most consistent with hemangioma. Evidence of distal pancreatitis. Pancreatic tail mass not excluded with certainty. Appearance is clearly change compared with previous study dated January 13, 2021. Follow-up study after appropriate medical treatment for pancreatitis recommended to reevaluate. Electronically signed by:  Morris Marsh MD  11/9/2021 9:53 AM CST Workstation: [id number]    CT Abdomen Pelvis With Contrast    Result Date: 11/8/2021  1.Posterior segment right lobe of liver small nodular foci of diminished enhancement which measures 1.75 x 0.9 cm. This is of uncertain etiology. This may represent focal fatty infiltrative changes versus atypical appearing hemangioma versus small metastatic lesion. Dedicated CT liver protocol without and with contrast or MR liver protocol without and with contrast would be of further benefit to evaluate. 2.Anterior pararenal space fatty stranding surrounding the tail of the pancreas. This would be suspicious for changes from acute pancreatitis. Recommend clinical correlation. . 3. Bilateral kidney benign renal cortical cysts.. 4. Largely decompressed bladder with diffusely thickened  walls. The differential for this finding would include pseudothickening from decompressed state versus changes from bladder muscle hypertrophy from bladder outlet obstruction versus cystitis versus neoplastic involvement. Recommend clinical correlation.. 5. Partially imaged along the proximal right femur posteriorly there is a hyperdense lesion which has Hounsfield units of 95 and measures 3.35 x 3.83 cm. This may represent hematoma versus mass in this area. Recommend clinical correlation and if clinically indicated dedicated CT without and with contrast to further characterize this area. Electronically signed by:  Tanner Portillo MD  11/8/2021 3:17 PM CST Workstation: [id number]        I reviewed the patient's new clinical results.    Assessment/Plan:     Active Hospital Problems    Diagnosis  POA   • **Acute pancreatitis [K85.90]  Yes     CT abd/pelvis: Anterior pararenal space fatty stranding surrounding the tail of the pancreas suggestive of acute pancreatitis   Lipase: elevated at 116   Admits to alcohol use  received 1000 cc bolus of  LR in ED   Continue LR @ 200 cc/hr   Allergic to morphine, will give Toradol for pain control   We will obtain amylase 94   Lipid panel shows trigylerides 256  Ethanol level <10  Monitor Daily labs   Etiology is likely secondary to EtOH use     • Chronic alcohol use [Z72.89]  Yes     Drinks beer and hard liquor   Most recent alcoholic drink was last Wednesday  Does not recall he has ever been in withdrawal  Obtain serum ethanol level  [facility] protocol     • Hemangioma of liver [D18.03]  Yes     -CT abd/pelvis (11/8/21): Posterior segment right lobe of liver small nodular foci that may represent focal fatty infiltrative changes versus atypical appearing hemangioma versus small metastatic lesion.  - Will obtain CT abdomen with contrast showed at posterior right lobe liver lesion as enhancement characteristics most consistent with hemangioma.  - continue to monitor daily labs       •  Tobacco dependence syndrome [F17.200]  Yes     Nicotine patch   Smoking cessation counseling      • Hyperlipidemia [E78.5]  Yes     The 10-year ASCVD risk score (Halechelly CARRILLO Jr., et al., 2013) is: 38.6%    - Lipid panel     -Currently not on statin therapy.    -We will consider initiating statin therapy after resolution of current pancreatitis flare         • Gastroesophageal reflux disease [K21.9]  Yes     Continue Prilosec 40 mg daily     • Chronic obstructive lung disease (HCC) [J44.9]  Yes     duonebs  singulair   Nascort        • Essential hypertension [I10]  Yes     Continue amlodipine 10 mg daily  Continue lisinopril 40 mg daily  Labetalol as needed for systolic blood pressure greater than 160  Monitor blood pressure per hospital protocol           DVT prophylaxis: SCDs/TEDs  Code status is   Code Status and Medical Interventions:   Ordered at: 11/08/21 1906     Level Of Support Discussed With:    Patient     Code Status (Patient has no pulse and is not breathing):    CPR (Attempt to Resuscitate)     Medical Interventions (Patient has pulse or is breathing):    Full Support       Plan for disposition:Where: home and Undetermined      Time: 15 min          This document has been electronically signed by Tete Villarreal MD on November 9, 2021 12:31 CST    Electronically signed by Tete Villarreal MD at 11/09/21 1231       Medical Student Notes (last 24 hours)  Notes from 11/08/21 1514 through 11/09/21 1514   No notes of this type exist for this encounter.         Consult Notes (last 24 hours)  Notes from 11/08/21 1514 through 11/09/21 1514   No notes of this type exist for this encounter.

## 2021-11-09 NOTE — PROGRESS NOTES
FAMILY MEDICINE RESIDENCY SERVICE  DAILY PROGRESS NOTE    NAME: Zak Lutz  : 1960  MRN: [id number]      LOS: 0 days     PROVIDER OF SERVICE: Tete Villarreal MD    Chief Complaint: Acute pancreatitis    Subjective:     Interval History:  History taken from: patient  Patient states his abdominal pain is significantly improved since yesterday.  He states that his pain was 10 out of 10 yesterday and now 6 out of 10.  He states that he is no longer having nausea and vomiting.     Review of Systems:   Review of Systems   Constitutional: Negative.    HENT: Negative for congestion, dental problem, ear discharge, facial swelling, rhinorrhea and sore throat.    Eyes: Negative for pain and visual disturbance.   Respiratory: Negative for apnea, cough, chest tightness, shortness of breath and wheezing.    Cardiovascular: Negative for chest pain, palpitations and leg swelling.   Gastrointestinal: Positive for abdominal pain. Negative for blood in stool, constipation, diarrhea, nausea and vomiting.   Endocrine: Negative for cold intolerance, heat intolerance, polydipsia and polyuria.   Genitourinary: Negative for dysuria, flank pain and hematuria.   Musculoskeletal: Negative for back pain and neck pain.   Skin: Negative for rash.   Neurological: Negative for dizziness, syncope, weakness and headaches.       Objective:     Vital Signs  Temp:  [97.2 °F (36.2 °C)-98.4 °F (36.9 °C)] 97.2 °F (36.2 °C)  Heart Rate:  [] 89  Resp:  [14-18] 16  BP: (122-189)/() 122/67   Body mass index is 20.85 kg/m².    Physical Exam  Physical Exam  Vitals reviewed.   Constitutional:       Appearance: He is obese.   HENT:      Head: Normocephalic and atraumatic.      Mouth/Throat:      Pharynx: Oropharynx is clear.   Eyes:      Pupils: Pupils are equal, round, and reactive to light.   Cardiovascular:      Rate and Rhythm: Normal rate and regular rhythm.      Pulses: Normal pulses.      Heart sounds: Normal heart sounds.    Pulmonary:      Effort: Pulmonary effort is normal.      Breath sounds: Normal breath sounds.   Abdominal:      General: Abdomen is flat.      Palpations: Abdomen is soft.      Tenderness: There is abdominal tenderness.   Musculoskeletal:         General: Normal range of motion.      Cervical back: Normal range of motion and neck supple.   Skin:     General: Skin is warm and dry.      Capillary Refill: Capillary refill takes less than 2 seconds.   Neurological:      General: No focal deficit present.      Mental Status: He is alert and oriented to person, place, and time. Mental status is at baseline.   Psychiatric:         Mood and Affect: Mood normal.         Behavior: Behavior normal.         Scheduled Meds   amLODIPine, 10 mg, Oral, Daily  cetirizine, 10 mg, Oral, Nightly  ipratropium-albuterol, 3 mL, Nebulization, 4x Daily - RT  ketotifen, 1 drop, Both Eyes, BID  lisinopril, 40 mg, Oral, Daily  montelukast, 10 mg, Oral, Daily  nicotine, 1 patch, Transdermal, Q24H  pantoprazole, 40 mg, Oral, QAM  senna-docusate sodium, 2 tablet, Oral, BID  sodium chloride, 10 mL, Intravenous, Q12H       PRN Meds   senna-docusate sodium **AND** polyethylene glycol **AND** bisacodyl **AND** bisacodyl  •  calcium carbonate  •  ketorolac  •  labetalol  •  LORazepam **OR** LORazepam **OR** LORazepam **OR** LORazepam **OR** LORazepam **OR** LORazepam  •  ondansetron **OR** ondansetron  •  potassium chloride **OR** potassium chloride **OR** potassium chloride  •  sodium chloride      Diagnostic Data    Results from last 7 days   Lab Units 11/09/21  0558   WBC 10*3/mm3 8.60   HEMOGLOBIN g/dL 14.0   HEMATOCRIT % 41.2   PLATELETS 10*3/mm3 188   GLUCOSE mg/dL 103*   CREATININE mg/dL 1.03   BUN mg/dL 6*   SODIUM mmol/L 135*   POTASSIUM mmol/L 4.1   AST (SGOT) U/L 19   ALT (SGPT) U/L 11   ALK PHOS U/L 55   BILIRUBIN mg/dL 1.1   ANION GAP mmol/L 9.0       CT Abdomen With & Without Contrast    Result Date: 11/9/2021  Posterior right lobe  liver lesion as enhancement characteristics most consistent with hemangioma. Evidence of distal pancreatitis. Pancreatic tail mass not excluded with certainty. Appearance is clearly change compared with previous study dated January 13, 2021. Follow-up study after appropriate medical treatment for pancreatitis recommended to reevaluate. Electronically signed by:  Morris Marsh MD  11/9/2021 9:53 AM CST Workstation: [id number]    CT Abdomen Pelvis With Contrast    Result Date: 11/8/2021  1.Posterior segment right lobe of liver small nodular foci of diminished enhancement which measures 1.75 x 0.9 cm. This is of uncertain etiology. This may represent focal fatty infiltrative changes versus atypical appearing hemangioma versus small metastatic lesion. Dedicated CT liver protocol without and with contrast or MR liver protocol without and with contrast would be of further benefit to evaluate. 2.Anterior pararenal space fatty stranding surrounding the tail of the pancreas. This would be suspicious for changes from acute pancreatitis. Recommend clinical correlation. . 3. Bilateral kidney benign renal cortical cysts.. 4. Largely decompressed bladder with diffusely thickened walls. The differential for this finding would include pseudothickening from decompressed state versus changes from bladder muscle hypertrophy from bladder outlet obstruction versus cystitis versus neoplastic involvement. Recommend clinical correlation.. 5. Partially imaged along the proximal right femur posteriorly there is a hyperdense lesion which has Hounsfield units of 95 and measures 3.35 x 3.83 cm. This may represent hematoma versus mass in this area. Recommend clinical correlation and if clinically indicated dedicated CT without and with contrast to further characterize this area. Electronically signed by:  Tanner Portillo MD  11/8/2021 3:17 PM CST Workstation: [id number]        I reviewed the patient's new clinical results.    Assessment/Plan:      Active Hospital Problems    Diagnosis  POA   • **Acute pancreatitis [K85.90]  Yes     CT abd/pelvis: Anterior pararenal space fatty stranding surrounding the tail of the pancreas suggestive of acute pancreatitis   Lipase: elevated at 116   Admits to alcohol use  received 1000 cc bolus of  LR in ED   Continue LR @ 200 cc/hr   Allergic to morphine, will give Toradol for pain control   We will obtain amylase 94   Lipid panel shows trigylerides 256  Ethanol level <10  Monitor Daily labs   Etiology is likely secondary to EtOH use     • Chronic alcohol use [Z72.89]  Yes     Drinks beer and hard liquor   Most recent alcoholic drink was last Wednesday  Does not recall he has ever been in withdrawal  Obtain serum ethanol level  [facility] protocol     • Hemangioma of liver [D18.03]  Yes     -CT abd/pelvis (11/8/21): Posterior segment right lobe of liver small nodular foci that may represent focal fatty infiltrative changes versus atypical appearing hemangioma versus small metastatic lesion.  - Will obtain CT abdomen with contrast showed at posterior right lobe liver lesion as enhancement characteristics most consistent with hemangioma.  - continue to monitor daily labs       • Tobacco dependence syndrome [F17.200]  Yes     Nicotine patch   Smoking cessation counseling      • Hyperlipidemia [E78.5]  Yes     The 10-year ASCVD risk score (Thurman DC Jr., et al., 2013) is: 38.6%    - Lipid panel     -Currently not on statin therapy.    -We will consider initiating statin therapy after resolution of current pancreatitis flare         • Gastroesophageal reflux disease [K21.9]  Yes     Continue Prilosec 40 mg daily     • Chronic obstructive lung disease (HCC) [J44.9]  Yes     duonebs  singulair   Nascort        • Essential hypertension [I10]  Yes     Continue amlodipine 10 mg daily  Continue lisinopril 40 mg daily  Labetalol as needed for systolic blood pressure greater than 160  Monitor blood pressure per hospital protocol            DVT prophylaxis: SCDs/TEDs  Code status is   Code Status and Medical Interventions:   Ordered at: 11/08/21 1906     Level Of Support Discussed With:    Patient     Code Status (Patient has no pulse and is not breathing):    CPR (Attempt to Resuscitate)     Medical Interventions (Patient has pulse or is breathing):    Full Support       Plan for disposition:Where: home and Undetermined      Time: 15 min          This document has been electronically signed by Tete Villarreal MD on November 9, 2021 12:31 CST

## 2021-11-09 NOTE — ACP (ADVANCE CARE PLANNING)
Patient expresses desire to be full code.  Patient designates his sister Chrissy Rodriguez at [phone] to be is healthcare surrogate if situation arises where patient is unable to make medical decisions on his own.      Bonifacio Woodard M.D. PGY 2  [facility]  [address] [place] [address]  Office: [phone]  This document has been electronically signed by Bonifacio Woodard MD on November 8, 2021 20:24 CST

## 2021-11-10 ENCOUNTER — READMISSION MANAGEMENT (OUTPATIENT)
Dept: CALL CENTER | Facility: HOSPITAL | Age: 61
End: 2021-11-10

## 2021-11-10 VITALS
HEART RATE: 88 BPM | OXYGEN SATURATION: 98 % | BODY MASS INDEX: 21.66 KG/M2 | DIASTOLIC BLOOD PRESSURE: 68 MMHG | TEMPERATURE: 98.5 F | WEIGHT: 163.4 LBS | SYSTOLIC BLOOD PRESSURE: 128 MMHG | HEIGHT: 73 IN | RESPIRATION RATE: 18 BRPM

## 2021-11-10 PROBLEM — R10.12 LEFT UPPER QUADRANT ABDOMINAL PAIN: Status: RESOLVED | Noted: 2021-11-08 | Resolved: 2021-11-10

## 2021-11-10 PROBLEM — K85.90 ACUTE PANCREATITIS: Status: RESOLVED | Noted: 2021-11-08 | Resolved: 2021-11-10

## 2021-11-10 LAB
ALBUMIN SERPL-MCNC: 3.3 G/DL (ref 3.5–5.2)
ALBUMIN/GLOB SERPL: 1.1 G/DL
ALP SERPL-CCNC: 50 U/L (ref 39–117)
ALT SERPL W P-5'-P-CCNC: 10 U/L (ref 1–41)
ANION GAP SERPL CALCULATED.3IONS-SCNC: 8 MMOL/L (ref 5–15)
AST SERPL-CCNC: 19 U/L (ref 1–40)
BASOPHILS # BLD AUTO: 0.02 10*3/MM3 (ref 0–0.2)
BASOPHILS NFR BLD AUTO: 0.3 % (ref 0–1.5)
BILIRUB SERPL-MCNC: 0.8 MG/DL (ref 0–1.2)
BUN SERPL-MCNC: 5 MG/DL (ref 8–23)
BUN/CREAT SERPL: 5.2 (ref 7–25)
CALCIUM SPEC-SCNC: 8.9 MG/DL (ref 8.6–10.5)
CHLORIDE SERPL-SCNC: 101 MMOL/L (ref 98–107)
CO2 SERPL-SCNC: 27 MMOL/L (ref 22–29)
CREAT SERPL-MCNC: 0.97 MG/DL (ref 0.76–1.27)
DEPRECATED RDW RBC AUTO: 42.9 FL (ref 37–54)
EOSINOPHIL # BLD AUTO: 0.35 10*3/MM3 (ref 0–0.4)
EOSINOPHIL NFR BLD AUTO: 4.9 % (ref 0.3–6.2)
ERYTHROCYTE [DISTWIDTH] IN BLOOD BY AUTOMATED COUNT: 12.5 % (ref 12.3–15.4)
GFR SERPL CREATININE-BSD FRML MDRD: 95 ML/MIN/1.73
GLOBULIN UR ELPH-MCNC: 3 GM/DL
GLUCOSE SERPL-MCNC: 93 MG/DL (ref 65–99)
HCT VFR BLD AUTO: 36 % (ref 37.5–51)
HGB BLD-MCNC: 12.4 G/DL (ref 13–17.7)
IMM GRANULOCYTES # BLD AUTO: 0.02 10*3/MM3 (ref 0–0.05)
IMM GRANULOCYTES NFR BLD AUTO: 0.3 % (ref 0–0.5)
LYMPHOCYTES # BLD AUTO: 1.56 10*3/MM3 (ref 0.7–3.1)
LYMPHOCYTES NFR BLD AUTO: 21.9 % (ref 19.6–45.3)
MCH RBC QN AUTO: 32.2 PG (ref 26.6–33)
MCHC RBC AUTO-ENTMCNC: 34.4 G/DL (ref 31.5–35.7)
MCV RBC AUTO: 93.5 FL (ref 79–97)
MONOCYTES # BLD AUTO: 0.73 10*3/MM3 (ref 0.1–0.9)
MONOCYTES NFR BLD AUTO: 10.3 % (ref 5–12)
NEUTROPHILS NFR BLD AUTO: 4.44 10*3/MM3 (ref 1.7–7)
NEUTROPHILS NFR BLD AUTO: 62.3 % (ref 42.7–76)
NRBC BLD AUTO-RTO: 0 /100 WBC (ref 0–0.2)
PLATELET # BLD AUTO: 181 10*3/MM3 (ref 140–450)
PMV BLD AUTO: 10.1 FL (ref 6–12)
POTASSIUM SERPL-SCNC: 3.7 MMOL/L (ref 3.5–5.2)
PROT SERPL-MCNC: 6.3 G/DL (ref 6–8.5)
RBC # BLD AUTO: 3.85 10*6/MM3 (ref 4.14–5.8)
SODIUM SERPL-SCNC: 136 MMOL/L (ref 136–145)
WBC # BLD AUTO: 7.12 10*3/MM3 (ref 3.4–10.8)

## 2021-11-10 PROCEDURE — 80053 COMPREHEN METABOLIC PANEL: CPT | Performed by: STUDENT IN AN ORGANIZED HEALTH CARE EDUCATION/TRAINING PROGRAM

## 2021-11-10 PROCEDURE — 94799 UNLISTED PULMONARY SVC/PX: CPT

## 2021-11-10 PROCEDURE — 94760 N-INVAS EAR/PLS OXIMETRY 1: CPT

## 2021-11-10 PROCEDURE — 99239 HOSP IP/OBS DSCHRG MGMT >30: CPT | Performed by: STUDENT IN AN ORGANIZED HEALTH CARE EDUCATION/TRAINING PROGRAM

## 2021-11-10 PROCEDURE — 85025 COMPLETE CBC W/AUTO DIFF WBC: CPT | Performed by: STUDENT IN AN ORGANIZED HEALTH CARE EDUCATION/TRAINING PROGRAM

## 2021-11-10 RX ORDER — ONDANSETRON 4 MG/1
4 TABLET, FILM COATED ORAL EVERY 8 HOURS PRN
Qty: 12 TABLET | Refills: 0 | Status: SHIPPED | OUTPATIENT
Start: 2021-11-10 | End: 2021-11-24 | Stop reason: HOSPADM

## 2021-11-10 RX ADMIN — MONTELUKAST 10 MG: 10 TABLET, FILM COATED ORAL at 08:25

## 2021-11-10 RX ADMIN — SODIUM CHLORIDE, PRESERVATIVE FREE 10 ML: 5 INJECTION INTRAVENOUS at 08:24

## 2021-11-10 RX ADMIN — PANTOPRAZOLE SODIUM 40 MG: 40 TABLET, DELAYED RELEASE ORAL at 06:18

## 2021-11-10 RX ADMIN — DOCUSATE SODIUM 50 MG AND SENNOSIDES 8.6 MG 2 TABLET: 8.6; 5 TABLET, FILM COATED ORAL at 08:24

## 2021-11-10 RX ADMIN — AMLODIPINE BESYLATE 10 MG: 10 TABLET ORAL at 08:24

## 2021-11-10 RX ADMIN — KETOTIFEN FUMARATE 1 DROP: 0.35 SOLUTION/ DROPS OPHTHALMIC at 08:24

## 2021-11-10 RX ADMIN — IPRATROPIUM BROMIDE AND ALBUTEROL SULFATE 3 ML: 2.5; .5 SOLUTION RESPIRATORY (INHALATION) at 10:21

## 2021-11-10 RX ADMIN — LISINOPRIL 40 MG: 40 TABLET ORAL at 08:25

## 2021-11-10 NOTE — DISCHARGE SUMMARY
DISCHARGE SUMMARY    PATIENT NAME: Zak Lutz       PHYSICIAN: Consuelo Brand MD  : 1960  MRN: [id number]    ADMITTED: 2021     DISCHARGED: 11/10/2021    ADMISSION DIAGNOSES:  Active Hospital Problems    Diagnosis  POA   • Chronic alcohol use [Z72.89]  Yes   • Hemangioma of liver [D18.03]  Yes   • Tobacco dependence syndrome [F17.200]  Yes   • Hyperlipidemia [E78.5]  Yes   • Gastroesophageal reflux disease [K21.9]  Yes   • Chronic obstructive lung disease (HCC) [J44.9]  Yes   • Essential hypertension [I10]  Yes      Resolved Hospital Problems    Diagnosis Date Resolved POA   • **Acute pancreatitis [K85.90] 11/10/2021 Yes   • Left upper quadrant abdominal pain [R10.12] 11/10/2021 Yes     DISCHARGE DIAGNOSES:   Active Hospital Problems    Diagnosis  POA   • Chronic alcohol use [Z72.89]  Yes   • Hemangioma of liver [D18.03]  Yes   • Tobacco dependence syndrome [F17.200]  Yes   • Hyperlipidemia [E78.5]  Yes   • Gastroesophageal reflux disease [K21.9]  Yes   • Chronic obstructive lung disease (HCC) [J44.9]  Yes   • Essential hypertension [I10]  Yes      Resolved Hospital Problems    Diagnosis Date Resolved POA   • **Acute pancreatitis [K85.90] 11/10/2021 Yes   • Left upper quadrant abdominal pain [R10.12] 11/10/2021 Yes       SERVICE: Family Medicine Residency  Attending: Parminder Govea MD  Resident: Consuelo Brand MD    CONSULTS:   Consult Orders (all) (From admission, onward)    None          PROCEDURES:   None    HISTORY OF PRESENT ILLNESS:   Zak Lutz is a 61 y.o. male with a CMH of COPD, hypertension, hyperlipidemia,, GERD, chronic alcohol use and tobacco dependence who presents complaining of worsening abdominal pain.  Patient reports pain onset was last Friday ().  Pain is located in the left upper quadrant and epigastric area nonradiating, associated with nausea, but no vomiting.  Improved by sitting up and movement but exacerbated by eating.  Patient admits to alcohol  use on a regular basis,  his last drink was last Wednesday and he likes to drink beers and occasionally hard liquor. Patient denies dizziness, fever, chills, shortness of air, diarrhea or constipation.     HPI written by Dr. Woodard on 11/8/21    DIAGNOSTIC DATA:   Lab Results (last 24 hours)     Procedure Component Value Units Date/Time    Blood Culture - Blood, Arm, Left [[id number]]  (Normal) Collected: 11/08/21 1230    Specimen: Blood from Arm, Left Updated: 11/10/21 1245     Blood Culture No growth at 2 days    Blood Culture - Blood, Arm, Left [[id number]]  (Normal) Collected: 11/08/21 1220    Specimen: Blood from Arm, Left Updated: 11/10/21 1245     Blood Culture No growth at 2 days    Comprehensive Metabolic Panel [[id number]]  (Abnormal) Collected: 11/10/21 0557    Specimen: Blood Updated: 11/10/21 0635     Glucose 93 mg/dL      BUN 5 mg/dL      Creatinine 0.97 mg/dL      Sodium 136 mmol/L      Potassium 3.7 mmol/L      Chloride 101 mmol/L      CO2 27.0 mmol/L      Calcium 8.9 mg/dL      Total Protein 6.3 g/dL      Albumin 3.30 g/dL      ALT (SGPT) 10 U/L      AST (SGOT) 19 U/L      Alkaline Phosphatase 50 U/L      Total Bilirubin 0.8 mg/dL      eGFR  African Amer 95 mL/min/1.73      Globulin 3.0 gm/dL      A/G Ratio 1.1 g/dL      BUN/Creatinine Ratio 5.2     Anion Gap 8.0 mmol/L     Narrative:      GFR Normal >60  Chronic Kidney Disease <60  Kidney Failure <15      CBC Auto Differential [[id number]]  (Abnormal) Collected: 11/10/21 0557    Specimen: Blood Updated: 11/10/21 0609     WBC 7.12 10*3/mm3      RBC 3.85 10*6/mm3      Hemoglobin 12.4 g/dL      Hematocrit 36.0 %      MCV 93.5 fL      MCH 32.2 pg      MCHC 34.4 g/dL      RDW 12.5 %      RDW-SD 42.9 fl      MPV 10.1 fL      Platelets 181 10*3/mm3      Neutrophil % 62.3 %      Lymphocyte % 21.9 %      Monocyte % 10.3 %      Eosinophil % 4.9 %      Basophil % 0.3 %      Immature Grans % 0.3 %      Neutrophils, Absolute 4.44 10*3/mm3      Lymphocytes,  Absolute 1.56 10*3/mm3      Monocytes, Absolute 0.73 10*3/mm3      Eosinophils, Absolute 0.35 10*3/mm3      Basophils, Absolute 0.02 10*3/mm3      Immature Grans, Absolute 0.02 10*3/mm3      nRBC 0.0 /100 WBC         Imaging Results (Last 24 Hours)     ** No results found for the last 24 hours. **           HOSPITAL COURSE:  The patient was determined to have acute pancreatitis based on elevated lipase and imaging.  This was thought to be secondary to his admitted alcohol use.  The patient was made n.p.o. and started on aggressive fluid resuscitation.  Pain control was with Toradol as patient has morphine allergy.  The patient's abdominal pain began to improve and he was advanced to a clear liquid and subsequently full liquid diet.  On the day of discharge, he tolerated a soft diet without any pain, nausea, or vomiting.  He was discharged home in stable condition on 11/10/2021 on a GI soft diet and scheduled to follow-up with his primary care provider outpatient.    DISCHARGE CONDITION:   Stable    DISPOSITION:  Home or Self Care    DISCHARGE MEDICATIONS     Discharge Medications      New Medications      Instructions Start Date   ondansetron 4 MG tablet  Commonly known as: Zofran   4 mg, Oral, Every 8 Hours PRN         Changes to Medications      Instructions Start Date   cetirizine 10 MG tablet  Commonly known as: zyrTEC  What changed: when to take this   TAKE 1 TABLET BY MOUTH EVERY DAY         Continue These Medications      Instructions Start Date   amLODIPine 10 MG tablet  Commonly known as: NORVASC   10 mg, Oral, Daily      Chantix Starting Month Batr 0.5 MG X 11 & 1 MG X 42 tablet  Generic drug: varenicline  Notes to patient: Take as prescribed at discharge   Take 0.5 mg one daily on days 1-3 and and 0.5 mg twice daily on days 4-7.Then 1 mg twice daily for a total of 12 weeks.      ketotifen 0.025 % ophthalmic solution  Commonly known as: ZADITOR   1 drop, Both Eyes, 2 Times Daily      lisinopril 40 MG  tablet  Commonly known as: PRINIVIL,ZESTRIL   40 mg, Oral, Daily      meloxicam 15 MG tablet  Commonly known as: MOBIC  Notes to patient: Take as prescribed upon discharge   15 mg, Oral, Daily      montelukast 10 MG tablet  Commonly known as: Singulair   10 mg, Oral, Daily      omeprazole 40 MG capsule  Commonly known as: priLOSEC  Notes to patient: Take as prescribed upon discharge   40 mg, Oral, Daily      ProAir  (90 Base) MCG/ACT inhaler  Generic drug: albuterol sulfate HFA   2 puffs, Inhalation, Every 4 Hours PRN      Triamcinolone Acetonide 55 MCG/ACT nasal inhaler  Commonly known as: NASACORT  Notes to patient: Take as prescribed upon discharge.   2 sprays, Nasal, Daily             INSTRUCTIONS:  Activity:   Activity Instructions     Activity as Tolerated     Additional Activity Instructions:    Activity as tolerated.          Diet:   Diet Instructions     Diet: Soft Texture; Pudding Thick Liquids; Whole      Discharge Diet: Soft Texture    Fluid Consistency: Pudding Thick Liquids    Soft Options: Whole    If tolerating soft diet, advance to BRAT (Bananas, Rice, Applesauce, Toast) as tolerated before resuming regular diet.          FOLLOW UP:   Additional Instructions for the Follow-ups that You Need to Schedule     Discharge Follow-up with PCP   As directed       Currently Documented PCP:    Jeramie Singh MD    PCP Phone Number:    [phone]     Follow Up Details: Within 1 week for hospitalization for acute pancreatitis            Follow-up Information     Jeramie Singh MD .    Specialty: Family Medicine  Why: Within 1 week for hospitalization for acute pancreatitis  Contact information:  [address] [place] [address] [phone]                         PENDING TEST RESULTS AT DISCHARGE  Pending Labs     Order Current Status    Blood Culture - Blood, Arm, Left Preliminary result    Blood Culture - Blood, Arm, Left Preliminary result          Time: >30 minutes were spent in discharge  planning, medication reconciliation and coordination of care for this patient.    Parminder Govea MD is the attending at time of discharge, He is aware of the patient's status and agrees with the above discharge summary.          PGY-3  [facility] Family Medicine Residency  This document has been electronically signed by Consuelo Brand MD on November 10, 2021 14:11 CST

## 2021-11-10 NOTE — PROGRESS NOTES
FAMILY MEDICINE RESIDENCY SERVICE  DAILY PROGRESS NOTE    NAME: Zak Lutz  : 1960  MRN: [id number]      LOS: 1 day     PROVIDER OF SERVICE: Consuelo Brand MD    Chief Complaint: Acute pancreatitis    Subjective:     No acute overnight events.  Vital signs stable.  We will stop IV fluids and advance diet to full liquid.  Abdominal pain well controlled.  If able to tolerate advance diet, can discharge today.    Review of Systems:   Review of Systems   Constitutional: Negative for chills and fever.   HENT: Negative for facial swelling, nosebleeds and trouble swallowing.    Eyes: Negative for photophobia and visual disturbance.   Respiratory: Negative for choking and stridor.    Gastrointestinal: Positive for abdominal pain. Negative for abdominal distention, diarrhea, nausea and vomiting.   Genitourinary: Negative for difficulty urinating and dysuria.   Musculoskeletal: Negative for arthralgias, gait problem and myalgias.   Skin: Negative for pallor and rash.   Neurological: Negative for seizures and syncope.   Psychiatric/Behavioral: Negative for dysphoric mood and hallucinations.       Objective:     Vital Signs  Temp:  [96.8 °F (36 °C)-98.7 °F (37.1 °C)] 98.5 °F (36.9 °C)  Heart Rate:  [] 88  Resp:  [16-18] 18  BP: (118-157)/(64-92) 128/68   Body mass index is 21.56 kg/m².    Physical Exam  Physical Exam  Constitutional:       General: He is not in acute distress.     Appearance: He is well-developed.   HENT:      Head: Normocephalic and atraumatic.      Right Ear: External ear normal.      Left Ear: External ear normal.      Nose: Nose normal.   Eyes:      Conjunctiva/sclera: Conjunctivae normal.      Pupils: Pupils are equal, round, and reactive to light.   Cardiovascular:      Rate and Rhythm: Normal rate and regular rhythm.      Heart sounds: Normal heart sounds. No murmur heard.  No friction rub. No gallop.    Pulmonary:      Effort: Pulmonary effort is normal. No respiratory  distress.      Breath sounds: Normal breath sounds. No wheezing.   Abdominal:      General: Bowel sounds are normal. There is no distension.      Palpations: Abdomen is soft.      Tenderness: There is no abdominal tenderness. There is no guarding.   Musculoskeletal:         General: Normal range of motion.      Cervical back: Normal range of motion and neck supple.   Skin:     General: Skin is warm and dry.   Neurological:      Mental Status: He is alert and oriented to person, place, and time.      Cranial Nerves: No cranial nerve deficit.      Coordination: Coordination normal.   Psychiatric:         Behavior: Behavior normal.         Thought Content: Thought content normal.         Judgment: Judgment normal.         Scheduled Meds   amLODIPine, 10 mg, Oral, Daily  cetirizine, 10 mg, Oral, Nightly  ipratropium-albuterol, 3 mL, Nebulization, 4x Daily - RT  ketotifen, 1 drop, Both Eyes, BID  lisinopril, 40 mg, Oral, Daily  montelukast, 10 mg, Oral, Daily  nicotine, 1 patch, Transdermal, Q24H  pantoprazole, 40 mg, Oral, QAM  senna-docusate sodium, 2 tablet, Oral, BID  sodium chloride, 10 mL, Intravenous, Q12H       PRN Meds   senna-docusate sodium **AND** polyethylene glycol **AND** bisacodyl **AND** bisacodyl  •  calcium carbonate  •  labetalol  •  LORazepam **OR** LORazepam **OR** LORazepam **OR** LORazepam **OR** LORazepam **OR** LORazepam  •  ondansetron **OR** ondansetron  •  potassium chloride **OR** potassium chloride **OR** potassium chloride  •  sodium chloride      Diagnostic Data    Results from last 7 days   Lab Units 11/10/21  0557   WBC 10*3/mm3 7.12   HEMOGLOBIN g/dL 12.4*   HEMATOCRIT % 36.0*   PLATELETS 10*3/mm3 181   GLUCOSE mg/dL 93   CREATININE mg/dL 0.97   BUN mg/dL 5*   SODIUM mmol/L 136   POTASSIUM mmol/L 3.7   AST (SGOT) U/L 19   ALT (SGPT) U/L 10   ALK PHOS U/L 50   BILIRUBIN mg/dL 0.8   ANION GAP mmol/L 8.0       CT Abdomen With & Without Contrast    Result Date: 11/9/2021  Posterior right  lobe liver lesion as enhancement characteristics most consistent with hemangioma. Evidence of distal pancreatitis. Pancreatic tail mass not excluded with certainty. Appearance is clearly change compared with previous study dated January 13, 2021. Follow-up study after appropriate medical treatment for pancreatitis recommended to reevaluate. Electronically signed by:  Morris Marsh MD  11/9/2021 9:53 AM CST Workstation: [id number]        I reviewed the patient's new clinical results.    Assessment/Plan:     Active Hospital Problems    Diagnosis  POA   • Chronic alcohol use [Z72.89]  Yes     -For patient's pancreatitis, can stop IV fluids and advance diet.  If he is able to tolerate diet, he can be discharged today.  Drinks beer and hard liquor   Most recent alcoholic drink was last Wednesday  Does not recall he has ever been in withdrawal  Obtain serum ethanol level  [facility] protocol     • Hemangioma of liver [D18.03]  Yes     -CT abd/pelvis (11/8/21): Posterior segment right lobe of liver small nodular foci that may represent focal fatty infiltrative changes versus atypical appearing hemangioma versus small metastatic lesion.  - Will obtain CT abdomen with contrast with liver protocol  - continue to monitor daily labs       • Tobacco dependence syndrome [F17.200]  Yes     Nicotine patch   Smoking cessation counseling      • Hyperlipidemia [E78.5]  Yes     The 10-year ASCVD risk score (Mantee DC Jr., et al., 2013) is: 38.6%    - Lipid panel     -Currently not on statin therapy.    -We will consider initiating statin therapy after resolution of current pancreatitis flare         • Gastroesophageal reflux disease [K21.9]  Yes     Continue Prilosec 40 mg daily     • Chronic obstructive lung disease (HCC) [J44.9]  Yes     duonebs  singulair   Nascort        • Essential hypertension [I10]  Yes     Continue amlodipine 10 mg daily  Continue lisinopril 40 mg daily  Labetalol as needed for systolic blood pressure greater than  160  Monitor blood pressure per hospital protocol           DVT prophylaxis: SCDs/TEDs  Code status is   Code Status and Medical Interventions:   Ordered at: 11/08/21 1906     Level Of Support Discussed With:    Patient     Code Status (Patient has no pulse and is not breathing):    CPR (Attempt to Resuscitate)     Medical Interventions (Patient has pulse or is breathing):    Full Support       Plan for disposition:Home in 0-1 days      Time: 15 minutes           PGY-3  [facility] Family Medicine Residency  This document has been electronically signed by Consuelo Brand MD on November 10, 2021 14:07 CST

## 2021-11-10 NOTE — NURSING NOTE
Discharge order placed. AVS printed, discussed, and given to patient. All questions and concerns addressed. PIV and heart monitor removed from patient. All belongings present at bedside sent home with patient. Patient leaving alert, oriented, and on room air. Patient will be wheeled down once his ride arrives to pick him up.

## 2021-11-10 NOTE — DISCHARGE SUMMARY
DISCHARGE SUMMARY    PATIENT NAME: Zak Lutz       PHYSICIAN: Consuelo Brand MD  : 1960  MRN: [id number]    ADMITTED: 2021     DISCHARGED: 11/10/2021    ADMISSION DIAGNOSES:  Active Hospital Problems    Diagnosis  POA   • Chronic alcohol use [Z72.89]  Yes   • Hemangioma of liver [D18.03]  Yes   • Tobacco dependence syndrome [F17.200]  Yes   • Hyperlipidemia [E78.5]  Yes   • Gastroesophageal reflux disease [K21.9]  Yes   • Chronic obstructive lung disease (HCC) [J44.9]  Yes   • Essential hypertension [I10]  Yes      Resolved Hospital Problems    Diagnosis Date Resolved POA   • **Acute pancreatitis [K85.90] 11/10/2021 Yes   • Left upper quadrant abdominal pain [R10.12] 11/10/2021 Yes       DISCHARGE DIAGNOSES:   Active Hospital Problems    Diagnosis  POA   • Chronic alcohol use [Z72.89]  Yes   • Hemangioma of liver [D18.03]  Yes   • Tobacco dependence syndrome [F17.200]  Yes   • Hyperlipidemia [E78.5]  Yes   • Gastroesophageal reflux disease [K21.9]  Yes   • Chronic obstructive lung disease (HCC) [J44.9]  Yes   • Essential hypertension [I10]  Yes      Resolved Hospital Problems    Diagnosis Date Resolved POA   • **Acute pancreatitis [K85.90] 11/10/2021 Yes   • Left upper quadrant abdominal pain [R10.12] 11/10/2021 Yes       SERVICE: Family Medicine Residency  Attending: Parminder Govea MD  Resident: Consuelo Brand MD    CONSULTS:   Consult Orders (all) (From admission, onward)    None          PROCEDURES:   None    HISTORY OF PRESENT ILLNESS:   Retrieved from history and physical exam by Dr. Bonifacio Woodard on 2021:    Zak Lutz is a 61 y.o. male with a CMH of COPD, hypertension, hyperlipidemia,, GERD, chronic alcohol use and tobacco dependence who presents complaining of worsening abdominal pain.  Patient reports pain onset was last Friday ().  Pain is located in the left upper quadrant and epigastric area nonradiating, associated with nausea, but no vomiting.   Improved by sitting up and movement but exacerbated by eating.  Patient admits to alcohol use on a regular basis,  his last drink was last Wednesday and he likes to drink beers and occasionally hard liquor. Patient denies dizziness, fever, chills, shortness of air, diarrhea or constipation.     DIAGNOSTIC DATA:   Lab Results (last 24 hours)     Procedure Component Value Units Date/Time    Blood Culture - Blood, Arm, Left [[id number]]  (Normal) Collected: 11/08/21 1230    Specimen: Blood from Arm, Left Updated: 11/10/21 1245     Blood Culture No growth at 2 days    Blood Culture - Blood, Arm, Left [[id number]]  (Normal) Collected: 11/08/21 1220    Specimen: Blood from Arm, Left Updated: 11/10/21 1245     Blood Culture No growth at 2 days    Comprehensive Metabolic Panel [[id number]]  (Abnormal) Collected: 11/10/21 0557    Specimen: Blood Updated: 11/10/21 0635     Glucose 93 mg/dL      BUN 5 mg/dL      Creatinine 0.97 mg/dL      Sodium 136 mmol/L      Potassium 3.7 mmol/L      Chloride 101 mmol/L      CO2 27.0 mmol/L      Calcium 8.9 mg/dL      Total Protein 6.3 g/dL      Albumin 3.30 g/dL      ALT (SGPT) 10 U/L      AST (SGOT) 19 U/L      Alkaline Phosphatase 50 U/L      Total Bilirubin 0.8 mg/dL      eGFR  African Amer 95 mL/min/1.73      Globulin 3.0 gm/dL      A/G Ratio 1.1 g/dL      BUN/Creatinine Ratio 5.2     Anion Gap 8.0 mmol/L     Narrative:      GFR Normal >60  Chronic Kidney Disease <60  Kidney Failure <15      CBC Auto Differential [[id number]]  (Abnormal) Collected: 11/10/21 0557    Specimen: Blood Updated: 11/10/21 0609     WBC 7.12 10*3/mm3      RBC 3.85 10*6/mm3      Hemoglobin 12.4 g/dL      Hematocrit 36.0 %      MCV 93.5 fL      MCH 32.2 pg      MCHC 34.4 g/dL      RDW 12.5 %      RDW-SD 42.9 fl      MPV 10.1 fL      Platelets 181 10*3/mm3      Neutrophil % 62.3 %      Lymphocyte % 21.9 %      Monocyte % 10.3 %      Eosinophil % 4.9 %      Basophil % 0.3 %      Immature Grans % 0.3 %      Neutrophils,  Absolute 4.44 10*3/mm3      Lymphocytes, Absolute 1.56 10*3/mm3      Monocytes, Absolute 0.73 10*3/mm3      Eosinophils, Absolute 0.35 10*3/mm3      Basophils, Absolute 0.02 10*3/mm3      Immature Grans, Absolute 0.02 10*3/mm3      nRBC 0.0 /100 WBC         Imaging Results (Last 72 Hours)     Procedure Component Value Units Date/Time    CT Abdomen With & Without Contrast [[id number]] Collected: 11/09/21 0920     Updated: 11/09/21 0954    Narrative:      CT abdomen with and without IV contrast November 9, 2021    INDICATION: Follow-up abnormal study    TECHNIQUE: Spiral images obtained from diaphragm through to the  iliac crests prior to and following intravenous administration of  100 mL of Isovue-300. Sagittal, axial and coronal reformatted  images generated retrospectively.    FINDINGS:  Right lobe liver lesion has enhancement characteristics most  consistent with hemangioma.  Again noted infiltration of the peripancreatic fat distally.  There is focal bulge in the contour of the tail of the pancreas  and a small focal mass cannot be excluded.  Splenic granulomata as previously.  Adrenals grossly normal.  Gallbladder normal.  Scattered renal cortical cysts.  No abdominal aortic aneurysm.      Impression:      Posterior right lobe liver lesion as enhancement characteristics  most consistent with hemangioma.  Evidence of distal pancreatitis. Pancreatic tail mass not  excluded with certainty. Appearance is clearly change compared  with previous study dated January 13, 2021. Follow-up study after  appropriate medical treatment for pancreatitis recommended to  reevaluate.    Electronically signed by:  Morris Marsh MD  11/9/2021 9:53 AM  CST Workstation: [id number]    CT Abdomen Pelvis With Contrast [[id number]] Collected: 11/08/21 1356     Updated: 11/08/21 1518    Narrative:        PROCEDURE: Ct abdomen and pelvis with contrast    REASON FOR EXAM: LUQ abd pain    FINDINGS: Axial computer tomography sequential  imaging was  performed from the diaphragms through the symphysis pubis with IV  contrast administration. Sagittal and coronal reformates was  performed. This exam was performed according to our departmental  dose optimization program, which includes automated exposure  control, adjustment of the mA and/or KV according to patient size  and/or use of iterative reconstruction technique.     Imaging through lung bases reveals no abnormality.     Posterior segment right lobe of liver small nodular foci of  diminished enhancement which measures 1.75 x 0.9 cm. The liver is  otherwise unremarkable. The gallbladder is normal. The biliary  system is normal. Anterior pararenal space fatty stranding  surrounding the tail of the pancreas. The pancreas is otherwise  unremarkable. The spleen is normal. Bilateral adrenal glands are  normal. Right kidney mid zone subcentimeter circular hypodense  lesion which is too small to definitively characterize with  Hounsfield units. Statistically this most likely represents a  small benign renal cortical cysts. Left kidney lower pole 1.39 cm  benign simple renal cortical cyst with Hounsfield units of 14.  The right kidney and ureter are otherwise normal. Left kidney  lower pole circular hypodense lesion with Hounsfield units of 19  which measures 1 cm in greatest diameter consistent with benign  simple renal cortical cyst. The left kidney and ureter are  otherwise normal. The bladder is largely decompressed which  limits evaluation.. Diffuse bladder wall thickening. The bladder  is otherwise unremarkable. The hollow viscera is normal. The  appendix is identified appears normal. No lymphadenopathy in the  abdomen or the pelvis. Atherosclerotic vascular calcifications of  the abdominal aorta. Postsurgical changes with right bipolar hip  prostheses in place. Orthopedic metallic hardware is seen  fixating the left acetabular region. Partially imaged along the  proximal right femur posteriorly  there is a hyperdense lesion  which has Hounsfield units of 95 and measures 3.35 x 3.83 cm.      Impression:      1.Posterior segment right lobe of liver small nodular foci of  diminished enhancement which measures 1.75 x 0.9 cm. This is of  uncertain etiology. This may represent focal fatty infiltrative  changes versus atypical appearing hemangioma versus small  metastatic lesion. Dedicated CT liver protocol without and with  contrast or MR liver protocol without and with contrast would be  of further benefit to evaluate.  2.Anterior pararenal space fatty stranding surrounding the tail  of the pancreas. This would be suspicious for changes from acute  pancreatitis. Recommend clinical correlation. .  3. Bilateral kidney benign renal cortical cysts..  4. Largely decompressed bladder with diffusely thickened walls.  The differential for this finding would include pseudothickening  from decompressed state versus changes from bladder muscle  hypertrophy from bladder outlet obstruction versus cystitis  versus neoplastic involvement. Recommend clinical correlation..  5. Partially imaged along the proximal right femur posteriorly  there is a hyperdense lesion which has Hounsfield units of 95 and  measures 3.35 x 3.83 cm. This may represent hematoma versus mass  in this area. Recommend clinical correlation and if clinically  indicated dedicated CT without and with contrast to further  characterize this area.    Electronically signed by:  Tanner Portillo MD  11/8/2021 3:17 PM CST  Workstation: [id number]             HOSPITAL COURSE:    The patient was determined to have acute pancreatitis based on elevated lipase and imaging.  This was thought to be secondary to his admitted alcohol use.  The patient was made n.p.o. and started on aggressive fluid resuscitation.  Pain control was with Toradol as patient has morphine allergy.  The patient's abdominal pain began to improve, and he was advanced to a clear liquid and subsequently  full liquid diet.  On the day of discharge, he tolerated a soft diet without any pain, nausea, or vomiting.  He was discharged home in stable condition on 11/10/2021 on a GI soft diet and scheduled to follow-up with his primary care provider outpatient.      DISCHARGE CONDITION:   Stable     DISPOSITION:  Home or Self Care    DISCHARGE MEDICATIONS     Discharge Medications      New Medications      Instructions Start Date   ondansetron 4 MG tablet  Commonly known as: Zofran   4 mg, Oral, Every 8 Hours PRN         Changes to Medications      Instructions Start Date   cetirizine 10 MG tablet  Commonly known as: zyrTEC  What changed: when to take this   TAKE 1 TABLET BY MOUTH EVERY DAY         Continue These Medications      Instructions Start Date   amLODIPine 10 MG tablet  Commonly known as: NORVASC   10 mg, Oral, Daily      Chantix Starting Month Bart 0.5 MG X 11 & 1 MG X 42 tablet  Generic drug: varenicline  Notes to patient: Take as prescribed at discharge   Take 0.5 mg one daily on days 1-3 and and 0.5 mg twice daily on days 4-7.Then 1 mg twice daily for a total of 12 weeks.      ketotifen 0.025 % ophthalmic solution  Commonly known as: ZADITOR   1 drop, Both Eyes, 2 Times Daily      lisinopril 40 MG tablet  Commonly known as: PRINIVIL,ZESTRIL   40 mg, Oral, Daily      meloxicam 15 MG tablet  Commonly known as: MOBIC  Notes to patient: Take as prescribed upon discharge   15 mg, Oral, Daily      montelukast 10 MG tablet  Commonly known as: Singulair   10 mg, Oral, Daily      omeprazole 40 MG capsule  Commonly known as: priLOSEC  Notes to patient: Take as prescribed upon discharge   40 mg, Oral, Daily      ProAir  (90 Base) MCG/ACT inhaler  Generic drug: albuterol sulfate HFA   2 puffs, Inhalation, Every 4 Hours PRN      Triamcinolone Acetonide 55 MCG/ACT nasal inhaler  Commonly known as: NASACORT  Notes to patient: Take as prescribed upon discharge.   2 sprays, Nasal, Daily              INSTRUCTIONS:  Activity:   Activity Instructions     Activity as Tolerated     Additional Activity Instructions:    Activity as tolerated.          Diet:   Diet Instructions     Diet: Soft Texture; Pudding Thick Liquids; Whole      Discharge Diet: Soft Texture    Fluid Consistency: Pudding Thick Liquids    Soft Options: Whole    If tolerating soft diet, advance to BRAT (Bananas, Rice, Applesauce, Toast) as tolerated before resuming regular diet.          FOLLOW UP:   Additional Instructions for the Follow-ups that You Need to Schedule     Discharge Follow-up with PCP   As directed       Currently Documented PCP:    Jeramie Singh MD    PCP Phone Number:    [phone]     Follow Up Details: Within 1 week for hospitalization for acute pancreatitis            Follow-up Information     Jeramie Singh MD .    Specialty: Family Medicine  Why: Within 1 week for hospitalization for acute pancreatitis  Contact information:  [address]   [place] [address] [phone]                         PENDING TEST RESULTS AT DISCHARGE  Pending Labs     Order Current Status    Blood Culture - Blood, Arm, Left Preliminary result    Blood Culture - Blood, Arm, Left Preliminary result          Time: 35 minutes was spent in discharge planning, medication reconciliation and coordination of care for this patient.    Parminder Govea MD is the attending at time of discharge, He is aware of the patient's status and agrees with the above discharge summary.      This document has been electronically signed by Consuelo Brand MD on November 10, 2021 13:59 CST    Part of this note may be an electronic transcription or translation of spoken language to printed text using the Dragon Dictation System

## 2021-11-10 NOTE — PLAN OF CARE
Goal Outcome Evaluation:           Progress: improving  Outcome Summary: Pt has tolerated clear liqid diet and is requesting solid food. pain controlled with one dose of IV PRN pain medication. No acute complaints. pt is resting between care. Will continue to monitor.   Immediate family member I have personally seen and examined this patient.  I have fully participated in the care of this patient. I have reviewed all pertinent clinical information, including history, physical exam, plan and the Resident’s note and agree except as noted.

## 2021-11-11 ENCOUNTER — TRANSITIONAL CARE MANAGEMENT TELEPHONE ENCOUNTER (OUTPATIENT)
Dept: CALL CENTER | Facility: HOSPITAL | Age: 61
End: 2021-11-11

## 2021-11-11 NOTE — PAYOR COMM NOTE
"Susan Arguello  Case Management Extender  [phone] phone  [phone] fax    Ref# [id number]    Zak Lutz (61 y.o. Male)             Date of Birth Social Security Number Address Home Phone MRN    1960  [address] [address] [phone] [id number]    Zoroastrian Marital Status             Religion Single       Admission Date Admission Type Admitting Provider Attending Provider Department, Room/Bed    11/8/21 Emergency Pamrinder Govea MD  [address], 429/1    Discharge Date Discharge Disposition Discharge Destination          11/10/2021 Home or Self Care              Attending Provider: (none)   Allergies: Tramadol, Morphine And Related    Isolation: None   Infection: None   Code Status: Prior   Advance Care Planning Activity    Ht: 185.4 cm (73\")   Wt: 74.1 kg (163 lb 6.4 oz)    Admission Cmt: None   Principal Problem: Acute pancreatitis [K85.90] More...                 Active Insurance as of 11/8/2021     Primary Coverage     Payor Plan Insurance Group Employer/Plan Group    [employer] [email or web address] [place] [email or web address] [place] [id number]     Payor Plan Address Payor Plan Phone Number Payor Plan Fax Number Effective Dates    [address] [address]   1/1/2014 - None Entered    [place] [address]       Subscriber Name Subscriber Birth Date Member ID       ZAK LUTZ 1960 [id number]                 Emergency Contacts      (Rel.) Home Phone Work Phone Mobile Phone    Chrissy Rodriguez (Sister) [phone] -- [phone]    Susan Lutz (Sister) [phone] -- [phone]               Discharge Summary      Consuelo Brand MD at 11/10/21 1327     Attestation signed by Parminder Govea MD at 11/10/21 9372    I have seen the patient on day of discharge. I have reviewed the notes, assessments, and/or procedures performed by Dr. Brand, I concur with her/his documentation and assessment and plan for Zak Lutz. "       This document has been electronically signed by Parminder Govea MD on November 10, 2021 23:17 CST                          DISCHARGE SUMMARY    PATIENT NAME: Zak Lutz       PHYSICIAN: Consuelo Brand MD  : 1960  MRN: [id number]    ADMITTED: 2021     DISCHARGED: 11/10/2021    ADMISSION DIAGNOSES:  Active Hospital Problems    Diagnosis  POA   • Chronic alcohol use [Z72.89]  Yes   • Hemangioma of liver [D18.03]  Yes   • Tobacco dependence syndrome [F17.200]  Yes   • Hyperlipidemia [E78.5]  Yes   • Gastroesophageal reflux disease [K21.9]  Yes   • Chronic obstructive lung disease (HCC) [J44.9]  Yes   • Essential hypertension [I10]  Yes      Resolved Hospital Problems    Diagnosis Date Resolved POA   • **Acute pancreatitis [K85.90] 11/10/2021 Yes   • Left upper quadrant abdominal pain [R10.12] 11/10/2021 Yes     DISCHARGE DIAGNOSES:   Active Hospital Problems    Diagnosis  POA   • Chronic alcohol use [Z72.89]  Yes   • Hemangioma of liver [D18.03]  Yes   • Tobacco dependence syndrome [F17.200]  Yes   • Hyperlipidemia [E78.5]  Yes   • Gastroesophageal reflux disease [K21.9]  Yes   • Chronic obstructive lung disease (HCC) [J44.9]  Yes   • Essential hypertension [I10]  Yes      Resolved Hospital Problems    Diagnosis Date Resolved POA   • **Acute pancreatitis [K85.90] 11/10/2021 Yes   • Left upper quadrant abdominal pain [R10.12] 11/10/2021 Yes       SERVICE: Family Medicine Residency  Attending: Parminder oGvea MD  Resident: Consuelo Brand MD    CONSULTS:   Consult Orders (all) (From admission, onward)    None          PROCEDURES:   None    HISTORY OF PRESENT ILLNESS:   Zak Lutz is a 61 y.o. male with a CMH of COPD, hypertension, hyperlipidemia,, GERD, chronic alcohol use and tobacco dependence who presents complaining of worsening abdominal pain.  Patient reports pain onset was last Friday ().  Pain is located in the left upper quadrant and epigastric area nonradiating,  associated with nausea, but no vomiting.  Improved by sitting up and movement but exacerbated by eating.  Patient admits to alcohol use on a regular basis,  his last drink was last Wednesday and he likes to drink beers and occasionally hard liquor. Patient denies dizziness, fever, chills, shortness of air, diarrhea or constipation.     HPI written by Dr. Woodard on 11/8/21    DIAGNOSTIC DATA:   Lab Results (last 24 hours)     Procedure Component Value Units Date/Time    Blood Culture - Blood, Arm, Left [[id number]]  (Normal) Collected: 11/08/21 1230    Specimen: Blood from Arm, Left Updated: 11/10/21 1245     Blood Culture No growth at 2 days    Blood Culture - Blood, Arm, Left [[id number]]  (Normal) Collected: 11/08/21 1220    Specimen: Blood from Arm, Left Updated: 11/10/21 1245     Blood Culture No growth at 2 days    Comprehensive Metabolic Panel [[id number]]  (Abnormal) Collected: 11/10/21 0557    Specimen: Blood Updated: 11/10/21 0635     Glucose 93 mg/dL      BUN 5 mg/dL      Creatinine 0.97 mg/dL      Sodium 136 mmol/L      Potassium 3.7 mmol/L      Chloride 101 mmol/L      CO2 27.0 mmol/L      Calcium 8.9 mg/dL      Total Protein 6.3 g/dL      Albumin 3.30 g/dL      ALT (SGPT) 10 U/L      AST (SGOT) 19 U/L      Alkaline Phosphatase 50 U/L      Total Bilirubin 0.8 mg/dL      eGFR  African Amer 95 mL/min/1.73      Globulin 3.0 gm/dL      A/G Ratio 1.1 g/dL      BUN/Creatinine Ratio 5.2     Anion Gap 8.0 mmol/L     Narrative:      GFR Normal >60  Chronic Kidney Disease <60  Kidney Failure <15      CBC Auto Differential [[id number]]  (Abnormal) Collected: 11/10/21 0557    Specimen: Blood Updated: 11/10/21 0609     WBC 7.12 10*3/mm3      RBC 3.85 10*6/mm3      Hemoglobin 12.4 g/dL      Hematocrit 36.0 %      MCV 93.5 fL      MCH 32.2 pg      MCHC 34.4 g/dL      RDW 12.5 %      RDW-SD 42.9 fl      MPV 10.1 fL      Platelets 181 10*3/mm3      Neutrophil % 62.3 %      Lymphocyte % 21.9 %      Monocyte % 10.3 %       Eosinophil % 4.9 %      Basophil % 0.3 %      Immature Grans % 0.3 %      Neutrophils, Absolute 4.44 10*3/mm3      Lymphocytes, Absolute 1.56 10*3/mm3      Monocytes, Absolute 0.73 10*3/mm3      Eosinophils, Absolute 0.35 10*3/mm3      Basophils, Absolute 0.02 10*3/mm3      Immature Grans, Absolute 0.02 10*3/mm3      nRBC 0.0 /100 WBC         Imaging Results (Last 24 Hours)     ** No results found for the last 24 hours. **           HOSPITAL COURSE:  The patient was determined to have acute pancreatitis based on elevated lipase and imaging.  This was thought to be secondary to his admitted alcohol use.  The patient was made n.p.o. and started on aggressive fluid resuscitation.  Pain control was with Toradol as patient has morphine allergy.  The patient's abdominal pain began to improve and he was advanced to a clear liquid and subsequently full liquid diet.  On the day of discharge, he tolerated a soft diet without any pain, nausea, or vomiting.  He was discharged home in stable condition on 11/10/2021 on a GI soft diet and scheduled to follow-up with his primary care provider outpatient.    DISCHARGE CONDITION:   Stable    DISPOSITION:  Home or Self Care    DISCHARGE MEDICATIONS     Discharge Medications      New Medications      Instructions Start Date   ondansetron 4 MG tablet  Commonly known as: Zofran   4 mg, Oral, Every 8 Hours PRN         Changes to Medications      Instructions Start Date   cetirizine 10 MG tablet  Commonly known as: zyrTEC  What changed: when to take this   TAKE 1 TABLET BY MOUTH EVERY DAY         Continue These Medications      Instructions Start Date   amLODIPine 10 MG tablet  Commonly known as: NORVASC   10 mg, Oral, Daily      Chantix Starting Month Bart 0.5 MG X 11 & 1 MG X 42 tablet  Generic drug: varenicline  Notes to patient: Take as prescribed at discharge   Take 0.5 mg one daily on days 1-3 and and 0.5 mg twice daily on days 4-7.Then 1 mg twice daily for a total of 12 weeks.       ketotifen 0.025 % ophthalmic solution  Commonly known as: ZADITOR   1 drop, Both Eyes, 2 Times Daily      lisinopril 40 MG tablet  Commonly known as: PRINIVIL,ZESTRIL   40 mg, Oral, Daily      meloxicam 15 MG tablet  Commonly known as: MOBIC  Notes to patient: Take as prescribed upon discharge   15 mg, Oral, Daily      montelukast 10 MG tablet  Commonly known as: Singulair   10 mg, Oral, Daily      omeprazole 40 MG capsule  Commonly known as: priLOSEC  Notes to patient: Take as prescribed upon discharge   40 mg, Oral, Daily      ProAir  (90 Base) MCG/ACT inhaler  Generic drug: albuterol sulfate HFA   2 puffs, Inhalation, Every 4 Hours PRN      Triamcinolone Acetonide 55 MCG/ACT nasal inhaler  Commonly known as: NASACORT  Notes to patient: Take as prescribed upon discharge.   2 sprays, Nasal, Daily             INSTRUCTIONS:  Activity:   Activity Instructions     Activity as Tolerated     Additional Activity Instructions:    Activity as tolerated.          Diet:   Diet Instructions     Diet: Soft Texture; Pudding Thick Liquids; Whole      Discharge Diet: Soft Texture    Fluid Consistency: Pudding Thick Liquids    Soft Options: Whole    If tolerating soft diet, advance to BRAT (Bananas, Rice, Applesauce, Toast) as tolerated before resuming regular diet.          FOLLOW UP:   Additional Instructions for the Follow-ups that You Need to Schedule     Discharge Follow-up with PCP   As directed       Currently Documented PCP:    Jeramie Singh MD    PCP Phone Number:    [phone]     Follow Up Details: Within 1 week for hospitalization for acute pancreatitis            Follow-up Information     Jeramie Singh MD .    Specialty: Family Medicine  Why: Within 1 week for hospitalization for acute pancreatitis  Contact information:  [address] [place] [address] [phone]                         PENDING TEST RESULTS AT DISCHARGE  Pending Labs     Order Current Status    Blood Culture - Blood, Arm, Left  Preliminary result    Blood Culture - Blood, Arm, Left Preliminary result          Time: >30 minutes were spent in discharge planning, medication reconciliation and coordination of care for this patient.    Parminder Govea MD is the attending at time of discharge, He is aware of the patient's status and agrees with the above discharge summary.          PGY-3  [facility] Family Medicine Residency  This document has been electronically signed by Consuelo Brand MD on November 10, 2021 14:11 CST                Electronically signed by Parminder Govea MD at 11/10/21 6838

## 2021-11-11 NOTE — OUTREACH NOTE
Call Center TCM Note      Responses   [facility] patient discharged from? [place]   Does the patient have one of the following disease processes/diagnoses(primary or secondary)? Other   TCM attempt successful? Yes   Call start time 1612   Call end time 1613   Discharge diagnosis Acute pancreatitis    Meds reviewed with patient/caregiver? Yes   Is the patient having any side effects they believe may be caused by any medication additions or changes? No   Does the patient have all medications ordered at discharge? Yes   Is the patient taking all medications as directed (includes completed medication regime)? Yes   Does the patient have a primary care provider?  Yes   Does the patient have an appointment with their PCP within 7 days of discharge? Yes   Comments regarding PCP hospital f/u with Dr. Hewitt on 11/17   Has the patient kept scheduled appointments due by today? N/A   Has home health visited the patient within 72 hours of discharge? N/A   Psychosocial issues? No   Did the patient receive a copy of their discharge instructions? Yes   Nursing interventions Reviewed instructions with patient   What is the patient's perception of their health status since discharge? Improving   Is the patient/caregiver able to teach back the hierarchy of who to call/visit for symptoms/problems? PCP, Specialist, Home health nurse, Urgent Care, ED, 911 Yes   TCM call completed? Yes   Wrap up additional comments Says he is doing much better, no questions or concerns at this time, confirmed f/u appt with PCP for 11/17.          Marisol Morrow RN    11/11/2021, 16:13 EST

## 2021-11-11 NOTE — OUTREACH NOTE
Prep Survey      Responses   [facility] [place] patient discharged from? [place]   Is LACE score < 7 ? No   Emergency Room discharge w/ pulse ox? No   Eligibility [facility]   Date of Admission 11/08/21   Date of Discharge 11/10/21   Discharge Disposition Home or Self Care   Discharge diagnosis Acute pancreatitis    Does the patient have one of the following disease processes/diagnoses(primary or secondary)? Other   Does the patient have Home health ordered? No   Is there a DME ordered? No   Prep survey completed? Yes          Magda Carreon RN

## 2021-11-12 ENCOUNTER — TELEPHONE (OUTPATIENT)
Dept: FAMILY MEDICINE CLINIC | Facility: CLINIC | Age: 61
End: 2021-11-12

## 2021-11-12 NOTE — TELEPHONE ENCOUNTER
Called patient to discuss recent blood in stool.  I do not see any medications on his list that might be causing it.  His last colonoscopic exam was 2 or 3 years ago as he reports.  He does report that every several months he gets a flareup of his internal hemorrhoids.  My impression is the recent bleeding was due to that.  Advised him to call back if bleeding persisted beyond 1 week.    This document has been electronically signed by Jeramie Singh MD on November 12, 2021 17:59 CST

## 2021-11-13 LAB
BACTERIA SPEC AEROBE CULT: NORMAL
BACTERIA SPEC AEROBE CULT: NORMAL

## 2021-11-17 ENCOUNTER — READMISSION MANAGEMENT (OUTPATIENT)
Dept: CALL CENTER | Facility: HOSPITAL | Age: 61
End: 2021-11-17

## 2021-11-17 ENCOUNTER — OFFICE VISIT (OUTPATIENT)
Dept: FAMILY MEDICINE CLINIC | Facility: CLINIC | Age: 61
End: 2021-11-17

## 2021-11-17 VITALS
WEIGHT: 165.7 LBS | HEIGHT: 73 IN | OXYGEN SATURATION: 98 % | HEART RATE: 62 BPM | BODY MASS INDEX: 21.96 KG/M2 | SYSTOLIC BLOOD PRESSURE: 132 MMHG | DIASTOLIC BLOOD PRESSURE: 70 MMHG

## 2021-11-17 DIAGNOSIS — K85.20 ALCOHOL-INDUCED ACUTE PANCREATITIS, UNSPECIFIED COMPLICATION STATUS: Primary | ICD-10-CM

## 2021-11-17 DIAGNOSIS — F10.29 ALCOHOL DEPENDENCE WITH UNSPECIFIED ALCOHOL-INDUCED DISORDER (HCC): ICD-10-CM

## 2021-11-17 DIAGNOSIS — F17.219 NICOTINE DEPENDENCE, CIGARETTES, W UNSP DISORDERS: ICD-10-CM

## 2021-11-17 PROCEDURE — 99213 OFFICE O/P EST LOW 20 MIN: CPT | Performed by: STUDENT IN AN ORGANIZED HEALTH CARE EDUCATION/TRAINING PROGRAM

## 2021-11-17 RX ORDER — NALTREXONE HYDROCHLORIDE 50 MG/1
50 TABLET, FILM COATED ORAL DAILY
Qty: 30 TABLET | Refills: 2 | Status: ON HOLD | OUTPATIENT
Start: 2021-11-17 | End: 2021-11-22

## 2021-11-17 RX ORDER — TRIAMCINOLONE ACETONIDE 55 UG/1
SPRAY, METERED NASAL
COMMUNITY
Start: 2021-10-01 | End: 2021-11-24 | Stop reason: HOSPADM

## 2021-11-17 NOTE — OUTREACH NOTE
Medical Week 2 Survey      Responses   [facility] patient discharged from? [place]   Does the patient have one of the following disease processes/diagnoses(primary or secondary)? Other   Week 2 attempt successful? Yes   Call start time 1104   Discharge diagnosis Acute pancreatitis    Call end time 1105   Meds reviewed with patient/caregiver? Yes   Is the patient taking all medications as directed (includes completed medication regime)? Yes   Has the patient kept scheduled appointments due by today? N/A   Comments PCP today   What is the patient's perception of their health status since discharge? Improving   Week 2 Call Completed? Yes          Aide Lang RN
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
## 2021-11-19 NOTE — PROGRESS NOTES
I have seen the patient.  I have reviewed the notes, assessments, and/or procedures performed by dr Hewitt, I concur with her/his documentation and assessment and plan for Zak Frederick Bolivar.               This document has been electronically signed by Dhiraj Watkins MD on November 19, 2021 16:03 CST

## 2021-11-20 ENCOUNTER — APPOINTMENT (OUTPATIENT)
Dept: GENERAL RADIOLOGY | Facility: HOSPITAL | Age: 61
End: 2021-11-20

## 2021-11-20 ENCOUNTER — HOSPITAL ENCOUNTER (INPATIENT)
Facility: HOSPITAL | Age: 61
LOS: 4 days | Discharge: HOME | End: 2021-11-24
Attending: FAMILY MEDICINE | Admitting: INTERNAL MEDICINE

## 2021-11-20 DIAGNOSIS — F10.930 ALCOHOL WITHDRAWAL SYNDROME WITHOUT COMPLICATION (HCC): ICD-10-CM

## 2021-11-20 DIAGNOSIS — T78.3XXA ANGIOEDEMA, INITIAL ENCOUNTER: Primary | ICD-10-CM

## 2021-11-20 DIAGNOSIS — R13.10 DYSPHAGIA, UNSPECIFIED TYPE: ICD-10-CM

## 2021-11-20 PROBLEM — N17.9 AKI (ACUTE KIDNEY INJURY) (HCC): Status: ACTIVE | Noted: 2021-11-20

## 2021-11-20 LAB
ALBUMIN SERPL-MCNC: 4.8 G/DL (ref 3.5–5.2)
ALBUMIN/GLOB SERPL: 1.3 G/DL
ALP SERPL-CCNC: 62 U/L (ref 39–117)
ALT SERPL W P-5'-P-CCNC: 22 U/L (ref 1–41)
ANION GAP SERPL CALCULATED.3IONS-SCNC: 8 MMOL/L (ref 5–15)
ARTERIAL PATENCY WRIST A: ABNORMAL
AST SERPL-CCNC: 27 U/L (ref 1–40)
ATMOSPHERIC PRESS: 752 MMHG
BASE EXCESS BLDA CALC-SCNC: -2.4 MMOL/L (ref 0–2)
BASOPHILS # BLD AUTO: 0.04 10*3/MM3 (ref 0–0.2)
BASOPHILS NFR BLD AUTO: 0.5 % (ref 0–1.5)
BDY SITE: ABNORMAL
BILIRUB SERPL-MCNC: 0.3 MG/DL (ref 0–1.2)
BUN SERPL-MCNC: 11 MG/DL (ref 8–23)
BUN/CREAT SERPL: 7.2 (ref 7–25)
C4 SERPL-MCNC: 35 MG/DL (ref 14–44)
CALCIUM SPEC-SCNC: 10.4 MG/DL (ref 8.6–10.5)
CHLORIDE SERPL-SCNC: 101 MMOL/L (ref 98–107)
CO2 SERPL-SCNC: 31 MMOL/L (ref 22–29)
CREAT SERPL-MCNC: 1.53 MG/DL (ref 0.76–1.27)
CRP SERPL-MCNC: 0.5 MG/DL (ref 0–0.5)
DEPRECATED RDW RBC AUTO: 44.8 FL (ref 37–54)
EOSINOPHIL # BLD AUTO: 0.26 10*3/MM3 (ref 0–0.4)
EOSINOPHIL NFR BLD AUTO: 3.4 % (ref 0.3–6.2)
ERYTHROCYTE [DISTWIDTH] IN BLOOD BY AUTOMATED COUNT: 13 % (ref 12.3–15.4)
ERYTHROCYTE [SEDIMENTATION RATE] IN BLOOD: 28 MM/HR (ref 0–15)
FLUAV SUBTYP SPEC NAA+PROBE: NOT DETECTED
FLUBV RNA ISLT QL NAA+PROBE: NOT DETECTED
GFR SERPL CREATININE-BSD FRML MDRD: 56 ML/MIN/1.73
GLOBULIN UR ELPH-MCNC: 3.6 GM/DL
GLUCOSE SERPL-MCNC: 136 MG/DL (ref 65–99)
HCO3 BLDA-SCNC: 22.8 MMOL/L (ref 20–26)
HCT VFR BLD AUTO: 41.9 % (ref 37.5–51)
HGB BLD-MCNC: 14 G/DL (ref 13–17.7)
IMM GRANULOCYTES # BLD AUTO: 0.04 10*3/MM3 (ref 0–0.05)
IMM GRANULOCYTES NFR BLD AUTO: 0.5 % (ref 0–0.5)
INHALED O2 CONCENTRATION: 30 %
LYMPHOCYTES # BLD AUTO: 2.08 10*3/MM3 (ref 0.7–3.1)
LYMPHOCYTES NFR BLD AUTO: 27.3 % (ref 19.6–45.3)
Lab: ABNORMAL
MAGNESIUM SERPL-MCNC: 1.7 MG/DL (ref 1.6–2.4)
MCH RBC QN AUTO: 31.7 PG (ref 26.6–33)
MCHC RBC AUTO-ENTMCNC: 33.4 G/DL (ref 31.5–35.7)
MCV RBC AUTO: 95 FL (ref 79–97)
MODALITY: ABNORMAL
MONOCYTES # BLD AUTO: 0.71 10*3/MM3 (ref 0.1–0.9)
MONOCYTES NFR BLD AUTO: 9.3 % (ref 5–12)
NEUTROPHILS NFR BLD AUTO: 4.49 10*3/MM3 (ref 1.7–7)
NEUTROPHILS NFR BLD AUTO: 59 % (ref 42.7–76)
NRBC BLD AUTO-RTO: 0 /100 WBC (ref 0–0.2)
PCO2 BLDA: 40.2 MM HG (ref 35–45)
PH BLDA: 7.36 PH UNITS (ref 7.35–7.45)
PLATELET # BLD AUTO: 359 10*3/MM3 (ref 140–450)
PMV BLD AUTO: 10.1 FL (ref 6–12)
PO2 BLDA: 87.6 MM HG (ref 83–108)
POTASSIUM SERPL-SCNC: 3.9 MMOL/L (ref 3.5–5.2)
PROT SERPL-MCNC: 8.4 G/DL (ref 6–8.5)
RBC # BLD AUTO: 4.41 10*6/MM3 (ref 4.14–5.8)
SAO2 % BLDCOA: 96.6 % (ref 94–99)
SARS-COV-2 RNA PNL SPEC NAA+PROBE: NOT DETECTED
SODIUM SERPL-SCNC: 140 MMOL/L (ref 136–145)
VENTILATOR MODE: AC
WBC NRBC COR # BLD: 7.62 10*3/MM3 (ref 3.4–10.8)

## 2021-11-20 PROCEDURE — 86003 ALLG SPEC IGE CRUDE XTRC EA: CPT | Performed by: STUDENT IN AN ORGANIZED HEALTH CARE EDUCATION/TRAINING PROGRAM

## 2021-11-20 PROCEDURE — 25010000002 EPINEPHRINE (ANAPHYLAXIS) 1 MG/ML SOLUTION

## 2021-11-20 PROCEDURE — 99222 1ST HOSP IP/OBS MODERATE 55: CPT | Performed by: STUDENT IN AN ORGANIZED HEALTH CARE EDUCATION/TRAINING PROGRAM

## 2021-11-20 PROCEDURE — 80053 COMPREHEN METABOLIC PANEL: CPT | Performed by: PHYSICIAN ASSISTANT

## 2021-11-20 PROCEDURE — 31500 INSERT EMERGENCY AIRWAY: CPT

## 2021-11-20 PROCEDURE — 94799 UNLISTED PULMONARY SVC/PX: CPT

## 2021-11-20 PROCEDURE — 94002 VENT MGMT INPAT INIT DAY: CPT

## 2021-11-20 PROCEDURE — 25010000002 DIPHENHYDRAMINE PER 50 MG: Performed by: PHYSICIAN ASSISTANT

## 2021-11-20 PROCEDURE — 87636 SARSCOV2 & INF A&B AMP PRB: CPT | Performed by: PHYSICIAN ASSISTANT

## 2021-11-20 PROCEDURE — 83520 IMMUNOASSAY QUANT NOS NONAB: CPT | Performed by: STUDENT IN AN ORGANIZED HEALTH CARE EDUCATION/TRAINING PROGRAM

## 2021-11-20 PROCEDURE — 83735 ASSAY OF MAGNESIUM: CPT | Performed by: INTERNAL MEDICINE

## 2021-11-20 PROCEDURE — 85025 COMPLETE CBC W/AUTO DIFF WBC: CPT | Performed by: PHYSICIAN ASSISTANT

## 2021-11-20 PROCEDURE — 25010000002 MIDAZOLAM PER 1 MG

## 2021-11-20 PROCEDURE — 25010000002 HYDRALAZINE PER 20 MG: Performed by: STUDENT IN AN ORGANIZED HEALTH CARE EDUCATION/TRAINING PROGRAM

## 2021-11-20 PROCEDURE — 36600 WITHDRAWAL OF ARTERIAL BLOOD: CPT

## 2021-11-20 PROCEDURE — 25010000002 MIDAZOLAM 50 MG/10ML SOLUTION: Performed by: PHYSICIAN ASSISTANT

## 2021-11-20 PROCEDURE — 86160 COMPLEMENT ANTIGEN: CPT | Performed by: STUDENT IN AN ORGANIZED HEALTH CARE EDUCATION/TRAINING PROGRAM

## 2021-11-20 PROCEDURE — 25010000002 SUCCINYLCHOLINE PER 20 MG: Performed by: FAMILY MEDICINE

## 2021-11-20 PROCEDURE — 85651 RBC SED RATE NONAUTOMATED: CPT | Performed by: PHYSICIAN ASSISTANT

## 2021-11-20 PROCEDURE — 87070 CULTURE OTHR SPECIMN AEROBIC: CPT | Performed by: FAMILY MEDICINE

## 2021-11-20 PROCEDURE — 82803 BLOOD GASES ANY COMBINATION: CPT

## 2021-11-20 PROCEDURE — 25010000002 THIAMINE PER 100 MG: Performed by: INTERNAL MEDICINE

## 2021-11-20 PROCEDURE — 25010000002 METHYLPREDNISOLONE PER 125 MG: Performed by: PHYSICIAN ASSISTANT

## 2021-11-20 PROCEDURE — 25010000002 LORAZEPAM PER 2 MG: Performed by: STUDENT IN AN ORGANIZED HEALTH CARE EDUCATION/TRAINING PROGRAM

## 2021-11-20 PROCEDURE — 25010000002 PROPOFOL 10 MG/ML EMULSION: Performed by: FAMILY MEDICINE

## 2021-11-20 PROCEDURE — 87205 SMEAR GRAM STAIN: CPT | Performed by: FAMILY MEDICINE

## 2021-11-20 PROCEDURE — 99284 EMERGENCY DEPT VISIT MOD MDM: CPT

## 2021-11-20 PROCEDURE — 71045 X-RAY EXAM CHEST 1 VIEW: CPT

## 2021-11-20 PROCEDURE — 86140 C-REACTIVE PROTEIN: CPT | Performed by: PHYSICIAN ASSISTANT

## 2021-11-20 PROCEDURE — 25010000002 HEPARIN (PORCINE) PER 1000 UNITS: Performed by: STUDENT IN AN ORGANIZED HEALTH CARE EDUCATION/TRAINING PROGRAM

## 2021-11-20 RX ORDER — KETOROLAC TROMETHAMINE 15 MG/ML
15 INJECTION, SOLUTION INTRAMUSCULAR; INTRAVENOUS EVERY 6 HOURS PRN
Status: DISPENSED | OUTPATIENT
Start: 2021-11-20 | End: 2021-11-23

## 2021-11-20 RX ORDER — EPINEPHRINE 0.1 MG/ML
20 SYRINGE (ML) INJECTION AS NEEDED
Status: DISCONTINUED | OUTPATIENT
Start: 2021-11-20 | End: 2021-11-24 | Stop reason: HOSPADM

## 2021-11-20 RX ORDER — MIDAZOLAM HYDROCHLORIDE 1 MG/ML
4 INJECTION INTRAMUSCULAR; INTRAVENOUS ONCE
Status: COMPLETED | OUTPATIENT
Start: 2021-11-20 | End: 2021-11-20

## 2021-11-20 RX ORDER — SUCCINYLCHOLINE CHLORIDE 20 MG/ML
INJECTION INTRAMUSCULAR; INTRAVENOUS
Status: COMPLETED | OUTPATIENT
Start: 2021-11-20 | End: 2021-11-20

## 2021-11-20 RX ORDER — LORAZEPAM 2 MG/ML
2 INJECTION INTRAMUSCULAR
Status: DISCONTINUED | OUTPATIENT
Start: 2021-11-20 | End: 2021-11-23

## 2021-11-20 RX ORDER — SODIUM CHLORIDE 0.9 % (FLUSH) 0.9 %
10 SYRINGE (ML) INJECTION EVERY 12 HOURS SCHEDULED
Status: DISCONTINUED | OUTPATIENT
Start: 2021-11-20 | End: 2021-11-24 | Stop reason: HOSPADM

## 2021-11-20 RX ORDER — DIPHENHYDRAMINE HYDROCHLORIDE 50 MG/ML
25 INJECTION INTRAMUSCULAR; INTRAVENOUS ONCE
Status: COMPLETED | OUTPATIENT
Start: 2021-11-20 | End: 2021-11-20

## 2021-11-20 RX ORDER — FOLIC ACID 1 MG/1
1 TABLET ORAL DAILY
Status: DISCONTINUED | OUTPATIENT
Start: 2021-11-20 | End: 2021-11-24 | Stop reason: HOSPADM

## 2021-11-20 RX ORDER — FAMOTIDINE 10 MG/ML
20 INJECTION, SOLUTION INTRAVENOUS ONCE
Status: COMPLETED | OUTPATIENT
Start: 2021-11-20 | End: 2021-11-20

## 2021-11-20 RX ORDER — METHYLPREDNISOLONE SODIUM SUCCINATE 125 MG/2ML
125 INJECTION, POWDER, LYOPHILIZED, FOR SOLUTION INTRAMUSCULAR; INTRAVENOUS ONCE
Status: COMPLETED | OUTPATIENT
Start: 2021-11-20 | End: 2021-11-20

## 2021-11-20 RX ORDER — SODIUM CHLORIDE 9 MG/ML
100 INJECTION, SOLUTION INTRAVENOUS CONTINUOUS
Status: DISCONTINUED | OUTPATIENT
Start: 2021-11-20 | End: 2021-11-24

## 2021-11-20 RX ORDER — DIPHENHYDRAMINE HYDROCHLORIDE 50 MG/ML
INJECTION INTRAMUSCULAR; INTRAVENOUS
Status: DISCONTINUED
Start: 2021-11-20 | End: 2021-11-24 | Stop reason: HOSPADM

## 2021-11-20 RX ORDER — LORAZEPAM 2 MG/ML
4 INJECTION INTRAMUSCULAR
Status: DISCONTINUED | OUTPATIENT
Start: 2021-11-20 | End: 2021-11-23

## 2021-11-20 RX ORDER — IPRATROPIUM BROMIDE AND ALBUTEROL SULFATE 2.5; .5 MG/3ML; MG/3ML
3 SOLUTION RESPIRATORY (INHALATION) EVERY 4 HOURS PRN
Status: DISCONTINUED | OUTPATIENT
Start: 2021-11-20 | End: 2021-11-24 | Stop reason: HOSPADM

## 2021-11-20 RX ORDER — ONDANSETRON 2 MG/ML
4 INJECTION INTRAMUSCULAR; INTRAVENOUS EVERY 6 HOURS PRN
Status: DISCONTINUED | OUTPATIENT
Start: 2021-11-20 | End: 2021-11-24 | Stop reason: HOSPADM

## 2021-11-20 RX ORDER — LORAZEPAM 2 MG/ML
1 INJECTION INTRAMUSCULAR
Status: DISCONTINUED | OUTPATIENT
Start: 2021-11-20 | End: 2021-11-23

## 2021-11-20 RX ORDER — MIDAZOLAM HYDROCHLORIDE 1 MG/ML
INJECTION INTRAMUSCULAR; INTRAVENOUS
Status: COMPLETED
Start: 2021-11-20 | End: 2021-11-20

## 2021-11-20 RX ORDER — LORAZEPAM 0.5 MG/1
1 TABLET ORAL
Status: DISCONTINUED | OUTPATIENT
Start: 2021-11-20 | End: 2021-11-23

## 2021-11-20 RX ORDER — DIPHENHYDRAMINE HYDROCHLORIDE 50 MG/ML
25 INJECTION INTRAMUSCULAR; INTRAVENOUS EVERY 6 HOURS
Status: DISCONTINUED | OUTPATIENT
Start: 2021-11-20 | End: 2021-11-24 | Stop reason: HOSPADM

## 2021-11-20 RX ORDER — EPINEPHRINE 1 MG/ML
0.3 INJECTION, SOLUTION INTRAMUSCULAR; SUBCUTANEOUS ONCE
Status: COMPLETED | OUTPATIENT
Start: 2021-11-20 | End: 2021-11-20

## 2021-11-20 RX ORDER — LORAZEPAM 2 MG/1
2 TABLET ORAL
Status: DISCONTINUED | OUTPATIENT
Start: 2021-11-20 | End: 2021-11-23

## 2021-11-20 RX ORDER — HYDRALAZINE HYDROCHLORIDE 20 MG/ML
10 INJECTION INTRAMUSCULAR; INTRAVENOUS EVERY 6 HOURS PRN
Status: DISCONTINUED | OUTPATIENT
Start: 2021-11-20 | End: 2021-11-21

## 2021-11-20 RX ORDER — ALBUTEROL SULFATE 2.5 MG/3ML
2.5 SOLUTION RESPIRATORY (INHALATION) EVERY 4 HOURS PRN
Status: DISCONTINUED | OUTPATIENT
Start: 2021-11-20 | End: 2021-11-24 | Stop reason: HOSPADM

## 2021-11-20 RX ORDER — FAMOTIDINE 10 MG/ML
20 INJECTION, SOLUTION INTRAVENOUS EVERY 12 HOURS SCHEDULED
Status: DISCONTINUED | OUTPATIENT
Start: 2021-11-20 | End: 2021-11-24

## 2021-11-20 RX ORDER — METHYLPREDNISOLONE SODIUM SUCCINATE 40 MG/ML
40 INJECTION, POWDER, LYOPHILIZED, FOR SOLUTION INTRAMUSCULAR; INTRAVENOUS EVERY 12 HOURS
Status: DISCONTINUED | OUTPATIENT
Start: 2021-11-21 | End: 2021-11-23

## 2021-11-20 RX ORDER — SODIUM CHLORIDE 0.9 % (FLUSH) 0.9 %
10 SYRINGE (ML) INJECTION AS NEEDED
Status: DISCONTINUED | OUTPATIENT
Start: 2021-11-20 | End: 2021-11-24 | Stop reason: HOSPADM

## 2021-11-20 RX ORDER — PROPOFOL 10 MG/ML
VIAL (ML) INTRAVENOUS
Status: COMPLETED | OUTPATIENT
Start: 2021-11-20 | End: 2021-11-20

## 2021-11-20 RX ORDER — ALBUTEROL SULFATE 90 UG/1
2 AEROSOL, METERED RESPIRATORY (INHALATION) EVERY 4 HOURS PRN
Status: DISCONTINUED | OUTPATIENT
Start: 2021-11-20 | End: 2021-11-20 | Stop reason: CLARIF

## 2021-11-20 RX ORDER — LANOLIN ALCOHOL/MO/W.PET/CERES
0.75 CREAM (GRAM) TOPICAL NIGHTLY PRN
Status: DISCONTINUED | OUTPATIENT
Start: 2021-11-20 | End: 2021-11-21

## 2021-11-20 RX ORDER — HEPARIN SODIUM 5000 [USP'U]/ML
5000 INJECTION, SOLUTION INTRAVENOUS; SUBCUTANEOUS EVERY 12 HOURS SCHEDULED
Status: DISCONTINUED | OUTPATIENT
Start: 2021-11-20 | End: 2021-11-24 | Stop reason: HOSPADM

## 2021-11-20 RX ORDER — LORAZEPAM 2 MG/1
4 TABLET ORAL
Status: DISCONTINUED | OUTPATIENT
Start: 2021-11-20 | End: 2021-11-23

## 2021-11-20 RX ORDER — DIPHENHYDRAMINE HYDROCHLORIDE 50 MG/ML
25 INJECTION INTRAMUSCULAR; INTRAVENOUS EVERY 6 HOURS PRN
Status: DISCONTINUED | OUTPATIENT
Start: 2021-11-20 | End: 2021-11-20

## 2021-11-20 RX ORDER — PANTOPRAZOLE SODIUM 40 MG/10ML
40 INJECTION, POWDER, LYOPHILIZED, FOR SOLUTION INTRAVENOUS
Status: DISCONTINUED | OUTPATIENT
Start: 2021-11-21 | End: 2021-11-20

## 2021-11-20 RX ORDER — EPINEPHRINE 1 MG/ML
INJECTION, SOLUTION INTRAMUSCULAR; SUBCUTANEOUS
Status: COMPLETED
Start: 2021-11-20 | End: 2021-11-20

## 2021-11-20 RX ADMIN — THIAMINE HYDROCHLORIDE 100 MG: 100 INJECTION, SOLUTION INTRAMUSCULAR; INTRAVENOUS at 21:35

## 2021-11-20 RX ADMIN — MIDAZOLAM HYDROCHLORIDE 4 MG: 1 INJECTION INTRAMUSCULAR; INTRAVENOUS at 18:47

## 2021-11-20 RX ADMIN — FAMOTIDINE 20 MG: 10 INJECTION INTRAVENOUS at 21:38

## 2021-11-20 RX ADMIN — DIPHENHYDRAMINE HYDROCHLORIDE 25 MG: 50 INJECTION INTRAMUSCULAR; INTRAVENOUS at 15:15

## 2021-11-20 RX ADMIN — HYDRALAZINE HYDROCHLORIDE 10 MG: 20 INJECTION INTRAMUSCULAR; INTRAVENOUS at 19:41

## 2021-11-20 RX ADMIN — HEPARIN SODIUM 5000 UNITS: 5000 INJECTION INTRAVENOUS; SUBCUTANEOUS at 21:39

## 2021-11-20 RX ADMIN — SUCCINYLCHOLINE CHLORIDE 75 MG: 20 INJECTION, SOLUTION INTRAMUSCULAR; INTRAVENOUS at 18:24

## 2021-11-20 RX ADMIN — EPINEPHRINE 0.3 MG: 1 INJECTION, SOLUTION INTRAMUSCULAR; SUBCUTANEOUS at 17:37

## 2021-11-20 RX ADMIN — FAMOTIDINE 20 MG: 10 INJECTION INTRAVENOUS at 15:15

## 2021-11-20 RX ADMIN — LORAZEPAM 2 MG: 2 INJECTION INTRAMUSCULAR; INTRAVENOUS at 22:11

## 2021-11-20 RX ADMIN — EPINEPHRINE 0.3 MG: 1 INJECTION INTRAMUSCULAR; INTRAVENOUS; SUBCUTANEOUS at 17:37

## 2021-11-20 RX ADMIN — METHYLPREDNISOLONE SODIUM SUCCINATE 125 MG: 125 INJECTION, POWDER, FOR SOLUTION INTRAMUSCULAR; INTRAVENOUS at 15:15

## 2021-11-20 RX ADMIN — FOLIC ACID 1 MG: 1 TABLET ORAL at 21:39

## 2021-11-20 RX ADMIN — MIDAZOLAM HYDROCHLORIDE 4 MG: 1 INJECTION, SOLUTION INTRAMUSCULAR; INTRAVENOUS at 18:47

## 2021-11-20 RX ADMIN — PROPOFOL 50 MCG/KG/MIN: 10 INJECTION, EMULSION INTRAVENOUS at 19:00

## 2021-11-20 RX ADMIN — SODIUM CHLORIDE 75 ML/HR: 9 INJECTION, SOLUTION INTRAVENOUS at 18:00

## 2021-11-20 RX ADMIN — PROPOFOL 200 MG: 10 INJECTION, EMULSION INTRAVENOUS at 18:24

## 2021-11-20 RX ADMIN — MIDAZOLAM 1 MG/HR: 5 INJECTION INTRAMUSCULAR; INTRAVENOUS at 19:21
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
## 2021-11-20 NOTE — ED NOTES
Swelling has increased to pt's right side of lips/cheek. Contacted Dr. Gore and gave an update. No new orders at this time, will continue to monitor. Airway patent, VSS.      Myron Davey, RN  11/20/21 [id number]
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
## 2021-11-21 ENCOUNTER — APPOINTMENT (OUTPATIENT)
Dept: GENERAL RADIOLOGY | Facility: HOSPITAL | Age: 61
End: 2021-11-21

## 2021-11-21 PROBLEM — N17.9 AKI (ACUTE KIDNEY INJURY): Status: RESOLVED | Noted: 2021-11-20 | Resolved: 2021-11-21

## 2021-11-21 LAB
ALBUMIN SERPL-MCNC: 3.8 G/DL (ref 3.5–5.2)
ALBUMIN/GLOB SERPL: 1.3 G/DL
ALP SERPL-CCNC: 47 U/L (ref 39–117)
ALT SERPL W P-5'-P-CCNC: 16 U/L (ref 1–41)
ANION GAP SERPL CALCULATED.3IONS-SCNC: 9 MMOL/L (ref 5–15)
ARTERIAL PATENCY WRIST A: ABNORMAL
AST SERPL-CCNC: 18 U/L (ref 1–40)
ATMOSPHERIC PRESS: 751 MMHG
BASE EXCESS BLDA CALC-SCNC: -2.5 MMOL/L (ref 0–2)
BASOPHILS # BLD AUTO: 0.02 10*3/MM3 (ref 0–0.2)
BASOPHILS NFR BLD AUTO: 0.2 % (ref 0–1.5)
BDY SITE: ABNORMAL
BILIRUB SERPL-MCNC: 0.2 MG/DL (ref 0–1.2)
BUN SERPL-MCNC: 14 MG/DL (ref 8–23)
BUN/CREAT SERPL: 11.8 (ref 7–25)
CALCIUM SPEC-SCNC: 8.9 MG/DL (ref 8.6–10.5)
CHLORIDE SERPL-SCNC: 107 MMOL/L (ref 98–107)
CO2 SERPL-SCNC: 23 MMOL/L (ref 22–29)
CREAT SERPL-MCNC: 1.19 MG/DL (ref 0.76–1.27)
DEPRECATED RDW RBC AUTO: 44.5 FL (ref 37–54)
EOSINOPHIL # BLD AUTO: 0 10*3/MM3 (ref 0–0.4)
EOSINOPHIL NFR BLD AUTO: 0 % (ref 0.3–6.2)
ERYTHROCYTE [DISTWIDTH] IN BLOOD BY AUTOMATED COUNT: 13.1 % (ref 12.3–15.4)
GFR SERPL CREATININE-BSD FRML MDRD: 75 ML/MIN/1.73
GLOBULIN UR ELPH-MCNC: 3 GM/DL
GLUCOSE SERPL-MCNC: 142 MG/DL (ref 65–99)
HCO3 BLDA-SCNC: 22.3 MMOL/L (ref 20–26)
HCT VFR BLD AUTO: 35.5 % (ref 37.5–51)
HGB BLD-MCNC: 12 G/DL (ref 13–17.7)
IMM GRANULOCYTES # BLD AUTO: 0.06 10*3/MM3 (ref 0–0.05)
IMM GRANULOCYTES NFR BLD AUTO: 0.7 % (ref 0–0.5)
INHALED O2 CONCENTRATION: 30 %
LYMPHOCYTES # BLD AUTO: 0.67 10*3/MM3 (ref 0.7–3.1)
LYMPHOCYTES NFR BLD AUTO: 7.8 % (ref 19.6–45.3)
Lab: ABNORMAL
MCH RBC QN AUTO: 31.6 PG (ref 26.6–33)
MCHC RBC AUTO-ENTMCNC: 33.8 G/DL (ref 31.5–35.7)
MCV RBC AUTO: 93.4 FL (ref 79–97)
MODALITY: ABNORMAL
MONOCYTES # BLD AUTO: 0.13 10*3/MM3 (ref 0.1–0.9)
MONOCYTES NFR BLD AUTO: 1.5 % (ref 5–12)
NEUTROPHILS NFR BLD AUTO: 7.7 10*3/MM3 (ref 1.7–7)
NEUTROPHILS NFR BLD AUTO: 89.8 % (ref 42.7–76)
NRBC BLD AUTO-RTO: 0 /100 WBC (ref 0–0.2)
PCO2 BLDA: 37.7 MM HG (ref 35–45)
PEEP RESPIRATORY: 5 CM[H2O]
PH BLDA: 7.38 PH UNITS (ref 7.35–7.45)
PHOSPHATE SERPL-MCNC: 3 MG/DL (ref 2.5–4.5)
PLATELET # BLD AUTO: 320 10*3/MM3 (ref 140–450)
PMV BLD AUTO: 9.9 FL (ref 6–12)
PO2 BLDA: 161 MM HG (ref 83–108)
POTASSIUM SERPL-SCNC: 4.3 MMOL/L (ref 3.5–5.2)
PROT SERPL-MCNC: 6.8 G/DL (ref 6–8.5)
RBC # BLD AUTO: 3.8 10*6/MM3 (ref 4.14–5.8)
SAO2 % BLDCOA: 99.9 % (ref 94–99)
SODIUM SERPL-SCNC: 139 MMOL/L (ref 136–145)
VENTILATOR MODE: AC
VT ON VENT VENT: 500 ML
WBC NRBC COR # BLD: 8.58 10*3/MM3 (ref 3.4–10.8)

## 2021-11-21 PROCEDURE — 25010000002 PROPOFOL 10 MG/ML EMULSION: Performed by: FAMILY MEDICINE

## 2021-11-21 PROCEDURE — 94799 UNLISTED PULMONARY SVC/PX: CPT

## 2021-11-21 PROCEDURE — 99291 CRITICAL CARE FIRST HOUR: CPT | Performed by: STUDENT IN AN ORGANIZED HEALTH CARE EDUCATION/TRAINING PROGRAM

## 2021-11-21 PROCEDURE — 84100 ASSAY OF PHOSPHORUS: CPT | Performed by: STUDENT IN AN ORGANIZED HEALTH CARE EDUCATION/TRAINING PROGRAM

## 2021-11-21 PROCEDURE — 82803 BLOOD GASES ANY COMBINATION: CPT

## 2021-11-21 PROCEDURE — 94003 VENT MGMT INPAT SUBQ DAY: CPT

## 2021-11-21 PROCEDURE — 25010000002 DIPHENHYDRAMINE PER 50 MG: Performed by: STUDENT IN AN ORGANIZED HEALTH CARE EDUCATION/TRAINING PROGRAM

## 2021-11-21 PROCEDURE — 25010000002 METHYLPREDNISOLONE PER 40 MG: Performed by: INTERNAL MEDICINE

## 2021-11-21 PROCEDURE — 25010000002 HEPARIN (PORCINE) PER 1000 UNITS: Performed by: STUDENT IN AN ORGANIZED HEALTH CARE EDUCATION/TRAINING PROGRAM

## 2021-11-21 PROCEDURE — 85025 COMPLETE CBC W/AUTO DIFF WBC: CPT | Performed by: STUDENT IN AN ORGANIZED HEALTH CARE EDUCATION/TRAINING PROGRAM

## 2021-11-21 PROCEDURE — 94760 N-INVAS EAR/PLS OXIMETRY 1: CPT

## 2021-11-21 PROCEDURE — 80053 COMPREHEN METABOLIC PANEL: CPT | Performed by: STUDENT IN AN ORGANIZED HEALTH CARE EDUCATION/TRAINING PROGRAM

## 2021-11-21 PROCEDURE — 71045 X-RAY EXAM CHEST 1 VIEW: CPT

## 2021-11-21 PROCEDURE — 25010000002 MIDAZOLAM 50 MG/10ML SOLUTION: Performed by: PHYSICIAN ASSISTANT

## 2021-11-21 RX ORDER — DEXMEDETOMIDINE HYDROCHLORIDE 4 UG/ML
.2-1.5 INJECTION, SOLUTION INTRAVENOUS
Status: DISCONTINUED | OUTPATIENT
Start: 2021-11-21 | End: 2021-11-22

## 2021-11-21 RX ORDER — AMOXICILLIN 250 MG
2 CAPSULE ORAL 2 TIMES DAILY
Status: DISCONTINUED | OUTPATIENT
Start: 2021-11-21 | End: 2021-11-24 | Stop reason: HOSPADM

## 2021-11-21 RX ORDER — BISACODYL 5 MG/1
5 TABLET, DELAYED RELEASE ORAL DAILY PRN
Status: DISCONTINUED | OUTPATIENT
Start: 2021-11-21 | End: 2021-11-24 | Stop reason: HOSPADM

## 2021-11-21 RX ORDER — POLYETHYLENE GLYCOL 3350 17 G/17G
17 POWDER, FOR SOLUTION ORAL DAILY PRN
Status: DISCONTINUED | OUTPATIENT
Start: 2021-11-21 | End: 2021-11-24 | Stop reason: HOSPADM

## 2021-11-21 RX ORDER — BISACODYL 10 MG
10 SUPPOSITORY, RECTAL RECTAL DAILY PRN
Status: DISCONTINUED | OUTPATIENT
Start: 2021-11-21 | End: 2021-11-24 | Stop reason: HOSPADM

## 2021-11-21 RX ADMIN — PROPOFOL 30 MCG/KG/MIN: 10 INJECTION, EMULSION INTRAVENOUS at 13:27

## 2021-11-21 RX ADMIN — METHYLPREDNISOLONE SODIUM SUCCINATE 40 MG: 40 INJECTION, POWDER, FOR SOLUTION INTRAMUSCULAR; INTRAVENOUS at 02:39

## 2021-11-21 RX ADMIN — DIPHENHYDRAMINE HYDROCHLORIDE 25 MG: 50 INJECTION INTRAMUSCULAR; INTRAVENOUS at 00:21

## 2021-11-21 RX ADMIN — DOCUSATE SODIUM 50 MG AND SENNOSIDES 8.6 MG 2 TABLET: 8.6; 5 TABLET, FILM COATED ORAL at 09:55

## 2021-11-21 RX ADMIN — DEXMEDETOMIDINE HYDROCHLORIDE 0.2 MCG/KG/HR: 100 INJECTION, SOLUTION INTRAVENOUS at 15:17

## 2021-11-21 RX ADMIN — DIPHENHYDRAMINE HYDROCHLORIDE 25 MG: 50 INJECTION INTRAMUSCULAR; INTRAVENOUS at 05:23

## 2021-11-21 RX ADMIN — DIPHENHYDRAMINE HYDROCHLORIDE 25 MG: 50 INJECTION INTRAMUSCULAR; INTRAVENOUS at 18:41

## 2021-11-21 RX ADMIN — PROPOFOL 25 MCG/KG/MIN: 10 INJECTION, EMULSION INTRAVENOUS at 21:11

## 2021-11-21 RX ADMIN — SODIUM CHLORIDE 100 ML/HR: 9 INJECTION, SOLUTION INTRAVENOUS at 15:17

## 2021-11-21 RX ADMIN — MIDAZOLAM 10 MG/HR: 5 INJECTION INTRAMUSCULAR; INTRAVENOUS at 05:30

## 2021-11-21 RX ADMIN — MIDAZOLAM 6 MG/HR: 5 INJECTION INTRAMUSCULAR; INTRAVENOUS at 13:07

## 2021-11-21 RX ADMIN — HEPARIN SODIUM 5000 UNITS: 5000 INJECTION INTRAVENOUS; SUBCUTANEOUS at 21:07

## 2021-11-21 RX ADMIN — DIPHENHYDRAMINE HYDROCHLORIDE 25 MG: 50 INJECTION INTRAMUSCULAR; INTRAVENOUS at 13:25

## 2021-11-21 RX ADMIN — METHYLPREDNISOLONE SODIUM SUCCINATE 40 MG: 40 INJECTION, POWDER, FOR SOLUTION INTRAMUSCULAR; INTRAVENOUS at 15:02

## 2021-11-21 RX ADMIN — DOCUSATE SODIUM 50 MG AND SENNOSIDES 8.6 MG 2 TABLET: 8.6; 5 TABLET, FILM COATED ORAL at 21:07

## 2021-11-21 RX ADMIN — PROPOFOL 30 MCG/KG/MIN: 10 INJECTION, EMULSION INTRAVENOUS at 05:22

## 2021-11-21 RX ADMIN — FAMOTIDINE 20 MG: 10 INJECTION INTRAVENOUS at 21:07

## 2021-11-21 RX ADMIN — SODIUM CHLORIDE 500 ML: 9 INJECTION, SOLUTION INTRAVENOUS at 20:00

## 2021-11-21 RX ADMIN — FAMOTIDINE 20 MG: 10 INJECTION INTRAVENOUS at 09:55

## 2021-11-21 RX ADMIN — HEPARIN SODIUM 5000 UNITS: 5000 INJECTION INTRAVENOUS; SUBCUTANEOUS at 09:55

## 2021-11-21 RX ADMIN — MIDAZOLAM 10 MG/HR: 5 INJECTION INTRAMUSCULAR; INTRAVENOUS at 00:28

## 2021-11-21 NOTE — PROGRESS NOTES
[facility] Tele-ICU Progress Note    Patient Name: Zak Lutz  : 1960  MRN: [id number]  Primary Care Physician:  Jeramie Singh MD  Date of admission: 2021  LOS: 0 days   ICU LOS: Patient does not have an ICU stay during this admission.     Subjective   Subjective     Reason for Call: Vent Management      Objective  60 y/o M h/o ETOH use, HTN, HL, GERD and smoker who presented to ED with angioedema. Pt given Epi, IV steroid, Pepcid and Benadryl. Pt was intubated for airway protection. Pt currently on Versed, Propofol and still wide awake. Pt on ACEi.  Objective     Vitals:   Temp:  [97.5 °F (36.4 °C)] 97.5 °F (36.4 °C)  Heart Rate:  [] 130  Resp:  [16-34] 20  BP: (125-272)/() 134/65  FiO2 (%):  [30 %-50 %] 30 %    Result Review    Result Review:  I have personally reviewed the results from the time of this admission to 2021 20:21 CST and agree with these findings:  [x]  Laboratory  [x]  Microbiology  [x]  Radiology  [x]  EKG/Telemetry   [x]  Cardiology/Vascular   []  Pathology  [x]  Old records  []  Other:  Most notable findings include: CXR ET tube in place    Assessment/Plan   Assessment / Plan     Brief Patient Summary:  Zak Lutz is a 61 y.o. male who was intubated for airway protection due to progression of angioedema likely in setting of ACEi. Pt on NSAIDs.     Active Hospital Problems:    Plan:   -LTVV with goal TV < 7 cc/kg IBW, wean FIO2, oral care  -bronchodilators  -CIWA, thiamine and folic acid  -Fentanyl drip added for extra sedation  -benadryl, Pepcid  -will add steroids given smoker and may also have reactive airway dz due to angioedema  -add on Mg level  -C1qi level pending  -ACEi d/c'd  -if doesn't improve consider FFP       DVT prophylaxis:  Medical DVT prophylaxis orders are present.    CODE STATUS:   Level Of Support Discussed With: Patient  Code Status (Patient has no pulse and is not breathing): CPR (Attempt to  Resuscitate)  Medical Interventions (Patient has pulse or is breathing): Full Support      This was an audio and video enabled telemedicine encounter.    Electronically signed by Geoff Morgan MD, 11/20/21, 8:21 PM CST.

## 2021-11-21 NOTE — SIGNIFICANT NOTE
After receiving handoff on this patient, I was paged by the emergency department.  Patient had been given epinephrine in addition to Solu-Medrol, Pepcid, Benadryl secondary to worsening angioedema.  I was informed that anesthesia had been consulted to intubate the patient to protect his airway.  Patient's oxygen saturations remained good however the progression of his angioedema became worrisome that his airway may be lost if intubation was not performed quickly.  Due to his current condition, there was concern about difficulty of securing this airway.  Anesthesia arrived and intubated the patient.  During this intubation process, patient's blood pressure was elevated and patient became tachycardic.  This slowly resolved with his sedation and time after intubation.  Some increasing blood pressure and heart rate could be secondary to epinephrine that was given and patient's antihypertensive medications being held because of his condition.  While patient was still in the ER, I consulted teleintensivist (Dr. Eddie Eastman) who agrees to help manage the ventilator.  Patient does have a history of smoking however reason for intubation was not secondary to respiratory distress or any lung parenchymal issue.  Initially patient was given 4 mg of Versed started on propofol drip.  Maxed out propofol while we were waiting for Versed drip to be started.  Patient approached maximal doses of Versed.  Fentanyl drip was ordered however patient's sedation became adequate.  Discussed with both ER and ICU nursing to not start fentanyl for sedation if his current regimen is adequately working.  Patient is going to be transferred to room 3 in the ICU.    While patient was still emergency department, I spoke with his sister who was in the waiting room.  Updated her on his condition.  She said she would reach out to nursing or through the hospital switchboard to get in touch with me if she had any further questions.       Pascual Avalos,  MD   PGY-2    [facility]  [address] [place] [address]  Office: [phone]    This document has been electronically signed by Pascual Avalos MD on November 20, 2021 21:27 CST

## 2021-11-21 NOTE — SIGNIFICANT NOTE
I received a page from pharmacy after putting in an order for Precedex for a patient Zach Lutz.  This is a patient who is currently intubated in the ICU for angioedema, likely secondary to lisinopril or naltrexone allergy.  He is on [facility] protocol for history of heavy alcohol use with 1 to 2 pints of whiskey daily.  Our team discussed his case on rounds and we were in agreement that the patient would benefit from Precedex.  I received a page from pharmacy in regards to this and explained our reasoning and why we thought this medicine was indicated.  The pharmacist I spoke to said that she would pass this up the chain and get back to me.      This document has been electronically signed by Demetri Bertrand MD on November 21, 2021 14:36 CST

## 2021-11-21 NOTE — ED NOTES
Report gave to NANCY Curiel  CCU. Pt sister asks for nursing staff to call with any updates  Or changes in pt status no matter the time through out the night.     Juan Miguel Barlow RN  11/20/21 0904

## 2021-11-21 NOTE — ED NOTES
Pt fighting ventilator. Pt opening eyes and attempting to speak to this RN. Both sedation medications on max. Md aware        Juan Miguel Barlow, RN  11/20/21 1941

## 2021-11-21 NOTE — ED NOTES
Pt tolerating Ventilator at this time. Pt resting well. Pt sister at bedside and taking home clothing, wallet, and phone.     Juan Miguel Barlow RN  11/20/21 203

## 2021-11-21 NOTE — SIGNIFICANT NOTE
I spoke with Marcus and pharmacy for an update on the Precedex for patient Zach Lutz, the patient currently intubated in the ICU for angioedema, likely secondary to lisinopril or naltrexone allergy.  He is on [facility] protocol for history of heavy alcohol use with 1 to 2 pints of whiskey daily.  Our team discussed his care on rounds and we were in agreement that the patient would likely benefit from Precedex.  I had spoken with the pharmacist earlier and explained our reasoning and why without this medicine was indicated.  I spoke with Marcus and requested an update and he told me that the medicine has been approved and had been dispensed 2:04 pm and should be down on its way to the ICU shortly      This document has been electronically signed by Demetri Bertrand MD on November 21, 2021 14:42 CST

## 2021-11-21 NOTE — PROGRESS NOTES
CRITICAL CARE DAILY PROGRESS NOTE  [facility] Res[facility] Service  NAME: Zak Lutz  : 1960  MRN: [id number]      LOS: 1 day     PROVIDER OF SERVICE: Demetri Bertrand MD    Chief Complaint: Angioedema    Subjective:     Interval History: History provided by: patient chart    Patient intubated in the ED yesterday after admission.     Afebrile. Blood pressure soft at 91/70.    Patient on CIWA protocol and required Ativan 2 mg overnight for agitation, per nurse Alcides.  No other concerns noted by nursing this morning.    LEONID has resolved. Increasing fluids to maintenance.     Feeding: NPO; 0.9NS  Analgesia: Fentanyl IV  Sedation: midazolam and propofol  Thromboprophylaxis: Heparin  HOB: 30 degrees  Ulcer ppx: PPIs  Glucose control: Patient not diabetic  SBT: Not attempted  Bowel Regimen: Ordered as as needed  Indwelling catheters: Adams catheter is in place.        Review of Systems:   Review of Systems   Unable to perform ROS: Intubated       Objective:     Vital Signs  Temp:  [96.7 °F (35.9 °C)-98.1 °F (36.7 °C)] 97.7 °F (36.5 °C)  Heart Rate:  [] 83  Resp:  [16-34] 17  BP: ()/() 84/60  FiO2 (%):  [30 %-50 %] 30 %  Body mass index is 21.67 kg/m².    FiO2 (%):  [30 %-50 %] 30 %  S RR:  [16-18] 18  PEEP/CPAP (cm H2O):  [4.7 cm H20-5 cm H20] 4.7 cm H20  MAP (cm H2O):  [7-8.3] 8.3    I/O last 3 completed shifts:  In: 1308.6 [I.V.:1178.6; Other:30; IV Piggyback:100]  Out: 705 [Urine:680; Emesis/NG output:25]    Physical Exam  Physical Exam  Constitutional:       General: He is not in acute distress.     Appearance: He is normal weight. He is not toxic-appearing.      Comments: Patient intubated and on ventilator   HENT:      Mouth/Throat:      Comments: Angioedema of upper and lower lip appears improved since yesterday  Cardiovascular:      Rate and Rhythm: Normal rate and regular rhythm.   Pulmonary:      Effort: Pulmonary effort is normal.      Breath sounds: Normal breath sounds.    Abdominal:      General: Bowel sounds are normal. There is no distension.   Genitourinary:     Comments: Adams catheter in place   Musculoskeletal:      Right lower leg: No edema.      Left lower leg: No edema.         Medication Review    Current Facility-Administered Medications:   •  albuterol (PROVENTIL) nebulizer solution 0.083% 2.5 mg/3mL, 2.5 mg, Nebulization, Q4H PRN, Tete Villarreal MD  •  diphenhydrAMINE (BENADRYL) injection 25 mg, 25 mg, Intravenous, Q6H, Consuelo Brand MD, 25 mg at 11/21/21 0523  •  EPINEPHrine (ADRENALIN) 0.05 mg/mL syringe, 20 mL, Injection, PRN, Tete Villarreal MD  •  famotidine (PEPCID) injection 20 mg, 20 mg, Intravenous, Q12H, Nura Morgan MD, 20 mg at 11/20/21 2138  •  fentaNYL (SUBLIMAZE) PCA 20 mcg/mL 50 mL syringe, , Intravenous, Continuous, Nura Morgan MD, Held at 11/20/21 2043  •  folic acid (FOLVITE) tablet 1 mg, 1 mg, Nasogastric, Daily, Nura Morgan MD, 1 mg at 11/20/21 2139  •  heparin (porcine) 5000 UNIT/ML injection 5,000 Units, 5,000 Units, Subcutaneous, Q12H, Tete Villarreal MD, 5,000 Units at 11/20/21 2139  •  ipratropium-albuterol (DUO-NEB) nebulizer solution 3 mL, 3 mL, Nebulization, Q4H PRN, Tete Villarreal MD  •  ketorolac (TORADOL) injection 15 mg, 15 mg, Intravenous, Q6H PRN, Tete Villarreal MD  •  LORazepam (ATIVAN) tablet 1 mg, 1 mg, Oral, Q2H PRN **OR** LORazepam (ATIVAN) injection 1 mg, 1 mg, Intravenous, Q2H PRN **OR** LORazepam (ATIVAN) tablet 2 mg, 2 mg, Oral, Q1H PRN **OR** LORazepam (ATIVAN) injection 2 mg, 2 mg, Intravenous, Q1H PRN, 2 mg at 11/20/21 2211 **OR** LORazepam (ATIVAN) injection 2 mg, 2 mg, Intravenous, Q15 Min PRN **OR** LORazepam (ATIVAN) injection 2 mg, 2 mg, Intramuscular, Q15 Min PRN **OR** LORazepam (ATIVAN) tablet 4 mg, 4 mg, Oral, Q1H PRN **OR** LORazepam (ATIVAN) injection 4 mg, 4 mg, Intravenous, Q1H PRN, Consuelo Brand MD  •  methylPREDNISolone sodium succinate (SOLU-Medrol) injection 40 mg, 40 mg, Intravenous,  Q12H, Nura Morgan MD, 40 mg at 11/21/21 0239  •  midazolam (VERSED) 50mg in 100 mL NS infusion, 1 mg/hr, Intravenous, Titrated, Boni Cobb PA-C, Last Rate: 20 mL/hr at 11/21/21 0530, 10 mg/hr at 11/21/21 0530  •  ondansetron (ZOFRAN) injection 4 mg, 4 mg, Intravenous, Q6H PRN, Tete Villarreal MD  •  propofol (DIPRIVAN) infusion 10 mg/mL 100 mL, 5-50 mcg/kg/min, Intravenous, Titrated, Los Joiner MD, Last Rate: 13.01 mL/hr at 11/21/21 0522, 30 mcg/kg/min at 11/21/21 0522  •  [COMPLETED] Insert peripheral IV, , , Once **AND** sodium chloride 0.9 % flush 10 mL, 10 mL, Intravenous, PRN, Tete Villarreal MD  •  sodium chloride 0.9 % flush 10 mL, 10 mL, Intravenous, Q12H, Tete Villarreal MD  •  sodium chloride 0.9 % flush 10 mL, 10 mL, Intravenous, PRN, Tete Villarreal MD  •  sodium chloride 0.9 % infusion, 100 mL/hr, Intravenous, Continuous, Demetri Bertrand MD, Last Rate: 100 mL/hr at 11/21/21 0622, 100 mL/hr at 11/21/21 0622  •  thiamine (B-1) 100 mg in sodium chloride 0.9 % 100 mL IVPB, 100 mg, Intravenous, Daily, Nura Morgan MD, Last Rate: 200 mL/hr at 11/20/21 2135, 100 mg at 11/20/21 2135     Diagnostic Data    Lab Results (last 24 hours)     Procedure Component Value Units Date/Time    Comprehensive Metabolic Panel [[id number]]  (Abnormal) Collected: 11/21/21 0331    Specimen: Blood Updated: 11/21/21 0425     Glucose 142 mg/dL      BUN 14 mg/dL      Creatinine 1.19 mg/dL      Sodium 139 mmol/L      Potassium 4.3 mmol/L      Comment: Slight hemolysis detected by analyzer. Results may be affected.        Chloride 107 mmol/L      CO2 23.0 mmol/L      Calcium 8.9 mg/dL      Total Protein 6.8 g/dL      Albumin 3.80 g/dL      ALT (SGPT) 16 U/L      AST (SGOT) 18 U/L      Alkaline Phosphatase 47 U/L      Total Bilirubin 0.2 mg/dL      eGFR  African Amer 75 mL/min/1.73      Globulin 3.0 gm/dL      A/G Ratio 1.3 g/dL      BUN/Creatinine Ratio 11.8     Anion Gap 9.0 mmol/L     Narrative:      GFR  Normal >60  Chronic Kidney Disease <60  Kidney Failure <15      CBC & Differential [[id number]]  (Abnormal) Collected: 11/21/21 0331    Specimen: Blood Updated: 11/21/21 0410    Narrative:      The following orders were created for panel order CBC & Differential.  Procedure                               Abnormality         Status                     ---------                               -----------         ------                     CBC Auto Differential[[id number]]        Abnormal            Final result                 Please view results for these tests on the individual orders.    CBC Auto Differential [[id number]]  (Abnormal) Collected: 11/21/21 0331    Specimen: Blood Updated: 11/21/21 0410     WBC 8.58 10*3/mm3      RBC 3.80 10*6/mm3      Hemoglobin 12.0 g/dL      Hematocrit 35.5 %      MCV 93.4 fL      MCH 31.6 pg      MCHC 33.8 g/dL      RDW 13.1 %      RDW-SD 44.5 fl      MPV 9.9 fL      Platelets 320 10*3/mm3      Neutrophil % 89.8 %      Lymphocyte % 7.8 %      Monocyte % 1.5 %      Eosinophil % 0.0 %      Basophil % 0.2 %      Immature Grans % 0.7 %      Neutrophils, Absolute 7.70 10*3/mm3      Lymphocytes, Absolute 0.67 10*3/mm3      Monocytes, Absolute 0.13 10*3/mm3      Eosinophils, Absolute 0.00 10*3/mm3      Basophils, Absolute 0.02 10*3/mm3      Immature Grans, Absolute 0.06 10*3/mm3      nRBC 0.0 /100 WBC     Blood Gas, Arterial - [[id number]]  (Abnormal) Collected: 11/21/21 0346    Specimen: Arterial Blood Updated: 11/21/21 0353     Site Arterial Line     Frederick's Test N/A     pH, Arterial 7.380 pH units      pCO2, Arterial 37.7 mm Hg      pO2, Arterial 161.0 mm Hg      Comment: 83 Value above reference range        HCO3, Arterial 22.3 mmol/L      Base Excess, Arterial -2.5 mmol/L      Comment: 84 Value below reference range        O2 Saturation, Arterial 99.9 %      Comment: 83 Value above reference range        Barometric Pressure for Blood Gas 751 mmHg      Modality Ventilator     FIO2 30 %       Ventilator Mode AC     Set Tidal Volume 500     PEEP 5.0     Collected by RT     Comment: Meter: [id number]     :  [id number]       C4 Complement [[id number]]  (Normal) Collected: 11/20/21 1558    Specimen: Blood Updated: 11/20/21 2127     C4 Complement 35.0 mg/dl     Respiratory Culture - Sputum, ET Suction [[id number]] Collected: 11/20/21 2020    Specimen: Sputum from ET Suction Updated: 11/20/21 2122     Gram Stain No Squamous epithelial cells      Moderate (3+) WBCs per low power field      Mixed bacterial joe    Magnesium [[id number]]  (Normal) Collected: 11/20/21 1539    Specimen: Blood Updated: 11/20/21 2058     Magnesium 1.7 mg/dL     Blood Gas, Arterial - [[id number]]  (Abnormal) Collected: 11/20/21 1905    Specimen: Arterial Blood Updated: 11/20/21 1910     Site Right Radial     Frederick's Test N/A     pH, Arterial 7.362 pH units      pCO2, Arterial 40.2 mm Hg      pO2, Arterial 87.6 mm Hg      HCO3, Arterial 22.8 mmol/L      Base Excess, Arterial -2.4 mmol/L      Comment: 84 Value below reference range        O2 Saturation, Arterial 96.6 %      Barometric Pressure for Blood Gas 752 mmHg      Modality Ventilator     FIO2 30 %      Ventilator Mode AC     Collected by NILESH     Comment: Meter: [id number]     :  [id number]       Sedimentation Rate [[id number]]  (Abnormal) Collected: 11/20/21 1539    Specimen: Blood Updated: 11/20/21 1706     Sed Rate 28 mm/hr     COVID-19 and FLU A/B PCR - Swab, Nasopharynx [[id number]]  (Normal) Collected: 11/20/21 1527    Specimen: Swab from Nasopharynx Updated: 11/20/21 1617     COVID19 Not Detected     Influenza A PCR Not Detected     Influenza B PCR Not Detected    Narrative:      Fact sheet for providers: https://www.fda.gov/media/882705/download    Fact sheet for patients: https://www.fda.gov/media/622854/download    Test performed by PCR.    Comprehensive Metabolic Panel [[id number]]  (Abnormal) Collected: 11/20/21 1539    Specimen: Blood Updated:  11/20/21 1600     Glucose 136 mg/dL      BUN 11 mg/dL      Creatinine 1.53 mg/dL      Sodium 140 mmol/L      Potassium 3.9 mmol/L      Chloride 101 mmol/L      CO2 31.0 mmol/L      Calcium 10.4 mg/dL      Total Protein 8.4 g/dL      Albumin 4.80 g/dL      ALT (SGPT) 22 U/L      AST (SGOT) 27 U/L      Alkaline Phosphatase 62 U/L      Total Bilirubin 0.3 mg/dL      eGFR   Amer 56 mL/min/1.73      Globulin 3.6 gm/dL      A/G Ratio 1.3 g/dL      BUN/Creatinine Ratio 7.2     Anion Gap 8.0 mmol/L     Narrative:      GFR Normal >60  Chronic Kidney Disease <60  Kidney Failure <15      C-reactive Protein [[id number]]  (Normal) Collected: 11/20/21 1539    Specimen: Blood Updated: 11/20/21 1600     C-Reactive Protein 0.50 mg/dL     Food Allergy Profile [[id number]] Collected: 11/20/21 1558    Specimen: Blood Updated: 11/20/21 1558    Tryptase [[id number]] Collected: 11/20/21 1558    Specimen: Blood Updated: 11/20/21 1558    CBC & Differential [[id number]]  (Normal) Collected: 11/20/21 1539    Specimen: Blood Updated: 11/20/21 1541    Narrative:      The following orders were created for panel order CBC & Differential.  Procedure                               Abnormality         Status                     ---------                               -----------         ------                     CBC Auto Differential[[id number]]        Normal              Final result                 Please view results for these tests on the individual orders.    CBC Auto Differential [[id number]]  (Normal) Collected: 11/20/21 1539    Specimen: Blood Updated: 11/20/21 1541     WBC 7.62 10*3/mm3      RBC 4.41 10*6/mm3      Hemoglobin 14.0 g/dL      Hematocrit 41.9 %      MCV 95.0 fL      MCH 31.7 pg      MCHC 33.4 g/dL      RDW 13.0 %      RDW-SD 44.8 fl      MPV 10.1 fL      Platelets 359 10*3/mm3      Neutrophil % 59.0 %      Lymphocyte % 27.3 %      Monocyte % 9.3 %      Eosinophil % 3.4 %      Basophil % 0.5 %      Immature Grans % 0.5 %       Neutrophils, Absolute 4.49 10*3/mm3      Lymphocytes, Absolute 2.08 10*3/mm3      Monocytes, Absolute 0.71 10*3/mm3      Eosinophils, Absolute 0.26 10*3/mm3      Basophils, Absolute 0.04 10*3/mm3      Immature Grans, Absolute 0.04 10*3/mm3      nRBC 0.0 /100 WBC     C1 Esterase Inhibitor, Serum [[id number]] Collected: 11/20/21 1539    Specimen: Blood Updated: 11/20/21 1539          I reviewed the patient's new clinical results.    Assessment/Plan:     Active Hospital Problems    Diagnosis  POA   • **Angioedema [T78.3XXA]  Yes     Priority: High     Patient developed angioedema with evolving upper and lower lip swelling prompting him to present to the ED  Patient given IV Solu-Medrol and Benadryl in the ED  Patient then required epinephrine and subsequently intubation to protect airway  Have discontinued home lisinopril, also will discontinue recently started naltrexone    -High intensity ICU physicians consulted to run ventilator  -Continue IV fluids  -Scheduled IV Benadryl and IV methylprednisolone  -As needed epinephrine for anaphylaxis  -Cardiac monitoring and continuous pulse oximetry monitoring  -Patient will need speech consult for swallow study after extubation    -C-1 esterase inhibitor pending  - C4 complement normal  - Tryptase pending  - CRP, ESR upper level of normal and elevated, respectively  - Food allergy panel pending    -Patient will need EpiPen and allergy/immunology referral when ready for discharge               • Angio-edema [T78.3XXA]  Yes     Priority: High   • Chronic alcohol use [Z72.89]  Yes     Priority: Medium     Drinks beer and hard liquor   Recently admitted for pancreatitis  CIWA protocol     • Gastroesophageal reflux disease [K21.9]  Yes     Priority: Medium     Continue Prilosec 40 mg daily IV while patient remains intubated     • Essential hypertension [I10]  Yes     Priority: Medium     Holding home amlodipine 10 mg daily due to soft blood pressures  Discontinued lisinopril due to  angioedema due to potential lisinopril allergy    Labetalol as needed for systolic blood pressure greater than 160  Monitor blood pressure per hospital protocol       • Tobacco dependence syndrome [F17.200]  Yes     Priority: Low     Nicotine patch   Smoking cessation counseling      • Hyperlipidemia [E78.5]  Yes     Priority: Low     The 10-year ASCVD risk score (Valentina CARRILLO Jr., et al., 2013) is: 38.6%    - Lipid panel     -Currently not on statin therapy.    -We will consider initiating statin therapy after resolution of current pancreatitis flare         • Chronic obstructive lung disease (HCC) [J44.9]  Yes     Priority: Low     duonebs  singulair   Nascort            Code status is   Code Status and Medical Interventions:   Ordered at: 11/20/21 1630     Level Of Support Discussed With:    Patient     Code Status (Patient has no pulse and is not breathing):    CPR (Attempt to Resuscitate)     Medical Interventions (Patient has pulse or is breathing):    Full Support       Prognosis: fair      This document has been electronically signed by Demetri Bertrand MD on November 21, 2021 08:07 CST

## 2021-11-22 ENCOUNTER — READMISSION MANAGEMENT (OUTPATIENT)
Dept: CALL CENTER | Facility: HOSPITAL | Age: 61
End: 2021-11-22

## 2021-11-22 ENCOUNTER — APPOINTMENT (OUTPATIENT)
Dept: GENERAL RADIOLOGY | Facility: HOSPITAL | Age: 61
End: 2021-11-22

## 2021-11-22 LAB
ALBUMIN SERPL-MCNC: 3.7 G/DL (ref 3.5–5.2)
ALBUMIN/GLOB SERPL: 1.4 G/DL
ALP SERPL-CCNC: 42 U/L (ref 39–117)
ALT SERPL W P-5'-P-CCNC: 13 U/L (ref 1–41)
ANION GAP SERPL CALCULATED.3IONS-SCNC: 9 MMOL/L (ref 5–15)
ARTERIAL PATENCY WRIST A: POSITIVE
AST SERPL-CCNC: 18 U/L (ref 1–40)
ATMOSPHERIC PRESS: 754 MMHG
BACTERIA SPEC RESP CULT: NORMAL
BASE EXCESS BLDA CALC-SCNC: -1.2 MMOL/L (ref 0–2)
BASOPHILS # BLD AUTO: 0.02 10*3/MM3 (ref 0–0.2)
BASOPHILS NFR BLD AUTO: 0.1 % (ref 0–1.5)
BDY SITE: ABNORMAL
BILIRUB SERPL-MCNC: 0.2 MG/DL (ref 0–1.2)
BUN SERPL-MCNC: 15 MG/DL (ref 8–23)
BUN/CREAT SERPL: 14.2 (ref 7–25)
CALCIUM SPEC-SCNC: 8.8 MG/DL (ref 8.6–10.5)
CHLORIDE SERPL-SCNC: 109 MMOL/L (ref 98–107)
CO2 SERPL-SCNC: 25 MMOL/L (ref 22–29)
CREAT SERPL-MCNC: 1.06 MG/DL (ref 0.76–1.27)
CRP SERPL-MCNC: 0.4 MG/DL (ref 0–0.5)
DEPRECATED RDW RBC AUTO: 50.1 FL (ref 37–54)
EOSINOPHIL # BLD AUTO: 0.01 10*3/MM3 (ref 0–0.4)
EOSINOPHIL NFR BLD AUTO: 0.1 % (ref 0.3–6.2)
ERYTHROCYTE [DISTWIDTH] IN BLOOD BY AUTOMATED COUNT: 13.7 % (ref 12.3–15.4)
ERYTHROCYTE [SEDIMENTATION RATE] IN BLOOD: 20 MM/HR (ref 0–15)
GFR SERPL CREATININE-BSD FRML MDRD: 86 ML/MIN/1.73
GLOBULIN UR ELPH-MCNC: 2.6 GM/DL
GLUCOSE SERPL-MCNC: 114 MG/DL (ref 65–99)
GRAM STN SPEC: NORMAL
HCO3 BLDA-SCNC: 23.1 MMOL/L (ref 20–26)
HCT VFR BLD AUTO: 37.6 % (ref 37.5–51)
HGB BLD-MCNC: 11.9 G/DL (ref 13–17.7)
IMM GRANULOCYTES # BLD AUTO: 0.15 10*3/MM3 (ref 0–0.05)
IMM GRANULOCYTES NFR BLD AUTO: 0.9 % (ref 0–0.5)
INHALED O2 CONCENTRATION: 24 %
LYMPHOCYTES # BLD AUTO: 1.52 10*3/MM3 (ref 0.7–3.1)
LYMPHOCYTES NFR BLD AUTO: 9.6 % (ref 19.6–45.3)
Lab: ABNORMAL
MAGNESIUM SERPL-MCNC: 1.8 MG/DL (ref 1.6–2.4)
MCH RBC QN AUTO: 31.8 PG (ref 26.6–33)
MCHC RBC AUTO-ENTMCNC: 31.6 G/DL (ref 31.5–35.7)
MCV RBC AUTO: 100.5 FL (ref 79–97)
MODALITY: ABNORMAL
MONOCYTES # BLD AUTO: 1.13 10*3/MM3 (ref 0.1–0.9)
MONOCYTES NFR BLD AUTO: 7.1 % (ref 5–12)
NEUTROPHILS NFR BLD AUTO: 13.06 10*3/MM3 (ref 1.7–7)
NEUTROPHILS NFR BLD AUTO: 82.2 % (ref 42.7–76)
NRBC BLD AUTO-RTO: 0 /100 WBC (ref 0–0.2)
PCO2 BLDA: 36.5 MM HG (ref 35–45)
PEEP RESPIRATORY: 5 CM[H2O]
PH BLDA: 7.41 PH UNITS (ref 7.35–7.45)
PHOSPHATE SERPL-MCNC: 3.1 MG/DL (ref 2.5–4.5)
PLATELET # BLD AUTO: 266 10*3/MM3 (ref 140–450)
PMV BLD AUTO: 11.1 FL (ref 6–12)
PO2 BLDA: 109 MM HG (ref 83–108)
POTASSIUM SERPL-SCNC: 4.5 MMOL/L (ref 3.5–5.2)
PROCALCITONIN SERPL-MCNC: 0.03 NG/ML (ref 0–0.25)
PROT SERPL-MCNC: 6.3 G/DL (ref 6–8.5)
PSV: 8 CMH2O
RBC # BLD AUTO: 3.74 10*6/MM3 (ref 4.14–5.8)
SAO2 % BLDCOA: 98.8 % (ref 94–99)
SODIUM SERPL-SCNC: 143 MMOL/L (ref 136–145)
VENTILATOR MODE: ABNORMAL
WBC NRBC COR # BLD: 15.89 10*3/MM3 (ref 3.4–10.8)

## 2021-11-22 PROCEDURE — 94799 UNLISTED PULMONARY SVC/PX: CPT

## 2021-11-22 PROCEDURE — 84145 PROCALCITONIN (PCT): CPT | Performed by: STUDENT IN AN ORGANIZED HEALTH CARE EDUCATION/TRAINING PROGRAM

## 2021-11-22 PROCEDURE — 71045 X-RAY EXAM CHEST 1 VIEW: CPT

## 2021-11-22 PROCEDURE — 36600 WITHDRAWAL OF ARTERIAL BLOOD: CPT

## 2021-11-22 PROCEDURE — 25010000002 DIPHENHYDRAMINE PER 50 MG: Performed by: STUDENT IN AN ORGANIZED HEALTH CARE EDUCATION/TRAINING PROGRAM

## 2021-11-22 PROCEDURE — 86140 C-REACTIVE PROTEIN: CPT | Performed by: STUDENT IN AN ORGANIZED HEALTH CARE EDUCATION/TRAINING PROGRAM

## 2021-11-22 PROCEDURE — 25010000002 HEPARIN (PORCINE) PER 1000 UNITS: Performed by: STUDENT IN AN ORGANIZED HEALTH CARE EDUCATION/TRAINING PROGRAM

## 2021-11-22 PROCEDURE — 94003 VENT MGMT INPAT SUBQ DAY: CPT

## 2021-11-22 PROCEDURE — 85651 RBC SED RATE NONAUTOMATED: CPT | Performed by: STUDENT IN AN ORGANIZED HEALTH CARE EDUCATION/TRAINING PROGRAM

## 2021-11-22 PROCEDURE — 36415 COLL VENOUS BLD VENIPUNCTURE: CPT | Performed by: STUDENT IN AN ORGANIZED HEALTH CARE EDUCATION/TRAINING PROGRAM

## 2021-11-22 PROCEDURE — 85025 COMPLETE CBC W/AUTO DIFF WBC: CPT | Performed by: STUDENT IN AN ORGANIZED HEALTH CARE EDUCATION/TRAINING PROGRAM

## 2021-11-22 PROCEDURE — 25010000002 THIAMINE PER 100 MG: Performed by: INTERNAL MEDICINE

## 2021-11-22 PROCEDURE — 25010000002 LORAZEPAM PER 2 MG: Performed by: STUDENT IN AN ORGANIZED HEALTH CARE EDUCATION/TRAINING PROGRAM

## 2021-11-22 PROCEDURE — 82803 BLOOD GASES ANY COMBINATION: CPT

## 2021-11-22 PROCEDURE — 84100 ASSAY OF PHOSPHORUS: CPT | Performed by: STUDENT IN AN ORGANIZED HEALTH CARE EDUCATION/TRAINING PROGRAM

## 2021-11-22 PROCEDURE — 99291 CRITICAL CARE FIRST HOUR: CPT | Performed by: STUDENT IN AN ORGANIZED HEALTH CARE EDUCATION/TRAINING PROGRAM

## 2021-11-22 PROCEDURE — 83735 ASSAY OF MAGNESIUM: CPT | Performed by: STUDENT IN AN ORGANIZED HEALTH CARE EDUCATION/TRAINING PROGRAM

## 2021-11-22 PROCEDURE — 80053 COMPREHEN METABOLIC PANEL: CPT | Performed by: STUDENT IN AN ORGANIZED HEALTH CARE EDUCATION/TRAINING PROGRAM

## 2021-11-22 PROCEDURE — 25010000002 METHYLPREDNISOLONE PER 40 MG: Performed by: INTERNAL MEDICINE

## 2021-11-22 RX ORDER — NICOTINE 21 MG/24HR
1 PATCH, TRANSDERMAL 24 HOURS TRANSDERMAL
Status: DISCONTINUED | OUTPATIENT
Start: 2021-11-22 | End: 2021-11-24 | Stop reason: HOSPADM

## 2021-11-22 RX ORDER — LABETALOL HYDROCHLORIDE 5 MG/ML
10 INJECTION, SOLUTION INTRAVENOUS ONCE
Status: COMPLETED | OUTPATIENT
Start: 2021-11-23 | End: 2021-11-22

## 2021-11-22 RX ADMIN — NICOTINE 1 PATCH: 14 PATCH, EXTENDED RELEASE TRANSDERMAL at 21:19

## 2021-11-22 RX ADMIN — LORAZEPAM 2 MG: 2 INJECTION INTRAMUSCULAR; INTRAVENOUS at 16:00

## 2021-11-22 RX ADMIN — LORAZEPAM 2 MG: 2 INJECTION INTRAMUSCULAR; INTRAVENOUS at 21:08

## 2021-11-22 RX ADMIN — HEPARIN SODIUM 5000 UNITS: 5000 INJECTION INTRAVENOUS; SUBCUTANEOUS at 21:09

## 2021-11-22 RX ADMIN — DIPHENHYDRAMINE HYDROCHLORIDE 25 MG: 50 INJECTION INTRAMUSCULAR; INTRAVENOUS at 05:06

## 2021-11-22 RX ADMIN — LORAZEPAM 4 MG: 2 INJECTION INTRAMUSCULAR; INTRAVENOUS at 14:43

## 2021-11-22 RX ADMIN — SODIUM CHLORIDE 100 ML/HR: 9 INJECTION, SOLUTION INTRAVENOUS at 03:06

## 2021-11-22 RX ADMIN — THIAMINE HYDROCHLORIDE 100 MG: 100 INJECTION, SOLUTION INTRAMUSCULAR; INTRAVENOUS at 07:48

## 2021-11-22 RX ADMIN — FOLIC ACID 1 MG: 1 TABLET ORAL at 07:48

## 2021-11-22 RX ADMIN — DOCUSATE SODIUM 50 MG AND SENNOSIDES 8.6 MG 2 TABLET: 8.6; 5 TABLET, FILM COATED ORAL at 21:09

## 2021-11-22 RX ADMIN — LABETALOL HYDROCHLORIDE 10 MG: 5 INJECTION, SOLUTION INTRAVENOUS at 23:22

## 2021-11-22 RX ADMIN — METHYLPREDNISOLONE SODIUM SUCCINATE 40 MG: 40 INJECTION, POWDER, FOR SOLUTION INTRAMUSCULAR; INTRAVENOUS at 12:00

## 2021-11-22 RX ADMIN — SODIUM CHLORIDE, PRESERVATIVE FREE 10 ML: 5 INJECTION INTRAVENOUS at 07:48

## 2021-11-22 RX ADMIN — SODIUM CHLORIDE 100 ML/HR: 9 INJECTION, SOLUTION INTRAVENOUS at 12:00

## 2021-11-22 RX ADMIN — LORAZEPAM 4 MG: 2 INJECTION INTRAMUSCULAR; INTRAVENOUS at 04:17

## 2021-11-22 RX ADMIN — FAMOTIDINE 20 MG: 10 INJECTION INTRAVENOUS at 07:54

## 2021-11-22 RX ADMIN — HEPARIN SODIUM 5000 UNITS: 5000 INJECTION INTRAVENOUS; SUBCUTANEOUS at 07:48

## 2021-11-22 RX ADMIN — LORAZEPAM 1 MG: 2 INJECTION INTRAMUSCULAR; INTRAVENOUS at 11:17

## 2021-11-22 RX ADMIN — FAMOTIDINE 20 MG: 10 INJECTION INTRAVENOUS at 21:10

## 2021-11-22 RX ADMIN — DIPHENHYDRAMINE HYDROCHLORIDE 25 MG: 50 INJECTION INTRAMUSCULAR; INTRAVENOUS at 00:12

## 2021-11-22 RX ADMIN — POLYETHYLENE GLYCOL 3350 17 G: 17 POWDER, FOR SOLUTION ORAL at 07:40

## 2021-11-22 RX ADMIN — SODIUM CHLORIDE, PRESERVATIVE FREE 10 ML: 5 INJECTION INTRAVENOUS at 21:09

## 2021-11-22 RX ADMIN — SODIUM CHLORIDE 100 ML/HR: 9 INJECTION, SOLUTION INTRAVENOUS at 22:12

## 2021-11-22 RX ADMIN — DIPHENHYDRAMINE HYDROCHLORIDE 25 MG: 50 INJECTION INTRAMUSCULAR; INTRAVENOUS at 14:23

## 2021-11-22 RX ADMIN — DIPHENHYDRAMINE HYDROCHLORIDE 25 MG: 50 INJECTION INTRAMUSCULAR; INTRAVENOUS at 18:47

## 2021-11-22 RX ADMIN — METHYLPREDNISOLONE SODIUM SUCCINATE 40 MG: 40 INJECTION, POWDER, FOR SOLUTION INTRAMUSCULAR; INTRAVENOUS at 03:06

## 2021-11-22 NOTE — OUTREACH NOTE
Medical Week 3 Survey      Responses   [facility], operated by [facility] patient discharged from? [facility]   Does the patient have one of the following disease processes/diagnoses(primary or secondary)? Other   Week 3 attempt successful? No   Unsuccessful attempts Attempt 1   Revoke Readmitted          Faith Mares RN

## 2021-11-22 NOTE — SIGNIFICANT NOTE
This is a patient we have been following for angioedema.  Have discussed case with respiratory therapist and ICU nurses tomorrow team and attending. Patient has been on minimal vent settings and has now passed spontaneous breathing trial.  Patient is ready to be extubated from our standpoint.  We attempted to consult the intensivist to manage the vent for extubation.  However, Dr. Peralta is not on-call today or working in the hospital per .  We then attempted to consult the hospitalist to help manage the vent for extubation.  They also declined to do this as they stated that they believe the patient was within the 48-hour window to be managed by our team.  Our team then reached out to our  for further clarification on if we could do this and since the patient was within the 48-hour window and the patient is stable and ready for extubation, we were given approval to extubate the patient.      Please refer to Dr. Pascual Avalos's significant note for the initial management of this gentleman.  In brief, per report given, when this gentleman was initially intubated in the ED, the hospitalists were attempted to be consulted by the ED physicians.  However, they declined the ED consult. ED then consulted the teleintensivist of high acuity Dr. Morgan who agreed to manage the ventilator.  Ever, high acuity has then fallen off due to administrative issues.  Because of this, we were forced to do the above for further management when this patient was ready to be extubated.            This document has been electronically signed by Demetri Bertrand MD on November 22, 2021 10:55 CST

## 2021-11-22 NOTE — PROGRESS NOTES
CRITICAL CARE DAILY PROGRESS NOTE  [facility] Service  NAME: Zak Lutz  : 1960  MRN: [id number]      LOS: 2 days     PROVIDER OF SERVICE: Demetri Bertrand MD    Chief Complaint: Angioedema    Subjective:     Interval History: History provided by: patient and nurse     Patient remains intubated.     Afebrile. Hypotensive to 83/60 yesterday. BP currently 122/82. VS otherwise stable.      Patient becomes restless when propofol is weaned. No other concerns overnight, per nurse. Swelling is improved.     Feeding: NPO; 0.9NS  Analgesia: Fentanyl IV  Sedation: propofol and dexmedetomide (precedex)  Prexedex 0.2 mcg/kg/hour  Propafol 25 micograms/kg/min  Thromboprophylaxis: Heparin  HOB: 30 degrees  Ulcer ppx: PPIs  Glucose control: Not diabetic  SBT: Will not attempt yet  Bowel Regimen:As needed bowel regimen in place   Indwelling catheters:  Adams catheter is in place.        Review of Systems:   Review of Systems   Unable to perform ROS: Intubated       Objective:     Vital Signs  Temp:  [98 °F (36.7 °C)-99.2 °F (37.3 °C)] 98 °F (36.7 °C)  Heart Rate:  [52-83] 55  Resp:  [16-21] 16  BP: ()/(60-82) 122/82  FiO2 (%):  [24 %-30 %] 24 %  Body mass index is 21.79 kg/m².    FiO2 (%):  [24 %-30 %] 24 %  S RR:  [16] 16  PEEP/CPAP (cm H2O):  [4.6 cm H20-5.1 cm H20] 5 cm H20  MAP (cm H2O):  [7.6-10] 9    I/O last 3 completed shifts:  In: 3808.6 [I.V.:3678.6; Other:30; IV Piggyback:100]  Out:  [Urine:; Emesis/NG output:25]    Physical Exam  Physical Exam  Constitutional:       General: He is not in acute distress.     Appearance: He is normal weight. He is not toxic-appearing or diaphoretic.      Comments: Intubated and on ventilator    HENT:      Mouth/Throat:      Comments: Upper and lower lip angioedema continues to improve   Cardiovascular:      Rate and Rhythm: Normal rate and regular rhythm.   Pulmonary:      Breath sounds: Normal breath sounds.   Abdominal:      General:  Bowel sounds are normal.      Palpations: Abdomen is soft.   Genitourinary:     Comments: Adams catheter in place  Musculoskeletal:      Right lower leg: No edema.      Left lower leg: No edema.         Medication Review    Current Facility-Administered Medications:   •  albuterol (PROVENTIL) nebulizer solution 0.083% 2.5 mg/3mL, 2.5 mg, Nebulization, Q4H PRN, Tete Villarreal MD  •  sennosides-docusate (PERICOLACE) 8.6-50 MG per tablet 2 tablet, 2 tablet, Oral, BID, 2 tablet at 11/21/21 2107 **AND** polyethylene glycol (MIRALAX) packet 17 g, 17 g, Oral, Daily PRN **AND** bisacodyl (DULCOLAX) EC tablet 5 mg, 5 mg, Oral, Daily PRN **AND** bisacodyl (DULCOLAX) suppository 10 mg, 10 mg, Rectal, Daily PRN, Demetri Bertrand MD  •  dexmedetomidine (PRECEDEX) 400 mcg in 100 mL NS infusion, 0.2-1.5 mcg/kg/hr, Intravenous, Titrated, Demetri Bertrand MD, Last Rate: 3.7 mL/hr at 11/22/21 0506, 0.2 mcg/kg/hr at 11/22/21 0506  •  diphenhydrAMINE (BENADRYL) injection 25 mg, 25 mg, Intravenous, Q6H, Consuelo Brand MD, 25 mg at 11/22/21 0506  •  EPINEPHrine (ADRENALIN) 0.05 mg/mL syringe, 20 mL, Injection, PRN, Tete Villarreal MD  •  famotidine (PEPCID) injection 20 mg, 20 mg, Intravenous, Q12H, Nura Morgan MD, 20 mg at 11/21/21 2107  •  fentaNYL (SUBLIMAZE) PCA 20 mcg/mL 50 mL syringe, , Intravenous, Continuous, Nura Morgan MD, Held at 11/20/21 2043  •  folic acid (FOLVITE) tablet 1 mg, 1 mg, Nasogastric, Daily, Nura Morgan MD, 1 mg at 11/20/21 2139  •  heparin (porcine) 5000 UNIT/ML injection 5,000 Units, 5,000 Units, Subcutaneous, Q12H, Tete Villarreal MD, 5,000 Units at 11/21/21 2107  •  ipratropium-albuterol (DUO-NEB) nebulizer solution 3 mL, 3 mL, Nebulization, Q4H PRN, Tete Villarreal MD  •  ketorolac (TORADOL) injection 15 mg, 15 mg, Intravenous, Q6H PRN, Tete Villarreal MD  •  LORazepam (ATIVAN) tablet 1 mg, 1 mg, Oral, Q2H PRN **OR** LORazepam (ATIVAN) injection 1 mg, 1 mg, Intravenous, Q2H PRN **OR**  LORazepam (ATIVAN) tablet 2 mg, 2 mg, Oral, Q1H PRN **OR** LORazepam (ATIVAN) injection 2 mg, 2 mg, Intravenous, Q1H PRN, 2 mg at 11/20/21 2211 **OR** LORazepam (ATIVAN) injection 2 mg, 2 mg, Intravenous, Q15 Min PRN **OR** LORazepam (ATIVAN) injection 2 mg, 2 mg, Intramuscular, Q15 Min PRN **OR** LORazepam (ATIVAN) tablet 4 mg, 4 mg, Oral, Q1H PRN **OR** LORazepam (ATIVAN) injection 4 mg, 4 mg, Intravenous, Q1H PRN, Consuelo Brand MD, 4 mg at 11/22/21 0417  •  methylPREDNISolone sodium succinate (SOLU-Medrol) injection 40 mg, 40 mg, Intravenous, Q12H, Nura Morgan MD, 40 mg at 11/22/21 0306  •  ondansetron (ZOFRAN) injection 4 mg, 4 mg, Intravenous, Q6H PRN, Tete Villarreal MD  •  propofol (DIPRIVAN) infusion 10 mg/mL 100 mL, 5-50 mcg/kg/min, Intravenous, Titrated, Los Joiner MD, Last Rate: 10.85 mL/hr at 11/21/21 2111, 25 mcg/kg/min at 11/21/21 2111  •  sodium chloride 0.9 % bolus 500 mL, 500 mL, Intravenous, Once, Demetri Bertrand MD  •  [COMPLETED] Insert peripheral IV, , , Once **AND** sodium chloride 0.9 % flush 10 mL, 10 mL, Intravenous, PRN, Tete Villarreal MD  •  sodium chloride 0.9 % flush 10 mL, 10 mL, Intravenous, Q12H, Tete Villarreal MD  •  sodium chloride 0.9 % flush 10 mL, 10 mL, Intravenous, PRN, Tete Villarreal MD  •  sodium chloride 0.9 % infusion, 100 mL/hr, Intravenous, Continuous, Demetri Bertrand MD, Last Rate: 100 mL/hr at 11/22/21 0306, 100 mL/hr at 11/22/21 0306  •  thiamine (B-1) 100 mg in sodium chloride 0.9 % 100 mL IVPB, 100 mg, Intravenous, Daily, Nura Morgan MD, Last Rate: 200 mL/hr at 11/20/21 2135, 100 mg at 11/20/21 2135     Diagnostic Data    Lab Results (last 24 hours)     Procedure Component Value Units Date/Time    CBC & Differential [[id number]]  (Abnormal) Collected: 11/22/21 0336    Specimen: Blood Updated: 11/22/21 0536    Narrative:      The following orders were created for panel order CBC & Differential.  Procedure                                Abnormality         Status                     ---------                               -----------         ------                     CBC Auto Differential[[id number]]        Abnormal            Final result                 Please view results for these tests on the individual orders.    CBC Auto Differential [[id number]]  (Abnormal) Collected: 11/22/21 0336    Specimen: Blood Updated: 11/22/21 0536     WBC 15.89 10*3/mm3      RBC 3.74 10*6/mm3      Hemoglobin 11.9 g/dL      Hematocrit 37.6 %      .5 fL      MCH 31.8 pg      MCHC 31.6 g/dL      RDW 13.7 %      RDW-SD 50.1 fl      MPV 11.1 fL      Platelets 266 10*3/mm3      Neutrophil % 82.2 %      Lymphocyte % 9.6 %      Monocyte % 7.1 %      Eosinophil % 0.1 %      Basophil % 0.1 %      Immature Grans % 0.9 %      Neutrophils, Absolute 13.06 10*3/mm3      Lymphocytes, Absolute 1.52 10*3/mm3      Monocytes, Absolute 1.13 10*3/mm3      Eosinophils, Absolute 0.01 10*3/mm3      Basophils, Absolute 0.02 10*3/mm3      Immature Grans, Absolute 0.15 10*3/mm3      nRBC 0.0 /100 WBC     Comprehensive Metabolic Panel [[id number]]  (Abnormal) Collected: 11/22/21 0337    Specimen: Blood Updated: 11/22/21 0458     Glucose 114 mg/dL      BUN 15 mg/dL      Creatinine 1.06 mg/dL      Sodium 143 mmol/L      Potassium 4.5 mmol/L      Chloride 109 mmol/L      CO2 25.0 mmol/L      Calcium 8.8 mg/dL      Total Protein 6.3 g/dL      Albumin 3.70 g/dL      ALT (SGPT) 13 U/L      AST (SGOT) 18 U/L      Alkaline Phosphatase 42 U/L      Total Bilirubin 0.2 mg/dL      eGFR   Amer 86 mL/min/1.73      Globulin 2.6 gm/dL      A/G Ratio 1.4 g/dL      BUN/Creatinine Ratio 14.2     Anion Gap 9.0 mmol/L     Narrative:      GFR Normal >60  Chronic Kidney Disease <60  Kidney Failure <15      Phosphorus [[id number]]  (Normal) Collected: 11/22/21 0337    Specimen: Blood Updated: 11/22/21 0458     Phosphorus 3.1 mg/dL     Magnesium [[id number]]  (Normal) Collected: 11/22/21 0337    Specimen:  Blood Updated: 11/22/21 0458     Magnesium 1.8 mg/dL     Respiratory Culture - Sputum, ET Suction [[id number]] Collected: 11/20/21 2020    Specimen: Sputum from ET Suction Updated: 11/21/21 1253     Respiratory Culture Rare Normal Respiratory Joe     Gram Stain No Squamous epithelial cells      Moderate (3+) WBCs per low power field      Mixed bacterial joe    Phosphorus [[id number]]  (Normal) Collected: 11/21/21 1042    Specimen: Blood Updated: 11/21/21 1121     Phosphorus 3.0 mg/dL           I reviewed the patient's new clinical results.    Assessment/Plan:     Active Hospital Problems    Diagnosis  POA   • **Angioedema [T78.3XXA]  Yes     Priority: High     Patient developed angioedema with evolving upper and lower lip swelling prompting him to present to the ED  Patient given IV Solu-Medrol and Benadryl in the ED  Patient then required epinephrine and subsequently intubation to protect airway  Have discontinued home lisinopril and naltrexone     -Daily chest x-ray to assess for pneumonia while on ventilator     -High intensity ICU physicians consulted to run ventilator  -Continue IV fluids  -Scheduled IV Benadryl and IV methylprednisolone  -As needed epinephrine for anaphylaxis  -Cardiac monitoring and continuous pulse oximetry monitoring  -Patient will need speech consult for swallow study after extubation    -C-1 esterase inhibitor pending  - C4 complement normal  - Tryptase pending  - CRP, ESR upper level of normal and elevated, respectively  - Food allergy panel pending    -Patient will need EpiPen and allergy/immunology referral when ready for discharge               • Angio-edema [T78.3XXA]  Yes     Priority: High   • Chronic alcohol use [Z72.89]  Yes     Priority: Medium     Drinks beer and hard liquor   Recently admitted for pancreatitis  WA protocol  Precedex with sedation      • Gastroesophageal reflux disease [K21.9]  Yes     Priority: Medium     Continue Prilosec 40 mg daily IV while patient  remains intubated     • Essential hypertension [I10]  Yes     Priority: Medium     Holding home amlodipine 10 mg daily due to soft blood pressures  Discontinued lisinopril due to angioedema due to potential lisinopril allergy    Labetalol as needed for systolic blood pressure greater than 160  Monitor blood pressure per hospital protocol       • Tobacco dependence syndrome [F17.200]  Yes     Priority: Low     Nicotine patch   Smoking cessation counseling      • Hyperlipidemia [E78.5]  Yes     Priority: Low     The 10-year ASCVD risk score (Gallitzin GERARDO Jr., et al., 2013) is: 38.6%    - Lipid panel     -Currently not on statin therapy.    -We will consider initiating statin therapy after resolution of current pancreatitis flare         • Chronic obstructive lung disease (HCC) [J44.9]  Yes     Priority: Low     duonebs  singulair   Nascort            Code status is   Code Status and Medical Interventions:   Ordered at: 11/20/21 1630     Level Of Support Discussed With:    Patient     Code Status (Patient has no pulse and is not breathing):    CPR (Attempt to Resuscitate)     Medical Interventions (Patient has pulse or is breathing):    Full Support         This document has been electronically signed by Demetri Bertrand MD on November 22, 2021 06:30 CST

## 2021-11-22 NOTE — PLAN OF CARE
Problem: Restraint, Nonbehavioral (Nonviolent)  Goal: Discontinuation Criteria Achieved  Outcome: Ongoing, Progressing  Goal: Personal Dignity and Safety Maintained  Outcome: Ongoing, Progressing     Problem: Skin Injury Risk Increased  Goal: Skin Health and Integrity  Outcome: Ongoing, Progressing     Problem: Fall Injury Risk  Goal: Absence of Fall and Fall-Related Injury  Outcome: Ongoing, Progressing   Goal Outcome Evaluation:

## 2021-11-22 NOTE — PAYOR COMM NOTE
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    11/21/21 1655 99 -- -- -- --    11/21/21 1650 100 -- -- -- --    11/21/21 1645 100 -- -- -- --    11/21/21 1640 100 -- -- -- --    11/21/21 1635 100 -- -- -- --    11/21/21 1630 100 -- -- -- --    11/21/21 1625 100 -- -- -- --    11/21/21 1620 100 -- -- -- --    11/21/21 1615 100 -- -- -- --    11/21/21 1610 99 -- -- -- --    11/21/21 1609 -- ventilator -- 24 --    11/21/21 1605 99 -- -- -- --    11/21/21 1600 99 -- -- -- --    11/21/21 1555 100 -- -- -- --    11/21/21 1550 100 -- -- -- --    11/21/21 1545 100 -- -- -- --    11/21/21 1540 100 -- -- -- --    11/21/21 1535 100 -- -- -- --    11/21/21 1530 100 -- -- -- --    11/21/21 1525 100 -- -- -- --    11/21/21 1520 99 -- -- -- --    11/21/21 1515 99 -- -- -- --    11/21/21 1510 99 -- -- -- --    11/21/21 1505 100 -- -- -- --    11/21/21 1500 100 -- -- -- --    11/21/21 1455 99 -- -- -- --    11/21/21 1450 99 -- -- -- --    11/21/21 1445 99 -- -- -- --    11/21/21 1440 99 -- -- -- --    11/21/21 1435 99 -- -- -- --    11/21/21 1430 99 -- -- -- --    11/21/21 1425 99 -- -- -- --    11/21/21 1420 99 -- -- -- --    11/21/21 1415 100  -- -- -- --    11/21/21 1410 99 -- -- -- --    11/21/21 1405 99 -- -- -- --    11/21/21 1400 99 -- -- -- --    11/21/21 1345 100 -- -- -- --    11/21/21 1330 100 -- -- -- --    11/21/21 1315 99 -- -- -- --    11/21/21 1300 100 -- -- -- --    11/21/21 1250 99 ventilator -- 24 --    11/21/21 1245 99 -- -- -- --    11/21/21 1230 99 -- -- -- --    11/21/21 1215 99 -- -- -- --    11/21/21 1200 99 -- -- -- --    11/21/21 1145 99 -- -- -- --    11/21/21 1130 98 -- -- -- --    11/21/21 1115 98 -- -- -- --    11/21/21 1100 98 -- -- -- --    11/21/21 1045 99 -- -- -- --    11/21/21 1037 100 -- -- 24  --    11/21/21 1030 100 -- -- -- --    11/21/21 1015 100 -- -- -- --    11/21/21 1005 97 ventilator -- 30 --    11/21/21 1000 97 -- -- -- --    11/21/21 0945 97 -- -- -- --    11/21/21 0930 97 -- -- -- --    11/21/21 0915 97 -- -- -- --    11/21/21 0900 99 -- -- -- --    11/21/21 0845 99 -- -- -- --    11/21/21 0830 88 -- -- -- --    11/21/21 0715 100 -- -- -- --    11/21/21 0703 99 ventilator -- 30 --    11/21/21 0700 99 -- -- -- --    11/21/21 0600 99 -- -- -- --    11/21/21 0500 99 -- -- -- --    11/21/21 0400 100 -- -- -- --    11/21/21 0345 100 ventilator -- 30 --    11/21/21 0300 100 -- -- -- --    11/21/21 0200 100 -- -- -- --    11/21/21 0123 100 ventilator -- 30 --    11/21/21 0100 99 -- -- -- --    11/21/21 0030 100 -- -- -- --    11/21/21 0015 100 -- -- -- --    11/21/21 0000 100 -- -- -- --            Current Facility-Administered Medications   Medication Dose Route Frequency Provider Last Rate Last Admin   • albuterol (PROVENTIL) nebulizer solution 0.083% 2.5 mg/3mL  2.5 mg Nebulization Q4H PRN Tete Villarreal MD       • sennosides-docusate (PERICOLACE) 8.6-50 MG per tablet 2 tablet  2 tablet Oral BID Demetri Bertrand MD   2 tablet at 11/21/21 2107    And   • polyethylene glycol (MIRALAX) packet 17 g  17 g Oral Daily PRN Demetri Bertrand MD   17 g at 11/22/21 0740    And   • bisacodyl (DULCOLAX) EC tablet 5 mg  5 mg  Oral Daily PRN Demetri Bertrand MD        And   • bisacodyl (DULCOLAX) suppository 10 mg  10 mg Rectal Daily PRN Demetri Bertrand MD       • dexmedetomidine (PRECEDEX) 400 mcg in 100 mL NS infusion  0.2-1.5 mcg/kg/hr Intravenous Titrated Demetri Bertrand MD 3.7 mL/hr at 11/22/21 0817 0.2 mcg/kg/hr at 11/22/21 0817   • diphenhydrAMINE (BENADRYL) injection 25 mg  25 mg Intravenous Q6H Consuelo Brand MD   25 mg at 11/22/21 0506   • EPINEPHrine (ADRENALIN) 0.05 mg/mL syringe  20 mL Injection PRN Tete Villarreal MD       • famotidine (PEPCID) injection 20 mg  20 mg Intravenous Q12H Nura Morgan MD   20 mg at 11/22/21 0754   • fentaNYL (SUBLIMAZE) PCA 20 mcg/mL 50 mL syringe   Intravenous Continuous Nura Morgan MD   Held at 11/20/21 2043   • folic acid (FOLVITE) tablet 1 mg  1 mg Nasogastric Daily Nura Morgan MD   1 mg at 11/22/21 0748   • heparin (porcine) 5000 UNIT/ML injection 5,000 Units  5,000 Units Subcutaneous Q12H Tete Villarreal MD   5,000 Units at 11/22/21 0748   • ipratropium-albuterol (DUO-NEB) nebulizer solution 3 mL  3 mL Nebulization Q4H PRN Tete Villarreal MD       • ketorolac (TORADOL) injection 15 mg  15 mg Intravenous Q6H PRN Tete Villarreal MD       • LORazepam (ATIVAN) tablet 1 mg  1 mg Oral Q2H PRN Consuelo Brand MD        Or   • LORazepam (ATIVAN) injection 1 mg  1 mg Intravenous Q2H PRN Consuelo Brand MD        Or   • LORazepam (ATIVAN) tablet 2 mg  2 mg Oral Q1H PRN Consuelo Brand MD        Or   • LORazepam (ATIVAN) injection 2 mg  2 mg Intravenous Q1H PRN Consuelo Brand MD   2 mg at 11/20/21 2211    Or   • LORazepam (ATIVAN) injection 2 mg  2 mg Intravenous Q15 Min PRN Consuelo Brand MD        Or   • LORazepam (ATIVAN) injection 2 mg  2 mg Intramuscular Q15 Min PRN Consuelo Brand MD        Or   • LORazepam (ATIVAN) tablet 4 mg  4 mg Oral Q1H PRN Consuelo Brand MD        Or   • LORazepam (ATIVAN) injection 4 mg  4 mg Intravenous Q1H PRN Consuelo Brand MD   4 mg  at 21 0417   • methylPREDNISolone sodium succinate (SOLU-Medrol) injection 40 mg  40 mg Intravenous Q12H Nura Morgan MD   40 mg at 21 0306   • ondansetron (ZOFRAN) injection 4 mg  4 mg Intravenous Q6H PRN eTte Villarreal MD       • propofol (DIPRIVAN) infusion 10 mg/mL 100 mL  5-50 mcg/kg/min Intravenous Titrated Los Joiner MD 6.51 mL/hr at 21 0818 15 mcg/kg/min at 21 0818   • sodium chloride 0.9 % flush 10 mL  10 mL Intravenous PRN Tete Villarreal MD       • sodium chloride 0.9 % flush 10 mL  10 mL Intravenous Q12H Tete Villarreal MD   10 mL at 21 0748   • sodium chloride 0.9 % flush 10 mL  10 mL Intravenous PRN Tete Villarreal MD       • sodium chloride 0.9 % infusion  100 mL/hr Intravenous Continuous Demetri Bertrand  mL/hr at 21 0306 100 mL/hr at 21 0306   • thiamine (B-1) 100 mg in sodium chloride 0.9 % 100 mL IVPB  100 mg Intravenous Daily Nura Morgan  mL/hr at 21 0748 100 mg at 21 0748     Ventilator/Non-Invasive Ventilation Settings (From admission, onward)             Start     Ordered    21 1028  Ventilator - AC/VC; (14); 30 (24); 92%; 5; 7  Continuous        Question Answer Comment   Vent Mode AC/VC    Breath rate  14   FiO2 30 24   FiO2 titrate for Sp02% =/> 92%    PEEP 5    Tidal Volume ML/Kg 7        21 1027    21  Ventilator - AC/VC; (14); 30; 92%; 5; 7  Continuous,   Status:  Canceled        Question Answer Comment   Vent Mode AC/VC    Breath rate  14   FiO2 30    FiO2 titrate for Sp02% =/> 92%    PEEP 5    Tidal Volume ML/Kg 7        21  Ventilator - AC/VC  Continuous,   Status:  Canceled        Question:  Vent Mode  Answer:  AC/VC    21                   Physician Progress Notes (last 48 hours)      Demetri Bertrand MD at 21 0615              CRITICAL CARE DAILY PROGRESS NOTE  Family Medicine Residency Service  NAME: Zak Lutz  :  1960  MRN: [id number]      LOS: 2 days     PROVIDER OF SERVICE: Demetri Bertrand MD    Chief Complaint: Angioedema    Subjective:     Interval History: History provided by: patient and nurse     Patient remains intubated.     Afebrile. Hypotensive to 83/60 yesterday. BP currently 122/82. VS otherwise stable.      Patient becomes restless when propofol is weaned. No other concerns overnight, per nurse. Swelling is improved.     Feeding: NPO; 0.9NS  Analgesia: Fentanyl IV  Sedation: propofol and dexmedetomide (precedex)  Prexedex 0.2 mcg/kg/hour  Propafol 25 micograms/kg/min  Thromboprophylaxis: Heparin  HOB: 30 degrees  Ulcer ppx: PPIs  Glucose control: Not diabetic  SBT: Will not attempt yet  Bowel Regimen:As needed bowel regimen in place   Indwelling catheters:  Adams catheter is in place.        Review of Systems:   Review of Systems   Unable to perform ROS: Intubated       Objective:     Vital Signs  Temp:  [98 °F (36.7 °C)-99.2 °F (37.3 °C)] 98 °F (36.7 °C)  Heart Rate:  [52-83] 55  Resp:  [16-21] 16  BP: ()/(60-82) 122/82  FiO2 (%):  [24 %-30 %] 24 %  Body mass index is 21.79 kg/m².    FiO2 (%):  [24 %-30 %] 24 %  S RR:  [16] 16  PEEP/CPAP (cm H2O):  [4.6 cm H20-5.1 cm H20] 5 cm H20  MAP (cm H2O):  [7.6-10] 9    I/O last 3 completed shifts:  In: 3808.6 [I.V.:3678.6; Other:30; IV Piggyback:100]  Out: 2005 [Urine:1980; Emesis/NG output:25]    Physical Exam  Physical Exam  Constitutional:       General: He is not in acute distress.     Appearance: He is normal weight. He is not toxic-appearing or diaphoretic.      Comments: Intubated and on ventilator    HENT:      Mouth/Throat:      Comments: Upper and lower lip angioedema continues to improve   Cardiovascular:      Rate and Rhythm: Normal rate and regular rhythm.   Pulmonary:      Breath sounds: Normal breath sounds.   Abdominal:      General: Bowel sounds are normal.      Palpations: Abdomen is soft.   Genitourinary:     Comments: Adams catheter in  place  Musculoskeletal:      Right lower leg: No edema.      Left lower leg: No edema.         Medication Review    Current Facility-Administered Medications:   •  albuterol (PROVENTIL) nebulizer solution 0.083% 2.5 mg/3mL, 2.5 mg, Nebulization, Q4H PRN, Tete Villarreal MD  •  sennosides-docusate (PERICOLACE) 8.6-50 MG per tablet 2 tablet, 2 tablet, Oral, BID, 2 tablet at 11/21/21 2107 **AND** polyethylene glycol (MIRALAX) packet 17 g, 17 g, Oral, Daily PRN **AND** bisacodyl (DULCOLAX) EC tablet 5 mg, 5 mg, Oral, Daily PRN **AND** bisacodyl (DULCOLAX) suppository 10 mg, 10 mg, Rectal, Daily PRN, Demetri Bertrand MD  •  dexmedetomidine (PRECEDEX) 400 mcg in 100 mL NS infusion, 0.2-1.5 mcg/kg/hr, Intravenous, Titrated, Demetri Bertrand MD, Last Rate: 3.7 mL/hr at 11/22/21 0506, 0.2 mcg/kg/hr at 11/22/21 0506  •  diphenhydrAMINE (BENADRYL) injection 25 mg, 25 mg, Intravenous, Q6H, Consuelo Brand MD, 25 mg at 11/22/21 0506  •  EPINEPHrine (ADRENALIN) 0.05 mg/mL syringe, 20 mL, Injection, PRN, Tete Villarreal MD  •  famotidine (PEPCID) injection 20 mg, 20 mg, Intravenous, Q12H, Nura Morgan MD, 20 mg at 11/21/21 2107  •  fentaNYL (SUBLIMAZE) PCA 20 mcg/mL 50 mL syringe, , Intravenous, Continuous, Nura Morgan MD, Held at 11/20/21 2043  •  folic acid (FOLVITE) tablet 1 mg, 1 mg, Nasogastric, Daily, Nura Morgan MD, 1 mg at 11/20/21 2139  •  heparin (porcine) 5000 UNIT/ML injection 5,000 Units, 5,000 Units, Subcutaneous, Q12H, Tete Villarreal MD, 5,000 Units at 11/21/21 2107  •  ipratropium-albuterol (DUO-NEB) nebulizer solution 3 mL, 3 mL, Nebulization, Q4H PRN, Tete Villarreal MD  •  ketorolac (TORADOL) injection 15 mg, 15 mg, Intravenous, Q6H PRN, Tete Villarreal MD  •  LORazepam (ATIVAN) tablet 1 mg, 1 mg, Oral, Q2H PRN **OR** LORazepam (ATIVAN) injection 1 mg, 1 mg, Intravenous, Q2H PRN **OR** LORazepam (ATIVAN) tablet 2 mg, 2 mg, Oral, Q1H PRN **OR** LORazepam (ATIVAN) injection 2 mg, 2 mg, Intravenous, Q1H  PRN, 2 mg at 11/20/21 2211 **OR** LORazepam (ATIVAN) injection 2 mg, 2 mg, Intravenous, Q15 Min PRN **OR** LORazepam (ATIVAN) injection 2 mg, 2 mg, Intramuscular, Q15 Min PRN **OR** LORazepam (ATIVAN) tablet 4 mg, 4 mg, Oral, Q1H PRN **OR** LORazepam (ATIVAN) injection 4 mg, 4 mg, Intravenous, Q1H PRN, Consuelo Brand MD, 4 mg at 11/22/21 0417  •  methylPREDNISolone sodium succinate (SOLU-Medrol) injection 40 mg, 40 mg, Intravenous, Q12H, Nura Morgan MD, 40 mg at 11/22/21 0306  •  ondansetron (ZOFRAN) injection 4 mg, 4 mg, Intravenous, Q6H PRN, Tete Villarreal MD  •  propofol (DIPRIVAN) infusion 10 mg/mL 100 mL, 5-50 mcg/kg/min, Intravenous, Titrated, Los Joiner MD, Last Rate: 10.85 mL/hr at 11/21/21 2111, 25 mcg/kg/min at 11/21/21 2111  •  sodium chloride 0.9 % bolus 500 mL, 500 mL, Intravenous, Once, Demetri Bertrand MD  •  [COMPLETED] Insert peripheral IV, , , Once **AND** sodium chloride 0.9 % flush 10 mL, 10 mL, Intravenous, PRN, Tete Villarreal MD  •  sodium chloride 0.9 % flush 10 mL, 10 mL, Intravenous, Q12H, Tete Villarreal MD  •  sodium chloride 0.9 % flush 10 mL, 10 mL, Intravenous, PRN, Tete Villarreal MD  •  sodium chloride 0.9 % infusion, 100 mL/hr, Intravenous, Continuous, Demetri Bertrand MD, Last Rate: 100 mL/hr at 11/22/21 0306, 100 mL/hr at 11/22/21 0306  •  thiamine (B-1) 100 mg in sodium chloride 0.9 % 100 mL IVPB, 100 mg, Intravenous, Daily, Nura Morgan MD, Last Rate: 200 mL/hr at 11/20/21 2135, 100 mg at 11/20/21 2135     Diagnostic Data    Lab Results (last 24 hours)     Procedure Component Value Units Date/Time    CBC & Differential [[id number]]  (Abnormal) Collected: 11/22/21 0336    Specimen: Blood Updated: 11/22/21 0536    Narrative:      The following orders were created for panel order CBC & Differential.  Procedure                               Abnormality         Status                     ---------                               -----------         ------                      CBC Auto Differential[[id number]]        Abnormal            Final result                 Please view results for these tests on the individual orders.    CBC Auto Differential [[id number]]  (Abnormal) Collected: 11/22/21 0336    Specimen: Blood Updated: 11/22/21 0536     WBC 15.89 10*3/mm3      RBC 3.74 10*6/mm3      Hemoglobin 11.9 g/dL      Hematocrit 37.6 %      .5 fL      MCH 31.8 pg      MCHC 31.6 g/dL      RDW 13.7 %      RDW-SD 50.1 fl      MPV 11.1 fL      Platelets 266 10*3/mm3      Neutrophil % 82.2 %      Lymphocyte % 9.6 %      Monocyte % 7.1 %      Eosinophil % 0.1 %      Basophil % 0.1 %      Immature Grans % 0.9 %      Neutrophils, Absolute 13.06 10*3/mm3      Lymphocytes, Absolute 1.52 10*3/mm3      Monocytes, Absolute 1.13 10*3/mm3      Eosinophils, Absolute 0.01 10*3/mm3      Basophils, Absolute 0.02 10*3/mm3      Immature Grans, Absolute 0.15 10*3/mm3      nRBC 0.0 /100 WBC     Comprehensive Metabolic Panel [[id number]]  (Abnormal) Collected: 11/22/21 0337    Specimen: Blood Updated: 11/22/21 0458     Glucose 114 mg/dL      BUN 15 mg/dL      Creatinine 1.06 mg/dL      Sodium 143 mmol/L      Potassium 4.5 mmol/L      Chloride 109 mmol/L      CO2 25.0 mmol/L      Calcium 8.8 mg/dL      Total Protein 6.3 g/dL      Albumin 3.70 g/dL      ALT (SGPT) 13 U/L      AST (SGOT) 18 U/L      Alkaline Phosphatase 42 U/L      Total Bilirubin 0.2 mg/dL      eGFR   Amer 86 mL/min/1.73      Globulin 2.6 gm/dL      A/G Ratio 1.4 g/dL      BUN/Creatinine Ratio 14.2     Anion Gap 9.0 mmol/L     Narrative:      GFR Normal >60  Chronic Kidney Disease <60  Kidney Failure <15      Phosphorus [[id number]]  (Normal) Collected: 11/22/21 0337    Specimen: Blood Updated: 11/22/21 0458     Phosphorus 3.1 mg/dL     Magnesium [[id number]]  (Normal) Collected: 11/22/21 0337    Specimen: Blood Updated: 11/22/21 0458     Magnesium 1.8 mg/dL     Respiratory Culture - Sputum, ET Suction [[id number]] Collected:  11/20/21 2020    Specimen: Sputum from ET Suction Updated: 11/21/21 1253     Respiratory Culture Rare Normal Respiratory Joe     Gram Stain No Squamous epithelial cells      Moderate (3+) WBCs per low power field      Mixed bacterial joe    Phosphorus [[id number]]  (Normal) Collected: 11/21/21 1042    Specimen: Blood Updated: 11/21/21 1121     Phosphorus 3.0 mg/dL           I reviewed the patient's new clinical results.    Assessment/Plan:     Active Hospital Problems    Diagnosis  POA   • **Angioedema [T78.3XXA]  Yes     Priority: High     Patient developed angioedema with evolving upper and lower lip swelling prompting him to present to the ED  Patient given IV Solu-Medrol and Benadryl in the ED  Patient then required epinephrine and subsequently intubation to protect airway  Have discontinued home lisinopril and naltrexone     -Daily chest x-ray to assess for pneumonia while on ventilator     -High intensity ICU physicians consulted to run ventilator  -Continue IV fluids  -Scheduled IV Benadryl and IV methylprednisolone  -As needed epinephrine for anaphylaxis  -Cardiac monitoring and continuous pulse oximetry monitoring  -Patient will need speech consult for swallow study after extubation    -C-1 esterase inhibitor pending  - C4 complement normal  - Tryptase pending  - CRP, ESR upper level of normal and elevated, respectively  - Food allergy panel pending    -Patient will need EpiPen and allergy/immunology referral when ready for discharge               • Angio-edema [T78.3XXA]  Yes     Priority: High   • Chronic alcohol use [Z72.89]  Yes     Priority: Medium     Drinks beer and hard liquor   Recently admitted for pancreatitis  CIWA protocol  Precedex with sedation      • Gastroesophageal reflux disease [K21.9]  Yes     Priority: Medium     Continue Prilosec 40 mg daily IV while patient remains intubated     • Essential hypertension [I10]  Yes     Priority: Medium     Holding home amlodipine 10 mg daily due to  soft blood pressures  Discontinued lisinopril due to angioedema due to potential lisinopril allergy    Labetalol as needed for systolic blood pressure greater than 160  Monitor blood pressure per hospital protocol       • Tobacco dependence syndrome [F17.200]  Yes     Priority: Low     Nicotine patch   Smoking cessation counseling      • Hyperlipidemia [E78.5]  Yes     Priority: Low     The 10-year ASCVD risk score ([place] GERARDO Jr., et al., 2013) is: 38.6%    - Lipid panel     -Currently not on statin therapy.    -We will consider initiating statin therapy after resolution of current pancreatitis flare         • Chronic obstructive lung disease (HCC) [J44.9]  Yes     Priority: Low     duonebs  singulair   Nascort            Code status is   Code Status and Medical Interventions:   Ordered at: 11/20/21 1630     Level Of Support Discussed With:    Patient     Code Status (Patient has no pulse and is not breathing):    CPR (Attempt to Resuscitate)     Medical Interventions (Patient has pulse or is breathing):    Full Support         This document has been electronically signed by Demetri Bertrand MD on November 22, 2021 06:30 CST      Electronically signed by Demetri Bertrand MD at 11/22/21 0630     Demetri Bertrand MD at 11/21/21 0621     Attestation signed by Jeffrey Kohler MD at 11/21/21 1521    I have reviewed this documentation and agree.  I saw and evaluated the patient with the resident.  I discussed the case with the resident and agree with the findings and plan as documented in the residents note. Additions include:    Patient has a history of heavy daily alcohol consumption and last intake reported to be one day prior to admission.Thus patient has a significant risk of going into DTs around time of resolution of his angioedema or readiness for extubation.Depending on his autonomic stability at time of angioedema resolution,he may end up requiring extension of mechanical ventilation because of Dts.  We have  therefore requested Dexmedetomidine (Precedex ) therapy in place of the Versed drip to help manage possible alcohol withdrawal or DTs.    This document has been electronically signed by Jeffrey Kohler MD on 2021 15:11 CST                             CRITICAL CARE DAILY PROGRESS NOTE  Family Medicine Residency Service  NAME: Zak Lutz  : 1960  MRN: [id number]      LOS: 1 day     PROVIDER OF SERVICE: Demetri Bertrand MD    Chief Complaint: Angioedema    Subjective:     Interval History: History provided by: patient chart    Patient intubated in the ED yesterday after admission.     Afebrile. Blood pressure soft at 91/70.    Patient on CIWA protocol and required Ativan 2 mg overnight for agitation, per nurse Alcides.  No other concerns noted by nursing this morning.    LEONID has resolved. Increasing fluids to maintenance.     Feeding: NPO; 0.9NS  Analgesia: Fentanyl IV  Sedation: midazolam and propofol  Thromboprophylaxis: Heparin  HOB: 30 degrees  Ulcer ppx: PPIs  Glucose control: Patient not diabetic  SBT: Not attempted  Bowel Regimen: Ordered as as needed  Indwelling catheters: Adams catheter is in place.        Review of Systems:   Review of Systems   Unable to perform ROS: Intubated       Objective:     Vital Signs  Temp:  [96.7 °F (35.9 °C)-98.1 °F (36.7 °C)] 97.7 °F (36.5 °C)  Heart Rate:  [] 83  Resp:  [16-34] 17  BP: ()/() 84/60  FiO2 (%):  [30 %-50 %] 30 %  Body mass index is 21.67 kg/m².    FiO2 (%):  [30 %-50 %] 30 %  S RR:  [16-18] 18  PEEP/CPAP (cm H2O):  [4.7 cm H20-5 cm H20] 4.7 cm H20  MAP (cm H2O):  [7-8.3] 8.3    I/O last 3 completed shifts:  In: 1308.6 [I.V.:1178.6; Other:30; IV Piggyback:100]  Out: 705 [Urine:680; Emesis/NG output:25]    Physical Exam  Physical Exam  Constitutional:       General: He is not in acute distress.     Appearance: He is normal weight. He is not toxic-appearing.      Comments: Patient intubated and on ventilator   HENT:       Mouth/Throat:      Comments: Angioedema of upper and lower lip appears improved since yesterday  Cardiovascular:      Rate and Rhythm: Normal rate and regular rhythm.   Pulmonary:      Effort: Pulmonary effort is normal.      Breath sounds: Normal breath sounds.   Abdominal:      General: Bowel sounds are normal. There is no distension.   Genitourinary:     Comments: Adams catheter in place   Musculoskeletal:      Right lower leg: No edema.      Left lower leg: No edema.         Medication Review    Current Facility-Administered Medications:   •  albuterol (PROVENTIL) nebulizer solution 0.083% 2.5 mg/3mL, 2.5 mg, Nebulization, Q4H PRN, Tete Villarreal MD  •  diphenhydrAMINE (BENADRYL) injection 25 mg, 25 mg, Intravenous, Q6H, Consuelo Brand MD, 25 mg at 11/21/21 0523  •  EPINEPHrine (ADRENALIN) 0.05 mg/mL syringe, 20 mL, Injection, PRN, Tete Villarreal MD  •  famotidine (PEPCID) injection 20 mg, 20 mg, Intravenous, Q12H, Nura Morgan MD, 20 mg at 11/20/21 2138  •  fentaNYL (SUBLIMAZE) PCA 20 mcg/mL 50 mL syringe, , Intravenous, Continuous, Nura Morgan MD, Held at 11/20/21 2043  •  folic acid (FOLVITE) tablet 1 mg, 1 mg, Nasogastric, Daily, Nura Morgan MD, 1 mg at 11/20/21 2139  •  heparin (porcine) 5000 UNIT/ML injection 5,000 Units, 5,000 Units, Subcutaneous, Q12H, Tete Villarreal MD, 5,000 Units at 11/20/21 2139  •  ipratropium-albuterol (DUO-NEB) nebulizer solution 3 mL, 3 mL, Nebulization, Q4H PRN, Tete Villarreal MD  •  ketorolac (TORADOL) injection 15 mg, 15 mg, Intravenous, Q6H PRN, Tete Villarreal MD  •  LORazepam (ATIVAN) tablet 1 mg, 1 mg, Oral, Q2H PRN **OR** LORazepam (ATIVAN) injection 1 mg, 1 mg, Intravenous, Q2H PRN **OR** LORazepam (ATIVAN) tablet 2 mg, 2 mg, Oral, Q1H PRN **OR** LORazepam (ATIVAN) injection 2 mg, 2 mg, Intravenous, Q1H PRN, 2 mg at 11/20/21 2211 **OR** LORazepam (ATIVAN) injection 2 mg, 2 mg, Intravenous, Q15 Min PRN **OR** LORazepam (ATIVAN) injection 2 mg, 2 mg,  Intramuscular, Q15 Min PRN **OR** LORazepam (ATIVAN) tablet 4 mg, 4 mg, Oral, Q1H PRN **OR** LORazepam (ATIVAN) injection 4 mg, 4 mg, Intravenous, Q1H PRN, Consuelo Brand MD  •  methylPREDNISolone sodium succinate (SOLU-Medrol) injection 40 mg, 40 mg, Intravenous, Q12H, Nura Morgan MD, 40 mg at 11/21/21 0239  •  midazolam (VERSED) 50mg in 100 mL NS infusion, 1 mg/hr, Intravenous, Titrated, Boni Cobb PA-C, Last Rate: 20 mL/hr at 11/21/21 0530, 10 mg/hr at 11/21/21 0530  •  ondansetron (ZOFRAN) injection 4 mg, 4 mg, Intravenous, Q6H PRN, Tete Villarreal MD  •  propofol (DIPRIVAN) infusion 10 mg/mL 100 mL, 5-50 mcg/kg/min, Intravenous, Titrated, Los Joiner MD, Last Rate: 13.01 mL/hr at 11/21/21 0522, 30 mcg/kg/min at 11/21/21 0522  •  [COMPLETED] Insert peripheral IV, , , Once **AND** sodium chloride 0.9 % flush 10 mL, 10 mL, Intravenous, PRN, Tete Villarreal MD  •  sodium chloride 0.9 % flush 10 mL, 10 mL, Intravenous, Q12H, Tete Villarreal MD  •  sodium chloride 0.9 % flush 10 mL, 10 mL, Intravenous, PRN, Tete Villarreal MD  •  sodium chloride 0.9 % infusion, 100 mL/hr, Intravenous, Continuous, Demetri Bertrand MD, Last Rate: 100 mL/hr at 11/21/21 0622, 100 mL/hr at 11/21/21 0622  •  thiamine (B-1) 100 mg in sodium chloride 0.9 % 100 mL IVPB, 100 mg, Intravenous, Daily, Nura Morgan MD, Last Rate: 200 mL/hr at 11/20/21 2135, 100 mg at 11/20/21 2135     Diagnostic Data    Lab Results (last 24 hours)     Procedure Component Value Units Date/Time    Comprehensive Metabolic Panel [[id number]]  (Abnormal) Collected: 11/21/21 0331    Specimen: Blood Updated: 11/21/21 0425     Glucose 142 mg/dL      BUN 14 mg/dL      Creatinine 1.19 mg/dL      Sodium 139 mmol/L      Potassium 4.3 mmol/L      Comment: Slight hemolysis detected by analyzer. Results may be affected.        Chloride 107 mmol/L      CO2 23.0 mmol/L      Calcium 8.9 mg/dL      Total Protein 6.8 g/dL      Albumin 3.80 g/dL      ALT  (SGPT) 16 U/L      AST (SGOT) 18 U/L      Alkaline Phosphatase 47 U/L      Total Bilirubin 0.2 mg/dL      eGFR  African Amer 75 mL/min/1.73      Globulin 3.0 gm/dL      A/G Ratio 1.3 g/dL      BUN/Creatinine Ratio 11.8     Anion Gap 9.0 mmol/L     Narrative:      GFR Normal >60  Chronic Kidney Disease <60  Kidney Failure <15      CBC & Differential [[id number]]  (Abnormal) Collected: 11/21/21 0331    Specimen: Blood Updated: 11/21/21 0410    Narrative:      The following orders were created for panel order CBC & Differential.  Procedure                               Abnormality         Status                     ---------                               -----------         ------                     CBC Auto Differential[[id number]]        Abnormal            Final result                 Please view results for these tests on the individual orders.    CBC Auto Differential [[id number]]  (Abnormal) Collected: 11/21/21 0331    Specimen: Blood Updated: 11/21/21 0410     WBC 8.58 10*3/mm3      RBC 3.80 10*6/mm3      Hemoglobin 12.0 g/dL      Hematocrit 35.5 %      MCV 93.4 fL      MCH 31.6 pg      MCHC 33.8 g/dL      RDW 13.1 %      RDW-SD 44.5 fl      MPV 9.9 fL      Platelets 320 10*3/mm3      Neutrophil % 89.8 %      Lymphocyte % 7.8 %      Monocyte % 1.5 %      Eosinophil % 0.0 %      Basophil % 0.2 %      Immature Grans % 0.7 %      Neutrophils, Absolute 7.70 10*3/mm3      Lymphocytes, Absolute 0.67 10*3/mm3      Monocytes, Absolute 0.13 10*3/mm3      Eosinophils, Absolute 0.00 10*3/mm3      Basophils, Absolute 0.02 10*3/mm3      Immature Grans, Absolute 0.06 10*3/mm3      nRBC 0.0 /100 WBC     Blood Gas, Arterial - [[id number]]  (Abnormal) Collected: 11/21/21 0346    Specimen: Arterial Blood Updated: 11/21/21 0353     Site Arterial Line     Frederick's Test N/A     pH, Arterial 7.380 pH units      pCO2, Arterial 37.7 mm Hg      pO2, Arterial 161.0 mm Hg      Comment: 83 Value above reference range        HCO3, Arterial 22.3  mmol/L      Base Excess, Arterial -2.5 mmol/L      Comment: 84 Value below reference range        O2 Saturation, Arterial 99.9 %      Comment: 83 Value above reference range        Barometric Pressure for Blood Gas 751 mmHg      Modality Ventilator     FIO2 30 %      Ventilator Mode AC     Set Tidal Volume 500     PEEP 5.0     Collected by RT     Comment: Meter: [id number]     :  [id number]       C4 Complement [[id number]]  (Normal) Collected: 11/20/21 1558    Specimen: Blood Updated: 11/20/21 2127     C4 Complement 35.0 mg/dl     Respiratory Culture - Sputum, ET Suction [[id number]] Collected: 11/20/21 2020    Specimen: Sputum from ET Suction Updated: 11/20/21 2122     Gram Stain No Squamous epithelial cells      Moderate (3+) WBCs per low power field      Mixed bacterial joe    Magnesium [[id number]]  (Normal) Collected: 11/20/21 1539    Specimen: Blood Updated: 11/20/21 2058     Magnesium 1.7 mg/dL     Blood Gas, Arterial - [[id number]]  (Abnormal) Collected: 11/20/21 1905    Specimen: Arterial Blood Updated: 11/20/21 1910     Site Right Radial     Frederick's Test N/A     pH, Arterial 7.362 pH units      pCO2, Arterial 40.2 mm Hg      pO2, Arterial 87.6 mm Hg      HCO3, Arterial 22.8 mmol/L      Base Excess, Arterial -2.4 mmol/L      Comment: 84 Value below reference range        O2 Saturation, Arterial 96.6 %      Barometric Pressure for Blood Gas 752 mmHg      Modality Ventilator     FIO2 30 %      Ventilator Mode AC     Collected by NILESH     Comment: Meter: [id number]     :  [id number]       Sedimentation Rate [[id number]]  (Abnormal) Collected: 11/20/21 1539    Specimen: Blood Updated: 11/20/21 1706     Sed Rate 28 mm/hr     COVID-19 and FLU A/B PCR - Swab, Nasopharynx [[id number]]  (Normal) Collected: 11/20/21 1527    Specimen: Swab from Nasopharynx Updated: 11/20/21 1617     COVID19 Not Detected     Influenza A PCR Not Detected     Influenza B PCR Not Detected    Narrative:      Fact sheet  for providers: https://www.fda.gov/media/191528/download    Fact sheet for patients: https://www.fda.gov/media/325363/download    Test performed by PCR.    Comprehensive Metabolic Panel [[id number]]  (Abnormal) Collected: 11/20/21 1539    Specimen: Blood Updated: 11/20/21 1600     Glucose 136 mg/dL      BUN 11 mg/dL      Creatinine 1.53 mg/dL      Sodium 140 mmol/L      Potassium 3.9 mmol/L      Chloride 101 mmol/L      CO2 31.0 mmol/L      Calcium 10.4 mg/dL      Total Protein 8.4 g/dL      Albumin 4.80 g/dL      ALT (SGPT) 22 U/L      AST (SGOT) 27 U/L      Alkaline Phosphatase 62 U/L      Total Bilirubin 0.3 mg/dL      eGFR   Amer 56 mL/min/1.73      Globulin 3.6 gm/dL      A/G Ratio 1.3 g/dL      BUN/Creatinine Ratio 7.2     Anion Gap 8.0 mmol/L     Narrative:      GFR Normal >60  Chronic Kidney Disease <60  Kidney Failure <15      C-reactive Protein [[id number]]  (Normal) Collected: 11/20/21 1539    Specimen: Blood Updated: 11/20/21 1600     C-Reactive Protein 0.50 mg/dL     Food Allergy Profile [[id number]] Collected: 11/20/21 1558    Specimen: Blood Updated: 11/20/21 1558    Tryptase [[id number]] Collected: 11/20/21 1558    Specimen: Blood Updated: 11/20/21 1558    CBC & Differential [[id number]]  (Normal) Collected: 11/20/21 1539    Specimen: Blood Updated: 11/20/21 1541    Narrative:      The following orders were created for panel order CBC & Differential.  Procedure                               Abnormality         Status                     ---------                               -----------         ------                     CBC Auto Differential[[id number]]        Normal              Final result                 Please view results for these tests on the individual orders.    CBC Auto Differential [[id number]]  (Normal) Collected: 11/20/21 1539    Specimen: Blood Updated: 11/20/21 1541     WBC 7.62 10*3/mm3      RBC 4.41 10*6/mm3      Hemoglobin 14.0 g/dL      Hematocrit 41.9 %      MCV 95.0 fL       MCH 31.7 pg      MCHC 33.4 g/dL      RDW 13.0 %      RDW-SD 44.8 fl      MPV 10.1 fL      Platelets 359 10*3/mm3      Neutrophil % 59.0 %      Lymphocyte % 27.3 %      Monocyte % 9.3 %      Eosinophil % 3.4 %      Basophil % 0.5 %      Immature Grans % 0.5 %      Neutrophils, Absolute 4.49 10*3/mm3      Lymphocytes, Absolute 2.08 10*3/mm3      Monocytes, Absolute 0.71 10*3/mm3      Eosinophils, Absolute 0.26 10*3/mm3      Basophils, Absolute 0.04 10*3/mm3      Immature Grans, Absolute 0.04 10*3/mm3      nRBC 0.0 /100 WBC     C1 Esterase Inhibitor, Serum [[id number]] Collected: 11/20/21 1539    Specimen: Blood Updated: 11/20/21 1539          I reviewed the patient's new clinical results.    Assessment/Plan:     Active Hospital Problems    Diagnosis  POA   • **Angioedema [T78.3XXA]  Yes     Priority: High     Patient developed angioedema with evolving upper and lower lip swelling prompting him to present to the ED  Patient given IV Solu-Medrol and Benadryl in the ED  Patient then required epinephrine and subsequently intubation to protect airway  Have discontinued home lisinopril, also will discontinue recently started naltrexone    -High intensity ICU physicians consulted to run ventilator  -Continue IV fluids  -Scheduled IV Benadryl and IV methylprednisolone  -As needed epinephrine for anaphylaxis  -Cardiac monitoring and continuous pulse oximetry monitoring  -Patient will need speech consult for swallow study after extubation    -C-1 esterase inhibitor pending  - C4 complement normal  - Tryptase pending  - CRP, ESR upper level of normal and elevated, respectively  - Food allergy panel pending    -Patient will need EpiPen and allergy/immunology referral when ready for discharge               • Angio-edema [T78.3XXA]  Yes     Priority: High   • Chronic alcohol use [Z72.89]  Yes     Priority: Medium     Drinks beer and hard liquor   Recently admitted for pancreatitis  [place] protocol     • Gastroesophageal reflux disease  [K21.9]  Yes     Priority: Medium     Continue Prilosec 40 mg daily IV while patient remains intubated     • Essential hypertension [I10]  Yes     Priority: Medium     Holding home amlodipine 10 mg daily due to soft blood pressures  Discontinued lisinopril due to angioedema due to potential lisinopril allergy    Labetalol as needed for systolic blood pressure greater than 160  Monitor blood pressure per hospital protocol       • Tobacco dependence syndrome [F17.200]  Yes     Priority: Low     Nicotine patch   Smoking cessation counseling      • Hyperlipidemia [E78.5]  Yes     Priority: Low     The 10-year ASCVD risk score (Valentina DC Jr., et al., 2013) is: 38.6%    - Lipid panel     -Currently not on statin therapy.    -We will consider initiating statin therapy after resolution of current pancreatitis flare         • Chronic obstructive lung disease (HCC) [J44.9]  Yes     Priority: Low     duonebs  singulair   Nascort            Code status is   Code Status and Medical Interventions:   Ordered at: 21 1630     Level Of Support Discussed With:    Patient     Code Status (Patient has no pulse and is not breathing):    CPR (Attempt to Resuscitate)     Medical Interventions (Patient has pulse or is breathing):    Full Support       Prognosis: fair      This document has been electronically signed by Demetri Bertrand MD on 2021 08:07 CST      Electronically signed by Jeffrey Kohler MD at 21 1521     Nura Morgan MD at 21           [facility]     [facility] Tele-ICU Progress Note    Patient Name: Zak Lutz  : 1960  MRN: [id number]  Primary Care Physician:  Jeramie Singh MD  Date of admission: 2021  LOS: 0 days   ICU LOS: Patient does not have an ICU stay during this admission.     Subjective   Subjective     Reason for Call: Vent Management      Objective  60 y/o M h/o ETOH use, HTN, HL, GERD and smoker who presented to ED with angioedema. Pt given  Epi, IV steroid, Pepcid and Benadryl. Pt was intubated for airway protection. Pt currently on Versed, Propofol and still wide awake. Pt on ACEi.  Objective     Vitals:   Temp:  [97.5 °F (36.4 °C)] 97.5 °F (36.4 °C)  Heart Rate:  [] 130  Resp:  [16-34] 20  BP: (125-272)/() 134/65  FiO2 (%):  [30 %-50 %] 30 %    Result Review    Result Review:  I have personally reviewed the results from the time of this admission to 11/20/2021 20:21 CST and agree with these findings:  [x]  Laboratory  [x]  Microbiology  [x]  Radiology  [x]  EKG/Telemetry   [x]  Cardiology/Vascular   []  Pathology  [x]  Old records  []  Other:  Most notable findings include: CXR ET tube in place    Assessment/Plan   Assessment / Plan     Brief Patient Summary:  Zak Lutz is a 61 y.o. male who was intubated for airway protection due to progression of angioedema likely in setting of ACEi. Pt on NSAIDs.     Active Hospital Problems:    Plan:   -LTVV with goal TV < 7 cc/kg IBW, wean FIO2, oral care  -bronchodilators  -CIWA, thiamine and folic acid  -Fentanyl drip added for extra sedation  -benadryl, Pepcid  -will add steroids given smoker and may also have reactive airway dz due to angioedema  -add on Mg level  -C1qi level pending  -ACEi d/c'd  -if doesn't improve consider FFP       DVT prophylaxis:  Medical DVT prophylaxis orders are present.    CODE STATUS:   Level Of Support Discussed With: Patient  Code Status (Patient has no pulse and is not breathing): CPR (Attempt to Resuscitate)  Medical Interventions (Patient has pulse or is breathing): Full Support      This was an audio and video enabled telemedicine encounter.    Electronically signed by Geoff Morgan MD, 11/20/21, 8:21 PM CST.             Electronically signed by Nura Morgan MD at 11/20/21 2041

## 2021-11-23 ENCOUNTER — APPOINTMENT (OUTPATIENT)
Dept: GENERAL RADIOLOGY | Facility: HOSPITAL | Age: 61
End: 2021-11-23

## 2021-11-23 LAB
ALBUMIN SERPL-MCNC: 3.5 G/DL (ref 3.5–5.2)
ALBUMIN/GLOB SERPL: 1.1 G/DL
ALP SERPL-CCNC: 45 U/L (ref 39–117)
ALT SERPL W P-5'-P-CCNC: 11 U/L (ref 1–41)
ANION GAP SERPL CALCULATED.3IONS-SCNC: 10 MMOL/L (ref 5–15)
ARTERIAL PATENCY WRIST A: NORMAL
AST SERPL-CCNC: 13 U/L (ref 1–40)
ATMOSPHERIC PRESS: 754 MMHG
BASE EXCESS BLDA CALC-SCNC: 0.7 MMOL/L (ref 0–2)
BASOPHILS # BLD AUTO: 0.02 10*3/MM3 (ref 0–0.2)
BASOPHILS NFR BLD AUTO: 0.2 % (ref 0–1.5)
BDY SITE: NORMAL
BILIRUB SERPL-MCNC: 0.2 MG/DL (ref 0–1.2)
BUN SERPL-MCNC: 10 MG/DL (ref 8–23)
BUN/CREAT SERPL: 11.6 (ref 7–25)
C1INH SERPL-MCNC: 41 MG/DL (ref 21–39)
CALCIUM SPEC-SCNC: 8.6 MG/DL (ref 8.6–10.5)
CHLORIDE SERPL-SCNC: 108 MMOL/L (ref 98–107)
CO2 SERPL-SCNC: 23 MMOL/L (ref 22–29)
CREAT SERPL-MCNC: 0.86 MG/DL (ref 0.76–1.27)
DEPRECATED RDW RBC AUTO: 44.2 FL (ref 37–54)
EOSINOPHIL # BLD AUTO: 0.01 10*3/MM3 (ref 0–0.4)
EOSINOPHIL NFR BLD AUTO: 0.1 % (ref 0.3–6.2)
ERYTHROCYTE [DISTWIDTH] IN BLOOD BY AUTOMATED COUNT: 13.1 % (ref 12.3–15.4)
GFR SERPL CREATININE-BSD FRML MDRD: 109 ML/MIN/1.73
GLOBULIN UR ELPH-MCNC: 3.1 GM/DL
GLUCOSE SERPL-MCNC: 109 MG/DL (ref 65–99)
HCO3 BLDA-SCNC: 24.3 MMOL/L (ref 20–26)
HCT VFR BLD AUTO: 37 % (ref 37.5–51)
HGB BLD-MCNC: 12.5 G/DL (ref 13–17.7)
IMM GRANULOCYTES # BLD AUTO: 0.09 10*3/MM3 (ref 0–0.05)
IMM GRANULOCYTES NFR BLD AUTO: 0.7 % (ref 0–0.5)
LYMPHOCYTES # BLD AUTO: 0.98 10*3/MM3 (ref 0.7–3.1)
LYMPHOCYTES NFR BLD AUTO: 8 % (ref 19.6–45.3)
Lab: NORMAL
MAGNESIUM SERPL-MCNC: 1.8 MG/DL (ref 1.6–2.4)
MCH RBC QN AUTO: 31.4 PG (ref 26.6–33)
MCHC RBC AUTO-ENTMCNC: 33.8 G/DL (ref 31.5–35.7)
MCV RBC AUTO: 93 FL (ref 79–97)
MODALITY: NORMAL
MONOCYTES # BLD AUTO: 0.46 10*3/MM3 (ref 0.1–0.9)
MONOCYTES NFR BLD AUTO: 3.8 % (ref 5–12)
NEUTROPHILS NFR BLD AUTO: 10.68 10*3/MM3 (ref 1.7–7)
NEUTROPHILS NFR BLD AUTO: 87.2 % (ref 42.7–76)
NRBC BLD AUTO-RTO: 0 /100 WBC (ref 0–0.2)
PCO2 BLDA: 35 MM HG (ref 35–45)
PH BLDA: 7.45 PH UNITS (ref 7.35–7.45)
PHOSPHATE SERPL-MCNC: 2.1 MG/DL (ref 2.5–4.5)
PLATELET # BLD AUTO: 256 10*3/MM3 (ref 140–450)
PMV BLD AUTO: 9.9 FL (ref 6–12)
PO2 BLDA: 95 MM HG (ref 83–108)
POTASSIUM SERPL-SCNC: 3.4 MMOL/L (ref 3.5–5.2)
PROT SERPL-MCNC: 6.6 G/DL (ref 6–8.5)
RBC # BLD AUTO: 3.98 10*6/MM3 (ref 4.14–5.8)
SAO2 % BLDCOA: 98.3 % (ref 94–99)
SODIUM SERPL-SCNC: 141 MMOL/L (ref 136–145)
VENTILATOR MODE: NORMAL
WBC NRBC COR # BLD: 12.24 10*3/MM3 (ref 3.4–10.8)

## 2021-11-23 PROCEDURE — 25010000002 METHYLPREDNISOLONE PER 40 MG: Performed by: INTERNAL MEDICINE

## 2021-11-23 PROCEDURE — 71045 X-RAY EXAM CHEST 1 VIEW: CPT

## 2021-11-23 PROCEDURE — 25010000002 LORAZEPAM PER 2 MG: Performed by: STUDENT IN AN ORGANIZED HEALTH CARE EDUCATION/TRAINING PROGRAM

## 2021-11-23 PROCEDURE — 83735 ASSAY OF MAGNESIUM: CPT | Performed by: STUDENT IN AN ORGANIZED HEALTH CARE EDUCATION/TRAINING PROGRAM

## 2021-11-23 PROCEDURE — 99231 SBSQ HOSP IP/OBS SF/LOW 25: CPT | Performed by: STUDENT IN AN ORGANIZED HEALTH CARE EDUCATION/TRAINING PROGRAM

## 2021-11-23 PROCEDURE — 92610 EVALUATE SWALLOWING FUNCTION: CPT | Performed by: SPEECH-LANGUAGE PATHOLOGIST

## 2021-11-23 PROCEDURE — 82803 BLOOD GASES ANY COMBINATION: CPT

## 2021-11-23 PROCEDURE — 25010000002 HYDRALAZINE PER 20 MG: Performed by: STUDENT IN AN ORGANIZED HEALTH CARE EDUCATION/TRAINING PROGRAM

## 2021-11-23 PROCEDURE — 85025 COMPLETE CBC W/AUTO DIFF WBC: CPT | Performed by: STUDENT IN AN ORGANIZED HEALTH CARE EDUCATION/TRAINING PROGRAM

## 2021-11-23 PROCEDURE — 25010000002 HEPARIN (PORCINE) PER 1000 UNITS: Performed by: STUDENT IN AN ORGANIZED HEALTH CARE EDUCATION/TRAINING PROGRAM

## 2021-11-23 PROCEDURE — 84100 ASSAY OF PHOSPHORUS: CPT | Performed by: STUDENT IN AN ORGANIZED HEALTH CARE EDUCATION/TRAINING PROGRAM

## 2021-11-23 PROCEDURE — 25010000002 DIPHENHYDRAMINE PER 50 MG: Performed by: STUDENT IN AN ORGANIZED HEALTH CARE EDUCATION/TRAINING PROGRAM

## 2021-11-23 PROCEDURE — 25010000002 THIAMINE PER 100 MG: Performed by: INTERNAL MEDICINE

## 2021-11-23 PROCEDURE — 36600 WITHDRAWAL OF ARTERIAL BLOOD: CPT

## 2021-11-23 PROCEDURE — 25010000002 KETOROLAC TROMETHAMINE PER 15 MG: Performed by: STUDENT IN AN ORGANIZED HEALTH CARE EDUCATION/TRAINING PROGRAM

## 2021-11-23 PROCEDURE — 80053 COMPREHEN METABOLIC PANEL: CPT | Performed by: STUDENT IN AN ORGANIZED HEALTH CARE EDUCATION/TRAINING PROGRAM

## 2021-11-23 RX ORDER — CHLORDIAZEPOXIDE HYDROCHLORIDE 25 MG/1
50 CAPSULE, GELATIN COATED ORAL EVERY 8 HOURS
Status: DISCONTINUED | OUTPATIENT
Start: 2021-11-24 | End: 2021-11-24 | Stop reason: HOSPADM

## 2021-11-23 RX ORDER — CHLORDIAZEPOXIDE HYDROCHLORIDE 25 MG/1
75 CAPSULE, GELATIN COATED ORAL EVERY 8 HOURS
Status: COMPLETED | OUTPATIENT
Start: 2021-11-23 | End: 2021-11-24

## 2021-11-23 RX ORDER — LORAZEPAM 2 MG/ML
1 INJECTION INTRAMUSCULAR EVERY 6 HOURS
Status: DISCONTINUED | OUTPATIENT
Start: 2021-11-23 | End: 2021-11-23

## 2021-11-23 RX ORDER — POTASSIUM CHLORIDE 1.5 G/1.77G
40 POWDER, FOR SOLUTION ORAL ONCE
Status: COMPLETED | OUTPATIENT
Start: 2021-11-23 | End: 2021-11-23

## 2021-11-23 RX ORDER — CHLORDIAZEPOXIDE HYDROCHLORIDE 25 MG/1
50 CAPSULE, GELATIN COATED ORAL EVERY 6 HOURS
Status: DISCONTINUED | OUTPATIENT
Start: 2021-11-23 | End: 2021-11-23

## 2021-11-23 RX ORDER — CHLORDIAZEPOXIDE HYDROCHLORIDE 25 MG/1
75 CAPSULE, GELATIN COATED ORAL EVERY 8 HOURS
Status: DISCONTINUED | OUTPATIENT
Start: 2021-11-24 | End: 2021-11-23

## 2021-11-23 RX ORDER — HYDRALAZINE HYDROCHLORIDE 20 MG/ML
10 INJECTION INTRAMUSCULAR; INTRAVENOUS EVERY 6 HOURS PRN
Status: DISCONTINUED | OUTPATIENT
Start: 2021-11-23 | End: 2021-11-24

## 2021-11-23 RX ORDER — CHLORDIAZEPOXIDE HYDROCHLORIDE 25 MG/1
100 CAPSULE, GELATIN COATED ORAL EVERY 8 HOURS
Status: DISCONTINUED | OUTPATIENT
Start: 2021-11-23 | End: 2021-11-23

## 2021-11-23 RX ORDER — LORAZEPAM 2 MG/ML
1 INJECTION INTRAMUSCULAR EVERY 8 HOURS
Status: DISCONTINUED | OUTPATIENT
Start: 2021-11-23 | End: 2021-11-23

## 2021-11-23 RX ORDER — AMLODIPINE BESYLATE 10 MG/1
10 TABLET ORAL
Status: DISCONTINUED | OUTPATIENT
Start: 2021-11-23 | End: 2021-11-24 | Stop reason: HOSPADM

## 2021-11-23 RX ORDER — CHLORDIAZEPOXIDE HYDROCHLORIDE 25 MG/1
50 CAPSULE, GELATIN COATED ORAL EVERY 8 HOURS
Status: DISCONTINUED | OUTPATIENT
Start: 2021-11-24 | End: 2021-11-23

## 2021-11-23 RX ORDER — LORAZEPAM 2 MG/ML
1 INJECTION INTRAMUSCULAR EVERY 4 HOURS
Status: DISCONTINUED | OUTPATIENT
Start: 2021-11-23 | End: 2021-11-23

## 2021-11-23 RX ORDER — PREDNISONE 20 MG/1
20 TABLET ORAL
Status: DISCONTINUED | OUTPATIENT
Start: 2021-11-24 | End: 2021-11-24

## 2021-11-23 RX ORDER — CHLORDIAZEPOXIDE HYDROCHLORIDE 25 MG/1
25 CAPSULE, GELATIN COATED ORAL 4 TIMES DAILY PRN
Status: DISCONTINUED | OUTPATIENT
Start: 2021-11-23 | End: 2021-11-24 | Stop reason: HOSPADM

## 2021-11-23 RX ORDER — LORAZEPAM 2 MG/ML
1 INJECTION INTRAMUSCULAR EVERY 8 HOURS
Status: DISCONTINUED | OUTPATIENT
Start: 2021-11-24 | End: 2021-11-23

## 2021-11-23 RX ADMIN — METHYLPREDNISOLONE SODIUM SUCCINATE 40 MG: 40 INJECTION, POWDER, FOR SOLUTION INTRAMUSCULAR; INTRAVENOUS at 03:01

## 2021-11-23 RX ADMIN — POLYETHYLENE GLYCOL 3350 17 G: 17 POWDER, FOR SOLUTION ORAL at 07:23

## 2021-11-23 RX ADMIN — DIPHENHYDRAMINE HYDROCHLORIDE 25 MG: 50 INJECTION INTRAMUSCULAR; INTRAVENOUS at 18:22

## 2021-11-23 RX ADMIN — LORAZEPAM 2 MG: 2 INJECTION INTRAMUSCULAR; INTRAVENOUS at 09:00

## 2021-11-23 RX ADMIN — CHLORDIAZEPOXIDE HYDROCHLORIDE 25 MG: 25 CAPSULE ORAL at 18:25

## 2021-11-23 RX ADMIN — SODIUM CHLORIDE 100 ML/HR: 9 INJECTION, SOLUTION INTRAVENOUS at 08:29

## 2021-11-23 RX ADMIN — AMLODIPINE BESYLATE 10 MG: 10 TABLET ORAL at 03:02

## 2021-11-23 RX ADMIN — FOLIC ACID 1 MG: 1 TABLET ORAL at 07:31

## 2021-11-23 RX ADMIN — LORAZEPAM 4 MG: 2 INJECTION INTRAMUSCULAR; INTRAVENOUS at 10:36

## 2021-11-23 RX ADMIN — POTASSIUM CHLORIDE 40 MEQ: 1.5 POWDER, FOR SOLUTION ORAL at 12:41

## 2021-11-23 RX ADMIN — HEPARIN SODIUM 5000 UNITS: 5000 INJECTION INTRAVENOUS; SUBCUTANEOUS at 20:39

## 2021-11-23 RX ADMIN — LORAZEPAM 1 MG: 2 INJECTION INTRAMUSCULAR; INTRAVENOUS at 03:01

## 2021-11-23 RX ADMIN — THIAMINE HYDROCHLORIDE 100 MG: 100 INJECTION, SOLUTION INTRAMUSCULAR; INTRAVENOUS at 10:37

## 2021-11-23 RX ADMIN — DIPHENHYDRAMINE HYDROCHLORIDE 25 MG: 50 INJECTION INTRAMUSCULAR; INTRAVENOUS at 00:12

## 2021-11-23 RX ADMIN — DIPHENHYDRAMINE HYDROCHLORIDE 25 MG: 50 INJECTION INTRAMUSCULAR; INTRAVENOUS at 12:41

## 2021-11-23 RX ADMIN — DOCUSATE SODIUM 50 MG AND SENNOSIDES 8.6 MG 2 TABLET: 8.6; 5 TABLET, FILM COATED ORAL at 20:39

## 2021-11-23 RX ADMIN — HEPARIN SODIUM 5000 UNITS: 5000 INJECTION INTRAVENOUS; SUBCUTANEOUS at 07:31

## 2021-11-23 RX ADMIN — DIPHENHYDRAMINE HYDROCHLORIDE 25 MG: 50 INJECTION INTRAMUSCULAR; INTRAVENOUS at 06:28

## 2021-11-23 RX ADMIN — LORAZEPAM 2 MG: 2 TABLET ORAL at 13:38

## 2021-11-23 RX ADMIN — FAMOTIDINE 20 MG: 10 INJECTION INTRAVENOUS at 20:41

## 2021-11-23 RX ADMIN — LORAZEPAM 2 MG: 2 INJECTION INTRAMUSCULAR; INTRAVENOUS at 01:24

## 2021-11-23 RX ADMIN — CHLORDIAZEPOXIDE HYDROCHLORIDE 75 MG: 25 CAPSULE ORAL at 20:45

## 2021-11-23 RX ADMIN — KETOROLAC TROMETHAMINE 15 MG: 15 INJECTION, SOLUTION INTRAMUSCULAR; INTRAVENOUS at 07:22

## 2021-11-23 RX ADMIN — HYDRALAZINE HYDROCHLORIDE 10 MG: 20 INJECTION INTRAMUSCULAR; INTRAVENOUS at 20:40

## 2021-11-23 RX ADMIN — DIPHENHYDRAMINE HYDROCHLORIDE 25 MG: 50 INJECTION INTRAMUSCULAR; INTRAVENOUS at 20:40

## 2021-11-23 RX ADMIN — LORAZEPAM 2 MG: 2 TABLET ORAL at 12:30

## 2021-11-23 RX ADMIN — LORAZEPAM 1 MG: 2 INJECTION INTRAMUSCULAR; INTRAVENOUS at 02:25

## 2021-11-23 RX ADMIN — SODIUM CHLORIDE 100 ML/HR: 9 INJECTION, SOLUTION INTRAVENOUS at 07:23

## 2021-11-23 RX ADMIN — LORAZEPAM 2 MG: 2 INJECTION INTRAMUSCULAR; INTRAVENOUS at 07:22

## 2021-11-23 RX ADMIN — BISACODYL 5 MG: 5 TABLET, COATED ORAL at 07:23

## 2021-11-23 RX ADMIN — SODIUM CHLORIDE, PRESERVATIVE FREE 10 ML: 5 INJECTION INTRAVENOUS at 07:31

## 2021-11-23 NOTE — PLAN OF CARE
Goal Outcome Evaluation:  Plan of Care Reviewed With: caregiver, patient        Progress: no change  Outcome Summary: Pt initially admitted with Angioedema and intubated.  He has not been extubated.  hx of ETOH on CIWA protocol.  S/p Swallowing eval and po diet started. Will add supplements.  Pt meets criteria for malnutrition.  Will monitor.

## 2021-11-23 NOTE — PROGRESS NOTES
FAMILY MEDICINE RESIDENCY SERVICE  DAILY PROGRESS NOTE    NAME: Zak Lutz  : 1960  MRN: [id number]      LOS: 3 days     PROVIDER OF SERVICE: Consuelo Brand MD    Chief Complaint: Angioedema    Subjective:     Interval History:  History taken from: patient chart    Patient had CIWA scores between 7 and 13 for which he was given a total of 3 mg of Ativan.  He was also hypertensive with blood pressure in the 170's-180s/100s.  He was given labetalol 10 mg.  Nighttime physician started patient on scheduled Ativan 1 mg every 4 hours in addition to as needed Ativan per CIWA scores.  Patient was also started on his home dose of amlodipine 10 mg.    This morning patient was resting comfortably.  Denied any vision changes, nausea, vomiting, fever, chest pain.    Review of Systems:   Review of Systems   Constitutional: Negative for chills and fever.   HENT: Negative for facial swelling, nosebleeds and trouble swallowing.    Eyes: Negative for photophobia and visual disturbance.   Respiratory: Negative for choking and stridor.    Gastrointestinal: Negative for abdominal distention, diarrhea, nausea and vomiting.   Genitourinary: Negative for difficulty urinating and dysuria.   Musculoskeletal: Negative for arthralgias, gait problem and myalgias.   Skin: Negative for pallor and rash.   Neurological: Negative for seizures and syncope.   Psychiatric/Behavioral: Negative for dysphoric mood and hallucinations.       Objective:     Vital Signs  Temp:  [98 °F (36.7 °C)-98.6 °F (37 °C)] 98.2 °F (36.8 °C)  Heart Rate:  [51-91] 88  Resp:  [10-16] 14  BP: (109-184)/() 145/91  Flow (L/min):  [1] 1  FiO2 (%):  [24 %] 24 %   Body mass index is 22.63 kg/m².    Physical Exam  Physical Exam  Constitutional:       General: He is not in acute distress.     Appearance: He is well-developed.   HENT:      Head: Normocephalic and atraumatic.      Nose: Nose normal.   Eyes:      Conjunctiva/sclera: Conjunctivae normal.       Pupils: Pupils are equal, round, and reactive to light.   Cardiovascular:      Rate and Rhythm: Normal rate and regular rhythm.      Pulses: Normal pulses.      Heart sounds: Normal heart sounds.   Pulmonary:      Effort: Pulmonary effort is normal.   Abdominal:      General: Bowel sounds are normal.      Palpations: Abdomen is soft.   Musculoskeletal:         General: Normal range of motion.      Cervical back: Normal range of motion and neck supple.   Skin:     General: Skin is warm and dry.   Neurological:      Mental Status: He is alert. Mental status is at baseline.      Cranial Nerves: No cranial nerve deficit.      Coordination: Coordination normal.   Psychiatric:         Mood and Affect: Mood normal.         Behavior: Behavior normal.         Scheduled Meds   amLODIPine, 10 mg, Oral, Q24H  diphenhydrAMINE, 25 mg, Intravenous, Q6H  famotidine, 20 mg, Intravenous, Q12H  folic acid, 1 mg, Nasogastric, Daily  heparin (porcine), 5,000 Units, Subcutaneous, Q12H  LORazepam, 1 mg, Intravenous, Q6H   Followed by  [START ON 11/24/2021] LORazepam, 1 mg, Intravenous, Q8H  methylPREDNISolone sodium succinate, 40 mg, Intravenous, Q12H  nicotine, 1 patch, Transdermal, Q24H  senna-docusate sodium, 2 tablet, Oral, BID  sodium chloride, 10 mL, Intravenous, Q12H  thiamine (VITAMIN B1) IVPB, 100 mg, Intravenous, Daily       PRN Meds   •  albuterol  •  senna-docusate sodium **AND** polyethylene glycol **AND** bisacodyl **AND** bisacodyl  •  EPINEPHrine  •  hydrALAZINE  •  ipratropium-albuterol  •  ketorolac  •  LORazepam **OR** LORazepam **OR** LORazepam **OR** LORazepam **OR** LORazepam **OR** LORazepam **OR** LORazepam **OR** LORazepam  •  ondansetron  •  [COMPLETED] Insert peripheral IV **AND** sodium chloride  •  sodium chloride      Diagnostic Data    Results from last 7 days   Lab Units 11/23/21  0459   WBC 10*3/mm3 12.24*   HEMOGLOBIN g/dL 12.5*   HEMATOCRIT % 37.0*   PLATELETS 10*3/mm3 256   GLUCOSE mg/dL 109*    CREATININE mg/dL 0.86   BUN mg/dL 10   SODIUM mmol/L 141   POTASSIUM mmol/L 3.4*   AST (SGOT) U/L 13   ALT (SGPT) U/L 11   ALK PHOS U/L 45   BILIRUBIN mg/dL 0.2   ANION GAP mmol/L 10.0       XR Chest 1 View    Result Date: 11/23/2021  1.  There is no acute intrathoracic process. 2.  Patient is now extubated, with removal of the NGT. Electronically signed by:  Marquis Reyes MD  11/23/2021 7:23 AM CST Workstation: [id number]    XR Chest 1 View    Result Date: 11/22/2021  1.  Tubes as described above. 2.  No acute cardiopulmonary abnormality. Electronically signed by:  Tanner Portillo MD  11/22/2021 7:12 AM CST Workstation: [id number]    XR Chest 1 View    Result Date: 11/21/2021  ET tube 4.5 cm from julian. No acute cardiopulmonary process Electronically signed by:  Abhishek Wilson MD  11/21/2021 11:03 AM CST Workstation: [id number]        I reviewed the patient's new clinical results.    Assessment/Plan:     Active Hospital Problems    Diagnosis  POA   • **Angioedema [T78.3XXA]  Yes     Priority: High     -Significant improvement of angioedema  -Discontinued home lisinopril and naltrexone   -Continue IV fluids  -Scheduled IV Benadryl and IV methylprednisolone  -As needed epinephrine for anaphylaxis  -Cardiac monitoring and continuous pulse oximetry monitoring  -Patient will need speech consult for swallow study     - C-1 esterase inhibitor pending  - C4 complement normal  - Tryptase pending  - CRP, ESR upper level of normal and elevated, respectively  - Food allergy panel pending    -Patient will need EpiPen and allergy/immunology referral when ready for discharge               • Chronic alcohol use [Z72.89]  Yes     Priority: High     -Drinks beer and hard liquor   -Recently admitted for pancreatitis  -On scheduled Ativan every 4 hours.  Will space out to scheduled Ativan every 6 hours  -[facility] protocol       • Hypertensive urgency [I16.0]  Clinically Undetermined     Priority: High     -Overnight developed blood  pressure between 170s-180s over 100.  This improved after his home antihypertensive was started  -Blood pressure is currentlywithin normal limits     • Angio-edema [T78.3XXA]  Yes   • Tobacco dependence syndrome [F17.200]  Yes     Nicotine patch   Smoking cessation counseling      • Hyperlipidemia [E78.5]  Yes     The 10-year ASCVD risk score (Valentina CARRILLO Jr., et al., 2013) is: 38.6%    - Lipid panel     -Currently not on statin therapy.    -We will consider initiating statin therapy after resolution of current pancreatitis flare         • Gastroesophageal reflux disease [K21.9]  Yes     Continue Prilosec 40 mg daily IV while patient remains intubated     • Essential hypertension [I10]  Yes     Holding home amlodipine 10 mg daily due to soft blood pressures  Discontinued lisinopril due to angioedema due to potential lisinopril allergy    Labetalol as needed for systolic blood pressure greater than 160  Monitor blood pressure per hospital protocol       • Chronic obstructive lung disease (HCC) [J44.9]  Yes     duonebs  singulair   Nascort            DVT prophylaxis: Heparin  Code status is   Code Status and Medical Interventions:   Ordered at: 11/20/21 1630     Level Of Support Discussed With:    Patient     Code Status (Patient has no pulse and is not breathing):    CPR (Attempt to Resuscitate)     Medical Interventions (Patient has pulse or is breathing):    Full Support       Plan for disposition:Home in 0-1 days      Time: 15 minutes           PGY-3  [facility]  This document has been electronically signed by Consuelo Brand MD on November 23, 2021 08:21 CST

## 2021-11-23 NOTE — CONSULTS
Adult Nutrition  Assessment    Patient Name:  Zak Lutz  YOB: 1960  MRN: [id number]  Admit Date:  11/20/2021    Assessment Date:  11/23/2021    Comments: Pt admitted with Angioedema and intubated to protect his airway.  He has since been extubated and SLP has evaluated and po diet started this am, Regular and thin liquids. Ate well for breakfast.  He has a hx of ETOH use and is on CIWA protocol.  He is alert but confused.  He reports a good appetite and denies any hx of wt loss pta.  I am unsure how reliable his information is at this time.  His appears relatively stable.  Per Nutrition Focused physical assessment he does have some fat loss and muscle wasting present, (see MSA for details) meeting criteria for Chronic moderate malnutrition.  RD will add Boost and ice cream and monitor po intake.  Thiamine, Folic Acid, and Solumedrol prescribed along with CIWA protocol.       Reason for Assessment     Row Name 11/23/21 1155          Reason for Assessment    Reason For Assessment nurse/nurse practitioner consult     Diagnosis other (see comments); substance use/abuse  Angioedema requiring intubation--now extubated     Identified At Risk by Screening Criteria other (see comments)  Hx of alcohol use                Nutrition/Diet History     Row Name 11/23/21 1153          Nutrition/Diet History    Typical Food/Fluid Intake Pt extubated.  SLP evaluated pt and he has been started on a regular diet with thin liquids.  Pt ate a good breakfast.  He is confused and I am unsure if the info he provides is reliable.  He says he was eating well pta.  Denies any food allergies or wt loss.                Anthropometrics     Row Name 11/23/21 0550          Anthropometrics    Weight 77.8 kg (171 lb 8.3 oz)                Labs/Tests/Procedures/Meds     Row Name 11/23/21 1393          Labs/Procedures/Meds    Lab Results Reviewed reviewed, pertinent     Lab Results Comments K+ 3.4; gluc 109; Phos 2.1             Diagnostic Tests/Procedures    Diagnostic Test/Procedure Reviewed reviewed, pertinent     Diagnostic Test/Procedures Comments Intubated--extubated--SLP eval            Medications    Pertinent Medications Reviewed reviewed, pertinent     Pertinent Medications Comments Folic Acid; Ativan; Solumedrol; Thiamine; NaCl 100cc/hr                Physical Findings     Row Name 11/23/21 1155          Physical Findings    Overall Physical Appearance on oxygen therapy; underweight; loss of muscle mass; loss of subcutaneous fat                Estimated/Assessed Needs     Row Name 11/23/21 1155          Calculation Measurements    Weight Used For Calculations 76.2 kg (168 lb)            Estimated/Assessed Needs    Additional Documentation Additional Requirements (Group); Fluid Requirements (Group); [place]-St. Jeor Equation (Group); Protein Requirements (Group); Calorie Requirements (Group)            Calorie Requirements    Estimated Calorie Need Method [place]-St Jeor            [place]-St. Jeor Equation    RMR ([place]-St. Jeor Equation) 1610.788     [place]-St. Jeor Activity Factors 1.4 - 1.5     Activity Factors ([place]-St. Jeor) 2255.1032 - 2416.182            Protein Requirements    Weight Used For Protein Calculations 83.5 kg (184 lb)     Est Protein Requirement Amount (gms/kg) 1.1 gm protein     Estimated Protein Requirements (gms/day) 91.81            Fluid Requirements    Fluid Requirements (mL/day) 2400     Estimated Fluid Requirement Method RDA Method     RDA Method (mL) 2400                Nutrition Prescription Ordered     Row Name 11/23/21 1156          Nutrition Prescription PO    Current PO Diet Regular  just started this am     Fluid Consistency Thin                Evaluation of Received Nutrient/Fluid Intake     Row Name 11/23/21 1156          PO Evaluation    Number of Days PO Intake Evaluated Insufficient Data     % PO Intake 100% this am                  Malnutrition Severity Assessment     Row Name  11/23/21 1206          Malnutrition Severity Assessment    Malnutrition Type Chronic Disease - Related Malnutrition            Muscle Loss    Loss of Muscle Mass Findings Moderate     [place] Region Moderate - slight depression     Clavicle Bone Region Moderate - some protrusion in females, visible in males     Acromion Bone Region Moderate - acromion may slightly protrude     Scapular Bone Region Severe - prominent bones, depressions easily visible between ribs, scapula, spine, shoulders     Patellar Region Severe - prominent bone, square looking, very little muscle definition     Anterior Thigh Region Severe - line/depression along thigh, obviously thin            Fat Loss    Subcutaneous Fat Loss Findings Moderate     Orbital Region  Moderate -  somewhat hollowness, slightly dark circles     Upper Arm Region Moderate - some fat tissue, not ample     Thoracic & Lumbar Region Moderate - ribs visible with mild depressions, iliac crest somewhat prominent            Criteria Met (Must meet criteria for severity in at least 2 of these categories: M Wasting, Fat Loss, Fluid, Secondary Signs, Wt. Status, Intake)    Patient meets criteria for  Moderate (non-severe) Malnutrition                 Electronically signed by:  Nargis Mckeon RD  11/23/21 12:07 CST

## 2021-11-23 NOTE — SIGNIFICANT NOTE
0216: Spoke with nursing staff about patient's vitals and CIWA score. RN notified me that last CIWA score was 13, and she gave 2 mg of Ativan at 0124 hrs.  Blood pressure remains elevated at 182/106.  Instructed nursing staff to give another 1 mg of Ativan and recheck blood pressure in 15 minutes. Patient had received a single prn dose of labetalol 10 mg IV earlier at 2322. Initiated scheduled ativan 1 mg Q4H and will continue the prn dosing per CIWA scores. Will initiate home dose amlodipine 10 mg.       0310: I went to patient's room to examine him. He was alert and oriented x3 at the time seen. Tele showed BP of 173/102 with heart rate of 74. Patient denies headaches, chest pain or sob but endorsed been weak. Patient asked about when he will be able to eat and stated he was hungry. Patient updated on the pending SLP evaluation, and advised that it will be done sometimes in the morning. I added as needed hydralazine for SBP > 170.       Bonifacio Woodard M.D. PGY 2  [facility]  [address] [place] [address]  Office: [phone]  This document has been electronically signed by Bonifacio Woodard MD on November 23, 2021 02:32 CST

## 2021-11-23 NOTE — PLAN OF CARE
Problem: Restraint, Nonbehavioral (Nonviolent)  Goal: Discontinuation Criteria Achieved  Outcome: Ongoing, Not Progressing  Goal: Personal Dignity and Safety Maintained  Outcome: Ongoing, Not Progressing     Problem: Skin Injury Risk Increased  Goal: Skin Health and Integrity  Outcome: Ongoing, Not Progressing     Problem: Fall Injury Risk  Goal: Absence of Fall and Fall-Related Injury  Outcome: Ongoing, Not Progressing   Goal Outcome Evaluation:

## 2021-11-23 NOTE — PLAN OF CARE
Goal Outcome Evaluation:  Plan of Care Reviewed With: patient        Progress: improving  Outcome Summary: Pt extubated today, on room air and maintaining saturations.  Pt more alert and oriented through shift.  CIWA ranges from 7 to 13 this shift. Blood pressure hypertensive all shift, physician aware and pt given labetalol once, and amlodipine once.  Ativan now scheduled, in addition to PRN.
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
## 2021-11-23 NOTE — PLAN OF CARE
Goal Outcome Evaluation:  Plan of Care Reviewed With: patient           Outcome Summary: Pt seen for skilled ST services this date s/p extubation from facial swelling.  Pt demonstrated UE weakness, but able to self-feed w/occasional assist to lift arm from SLP.  Pt consumed regular (villegas) and mech soft(biscuit w/gravy, scrambled eggs) w/efficient mastication, good oral clearance, and timely AP transit.  Pt consumed medications whole w/thin liquids via cup rim w/no difficulty.  No overt s/s of aspiration noted w/thin liquids via cup rim or straw.  SLP recommends advancing diet to regular solids/thin liquids at this time.  Skilled ST services not recommended.  SLP educated pt on results and recommendations and pt in agreement.  Nsg informed of recommended diet.

## 2021-11-23 NOTE — SIGNIFICANT NOTE
At patient's bedside to evaluate patient for discharge and his intentions for future alcohol use.    Patient's CIWA scored an 11 at 1300 and received 2 mg ativan. He is alert, oriented, and answering all questions correctly.     Patient intends to stop drinking when he gets home. He has started drinking heavily after his mother passed July of this year. He endorses drinking heavily daily or every other day and has tried multiple times to stop. He currently does not have any alcohol at home and the sister he lives with will not get him alcohol, but he has nieces and nephews which will get him some if he wants it. He repeated told me he intends to stop drinking, although admits to perhaps having a beer or wine over Thanksgiving this Thursday. He would like to go to alcohol rehab- either inpatient or outpatient. He is interested in taking pills at home to help keep him out of alcohol withdrawal.  Patient gave me permission to speak with both of his sisters.    I spoke with his sister, Chrissy, who further endorsed patient's recent desires to quit drinking and go to rehab. She said she will have his sister, Ciara, whom he lives with, call me soon to discuss support offered to patient at home and if she will help with get to outpatient rehab, or inpatient rehab.    Spoke with case management to get resources for both inpatient and outpatient alcohol treatment programs. He will need to call these facilities to see if he can qualify to get into them.    Spoke with Dr Yogesh price, who agrees to start patient on librium taper without use of ativan overnight, and possible discharge tomorrow if stable (no signs of alcohol withdrawal and stable vital signs). Consulted pharmacy for an adequate librium taper. Patient will be discharged with one-week supply of librium and have him follow up in clinic weekly for additional librium doses if he still is not drinking.      This document has been electronically signed by Autumn Argueta MD on  November 23, 2021 14:44 CST

## 2021-11-23 NOTE — PLAN OF CARE
Problem: Restraint, Nonbehavioral (Nonviolent)  Goal: Discontinuation Criteria Achieved  Outcome: Ongoing, Progressing  Goal: Personal Dignity and Safety Maintained  Outcome: Ongoing, Progressing  Intervention: Protect Skin and Joint Integrity  Description: Frequently check restraint application site and document findings.  Release and replace at regular intervals per facility protocol.  Assist with frequent joint range of motion activity.  Recent Flowsheet Documentation  Taken 11/23/2021 0735 by Beltran Russo RN  Body Position:   turned   position changed independently  Range of Motion: active ROM (range of motion) encouraged     Problem: Skin Injury Risk Increased  Goal: Skin Health and Integrity  Outcome: Ongoing, Progressing  Intervention: Optimize Skin Protection  Description: Reassess skin injury risk and inspect skin frequently (e.g., scheduled interval, with turning, with change in condition) to provide optimal prevention.  Maintain adequate tissue perfusion (e.g., encourage fluid balance, avoid crossing legs, constrictive clothing or devices) to promote tissue oxygenation.  Maintain head of bed at lowest degree of elevation tolerated, considering medical condition and other restrictions. Limit amount of time head of bed is elevated greater than 30 degrees to prevent friction/shear injury.  Avoid positioning onto an area that remains reddened.  Minimize incontinence and moisture (e.g., toileting schedule; moisture-wicking pad, diaper or incontinence collection device, skin moisture barrier).  Cleanse skin promptly and gently when soiled utilizing a pH-balanced cleanser.  Relieve and redistribute pressure (e.g., schedule position changes; utilize higher specification foam mattress, chair cushion, constant low-pressure or alternating-pressure support surface; medical device repositioning; protective dressing applicatio  Support increased progressive functional activity (e.g., therapeutic exercise) to  decrease risk associated with immobility. Balance rest with activity.  Recent Flowsheet Documentation  Taken 11/23/2021 0735 by Beltran Rusos RN  Head of Bed (HOB): HOB at 20-30 degrees     Problem: Fall Injury Risk  Goal: Absence of Fall and Fall-Related Injury  Outcome: Ongoing, Progressing  Intervention: Identify and Manage Contributors to Fall Injury Risk  Description: Reassess fall risk frequently and with change in status or transfer to another level of care.  Communicate fall injury risk to all healthcare team members (e.g., rounds, change of shift/provider, patient transport).  Anticipate needs; perform regular intentional rounding to assess need for position change, pain assessment, personal needs (e.g., toileting) and placement of necessary items.  Provide reorientation, appropriate sensory stimulation and routines with changes in mental status to decrease risk of fall.  Promote use of personal vision and auditory aids (e.g., glasses, hearing aids).  Assess assistance level required for safe and effective care; provide support as needed (e.g., toileting, bathing, mobilization).  Define behavior and activity limits to patient and family.  If fall occurs, assess for and treat injury; determine cause; revise fall injury prevention plan.  Regularly review medication contribution to fall risk; adjust medication administration times to minimize risk of falling.  Consider risk related to polypharmacy and age.  Balance adequate pain management with potential for oversedation.  Recent Flowsheet Documentation  Taken 11/23/2021 0735 by Beltran Russo, RN  Medication Review/Management:   medications reviewed   dosing adjusted   high-risk medications identified   provider consulted  Intervention: Promote Injury-Free Environment  Description: Provide a safe, barrier-free environment that encourages independent activity.  Keep care area uncluttered and well-lighted.  Determine need for increased observation or auditory  alerts (e.g., bed or chair alarm).  Assess equipment and environmental modification needs (e.g., low bed, signage, nonskid footwear, grab bars).  Avoid use of restraints.  Recent Flowsheet Documentation  Taken 11/23/2021 0735 by Beltran Russo RN  Safety Promotion/Fall Prevention:   assistive device/personal items within reach   activity supervised   clutter free environment maintained   elopement precautions   fall prevention program maintained   gait belt   mobility aid in reach   muscle strengthening facilitated   nonskid shoes/slippers when out of bed   room organization consistent   toileting scheduled   safety round/check completed     Problem: Alcohol Withdrawal  Goal: Alcohol Withdrawal Symptom Control  Outcome: Ongoing, Progressing  Intervention: Minimize/Manage Alcohol Withdrawal Symptoms  Description: Verify last intake of alcohol and consumption habits.  Implement frequent/continuous physiologic monitoring (e.g., neurologic, cardiac, respiratory, hemodynamic assessments).  Monitor closely for respiratory depression and aspiration.  Prepare for emergency measures (e.g., oxygen, intubation).  Provide safe environment (e.g., seizure precautions, fall risk).  Anticipate administering pharmacologic treatment using a predetermined fixed medication tapering schedule or symptom-triggered approach.  Administer pharmacologic therapy as prescribed (e.g., benzodiazepine, sedatives, anticonvulsant, beta-blocker, thiamine).  Obtain blood and urine specimens as ordered (e.g., blood alcohol concentration, serum glucose, complete blood count, blood chemistry, electrolytes, urinalysis, toxicology screen).  Monitor and manage hypoglycemia.  Monitor and address fluid and electrolyte imbalance.  Provide environment that is quiet, low stimulation and cool.  Provide emotional support with empathy and without judgmental language.  Recent Flowsheet Documentation  Taken 11/23/2021 0735 by Beltran Russo RN  Aspiration  Precautions: awake/alert before oral intake     Problem: Adult Inpatient Plan of Care  Goal: Plan of Care Review  Outcome: Ongoing, Progressing  Goal: Patient-Specific Goal (Individualized)  Outcome: Ongoing, Progressing  Goal: Absence of Hospital-Acquired Illness or Injury  Outcome: Ongoing, Progressing  Intervention: Identify and Manage Fall Risk  Description: Perform standard risk assessment with a validated tool or comprehensive approach appropriate to the patient on admission; reassess fall risk frequently, with change in status or transfer to another level of care.  Communicate fall injury risk to interprofessional healthcare team.  Determine need for increased observation, equipment and environmental modification, such as low bed and signage, as well as supportive, nonskid footwear.  Adjust safety measures to individual developmental age, stage and identified risk factors.  Reinforce the importance of safety and physical activity with patient and family.  Perform regular intentional rounding to assess need for position change, pain assessment, personal needs, including assistance with toileting.  Recent Flowsheet Documentation  Taken 11/23/2021 7017 by Beltran Russo RN  Safety Promotion/Fall Prevention:   assistive device/personal items within reach   activity supervised   clutter free environment maintained   elopement precautions   fall prevention program maintained   gait belt   mobility aid in reach   muscle strengthening facilitated   nonskid shoes/slippers when out of bed   room organization consistent   toileting scheduled   safety round/check completed  Intervention: Prevent Skin Injury  Description: Assess skin risk on admission and at regular intervals throughout hospital stay.  Keep all areas of skin (especially folds) clean and dry.  Maintain adequate skin hydration.  Relieve and redistribute pressure and protect bony prominences; implement measures based on patient-specific risk factors.  Match  turning and repositioning schedule to clinical condition.  Encourage weight shift frequently; assist with reposition if unable to complete independently.  Float heels off bed. Avoid pressure on the Achilles tendon.  Keep skin free from extended contact with medical devices.  Use aids (e.g., slide boards, mechanical lift) during transfer.  Recent Flowsheet Documentation  Taken 11/23/2021 0735 by Beltran Russo RN  Body Position:   turned   position changed independently  Goal: Optimal Comfort and Wellbeing  Outcome: Ongoing, Progressing  Goal: Readiness for Transition of Care  Outcome: Ongoing, Progressing   Goal Outcome Evaluation:

## 2021-11-24 ENCOUNTER — READMISSION MANAGEMENT (OUTPATIENT)
Dept: CALL CENTER | Facility: HOSPITAL | Age: 61
End: 2021-11-24

## 2021-11-24 VITALS
SYSTOLIC BLOOD PRESSURE: 141 MMHG | WEIGHT: 173.2 LBS | DIASTOLIC BLOOD PRESSURE: 78 MMHG | HEART RATE: 101 BPM | RESPIRATION RATE: 18 BRPM | BODY MASS INDEX: 22.95 KG/M2 | OXYGEN SATURATION: 100 % | TEMPERATURE: 97.3 F | HEIGHT: 73 IN

## 2021-11-24 PROBLEM — D84.1: Status: ACTIVE | Noted: 2021-11-20

## 2021-11-24 PROBLEM — E44.0 MODERATE PROTEIN-CALORIE MALNUTRITION: Status: ACTIVE | Noted: 2021-11-24

## 2021-11-24 PROBLEM — I16.0 HYPERTENSIVE URGENCY: Status: RESOLVED | Noted: 2020-01-30 | Resolved: 2021-11-24

## 2021-11-24 PROBLEM — D84.1: Status: RESOLVED | Noted: 2021-11-20 | Resolved: 2021-11-24

## 2021-11-24 PROBLEM — T78.3XXA ANGIO-EDEMA: Status: RESOLVED | Noted: 2021-11-20 | Resolved: 2021-11-24

## 2021-11-24 LAB
ALBUMIN SERPL-MCNC: 3.7 G/DL (ref 3.5–5.2)
ALBUMIN/GLOB SERPL: 1 G/DL
ALP SERPL-CCNC: 61 U/L (ref 39–117)
ALT SERPL W P-5'-P-CCNC: 26 U/L (ref 1–41)
ANION GAP SERPL CALCULATED.3IONS-SCNC: 14 MMOL/L (ref 5–15)
AST SERPL-CCNC: 29 U/L (ref 1–40)
BILIRUB SERPL-MCNC: 0.3 MG/DL (ref 0–1.2)
BUN SERPL-MCNC: 9 MG/DL (ref 8–23)
BUN/CREAT SERPL: 10.5 (ref 7–25)
CALCIUM SPEC-SCNC: 9.1 MG/DL (ref 8.6–10.5)
CHLORIDE SERPL-SCNC: 104 MMOL/L (ref 98–107)
CLAM IGE QN: 0.62 KU/L
CO2 SERPL-SCNC: 20 MMOL/L (ref 22–29)
CODFISH IGE QN: <0.1 KU/L
CONV CLASS DESCRIPTION: ABNORMAL
CORN IGE QN: 0.21 KU/L
COW MILK IGE QN: 5.45 KU/L
CREAT SERPL-MCNC: 0.86 MG/DL (ref 0.76–1.27)
EGG WHITE IGE QN: 0.75 KU/L
GFR SERPL CREATININE-BSD FRML MDRD: 109 ML/MIN/1.73
GLOBULIN UR ELPH-MCNC: 3.7 GM/DL
GLUCOSE SERPL-MCNC: 85 MG/DL (ref 65–99)
MAGNESIUM SERPL-MCNC: 1.9 MG/DL (ref 1.6–2.4)
PEANUT IGE QN: 0.22 KU/L
PHOSPHATE SERPL-MCNC: 2.6 MG/DL (ref 2.5–4.5)
POTASSIUM SERPL-SCNC: 3.8 MMOL/L (ref 3.5–5.2)
PROT SERPL-MCNC: 7.4 G/DL (ref 6–8.5)
SCALLOP IGE QN: <0.1 KU/L
SESAME SEED IGE QN: 0.45 KU/L
SHRIMP IGE QN: 1.71 KU/L
SODIUM SERPL-SCNC: 138 MMOL/L (ref 136–145)
SOYBEAN IGE QN: <0.1 KU/L
TRYPTASE SERPL-MCNC: 4.5 UG/L (ref 2.2–13.2)
WALNUT IGE QN: <0.1 KU/L
WHEAT IGE QN: 0.13 KU/L

## 2021-11-24 PROCEDURE — 25010000002 THIAMINE PER 100 MG: Performed by: STUDENT IN AN ORGANIZED HEALTH CARE EDUCATION/TRAINING PROGRAM

## 2021-11-24 PROCEDURE — 84100 ASSAY OF PHOSPHORUS: CPT | Performed by: STUDENT IN AN ORGANIZED HEALTH CARE EDUCATION/TRAINING PROGRAM

## 2021-11-24 PROCEDURE — 83735 ASSAY OF MAGNESIUM: CPT | Performed by: STUDENT IN AN ORGANIZED HEALTH CARE EDUCATION/TRAINING PROGRAM

## 2021-11-24 PROCEDURE — 36415 COLL VENOUS BLD VENIPUNCTURE: CPT | Performed by: STUDENT IN AN ORGANIZED HEALTH CARE EDUCATION/TRAINING PROGRAM

## 2021-11-24 PROCEDURE — 25010000002 HEPARIN (PORCINE) PER 1000 UNITS: Performed by: STUDENT IN AN ORGANIZED HEALTH CARE EDUCATION/TRAINING PROGRAM

## 2021-11-24 PROCEDURE — 25010000002 DIPHENHYDRAMINE PER 50 MG: Performed by: STUDENT IN AN ORGANIZED HEALTH CARE EDUCATION/TRAINING PROGRAM

## 2021-11-24 PROCEDURE — 99239 HOSP IP/OBS DSCHRG MGMT >30: CPT | Performed by: STUDENT IN AN ORGANIZED HEALTH CARE EDUCATION/TRAINING PROGRAM

## 2021-11-24 PROCEDURE — 63710000001 PREDNISONE PER 1 MG: Performed by: STUDENT IN AN ORGANIZED HEALTH CARE EDUCATION/TRAINING PROGRAM

## 2021-11-24 PROCEDURE — 80053 COMPREHEN METABOLIC PANEL: CPT | Performed by: STUDENT IN AN ORGANIZED HEALTH CARE EDUCATION/TRAINING PROGRAM

## 2021-11-24 RX ORDER — CHLORDIAZEPOXIDE HYDROCHLORIDE 25 MG/1
CAPSULE, GELATIN COATED ORAL
Qty: 30 CAPSULE | Refills: 0 | Status: SHIPPED | OUTPATIENT
Start: 2021-11-24 | End: 2021-11-24

## 2021-11-24 RX ORDER — EPINEPHRINE 0.1 MG/ML
20 SYRINGE (ML) INJECTION AS NEEDED
Start: 2021-11-24 | End: 2023-01-17

## 2021-11-24 RX ORDER — CHLORDIAZEPOXIDE HYDROCHLORIDE 25 MG/1
CAPSULE, GELATIN COATED ORAL
Qty: 30 CAPSULE | Refills: 0
Start: 2021-11-24 | End: 2021-11-24

## 2021-11-24 RX ORDER — EPINEPHRINE 0.1 MG/ML
20 SYRINGE (ML) INJECTION AS NEEDED
Start: 2021-11-24 | End: 2021-11-24

## 2021-11-24 RX ORDER — PANTOPRAZOLE SODIUM 40 MG/1
40 TABLET, DELAYED RELEASE ORAL
Status: DISCONTINUED | OUTPATIENT
Start: 2021-11-24 | End: 2021-11-24

## 2021-11-24 RX ORDER — NICOTINE 21 MG/24HR
1 PATCH, TRANSDERMAL 24 HOURS TRANSDERMAL
Qty: 14 EACH | Refills: 0 | Status: SHIPPED | OUTPATIENT
Start: 2021-11-24 | End: 2022-08-16

## 2021-11-24 RX ORDER — CHLORDIAZEPOXIDE HYDROCHLORIDE 25 MG/1
CAPSULE, GELATIN COATED ORAL
Qty: 30 CAPSULE | Refills: 0
Start: 2021-11-24 | End: 2022-08-16

## 2021-11-24 RX ADMIN — THIAMINE HYDROCHLORIDE 100 MG: 100 INJECTION, SOLUTION INTRAMUSCULAR; INTRAVENOUS at 09:26

## 2021-11-24 RX ADMIN — PANTOPRAZOLE SODIUM 40 MG: 40 TABLET, DELAYED RELEASE ORAL at 08:24

## 2021-11-24 RX ADMIN — SODIUM CHLORIDE, PRESERVATIVE FREE 10 ML: 5 INJECTION INTRAVENOUS at 09:04

## 2021-11-24 RX ADMIN — CHLORDIAZEPOXIDE HYDROCHLORIDE 75 MG: 25 CAPSULE ORAL at 09:04

## 2021-11-24 RX ADMIN — PREDNISONE 20 MG: 20 TABLET ORAL at 08:24

## 2021-11-24 RX ADMIN — HEPARIN SODIUM 5000 UNITS: 5000 INJECTION INTRAVENOUS; SUBCUTANEOUS at 08:23

## 2021-11-24 RX ADMIN — AMLODIPINE BESYLATE 10 MG: 10 TABLET ORAL at 08:24

## 2021-11-24 RX ADMIN — DIPHENHYDRAMINE HYDROCHLORIDE 25 MG: 50 INJECTION INTRAMUSCULAR; INTRAVENOUS at 05:30

## 2021-11-24 RX ADMIN — DOCUSATE SODIUM 50 MG AND SENNOSIDES 8.6 MG 2 TABLET: 8.6; 5 TABLET, FILM COATED ORAL at 08:24

## 2021-11-24 RX ADMIN — CHLORDIAZEPOXIDE HYDROCHLORIDE 75 MG: 25 CAPSULE ORAL at 02:26

## 2021-11-24 RX ADMIN — DIPHENHYDRAMINE HYDROCHLORIDE 25 MG: 50 INJECTION INTRAMUSCULAR; INTRAVENOUS at 00:26

## 2021-11-24 RX ADMIN — FOLIC ACID 1 MG: 1 TABLET ORAL at 08:24

## 2021-11-24 NOTE — NURSING NOTE
DC instructions, med regimen, follow up information reviewed with pt. Awaiting OP pharmacy for delivery prior to DC to home.

## 2021-11-24 NOTE — DISCHARGE SUMMARY
DISCHARGE SUMMARY    PATIENT NAME: Zak Lutz         PHYSICIAN: Demetri Bertrand MD  : 1960  MRN: [id number]    ADMITTED: 2021     DISCHARGED: 2021    ADMISSION DIAGNOSES:  Active Hospital Problems    Diagnosis  POA   • Chronic alcohol use [Z72.89]  Yes     Priority: Medium   • Essential hypertension [I10]  Yes     Priority: Medium   • Tobacco dependence syndrome [F17.200]  Yes     Priority: Low   • Hyperlipidemia [E78.5]  Yes     Priority: Low   • Chronic obstructive lung disease (HCC) [J44.9]  Yes     Priority: Low   • Moderate protein-calorie malnutrition (HCC) [E44.0]  Yes      Resolved Hospital Problems    Diagnosis Date Resolved POA   • **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] 2021 Yes     Priority: High   • Angio-edema [T78.3XXA] 2021 Yes     Priority: High   • Gastroesophageal reflux disease [K21.9] 2021 Yes     Priority: Medium   • LEONID (acute kidney injury) (HCC) [N17.9] 2021 Unknown   • Hypertensive urgency [I16.0] 2021 Clinically Undetermined       DISCHARGE DIAGNOSES:   Active Hospital Problems    Diagnosis  POA   • Chronic alcohol use [Z72.89]  Yes     Priority: Medium   • Essential hypertension [I10]  Yes     Priority: Medium   • Tobacco dependence syndrome [F17.200]  Yes     Priority: Low   • Hyperlipidemia [E78.5]  Yes     Priority: Low   • Chronic obstructive lung disease (HCC) [J44.9]  Yes     Priority: Low   • Moderate protein-calorie malnutrition (HCC) [E44.0]  Yes      Resolved Hospital Problems    Diagnosis Date Resolved POA   • **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] 2021 Yes     Priority: High   • Angio-edema [T78.3XXA] 2021 Yes     Priority: High   • Gastroesophageal reflux disease [K21.9] 2021 Yes     Priority: Medium   • LEONID (acute kidney injury) (HCC) [N17.9] 2021 Unknown   • Hypertensive urgency [I16.0] 2021 Clinically Undetermined        SERVICE: Family Medicine Residency  Attending: Jeffrey Kohler MD  Resident: Demetri Bertrand MD    CONSULTS:   Consult Orders (all) (From admission, onward)     Start     Ordered    11/20/21 1859  Inpatient Intensivist Consult  Once        Comments: Jaren Jimenes, Called at approximately 1900   Specialty:  Intensive Care  Provider:  (Not yet assigned)    11/20/21 1901 11/20/21 1839  Anesthesiology (on-call MD unless specified)  Once        Specialty:  Anesthesiology  Provider:  Leisa Reynolds,     11/20/21 1838                PROCEDURES:     Intubation and extubation    HISTORY OF PRESENT ILLNESS:     Retrieved from history and physical exam by Dr. Tete Villarreal on 11/20/2021:    Zak Lutz is a 61 y.o. male with a CMH of hypertension, hyperlipidemia, GERD, chronic alcohol use and tobacco dependence  who presents complaining of swelling.  He states that the swelling started overnight.  He states that before he went to bed he did not notice any swelling however when he woke up he had the swelling that started at the left cheek which was a size of a ping-pong ball.  He states that throughout the day he noticed that the swelling started increasing and his left started swelling as well.  He states that he has had 2 episodes of left cheek swelling in the past with the most recent episode being about 3 months ago.  He states that he has not gotten treated for the swelling and he self treated with Listerine and it went away within a day or 2.  Patient states that the only recent change he has had was that he started taking naltrexone.  He states that he has been taking lisinopril for about 15 years.  He also states that he had a dry cough that is nonproductive for some time now.      In the ED, patient was given 125 mg of Solu-Medrol, 20 mg of Pepcid injection, and 25 mg of Benadryl injection.  He was admitted to the family medicine service team for evaluation of angioedema        DIAGNOSTIC  DATA:   Lab Results (last 24 hours)     Procedure Component Value Units Date/Time    Comprehensive Metabolic Panel [[id number]]  (Abnormal) Collected: 11/24/21 0528    Specimen: Blood Updated: 11/24/21 0711     Glucose 85 mg/dL      BUN 9 mg/dL      Creatinine 0.86 mg/dL      Sodium 138 mmol/L      Potassium 3.8 mmol/L      Comment: Slight hemolysis detected by analyzer. Results may be affected.        Chloride 104 mmol/L      CO2 20.0 mmol/L      Calcium 9.1 mg/dL      Total Protein 7.4 g/dL      Albumin 3.70 g/dL      ALT (SGPT) 26 U/L      AST (SGOT) 29 U/L      Comment: Slight hemolysis detected by analyzer. Results may be affected.        Alkaline Phosphatase 61 U/L      Total Bilirubin 0.3 mg/dL      eGFR  African Amer 109 mL/min/1.73      Globulin 3.7 gm/dL      A/G Ratio 1.0 g/dL      BUN/Creatinine Ratio 10.5     Anion Gap 14.0 mmol/L     Narrative:      GFR Normal >60  Chronic Kidney Disease <60  Kidney Failure <15      Phosphorus [[id number]]  (Normal) Collected: 11/24/21 0528    Specimen: Blood Updated: 11/24/21 0708     Phosphorus 2.6 mg/dL     Magnesium [[id number]]  (Normal) Collected: 11/24/21 0528    Specimen: Blood Updated: 11/24/21 0708     Magnesium 1.9 mg/dL     Tryptase [[id number]] Collected: 11/20/21 1558    Specimen: Blood Updated: 11/24/21 0007     Tryptase 4.5 ug/L     Narrative:      Performed at:  [address] [place] [address]  [id number]  : Michelle Laws MD, Phone:  [phone]        Imaging Results (Last 72 Hours)     Procedure Component Value Units Date/Time    XR Chest 1 View [[id number]] Collected: 11/23/21 0459     Updated: 11/23/21 0724    Narrative:      EXAM: XR CHEST 1 VIEW    HISTORY: Patient on ventilator, T78.3XXA Angioneurotic edema,  initial encounter    COMPARISON: 11/22/2021    TECHNIQUE:   Portable view of the chest.    FINDINGS:   Interval removal of the ETT and NGT.  The lungs are well aerated. There is no pleural effusion.  The  heart size is within normal limits. The mediastinal contours are  within normal limits. . Tortuous thoracic aorta.  No evidence of focal consolidation. Unremarkable bronchovascular  markings. Unremarkable bilateral hemidiaphragms.  Unremarkable visualized osseous structures.      Impression:      1.  There is no acute intrathoracic process.  2.  Patient is now extubated, with removal of the NGT.          Electronically signed by:  Marquis Reyes MD  11/23/2021 7:23 AM CST  Workstation: [id number]    XR Chest 1 View [[id number]] Collected: 11/22/21 0554     Updated: 11/22/21 0713    Narrative:        PROCEDURE: Single chest view portable    REASON FOR EXAM:Patient on ventilator, T78.3XXA Angioneurotic  edema, initial encounter    FINDINGS: Comparison exam dated November 21, 2021. ET tube with  tip 6.2 cm above julian. NG tube with tip below the level  diaphragm. Cardiac and pulmonary vasculature are normal. Lungs  are clear. Pleural spaces are normal. No acute osseous  abnormality.      Impression:      1.  Tubes as described above.  2.  No acute cardiopulmonary abnormality.    Electronically signed by:  Tanner Portillo MD  11/22/2021 7:12 AM CST  Workstation: [id number]             HOSPITAL COURSE:    The patient was admitted for angioedema and started on a 5-day course of steroids and Benadryl with epinephrine as needed for anaphylaxis. His lisinopril and naltrexone were discontinued, as both of these medicines have been associated with anaphylactic reactions and angioedema. The patient was placed on CIWA protocol due to his history of alcohol use.    After he was admitted to the family medicine service, he was then given epinephrine and intubated by the emergency room physicians and anesthesia to protect his airway.  Initially, the hospitalist were consulted by the ED to manage the patient's vent but declined.  High acuity teleinterventionalists were then consulted by the ED and put in initial vent settings. The  patient's angioedema resolved to the point where he was able to be extubated successfully.  He completed a 5-day course of steroids and Benadryl and was provided with an EpiPen prior to discharge and instructions to the pharmacy to go over its use with him.  The patient's tryptase level was normal as well as C4 complement.  However, the patient did have increased C1 esterase inhibitor above the range of normal.  Elevated levels of C1 esterase inhibitors can be seen in type II angioedema, and the patient was strongly encouraged to follow-up with an allergy clinic outpatient.    The patient had a longstanding history of alcohol abuse and was placed on CIWA protocol after admission due to drinking 1 to 2 pints a day of whiskey.  He was put on Precedex while he was intubated and did well.  After extubation, he was started on Librium and discharged with instructions to complete Librium taper.  This medicine was called in to his pharmacy.  He was also provided with a prescription for nicotine patches to help stop smoking and given an EpiPen.  His naltrexone and lisinopril were permanently discontinued he was instructed to get rid of those medicines and never take them again.  He expressed interest in quitting drinking while inpatient and it was suggested that his PCP could help facilitate rehab for him.    Patient was discharged home in stable condition on 11/24/2021 and scheduled to follow-up with his outpatient provider and the [place] allergy clinic.    To do for PCP: Make sure that patient is no longer taking lisinopril or naltrexone.  As the patient's lisinopril was permanently discontinued, if the patient's blood pressure continues to run high, can consider adding hydrochlorothiazide to his existing amlodipine outpatient now that he is no longer on lisinopril.  As the patient has an elevated cardiovascular risk and hyperlipidemia, could also consider initiating statin therapy.  This was not done inpatient due to  recent admission for pancreatitis. The patient is interested in quitting drinking, the patient's PCP could consider rehabilitation.    Also, PCP is encouraged to review the food allergy pending that is pending on this gentleman at the time of discharge and to make sure the patient has follow-up with allergy immunology due to his elevated C1 esterase inhibitor level as an elevated C1 esterase inhibitor can be associated with type II angioedema.       DISCHARGE CONDITION:   Stable     DISPOSITION:  Home or Self Care    DISCHARGE MEDICATIONS     Discharge Medications      New Medications      Instructions Start Date   chlordiazePOXIDE 25 MG capsule  Commonly known as: LIBRIUM   Look at note to pharmacy      EPINEPHrine 0.05 MG/ML syringe  Commonly known as: ADRENALIN   20 mL, Injection, As Needed      nicotine 14 MG/24HR patch  Commonly known as: NICODERM CQ   1 patch, Transdermal, Every 24 Hours Scheduled         Changes to Medications      Instructions Start Date   cetirizine 10 MG tablet  Commonly known as: zyrTEC  What changed: when to take this   TAKE 1 TABLET BY MOUTH EVERY DAY      Triamcinolone Acetonide 55 MCG/ACT nasal inhaler  Commonly known as: NASACORT  What changed: Another medication with the same name was removed. Continue taking this medication, and follow the directions you see here.   2 sprays, Nasal, Daily         Continue These Medications      Instructions Start Date   amLODIPine 10 MG tablet  Commonly known as: NORVASC   10 mg, Oral, Daily      ketotifen 0.025 % ophthalmic solution  Commonly known as: ZADITOR   1 drop, Both Eyes, 2 Times Daily      meloxicam 15 MG tablet  Commonly known as: MOBIC   15 mg, Oral, Daily      montelukast 10 MG tablet  Commonly known as: Singulair   10 mg, Oral, Daily      omeprazole 40 MG capsule  Commonly known as: priLOSEC   40 mg, Oral, Daily      ProAir  (90 Base) MCG/ACT inhaler  Generic drug: albuterol sulfate HFA   2 puffs, Inhalation, Every 4 Hours  PRN         Stop These Medications    Chantix Starting Month Bart 0.5 MG X 11 & 1 MG X 42 tablet  Generic drug: varenicline     lisinopril 40 MG tablet  Commonly known as: PRINIVIL,ZESTRIL     ondansetron 4 MG tablet  Commonly known as: Zofran            INSTRUCTIONS:  Activity:   Activity Instructions     Activity as Tolerated          Diet:   Diet Instructions     Diet: Regular      Discharge Diet: Regular          FOLLOW UP:   Additional Instructions for the Follow-ups that You Need to Schedule     Discharge Follow-up with PCP   As directed       Currently Documented PCP:    Jeramie Singh MD    PCP Phone Number:    [phone]     Follow Up Details: Within 1 week post hospital visit for angioedema         Discharge Follow-up with Specialty: Allergy/Immunolgy; 1 Week   As directed      Specialty: Allergy/Immunolgy    Follow Up: 1 Week    Follow Up Details: Angioedema with elevated C1 esterase            Follow-up Information     Jeramie Singh MD .    Specialty: Family Medicine  Why: Within 1 week post hospital visit for angioedema  Contact information:  [facility]   [place] [address]  [phone]             [facility] ALLERGY Follow up.    Specialty: Allergy  Contact information:  [address] [place] [id number]  [phone]                       PENDING TEST RESULTS AT DISCHARGE  Pending Labs     Order Current Status    Food Allergy Profile In process          Time: 35 minutes was spent in discharge planning, medication reconciliation and coordination of care for this patient.    Jeffrey Kohler  is the attending at time of discharge, He is aware of the patient's status and agrees with the above discharge summary.      This document has been electronically signed by Demetri Bertrand MD on November 24, 2021 11:25 CST    Part of this note may be an electronic transcription or translation of spoken language to printed text using the Dragon Dictation System

## 2021-11-24 NOTE — PLAN OF CARE
Goal Outcome Evaluation:  Plan of Care Reviewed With: patient        Progress: no change   Pt resting in bed. No complaints at this time

## 2021-11-24 NOTE — PROGRESS NOTES
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 Hypertensive to 160/102 this morning. Patient's home lisinopril has been restarted and he has as needed labetalol while inpatient.  Discontinuing maintenance fluids now that he is on a regular diet.    On day 5 of 5 of steroids and antihistamine.    Patient on Librium 75 mg 3 times daily and will be completing Librium taper outpatient to avoid symptoms of alcohol withdrawal.    Patient alert and oriented this morning but somewhat groggy.  Nurse says that he has been oriented x3 for her with some intermittent confusion.  Last CIWA score of 6.    Patient's diet has been advanced after speech consult.  He says he has been tolerating his diet with no complaints.    Labs pending this a.m.    Review of Systems:   Review of Systems   Constitutional: Negative for chills and fever.   HENT: Negative for facial swelling and trouble swallowing.    Respiratory: Negative for shortness of breath and wheezing.    Cardiovascular: Negative for chest pain, palpitations and leg swelling.   Gastrointestinal: Negative for abdominal pain, nausea and vomiting.   Genitourinary: Negative for difficulty urinating and dysuria.   Neurological: Negative for dizziness, weakness and light-headedness.       Objective:     Vital Signs  Temp:  [96.9 °F (36.1 °C)-98.2 °F (36.8 °C)] 97.8 °F (36.6 °C)  Heart Rate:  [] 81  Resp:  [18-20] 20  BP: (124-175)/() 160/102  Body mass index is 22.86 kg/m².    Physical Exam  Physical Exam  Constitutional:       General: He is not in acute distress.     Appearance: Normal appearance. He is not ill-appearing, toxic-appearing or diaphoretic.   HENT:      Head: Normocephalic and atraumatic.       Mouth/Throat:      Mouth: Mucous membranes are moist.      Pharynx: Oropharynx is clear.      Comments: Angioedema has resolved  Cardiovascular:      Rate and Rhythm: Normal rate and regular rhythm.   Pulmonary:      Effort: Pulmonary effort is normal.      Breath sounds: Normal breath sounds.   Abdominal:      General: Abdomen is flat.      Palpations: Abdomen is soft.      Tenderness: There is no abdominal tenderness.   Musculoskeletal:      Right lower leg: No edema.      Left lower leg: No edema.   Neurological:      Mental Status: He is alert.         Medication Review    Current Facility-Administered Medications:   •  albuterol (PROVENTIL) nebulizer solution 0.083% 2.5 mg/3mL, 2.5 mg, Nebulization, Q4H PRN, Consuelo Brand MD  •  amLODIPine (NORVASC) tablet 10 mg, 10 mg, Oral, Q24H, Consuelo Brand MD, 10 mg at 11/23/21 0302  •  sennosides-docusate (PERICOLACE) 8.6-50 MG per tablet 2 tablet, 2 tablet, Oral, BID, 2 tablet at 11/23/21 2039 **AND** polyethylene glycol (MIRALAX) packet 17 g, 17 g, Oral, Daily PRN, 17 g at 11/23/21 0723 **AND** bisacodyl (DULCOLAX) EC tablet 5 mg, 5 mg, Oral, Daily PRN, 5 mg at 11/23/21 0723 **AND** bisacodyl (DULCOLAX) suppository 10 mg, 10 mg, Rectal, Daily PRN, Consuelo Brand MD  •  chlordiazePOXIDE (LIBRIUM) capsule 25 mg, 25 mg, Oral, 4x Daily PRN, Autumn Argueta MD, 25 mg at 11/23/21 1825  •  chlordiazePOXIDE (LIBRIUM) capsule 50 mg, 50 mg, Oral, Q8H, Autumn Argueta MD  •  chlordiazePOXIDE (LIBRIUM) capsule 75 mg, 75 mg, Oral, Q8H, Autumn Argueta MD, 75 mg at 11/24/21 0226  •  diphenhydrAMINE (BENADRYL) injection 25 mg, 25 mg, Intravenous, Q6H, Consuelo Brand MD, 25 mg at 11/24/21 0530  •  EPINEPHrine (ADRENALIN) 0.05 mg/mL syringe, 20 mL, Injection, PRN, Consuelo Brand MD  •  folic acid (FOLVITE) tablet 1 mg, 1 mg, Nasogastric, Daily, Consuelo Brand MD, 1 mg at 11/23/21 0731  •  heparin (porcine) 5000 UNIT/ML injection 5,000 Units,  5,000 Units, Subcutaneous, Q12H, Consuelo Brand MD, 5,000 Units at 11/23/21 2039  •  hydrALAZINE (APRESOLINE) injection 10 mg, 10 mg, Intravenous, Q6H PRN, Consuelo Brand MD, 10 mg at 11/23/21 2040  •  ipratropium-albuterol (DUO-NEB) nebulizer solution 3 mL, 3 mL, Nebulization, Q4H PRN, Consuelo Brand MD  •  nicotine (NICODERM CQ) 14 MG/24HR patch 1 patch, 1 patch, Transdermal, Q24H, Consuelo Brand MD, 1 patch at 11/22/21 2119  •  ondansetron (ZOFRAN) injection 4 mg, 4 mg, Intravenous, Q6H PRN, Consuelo Brand MD  •  pantoprazole (PROTONIX) EC tablet 40 mg, 40 mg, Oral, Daily With Breakfast, Demetri Bertrand MD  •  predniSONE (DELTASONE) tablet 20 mg, 20 mg, Oral, Daily With Breakfast, Consuelo Brand MD  •  [COMPLETED] Insert peripheral IV, , , Once **AND** sodium chloride 0.9 % flush 10 mL, 10 mL, Intravenous, PRN, Consuelo Brand MD  •  sodium chloride 0.9 % flush 10 mL, 10 mL, Intravenous, Q12H, Consuelo Brand MD, 10 mL at 11/23/21 0731  •  sodium chloride 0.9 % flush 10 mL, 10 mL, Intravenous, PRN, Consuelo Brand MD  •  thiamine (B-1) 100 mg in sodium chloride 0.9 % 100 mL IVPB, 100 mg, Intravenous, Daily, Consuelo Brand MD, Last Rate: 200 mL/hr at 11/23/21 1037, 100 mg at 11/23/21 1037     Diagnostic Data    Lab Results (last 24 hours)     Procedure Component Value Units Date/Time    Comprehensive Metabolic Panel [[id number]]  (Abnormal) Collected: 11/24/21 0528    Specimen: Blood Updated: 11/24/21 0711     Glucose 85 mg/dL      BUN 9 mg/dL      Creatinine 0.86 mg/dL      Sodium 138 mmol/L      Potassium 3.8 mmol/L      Comment: Slight hemolysis detected by analyzer. Results may be affected.        Chloride 104 mmol/L      CO2 20.0 mmol/L      Calcium 9.1 mg/dL      Total Protein 7.4 g/dL      Albumin 3.70 g/dL      ALT (SGPT) 26 U/L      AST (SGOT) 29 U/L      Comment: Slight hemolysis detected by analyzer. Results may be affected.        Alkaline Phosphatase 61 U/L      Total  Bilirubin 0.3 mg/dL      eGFR  African Amer 109 mL/min/1.73      Globulin 3.7 gm/dL      A/G Ratio 1.0 g/dL      BUN/Creatinine Ratio 10.5     Anion Gap 14.0 mmol/L     Narrative:      GFR Normal >60  Chronic Kidney Disease <60  Kidney Failure <15      Phosphorus [[id number]]  (Normal) Collected: 11/24/21 0528    Specimen: Blood Updated: 11/24/21 0708     Phosphorus 2.6 mg/dL     Magnesium [[id number]]  (Normal) Collected: 11/24/21 0528    Specimen: Blood Updated: 11/24/21 0708     Magnesium 1.9 mg/dL     Tryptase [[id number]] Collected: 11/20/21 1558    Specimen: Blood Updated: 11/24/21 0007     Tryptase 4.5 ug/L     Narrative:      Performed at:   - [facility]  [id number]  : Michelle Laws MD, Phone:  [phone]    C1 Esterase Inhibitor, Serum [[id number]]  (Abnormal) Collected: 11/20/21 1539    Specimen: Blood Updated: 11/23/21 1011     C1 Esterase Inhibitor 41 mg/dL     Narrative:      Performed at:  [facility]  [id number]  : Michelle Laws MD, Phone:  [phone]            I reviewed the patient's new clinical results.    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 Librium taper and prn overnight and d/c home if stable  -[facility] protocol       • Gastroesophageal reflux disease [K21.9]  Yes     Priority: Medium     Transitioned to oral PPIs now that patient has been extubated     • Essential hypertension [I10]  Yes     Priority: Medium     Restarted home amlodipine 10 mg daily   Discontinued lisinopril due to angioedema due to potential lisinopril allergy    Labetalol as needed for systolic blood pressure greater than 160  Monitor blood pressure per hospital protocol       • Tobacco dependence syndrome [F17.200]  Yes     Priority: Low     Nicotine patch   Smoking cessation counseling      • Hyperlipidemia [E78.5]  Yes     Priority: Low     The 10-year ASCVD risk score (Valentina GERARDO Jr., et al., 2013) is: 38.6%    - Lipid panel     -Currently not on statin therapy.    -Patient has recently had pancreatitis flare; provider can consider initiating statin outpatient         • Chronic obstructive lung disease (HCC) [J44.9]  Yes     Priority: Low     duonebs  singulair   Nascort        • Moderate protein-calorie malnutrition (HCC) [E44.0]  Unknown     -Documented by dietitian  -Have added boost supplementation            DVT prophylaxis: Heparin     Code status is   Code Status and Medical Interventions:   Ordered at: 11/20/21 1630     Level Of Support Discussed With:    Patient     Code Status (Patient has no pulse and is not breathing):    CPR (Attempt to Resuscitate)     Medical Interventions (Patient has pulse or is breathing):    Full Support       Plan for disposition:To place of residence in 1-2 days       Time: 15 min         This document has been electronically signed by Demetri Bertrand MD on November 24, 2021 07:18 CST    Part of the note may be an electronic transcription or translation of spoken language to printed text using the Dragon  Dictation System

## 2021-11-25 NOTE — OUTREACH NOTE
Prep Survey      Responses   [facility] patient discharged from? [place]   Is LACE score < 7 ? No   Emergency Room discharge w/ pulse ox? No   Eligibility [facility]   Date of Admission 11/20/21   Date of Discharge 11/24/21   Discharge Disposition Home or Self Care   Discharge diagnosis angioedema r/t allergic reaction(possible due to Lisinopril or Naltrexone)   Does the patient have one of the following disease processes/diagnoses(primary or secondary)? Other   Does the patient have Home health ordered? No   Is there a DME ordered? No   Prep survey completed? Yes          Toña Echeverria RN

## 2021-11-26 ENCOUNTER — TRANSITIONAL CARE MANAGEMENT TELEPHONE ENCOUNTER (OUTPATIENT)
Dept: CALL CENTER | Facility: HOSPITAL | Age: 61
End: 2021-11-26

## 2021-11-26 NOTE — OUTREACH NOTE
Call Center TCM Note      Responses   [facility] patient discharged from? [place]   Does the patient have one of the following disease processes/diagnoses(primary or secondary)? Other   TCM attempt successful? Yes   Call start time 1121   Call end time 1129   Discharge diagnosis angioedema r/t allergic reaction (possible due to Lisinopril or Naltrexone)   Meds reviewed with patient/caregiver? Yes   Is the patient having any side effects they believe may be caused by any medication additions or changes? No   Does the patient have all medications ordered at discharge? Yes   Is the patient taking all medications as directed (includes completed medication regime)? Yes   Does the patient have a primary care provider?  Yes   Does the patient have an appointment with their PCP within 7 days of discharge? Yes   Comments regarding PCP Hospital d/c f/u appt is on 11/30/21 at 10:45 am    Has the patient kept scheduled appointments due by today? N/A   Psychosocial issues? No   Did the patient receive a copy of their discharge instructions? Yes   Nursing interventions Reviewed instructions with patient   What is the patient's perception of their health status since discharge? Improving  [Swelling has improved]   If the patient is a current smoker, are they able to teach back resources for cessation? Smoking cessation medications  [sent home with patches]   TCM call completed? Yes          Nati Bruno RN    11/26/2021, 11:29 EST

## 2021-11-26 NOTE — PAYOR COMM NOTE
"Susan Saundra  Case Management Extender  [phone] phone  [phone] fax    Ref# [id number]    Zak Lutz (61 y.o. Male)             Date of Birth Social Security Number Address Home Phone MRN    1960  [address] [facility] [address] [phone] [id number]    Congregational Marital Status             Yarsani Single       Admission Date Admission Type Admitting Provider Attending Provider Department, Room/Bed    11/20/21 Emergency Jeffrey Kohler MD  [address], 412/1    Discharge Date Discharge Disposition Discharge Destination          11/24/2021 Home or Self Care              Attending Provider: (none)   Allergies: Lisinopril, Naltrexone, Tramadol, Morphine And Related    Isolation: None   Infection: None   Code Status: Prior   Advance Care Planning Activity    Ht: 185.4 cm (72.99\")   Wt: 78.6 kg (173 lb 3.2 oz)    Admission Cmt: None   Principal Problem: 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                 Active Insurance as of 11/20/2021     Primary Coverage     Payor Plan Insurance Group Employer/Plan Group    [employer] [place] [id number]     Payor Plan Address Payor Plan Phone Number Payor Plan Fax Number Effective Dates    [address] [address]   1/1/2014 - None Entered    [facility] [place] [address]       Subscriber Name Subscriber Birth Date Member ID       ZKA LUTZ 1960 [id number]                 Emergency Contacts      (Rel.) Home Phone Work Phone Mobile Phone    Chrissy Rodriguez (Sister) [phone] -- [phone]    Susan Lutz (Sister) [phone] -- [phone]               Discharge Summary      Demetri Bertrand MD at 11/24/21 1107     Attestation signed by Jeffrey Kohler MD at 11/24/21 1411    I have reviewed this documentation and agree.  I saw and evaluated the patient with the resident.  I discussed the case with the resident and agree with the " findings and plan as documented in the residents note.      This document has been electronically signed by Jeffrey Kohler MD on 2021 14:11 CST                             DISCHARGE SUMMARY    PATIENT NAME: Zak Lutz         PHYSICIAN: Demetri Bertrand MD  : 1960  MRN: [id number]    ADMITTED: 2021     DISCHARGED: 2021    ADMISSION DIAGNOSES:  Active Hospital Problems    Diagnosis  POA   • Chronic alcohol use [Z72.89]  Yes     Priority: Medium   • Essential hypertension [I10]  Yes     Priority: Medium   • Tobacco dependence syndrome [F17.200]  Yes     Priority: Low   • Hyperlipidemia [E78.5]  Yes     Priority: Low   • Chronic obstructive lung disease (HCC) [J44.9]  Yes     Priority: Low   • Moderate protein-calorie malnutrition (HCC) [E44.0]  Yes      Resolved Hospital Problems    Diagnosis Date Resolved POA   • **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] 2021 Yes     Priority: High   • Angio-edema [T78.3XXA] 2021 Yes     Priority: High   • Gastroesophageal reflux disease [K21.9] 2021 Yes     Priority: Medium   • LEONID (acute kidney injury) (HCC) [N17.9] 2021 Unknown   • Hypertensive urgency [I16.0] 2021 Clinically Undetermined       DISCHARGE DIAGNOSES:   Active Hospital Problems    Diagnosis  POA   • Chronic alcohol use [Z72.89]  Yes     Priority: Medium   • Essential hypertension [I10]  Yes     Priority: Medium   • Tobacco dependence syndrome [F17.200]  Yes     Priority: Low   • Hyperlipidemia [E78.5]  Yes     Priority: Low   • Chronic obstructive lung disease (HCC) [J44.9]  Yes     Priority: Low   • Moderate protein-calorie malnutrition (HCC) [E44.0]  Yes      Resolved Hospital Problems    Diagnosis Date Resolved POA   • **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] 2021 Yes     Priority: High   • Angio-edema [T78.3XXA] 2021 Yes     Priority: High   • Gastroesophageal reflux disease  [K21.9] 11/24/2021 Yes     Priority: Medium   • LEONID (acute kidney injury) (HCC) [N17.9] 11/21/2021 Unknown   • Hypertensive urgency [I16.0] 11/24/2021 Clinically Undetermined       SERVICE: Family Medicine Residency  Attending: Jeffrey Kohler MD  Resident: Demetri Bertrand MD    CONSULTS:   Consult Orders (all) (From admission, onward)     Start     Ordered    11/20/21 1859  Inpatient Intensivist Consult  Once        Comments: Jaren Jimenes, Called at approximately 1900   Specialty:  Intensive Care  Provider:  (Not yet assigned)    11/20/21 1901 11/20/21 1839  Anesthesiology (on-call MD unless specified)  Once        Specialty:  Anesthesiology  Provider:  Leisa Reynolds,     11/20/21 1838                PROCEDURES:     Intubation and extubation    HISTORY OF PRESENT ILLNESS:     Retrieved from history and physical exam by Dr. Tete Villarreal on 11/20/2021:    Zak Lutz is a 61 y.o. male with a CMH of hypertension, hyperlipidemia, GERD, chronic alcohol use and tobacco dependence  who presents complaining of swelling.  He states that the swelling started overnight.  He states that before he went to bed he did not notice any swelling however when he woke up he had the swelling that started at the left cheek which was a size of a ping-pong ball.  He states that throughout the day he noticed that the swelling started increasing and his left started swelling as well.  He states that he has had 2 episodes of left cheek swelling in the past with the most recent episode being about 3 months ago.  He states that he has not gotten treated for the swelling and he self treated with Listerine and it went away within a day or 2.  Patient states that the only recent change he has had was that he started taking naltrexone.  He states that he has been taking lisinopril for about 15 years.  He also states that he had a dry cough that is nonproductive for some time now.      In the ED, patient was given 125 mg of  Solu-Medrol, 20 mg of Pepcid injection, and 25 mg of Benadryl injection.  He was admitted to the family medicine service team for evaluation of angioedema        DIAGNOSTIC DATA:   Lab Results (last 24 hours)     Procedure Component Value Units Date/Time    Comprehensive Metabolic Panel [[id number]]  (Abnormal) Collected: 11/24/21 0528    Specimen: Blood Updated: 11/24/21 0711     Glucose 85 mg/dL      BUN 9 mg/dL      Creatinine 0.86 mg/dL      Sodium 138 mmol/L      Potassium 3.8 mmol/L      Comment: Slight hemolysis detected by analyzer. Results may be affected.        Chloride 104 mmol/L      CO2 20.0 mmol/L      Calcium 9.1 mg/dL      Total Protein 7.4 g/dL      Albumin 3.70 g/dL      ALT (SGPT) 26 U/L      AST (SGOT) 29 U/L      Comment: Slight hemolysis detected by analyzer. Results may be affected.        Alkaline Phosphatase 61 U/L      Total Bilirubin 0.3 mg/dL      eGFR  African Amer 109 mL/min/1.73      Globulin 3.7 gm/dL      A/G Ratio 1.0 g/dL      BUN/Creatinine Ratio 10.5     Anion Gap 14.0 mmol/L     Narrative:      GFR Normal >60  Chronic Kidney Disease <60  Kidney Failure <15      Phosphorus [[id number]]  (Normal) Collected: 11/24/21 0528    Specimen: Blood Updated: 11/24/21 0708     Phosphorus 2.6 mg/dL     Magnesium [[id number]]  (Normal) Collected: 11/24/21 0528    Specimen: Blood Updated: 11/24/21 0708     Magnesium 1.9 mg/dL     Tryptase [[id number]] Collected: 11/20/21 1558    Specimen: Blood Updated: 11/24/21 0007     Tryptase 4.5 ug/L     Narrative:      Performed at:  [address] [place] [address]  [id number]  : Michelle Laws MD, Phone:  [phone]        Imaging Results (Last 72 Hours)     Procedure Component Value Units Date/Time    XR Chest 1 View [[id number]] Collected: 11/23/21 0459     Updated: 11/23/21 0724    Narrative:      EXAM: XR CHEST 1 VIEW    HISTORY: Patient on ventilator, T78.3XXA Angioneurotic edema,  initial encounter    COMPARISON:  11/22/2021    TECHNIQUE:   Portable view of the chest.    FINDINGS:   Interval removal of the ETT and NGT.  The lungs are well aerated. There is no pleural effusion. The  heart size is within normal limits. The mediastinal contours are  within normal limits. . Tortuous thoracic aorta.  No evidence of focal consolidation. Unremarkable bronchovascular  markings. Unremarkable bilateral hemidiaphragms.  Unremarkable visualized osseous structures.      Impression:      1.  There is no acute intrathoracic process.  2.  Patient is now extubated, with removal of the NGT.          Electronically signed by:  Marquis Reyes MD  11/23/2021 7:23 AM CST  Workstation: [id number]    XR Chest 1 View [[id number]] Collected: 11/22/21 0554     Updated: 11/22/21 0713    Narrative:        PROCEDURE: Single chest view portable    REASON FOR EXAM:Patient on ventilator, T78.3XXA Angioneurotic  edema, initial encounter    FINDINGS: Comparison exam dated November 21, 2021. ET tube with  tip 6.2 cm above julian. NG tube with tip below the level  diaphragm. Cardiac and pulmonary vasculature are normal. Lungs  are clear. Pleural spaces are normal. No acute osseous  abnormality.      Impression:      1.  Tubes as described above.  2.  No acute cardiopulmonary abnormality.    Electronically signed by:  Tanner Portillo MD  11/22/2021 7:12 AM CST  Workstation: [id number]             HOSPITAL COURSE:    The patient was admitted for angioedema and started on a 5-day course of steroids and Benadryl with epinephrine as needed for anaphylaxis. His lisinopril and naltrexone were discontinued, as both of these medicines have been associated with anaphylactic reactions and angioedema. The patient was placed on CIWA protocol due to his history of alcohol use.    After he was admitted to the family medicine service, he was then given epinephrine and intubated by the emergency room physicians and anesthesia to protect his airway.  Initially, the hospitalist were  consulted by the ED to manage the patient's vent but declined.  High acuity teleinterventionalists were then consulted by the ED and put in initial vent settings. The patient's angioedema resolved to the point where he was able to be extubated successfully.  He completed a 5-day course of steroids and Benadryl and was provided with an EpiPen prior to discharge and instructions to the pharmacy to go over its use with him.  The patient's tryptase level was normal as well as C4 complement.  However, the patient did have increased C1 esterase inhibitor above the range of normal.  Elevated levels of C1 esterase inhibitors can be seen in type II angioedema, and the patient was strongly encouraged to follow-up with an allergy clinic outpatient.    The patient had a longstanding history of alcohol abuse and was placed on CIWA protocol after admission due to drinking 1 to 2 pints a day of whiskey.  He was put on Precedex while he was intubated and did well.  After extubation, he was started on Librium and discharged with instructions to complete Librium taper.  This medicine was called in to his pharmacy.  He was also provided with a prescription for nicotine patches to help stop smoking and given an EpiPen.  His naltrexone and lisinopril were permanently discontinued he was instructed to get rid of those medicines and never take them again.  He expressed interest in quitting drinking while inpatient and it was suggested that his PCP could help facilitate rehab for him.    Patient was discharged home in stable condition on 11/24/2021 and scheduled to follow-up with his outpatient provider and the [facility] allergy clinic.    To do for PCP: Make sure that patient is no longer taking lisinopril or naltrexone.  As the patient's lisinopril was permanently discontinued, if the patient's blood pressure continues to run high, can consider adding hydrochlorothiazide to his existing amlodipine outpatient now that he is no longer on  lisinopril.  As the patient has an elevated cardiovascular risk and hyperlipidemia, could also consider initiating statin therapy.  This was not done inpatient due to recent admission for pancreatitis. The patient is interested in quitting drinking, the patient's PCP could consider rehabilitation.    Also, PCP is encouraged to review the food allergy pending that is pending on this gentleman at the time of discharge and to make sure the patient has follow-up with allergy immunology due to his elevated C1 esterase inhibitor level as an elevated C1 esterase inhibitor can be associated with type II angioedema.       DISCHARGE CONDITION:   Stable     DISPOSITION:  Home or Self Care    DISCHARGE MEDICATIONS     Discharge Medications      New Medications      Instructions Start Date   chlordiazePOXIDE 25 MG capsule  Commonly known as: LIBRIUM   Look at note to pharmacy      EPINEPHrine 0.05 MG/ML syringe  Commonly known as: ADRENALIN   20 mL, Injection, As Needed      nicotine 14 MG/24HR patch  Commonly known as: NICODERM CQ   1 patch, Transdermal, Every 24 Hours Scheduled         Changes to Medications      Instructions Start Date   cetirizine 10 MG tablet  Commonly known as: zyrTEC  What changed: when to take this   TAKE 1 TABLET BY MOUTH EVERY DAY      Triamcinolone Acetonide 55 MCG/ACT nasal inhaler  Commonly known as: NASACORT  What changed: Another medication with the same name was removed. Continue taking this medication, and follow the directions you see here.   2 sprays, Nasal, Daily         Continue These Medications      Instructions Start Date   amLODIPine 10 MG tablet  Commonly known as: NORVASC   10 mg, Oral, Daily      ketotifen 0.025 % ophthalmic solution  Commonly known as: ZADITOR   1 drop, Both Eyes, 2 Times Daily      meloxicam 15 MG tablet  Commonly known as: MOBIC   15 mg, Oral, Daily      montelukast 10 MG tablet  Commonly known as: Singulair   10 mg, Oral, Daily      omeprazole 40 MG  capsule  Commonly known as: priLOSEC   40 mg, Oral, Daily      ProAir  (90 Base) MCG/ACT inhaler  Generic drug: albuterol sulfate HFA   2 puffs, Inhalation, Every 4 Hours PRN         Stop These Medications    Chantix Starting Month Bart 0.5 MG X 11 & 1 MG X 42 tablet  Generic drug: varenicline     lisinopril 40 MG tablet  Commonly known as: PRINIVIL,ZESTRIL     ondansetron 4 MG tablet  Commonly known as: Zofran            INSTRUCTIONS:  Activity:   Activity Instructions     Activity as Tolerated          Diet:   Diet Instructions     Diet: Regular      Discharge Diet: Regular          FOLLOW UP:   Additional Instructions for the Follow-ups that You Need to Schedule     Discharge Follow-up with PCP   As directed       Currently Documented PCP:    Jeramie Singh MD    PCP Phone Number:    [phone]     Follow Up Details: Within 1 week post hospital visit for angioedema         Discharge Follow-up with Specialty: Allergy/Immunolgy; 1 Week   As directed      Specialty: Allergy/Immunolgy    Follow Up: 1 Week    Follow Up Details: Angioedema with elevated C1 esterase            Follow-up Information     Jeramie Singh MD .    Specialty: Family Medicine  Why: Within 1 week post hospital visit for angioedema  Contact information:  [facility] [address] [place] [address] [phone]             [facility] Follow up.    Specialty: Allergy  Contact information:  [address] [place] [id number]  [phone]                       PENDING TEST RESULTS AT DISCHARGE  Pending Labs     Order Current Status    Food Allergy Profile In process          Time: 35 minutes was spent in discharge planning, medication reconciliation and coordination of care for this patient.    Jeffrey Kohler  is the attending at time of discharge, He is aware of the patient's status and agrees with the above discharge summary.      This document has been electronically signed by Demetri Bertrand MD on November 24, 2021 11:25  CST    Part of this note may be an electronic transcription or translation of spoken language to printed text using the Dragon Dictation System              Electronically signed by Jeffrey Kohler MD at 11/24/21 5915

## 2021-11-30 ENCOUNTER — OFFICE VISIT (OUTPATIENT)
Dept: FAMILY MEDICINE CLINIC | Facility: CLINIC | Age: 61
End: 2021-11-30

## 2021-11-30 VITALS
WEIGHT: 176.4 LBS | BODY MASS INDEX: 23.38 KG/M2 | HEART RATE: 100 BPM | SYSTOLIC BLOOD PRESSURE: 132 MMHG | OXYGEN SATURATION: 98 % | DIASTOLIC BLOOD PRESSURE: 80 MMHG | HEIGHT: 73 IN

## 2021-11-30 DIAGNOSIS — Z09 HOSPITAL DISCHARGE FOLLOW-UP: ICD-10-CM

## 2021-11-30 DIAGNOSIS — G89.29 CHRONIC PAIN OF RIGHT KNEE: ICD-10-CM

## 2021-11-30 DIAGNOSIS — M25.561 CHRONIC PAIN OF RIGHT KNEE: ICD-10-CM

## 2021-11-30 DIAGNOSIS — J30.89 NON-SEASONAL ALLERGIC RHINITIS, UNSPECIFIED TRIGGER: Primary | ICD-10-CM

## 2021-11-30 PROCEDURE — 99213 OFFICE O/P EST LOW 20 MIN: CPT | Performed by: CHIROPRACTOR

## 2021-11-30 RX ORDER — FLUTICASONE PROPIONATE 50 MCG
2 SPRAY, SUSPENSION (ML) NASAL DAILY
Qty: 15.8 ML | Refills: 2 | Status: SHIPPED | OUTPATIENT
Start: 2021-11-30 | End: 2022-01-10 | Stop reason: SDUPTHER

## 2021-12-03 ENCOUNTER — READMISSION MANAGEMENT (OUTPATIENT)
Dept: CALL CENTER | Facility: HOSPITAL | Age: 61
End: 2021-12-03

## 2021-12-03 NOTE — OUTREACH NOTE
Medical Week 2 Survey      Responses   [facility] patient discharged from? [facility]   Does the patient have one of the following disease processes/diagnoses(primary or secondary)? Other   Week 2 attempt successful? Yes   Call start time 1545   Discharge diagnosis angioedema r/t allergic reaction (possible due to Lisinopril or Naltrexone)   Call end time 1551   Person spoke with today (if not patient) and relationship Chrissy-sister    Meds reviewed with patient/caregiver? Yes   Is the patient taking all medications as directed (includes completed medication regime)? Yes   Has the patient kept scheduled appointments due by today? Yes   What is the patient's perception of their health status since discharge? Returned to baseline/stable   Week 2 Call Completed? Yes   Is the patient interested in additional calls from an ambulatory ?  NOTE:  applies to high risk patients requiring additional follow-up. No   Revoked No further contact(revokes)-requires comment   Graduated/Revoked comments Pt is back to baseline          Nati Bruno RN

## 2021-12-08 ENCOUNTER — IMMUNIZATION (OUTPATIENT)
Dept: FAMILY MEDICINE CLINIC | Facility: CLINIC | Age: 61
End: 2021-12-08

## 2021-12-08 PROCEDURE — 91300 COVID-19 (PFIZER): CPT | Performed by: SURGERY

## 2021-12-08 PROCEDURE — 0003A COVID-19 (PFIZER): CPT | Performed by: SURGERY
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
## 2021-12-13 NOTE — PROGRESS NOTES
I have seen the patient.  I have reviewed the notes, assessments, and/or procedures performed by Jeramie Singh MD, I concur with her/his documentation and assessment and plan for Zak Mack Lutz.               This document has been electronically signed by Dhiraj Watkins MD on December 13, 2021 11:36 CST

## 2022-01-10 ENCOUNTER — TELEPHONE (OUTPATIENT)
Dept: FAMILY MEDICINE CLINIC | Facility: CLINIC | Age: 62
End: 2022-01-10

## 2022-01-10 DIAGNOSIS — J30.89 NON-SEASONAL ALLERGIC RHINITIS, UNSPECIFIED TRIGGER: ICD-10-CM

## 2022-01-10 RX ORDER — FLUTICASONE PROPIONATE 50 MCG
2 SPRAY, SUSPENSION (ML) NASAL DAILY
Qty: 15.8 ML | Refills: 2 | Status: SHIPPED | OUTPATIENT
Start: 2022-01-10 | End: 2022-07-18

## 2022-01-10 NOTE — TELEPHONE ENCOUNTER
Incoming Refill Request      Medication requested (name and dose): fluticasone (Flonase) 50 MCG/ACT nasal spray    Pharmacy where request should be sent: [facility] [place]    Additional details provided by patient: COMPLETELY OUT. DOES NOT WANT GENERIC    Best call back number: [phone]    Does the patient have less than a 3 day supply:  [x] Yes  [] No    Ness Joseph  01/10/22, 08:58 CST

## 2022-01-13 DIAGNOSIS — M25.561 CHRONIC PAIN OF RIGHT KNEE: Primary | ICD-10-CM

## 2022-01-13 DIAGNOSIS — M17.11 PRIMARY OSTEOARTHRITIS OF RIGHT KNEE: ICD-10-CM

## 2022-01-13 DIAGNOSIS — G89.29 CHRONIC PAIN OF RIGHT KNEE: Primary | ICD-10-CM

## 2022-01-14 ENCOUNTER — OFFICE VISIT (OUTPATIENT)
Dept: ORTHOPEDIC SURGERY | Facility: CLINIC | Age: 62
End: 2022-01-14

## 2022-01-14 VITALS — BODY MASS INDEX: 22.66 KG/M2 | HEIGHT: 73 IN | WEIGHT: 171 LBS

## 2022-01-14 DIAGNOSIS — J44.9 CHRONIC OBSTRUCTIVE PULMONARY DISEASE, UNSPECIFIED COPD TYPE: ICD-10-CM

## 2022-01-14 DIAGNOSIS — I10 ESSENTIAL HYPERTENSION: ICD-10-CM

## 2022-01-14 DIAGNOSIS — M25.561 CHRONIC PAIN OF RIGHT KNEE: Primary | ICD-10-CM

## 2022-01-14 DIAGNOSIS — M17.11 PRIMARY OSTEOARTHRITIS OF RIGHT KNEE: ICD-10-CM

## 2022-01-14 DIAGNOSIS — K21.9 GASTROESOPHAGEAL REFLUX DISEASE WITHOUT ESOPHAGITIS: ICD-10-CM

## 2022-01-14 DIAGNOSIS — G89.29 CHRONIC PAIN OF RIGHT KNEE: Primary | ICD-10-CM

## 2022-01-14 PROCEDURE — 99213 OFFICE O/P EST LOW 20 MIN: CPT | Performed by: ORTHOPAEDIC SURGERY

## 2022-01-14 PROCEDURE — 96372 THER/PROPH/DIAG INJ SC/IM: CPT | Performed by: ORTHOPAEDIC SURGERY

## 2022-01-14 RX ORDER — TRIAMCINOLONE ACETONIDE 40 MG/ML
40 INJECTION, SUSPENSION INTRA-ARTICULAR; INTRAMUSCULAR ONCE
Status: COMPLETED | OUTPATIENT
Start: 2022-01-14 | End: 2022-01-14

## 2022-01-14 RX ADMIN — TRIAMCINOLONE ACETONIDE 40 MG: 40 INJECTION, SUSPENSION INTRA-ARTICULAR; INTRAMUSCULAR at 09:24

## 2022-01-14 NOTE — PROGRESS NOTES
"Zak Lutz is a 61 y.o. male returns for     Chief Complaint   Patient presents with   • Right Knee - Follow-up, Pain       HISTORY OF PRESENT ILLNESS:  F/u right knee pain, xrays done today.  He reports that he is having soreness in his right shoulder his right hip and his right knee.  No new injury.  He reports that his left knee is doing very well.  No problems with the knee replacement.  No fevers or chills.       CONCURRENT MEDICAL HISTORY:    The following portions of the patient's history were reviewed and updated as appropriate: allergies, current medications, past family history, past medical history, past social history, past surgical history and problem list.     ROS  No fevers or chills.  No chest pain or shortness of air.  No GI or  disturbances.    PHYSICAL EXAMINATION:       Ht 185.4 cm (73\")   Wt 77.6 kg (171 lb)   BMI 22.56 kg/m²     Physical Exam  Constitutional:       General: He is not in acute distress.     Appearance: Normal appearance.   Pulmonary:      Effort: Pulmonary effort is normal. No respiratory distress.   Neurological:      Mental Status: He is alert and oriented to person, place, and time.         GAIT:     []  Normal  []  Antalgic    Assistive device: []  None  []  Walker     []  Crutches  []  Cane     []  Wheelchair  []  Stretcher    Right Knee Exam     Comments:  Mild diffuse tenderness.  Good stability on exam.  Mild effusion and mild swelling around the right knee.  Good strength.  Mild crepitus on exam.              XR Knee 1 or 2 View Right    Result Date: 1/14/2022  Narrative: Ordering Provider:  Jeramie Rai MD Ordering Diagnosis/Indication:  Primary osteoarthritis of right knee, Chronic pain of right knee Procedure:  XR KNEE 1 OR 2 VW RIGHT Exam Date:  1/14/22 COMPARISON:  Todays X-rays were compared to previous images dated January 17, 2020.     Impression:  AP bilateral standing of the knees with lateral of the right knee show acceptable position " and alignment of left total knee arthroplasty.  No sign of implant loosening or failure is noted.  Right knee shows mild medial joint space narrowing and mild diffuse arthritic change.  Evidence of a prior proximal fibula fracture is present with complete bony consolidation.  No interval change in comparison to prior x-ray. Jeramie Rai MD 1/14/22     XR Knee Bilateral AP Standing    Result Date: 1/14/2022  Narrative: Ordering Provider:  Jeramie Rai MD Ordering Diagnosis/Indication:  Primary osteoarthritis of right knee, Chronic pain of right knee Procedure:  XR KNEE BILATERAL AP STANDING Exam Date:  1/14/22 COMPARISON:  Todays X-rays were compared to previous images dated January 17, 2020.     Impression:  AP bilateral standing of the knees with lateral of the right knee show acceptable position and alignment of left total knee arthroplasty.  No sign of implant loosening or failure is noted.  Right knee shows mild medial joint space narrowing and mild diffuse arthritic change.  Evidence of a prior proximal fibula fracture is present with complete bony consolidation.  No interval change in comparison to prior x-ray. Jeramie Rai MD 1/14/22             ASSESSMENT:    Diagnoses and all orders for this visit:    Chronic pain of right knee  -     triamcinolone acetonide (KENALOG-40) injection 40 mg    Primary osteoarthritis of right knee  -     triamcinolone acetonide (KENALOG-40) injection 40 mg    Essential hypertension    Chronic obstructive pulmonary disease, unspecified COPD type (HCC)    Gastroesophageal reflux disease without esophagitis          PLAN    We discussed general strength and conditioning exercises.  We discussed continued with progression of motion and activity as tolerated.  He will continue using an assistive device as necessary for weightbearing support.  He will continue with activity as tolerated.  We discussed the possibility of eventual total knee arthroplasty  involving the right knee.  Due to pain in multiple joints along the right side we discussed an IM steroid injection.  Follow-up as needed.    Return if symptoms worsen or fail to improve, for recheck.    Jeramie Rai MD

## 2022-01-21 ENCOUNTER — OFFICE VISIT (OUTPATIENT)
Dept: ORTHOPEDIC SURGERY | Facility: CLINIC | Age: 62
End: 2022-01-21

## 2022-01-21 VITALS — HEIGHT: 73 IN | WEIGHT: 171 LBS | BODY MASS INDEX: 22.66 KG/M2

## 2022-01-21 DIAGNOSIS — G89.29 CHRONIC PAIN OF RIGHT KNEE: Primary | ICD-10-CM

## 2022-01-21 DIAGNOSIS — M17.11 PRIMARY OSTEOARTHRITIS OF RIGHT KNEE: ICD-10-CM

## 2022-01-21 DIAGNOSIS — M25.561 CHRONIC PAIN OF RIGHT KNEE: Primary | ICD-10-CM

## 2022-01-21 PROCEDURE — 20610 DRAIN/INJ JOINT/BURSA W/O US: CPT | Performed by: NURSE PRACTITIONER

## 2022-01-21 RX ADMIN — LIDOCAINE HYDROCHLORIDE 2 ML: 10 INJECTION, SOLUTION INFILTRATION; PERINEURAL at 11:16

## 2022-01-21 RX ADMIN — TRIAMCINOLONE ACETONIDE 40 MG: 40 INJECTION, SUSPENSION INTRA-ARTICULAR; INTRAMUSCULAR at 11:16

## 2022-01-21 NOTE — PROGRESS NOTES
"Zak Lutz is a 61 y.o. male returns for     Chief Complaint   Patient presents with   • Right Knee - Follow-up, Pain       HISTORY OF PRESENT ILLNESS: Patient presents to office for follow-up of chronic right knee pain.  Patient has experienced ongoing right knee pain for several years that has progressively worsened over time.  Patient was recently evaluated in office per Dr. Rai on 1/14/2022 with updated x-rays performed at that time.  Patient has a history of previous left total knee arthroplasty performed in April 2018.  He did well postoperatively with no known complications.  Patient has known osteoarthritic changes in the right knee.  Patient complains of aching pain in the right knee that worsens with weightbearing activity.  Patient reports occasional giving of the right knee.  Patient reports intermittent popping sensations in the right knee.  No new complaints or concerns noted since last office visit.  No falls or injuries reported.     CONCURRENT MEDICAL HISTORY:    The following portions of the patient's history were reviewed and updated as appropriate: allergies, current medications, past family history, past medical history, past social history, past surgical history and problem list.     ROS  No fevers or chills.  No chest pain or shortness of air.  No GI or  disturbances. Right knee pain.     PHYSICAL EXAMINATION:       Ht 185.4 cm (73\")   Wt 77.6 kg (171 lb)   BMI 22.56 kg/m²     Physical Exam  Vitals reviewed.   Constitutional:       General: He is not in acute distress.     Appearance: He is well-developed. He is not ill-appearing.   HENT:      Head: Normocephalic.   Pulmonary:      Effort: Pulmonary effort is normal. No respiratory distress.   Abdominal:      General: There is no distension.      Palpations: Abdomen is soft.   Musculoskeletal:         General: Tenderness (Mild, right knee) present. No swelling, deformity or signs of injury.      Right knee: No effusion. "   Skin:     General: Skin is warm and dry.      Capillary Refill: Capillary refill takes less than 2 seconds.      Findings: No erythema.   Neurological:      Mental Status: He is alert and oriented to person, place, and time.      GCS: GCS eye subscore is 4. GCS verbal subscore is 5. GCS motor subscore is 6.   Psychiatric:         Speech: Speech normal.         Behavior: Behavior normal.         Thought Content: Thought content normal.         Judgment: Judgment normal.         GAIT:     []  Normal  [x]  Antalgic    Assistive device: [x]  None  []  Walker     []  Crutches  []  Cane     []  Wheelchair  []  Stretcher    Right Knee Exam     Tenderness   The patient is experiencing tenderness in the medial joint line (Mild, diffuse).    Range of Motion   Extension: 0   Flexion: 130     Tests   Varus: negative Valgus: negative    Other   Erythema: absent  Sensation: normal  Pulse: present  Swelling: none  Effusion: no effusion present    Comments:  Mild diffuse tenderness.  Good stability on exam.  Mild effusion and mild swelling around the right knee.  Good strength.  Mild crepitus on exam.            XR Knee 1 or 2 View Right    Result Date: 1/14/2022  Narrative: Ordering Provider:  Jeramie Rai MD Ordering Diagnosis/Indication:  Primary osteoarthritis of right knee, Chronic pain of right knee Procedure:  XR KNEE 1 OR 2 VW RIGHT Exam Date:  1/14/22 COMPARISON:  Todays X-rays were compared to previous images dated January 17, 2020.     Impression:  AP bilateral standing of the knees with lateral of the right knee show acceptable position and alignment of left total knee arthroplasty.  No sign of implant loosening or failure is noted.  Right knee shows mild medial joint space narrowing and mild diffuse arthritic change.  Evidence of a prior proximal fibula fracture is present with complete bony consolidation.  No interval change in comparison to prior x-ray. Jeramie Rai MD 1/14/22     XR Knee  Bilateral AP Standing    Result Date: 1/14/2022  Narrative: Ordering Provider:  Jeramie Rai MD Ordering Diagnosis/Indication:  Primary osteoarthritis of right knee, Chronic pain of right knee Procedure:  XR KNEE BILATERAL AP STANDING Exam Date:  1/14/22 COMPARISON:  Todays X-rays were compared to previous images dated January 17, 2020.     Impression:  AP bilateral standing of the knees with lateral of the right knee show acceptable position and alignment of left total knee arthroplasty.  No sign of implant loosening or failure is noted.  Right knee shows mild medial joint space narrowing and mild diffuse arthritic change.  Evidence of a prior proximal fibula fracture is present with complete bony consolidation.  No interval change in comparison to prior x-ray. Jeramie Rai MD 1/14/22         ASSESSMENT:    Diagnoses and all orders for this visit:    Chronic pain of right knee    Primary osteoarthritis of right knee    Other orders  -     Large Joint Arthrocentesis: R knee    Large Joint Arthrocentesis: R knee  Date/Time: 1/21/2022 11:16 AM  Consent given by: patient  Timeout: Immediately prior to procedure a time out was called to verify the correct patient, procedure, equipment, support staff and site/side marked as required   Supporting Documentation  Indications: pain and diagnostic evaluation   Procedure Details  Location: knee - R knee  Preparation: Patient was prepped and draped in the usual sterile fashion  Needle size: 22 G  Approach: anterolateral  Medications administered: 40 mg triamcinolone acetonide 40 MG/ML; 2 mL lidocaine 1 %  Patient tolerance: patient tolerated the procedure well with no immediate complications      PLAN    Patient complains of chronic right knee pain that has been progressively worsening over the course of several years.  Patient was recently seen in office per Dr. Rai and had updated x-rays performed on 1/14/2022.  I have reviewed these x-rays today and  the patient is noted to have moderate narrowing of the medial joint space of the right knee.  No mechanical pain/symptoms reported.  Patient does report intermittent popping sensations and intermittent giving of the right knee.  Patient has a history of left total knee arthroplasty performed per Dr. Rai in April 2018 and did well postoperatively with no known complications.  Recommend proceeding today with an intra-articular injection of steroid to the right knee for management of joint pain/inflammation/swelling.    Recommend the following:    -Rest and activity modification as tolerated and based on pain.  -Modified weightbearing of the affected extremity with use of a cane or walker as needed.  -Gradual progression of weightbearing and activity as pain and swelling allow.  -Conditioning and strengthening exercises of the bilateral knees/legs.  -Elevation and ice therapy to the affected knee to minimize pain/swelling/inflammation.   -Tylenol, Aleve or Ibuprofen as needed for pain/discomfort.    Follow-up in 6 weeks for recheck as needed for any new, worsening or persistent symptoms.  Follow-up sooner as needed.  Consider MRI imaging of the right knee to evaluate for internal derangement.  Consider viscosupplementation for treatment of osteoarthritic pain.  Consider referral to physical therapy.    EMR Dragon/Transciption Disclaimer: Some of this note may be an electronic transcription/translation of spoken language to printed text.  The electronic translation of spoken language may permit erroneous, or at times, nonsensical words or phrases to be inadvertently transcribed. Although I have reviewed the note for such errors, some may still exist.     Return in about 6 weeks (around 3/4/2022), or if symptoms worsen or fail to improve, for Recheck.        This document has been electronically signed by TON Olmstead on January 23, 2022 19:54 CST      TON Olmstead

## 2022-01-23 RX ORDER — TRIAMCINOLONE ACETONIDE 40 MG/ML
40 INJECTION, SUSPENSION INTRA-ARTICULAR; INTRAMUSCULAR
Status: COMPLETED | OUTPATIENT
Start: 2022-01-21 | End: 2022-01-21

## 2022-01-23 RX ORDER — LIDOCAINE HYDROCHLORIDE 10 MG/ML
2 INJECTION, SOLUTION INFILTRATION; PERINEURAL
Status: COMPLETED | OUTPATIENT
Start: 2022-01-21 | End: 2022-01-21

## 2022-02-11 ENCOUNTER — OFFICE VISIT (OUTPATIENT)
Dept: FAMILY MEDICINE CLINIC | Facility: CLINIC | Age: 62
End: 2022-02-11

## 2022-02-11 VITALS
WEIGHT: 167.5 LBS | HEART RATE: 154 BPM | DIASTOLIC BLOOD PRESSURE: 80 MMHG | OXYGEN SATURATION: 96 % | SYSTOLIC BLOOD PRESSURE: 140 MMHG | HEIGHT: 73 IN | BODY MASS INDEX: 22.2 KG/M2

## 2022-02-11 DIAGNOSIS — J31.0 NON-ALLERGIC RHINITIS: Primary | ICD-10-CM

## 2022-02-11 PROCEDURE — 99213 OFFICE O/P EST LOW 20 MIN: CPT | Performed by: STUDENT IN AN ORGANIZED HEALTH CARE EDUCATION/TRAINING PROGRAM

## 2022-02-11 RX ORDER — TRIAMCINOLONE ACETONIDE 55 UG/1
1 SPRAY, METERED NASAL
COMMUNITY
Start: 2021-12-20 | End: 2022-07-18 | Stop reason: SDUPTHER

## 2022-02-11 RX ORDER — OLOPATADINE HYDROCHLORIDE 665 UG/1
2 SPRAY NASAL 2 TIMES DAILY
Qty: 30.5 G | Refills: 2 | Status: SHIPPED | OUTPATIENT
Start: 2022-02-11 | End: 2022-08-29 | Stop reason: SDUPTHER

## 2022-02-11 RX ORDER — NALTREXONE HYDROCHLORIDE 50 MG/1
50 TABLET, FILM COATED ORAL DAILY
COMMUNITY
Start: 2021-11-17 | End: 2022-08-16

## 2022-02-11 RX ORDER — OLOPATADINE HYDROCHLORIDE 1 MG/ML
SOLUTION/ DROPS OPHTHALMIC
COMMUNITY
Start: 2021-11-10 | End: 2022-08-29

## 2022-02-11 RX ORDER — TRIAMCINOLONE ACETONIDE 55 UG/1
SPRAY, METERED NASAL
COMMUNITY
Start: 2021-11-04 | End: 2022-05-20
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
## 2022-02-17 ENCOUNTER — TELEPHONE (OUTPATIENT)
Dept: FAMILY MEDICINE CLINIC | Facility: CLINIC | Age: 62
End: 2022-02-17

## 2022-02-17 NOTE — TELEPHONE ENCOUNTER
"This patient left a v/mail late yesterday, asking for a PA to be sent to his pharmacy for his \"sinus medication\".    If you need to speak with him call [phone].    Thank you,  Mary Anne"

## 2022-02-18 ENCOUNTER — TELEPHONE (OUTPATIENT)
Dept: FAMILY MEDICINE CLINIC | Facility: CLINIC | Age: 62
End: 2022-02-18

## 2022-02-23 ENCOUNTER — OFFICE VISIT (OUTPATIENT)
Dept: FAMILY MEDICINE CLINIC | Facility: CLINIC | Age: 62
End: 2022-02-23

## 2022-02-23 VITALS
HEART RATE: 96 BPM | WEIGHT: 172 LBS | HEIGHT: 73 IN | BODY MASS INDEX: 22.8 KG/M2 | TEMPERATURE: 96.8 F | OXYGEN SATURATION: 99 % | DIASTOLIC BLOOD PRESSURE: 100 MMHG | SYSTOLIC BLOOD PRESSURE: 160 MMHG

## 2022-02-23 DIAGNOSIS — I10 ESSENTIAL HYPERTENSION: Primary | ICD-10-CM

## 2022-02-23 DIAGNOSIS — J31.0 NON-ALLERGIC RHINITIS: ICD-10-CM

## 2022-02-23 PROCEDURE — 99213 OFFICE O/P EST LOW 20 MIN: CPT | Performed by: CHIROPRACTOR

## 2022-02-28 NOTE — PROGRESS NOTES
I have seen the patient.  I have reviewed the notes, assessments, and/or procedures performed by Dr. Singh, I concur with her/his documentation and assessment and plan for Zak Lutz.       This document has been electronically signed by Parminder Govea MD on February 28, 2022 16:52 CST

## 2022-04-06 RX ORDER — MELOXICAM 15 MG/1
15 TABLET ORAL DAILY
COMMUNITY
Start: 2022-03-07 | End: 2022-04-06 | Stop reason: SDUPTHER

## 2022-04-06 RX ORDER — MELOXICAM 15 MG/1
15 TABLET ORAL DAILY
Qty: 30 TABLET | Refills: 2 | Status: SHIPPED | OUTPATIENT
Start: 2022-04-06 | End: 2022-04-07 | Stop reason: SDUPTHER

## 2022-04-07 RX ORDER — MELOXICAM 15 MG/1
15 TABLET ORAL DAILY
Qty: 30 TABLET | Refills: 2 | Status: SHIPPED | OUTPATIENT
Start: 2022-04-07 | End: 2022-08-16

## 2022-05-13 ENCOUNTER — OFFICE VISIT (OUTPATIENT)
Dept: FAMILY MEDICINE CLINIC | Facility: CLINIC | Age: 62
End: 2022-05-13

## 2022-05-13 VITALS
HEART RATE: 96 BPM | DIASTOLIC BLOOD PRESSURE: 76 MMHG | TEMPERATURE: 97.5 F | BODY MASS INDEX: 21.34 KG/M2 | SYSTOLIC BLOOD PRESSURE: 140 MMHG | HEIGHT: 73 IN | WEIGHT: 161 LBS | OXYGEN SATURATION: 94 %

## 2022-05-13 DIAGNOSIS — M25.561 RIGHT KNEE PAIN, UNSPECIFIED CHRONICITY: ICD-10-CM

## 2022-05-13 DIAGNOSIS — I10 ESSENTIAL HYPERTENSION: Primary | ICD-10-CM

## 2022-05-13 PROCEDURE — 99213 OFFICE O/P EST LOW 20 MIN: CPT | Performed by: CHIROPRACTOR

## 2022-05-13 RX ORDER — CHLORTHALIDONE 25 MG/1
25 TABLET ORAL DAILY
Qty: 30 TABLET | Refills: 2 | Status: SHIPPED | OUTPATIENT
Start: 2022-05-13 | End: 2022-08-09
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
## 2022-05-20 ENCOUNTER — OFFICE VISIT (OUTPATIENT)
Dept: ORTHOPEDIC SURGERY | Facility: CLINIC | Age: 62
End: 2022-05-20

## 2022-05-20 VITALS — BODY MASS INDEX: 21.6 KG/M2 | HEIGHT: 73 IN | WEIGHT: 163 LBS

## 2022-05-20 DIAGNOSIS — G89.29 CHRONIC PAIN OF RIGHT KNEE: Primary | ICD-10-CM

## 2022-05-20 DIAGNOSIS — M17.11 PRIMARY OSTEOARTHRITIS OF RIGHT KNEE: ICD-10-CM

## 2022-05-20 DIAGNOSIS — M25.561 CHRONIC PAIN OF RIGHT KNEE: Primary | ICD-10-CM

## 2022-05-20 PROCEDURE — 99214 OFFICE O/P EST MOD 30 MIN: CPT | Performed by: NURSE PRACTITIONER

## 2022-05-20 RX ORDER — GABAPENTIN 300 MG/1
300 CAPSULE ORAL 2 TIMES DAILY
Qty: 60 CAPSULE | Refills: 1 | Status: SHIPPED | OUTPATIENT
Start: 2022-05-20 | End: 2022-08-23 | Stop reason: SDUPTHER

## 2022-05-20 NOTE — PROGRESS NOTES
I have seen the patient.  I have reviewed the notes, assessments, and/or procedures performed by **Jeramie Singh MD  * during office visit I concur with her/his documentation and assessment and plan for Zak Lutz.              This document has been electronically signed by Tyshanw Pearce MD on May 20, 2022 14:54 CDT

## 2022-05-20 NOTE — PROGRESS NOTES
"Zak Lutz is a 61 y.o. male returns for     Chief Complaint   Patient presents with   • Right Knee - Follow-up, Pain     HISTORY OF PRESENT ILLNESS: Patient presents to office for follow-up of chronic right knee pain.  Initial onset of right knee pain occurred several years ago and has progressively worsened over time.  The patient was last seen in office on 1/21/2022 and given an intra-articular injection of steroid at that time, which he states did offer good improvement.  Patient states his right knee pain has worsened and he also reports that he sustained a fall about 2 weeks ago when his right knee \"gave out\".  Patient states he continues to have intermittent buckling of the right knee and intermittent popping sensations.  Patient has a history of previous left total knee arthroplasty performed in April 2018 and he did well following the surgery with no known complications.  Patient has known osteoarthritic changes in the right knee from prior x-ray imaging.  Patient primarily complains of aching pain in the right knee that worsens with weightbearing activity.  X-rays are repeated today in office due to his recent fall and to rule out acute/bony injury.  Patient is currently taking Meloxicam 15 mg daily as prescribed by his PCP.  Pain scale currently is 4/10.     CONCURRENT MEDICAL HISTORY:    The following portions of the patient's history were reviewed and updated as appropriate: allergies, current medications, past family history, past medical history, past social history, past surgical history and problem list.     ROS  No fevers or chills.  No chest pain or shortness of air.  No GI or  disturbances.  Right knee pain.    PHYSICAL EXAMINATION:       Ht 185.4 cm (73\")   Wt 73.9 kg (163 lb)   BMI 21.51 kg/m²     Physical Exam  Vitals reviewed.   Constitutional:       General: He is not in acute distress.     Appearance: He is well-developed. He is not ill-appearing.   HENT:      Head: " Normocephalic.   Pulmonary:      Effort: Pulmonary effort is normal. No respiratory distress.   Abdominal:      General: There is no distension.      Palpations: Abdomen is soft.   Musculoskeletal:         General: Tenderness (Mild, right knee) present. No swelling, deformity or signs of injury.      Right knee: No effusion.      Instability Tests: Medial Kalia test negative and lateral Kalia test negative.   Skin:     General: Skin is warm and dry.      Capillary Refill: Capillary refill takes less than 2 seconds.      Findings: No erythema.   Neurological:      Mental Status: He is alert and oriented to person, place, and time.      GCS: GCS eye subscore is 4. GCS verbal subscore is 5. GCS motor subscore is 6.   Psychiatric:         Speech: Speech normal.         Behavior: Behavior normal.         Thought Content: Thought content normal.         Judgment: Judgment normal.         GAIT:     []  Normal  [x]  Antalgic (mild)    Assistive device: [x]  None  []  Walker     []  Crutches  []  Cane     []  Wheelchair  []  Stretcher    Right Knee Exam     Tenderness   The patient is experiencing tenderness in the medial joint line and patella (Mild, diffuse).    Range of Motion   Extension: 0   Flexion: 130     Tests   Kalia:  Medial - negative Lateral - negative  Varus: negative Valgus: negative    Other   Erythema: absent  Sensation: normal  Pulse: present  Swelling: none  Effusion: no effusion present            XR Knee 1 or 2 View Right    Result Date: 5/21/2022  Narrative: PROCEDURE: Bilateral knee x-rays with three views. INDICATION: pain, M25.561 Pain in right knee G89.29 Other chronic pain M17.11 Unilateral primary osteoarthritis, right knee COMPARISON: 1/14/2022 bilateral knee x-rays. 5/12/2014 right knee x-rays. FINDINGS: Right knee: May be a tiny right suprapatellar joint effusion. Moderate narrowing patellofemoral joint space with tiny posterior osteophytes. Narrowing medial joint space. Old healed  fracture proximal fibular shaft. Left knee: Left total knee prosthesis with anatomic alignment of components.     Impression: Right knee moderate patellofemoral and mild medial joint compartment degenerative arthritic change. Old healed proximal right fibular shaft fracture. Left total knee prosthesis with anatomic alignment. [id number] Electronically signed by:  Sourav Eric MD  5/21/2022 1:57 PM CDT Workstation: [id number]    XR Knee Bilateral AP Standing    Result Date: 5/21/2022  Narrative: PROCEDURE: Bilateral knee x-rays with three views. INDICATION: pain, M25.561 Pain in right knee G89.29 Other chronic pain M17.11 Unilateral primary osteoarthritis, right knee COMPARISON: 1/14/2022 bilateral knee x-rays. 5/12/2014 right knee x-rays. FINDINGS: Right knee: May be a tiny right suprapatellar joint effusion. Moderate narrowing patellofemoral joint space with tiny posterior osteophytes. Narrowing medial joint space. Old healed fracture proximal fibular shaft. Left knee: Left total knee prosthesis with anatomic alignment of components.     Impression: Right knee moderate patellofemoral and mild medial joint compartment degenerative arthritic change. Old healed proximal right fibular shaft fracture. Left total knee prosthesis with anatomic alignment. [id number] Electronically signed by:  Sourav Eric MD  5/21/2022 1:57 PM [email or web address] Workstation: [id number]        ASSESSMENT:    Diagnoses and all orders for this visit:    Chronic pain of right knee  -     XR Knee 1 or 2 View Right; Future  -     XR Knee Bilateral AP Standing; Future  -     gabapentin (Neurontin) 300 MG capsule; Take 1 capsule by mouth 2 (Two) Times a Day.    Primary osteoarthritis of right knee  -     XR Knee 1 or 2 View Right; Future  -     XR Knee Bilateral AP Standing; Future  -     gabapentin (Neurontin) 300 MG capsule; Take 1 capsule by mouth 2 (Two) Times a Day.    PLAN     AP standing x-ray of the bilateral knees with a lateral x-ray of the right knee  "performed in office today and reviewed with no acute findings noted.  Patient has evidence of moderate osteoarthritic changes in the right knee at the medial and patellofemoral compartments.  Patient has redemonstration of the left knee implant from his prior knee arthroplasty, which appears stable and unchanged.  The patient only complains today of right knee pain, which has been progressively worsening over the course of several years.  The patient also sustained a fall approximately 2 weeks ago when his right knee \"gave out\".  Patient continues to have intermittent buckling of the right knee.  Recommend proceeding today with a repeat intra-articular injection of steroid to the right knee for management of joint pain/inflammation/swelling.  Recommend use of a hinged knee brace for added support.  This is placed/fitted in office today.  If the patient continues to have instability symptoms, MRI imaging may be of benefit as well as referral to physical therapy for conditioning and strengthening exercises.    Recommend the following:    -Rest and activity modification as tolerated and based on pain.  -Modified weightbearing of the affected extremity with use of a cane or walker as needed.  -Gradual progression of weightbearing and activity as pain and swelling allow.  -Conditioning and strengthening exercises of the bilateral knees/legs.  -Elevation and ice therapy to the affected knee to minimize pain/swelling/inflammation.   -Continue Meloxicam 15 mg daily as needed for control of knee pain/inflammation as previously prescribed by his PCP.  Patient can also take Tylenol as needed for additional pain control.    Neurontin 300 mg is prescribed to take twice daily as needed for pain control.  Patient specifically requested Neurontin today and states he has taken Neurontin in the past for knee pain and it was beneficial.  Patient states he does not do his well with opioids but the Neurontin was beneficial to him and he " did not experience any adverse effects.  We discussed initially starting it once at bedtime as it would likely cause drowsiness and once he develops some tolerance, he can add a second dose during the day as needed.  We discussed that if the once daily dose is effective, then he should stay with that dosing regimen.  SUELLEN is reviewed internally via Epic and is noted be appropriate.    Follow-up in 4 weeks for recheck as needed for any new, worsening or persistent symptoms.  We discussed that if he gets good relief with the injection, he can follow-up on an as-needed basis.  Consider MRI imaging of the right knee for further evaluation if he continues to have pain.  Consider trial of viscosupplementation for further management of his osteoarthritic pain.  Consider referral to physical therapy.    Time spent of a minimum of 30 minutes including the face to face evaluation, reviewing of medical history and prior medial records, reviewing of diagnostic studies, prescription drug management, documentation, patient education and coordination of care.     EMR Dragon/FreeWheeliption Disclaimer: Some of this note may be an electronic transcription/translation of spoken language to printed text using the Dragon Dictation System.     Return in about 4 weeks (around 6/17/2022), or if symptoms worsen or fail to improve, for Recheck.        This document has been electronically signed by TON Olmstead on May 22, 2022 13:42 CDT      TON Olmstead

## 2022-06-08 ENCOUNTER — IMMUNIZATION (OUTPATIENT)
Dept: FAMILY MEDICINE CLINIC | Facility: CLINIC | Age: 62
End: 2022-06-08

## 2022-06-08 PROCEDURE — 0054A COVID-19 (PFIZER) 12+ YRS: CPT | Performed by: SURGERY

## 2022-06-08 PROCEDURE — 91305 COVID-19 (PFIZER) 12+ YRS: CPT | Performed by: SURGERY

## 2022-07-16 DIAGNOSIS — J30.89 NON-SEASONAL ALLERGIC RHINITIS, UNSPECIFIED TRIGGER: ICD-10-CM

## 2022-07-18 RX ORDER — FLUTICASONE PROPIONATE 50 MCG
SPRAY, SUSPENSION (ML) NASAL
Qty: 16 ML | Refills: 2 | Status: SHIPPED | OUTPATIENT
Start: 2022-07-18 | End: 2023-01-06

## 2022-08-08 DIAGNOSIS — R09.82 POST-NASAL DRIP: ICD-10-CM

## 2022-08-08 DIAGNOSIS — J30.9 ALLERGIC RHINITIS, UNSPECIFIED SEASONALITY, UNSPECIFIED TRIGGER: ICD-10-CM

## 2022-08-09 DIAGNOSIS — I10 ESSENTIAL HYPERTENSION: ICD-10-CM

## 2022-08-09 RX ORDER — CHLORTHALIDONE 25 MG/1
TABLET ORAL
Qty: 30 TABLET | Refills: 2 | Status: SHIPPED | OUTPATIENT
Start: 2022-08-09 | End: 2022-08-16

## 2022-08-14 RX ORDER — MONTELUKAST SODIUM 10 MG/1
TABLET ORAL
Qty: 90 TABLET | Refills: 19 | Status: SHIPPED | OUTPATIENT
Start: 2022-08-14 | End: 2023-01-18

## 2022-08-16 ENCOUNTER — OFFICE VISIT (OUTPATIENT)
Dept: FAMILY MEDICINE CLINIC | Facility: CLINIC | Age: 62
End: 2022-08-16

## 2022-08-16 VITALS
WEIGHT: 164.5 LBS | DIASTOLIC BLOOD PRESSURE: 80 MMHG | HEIGHT: 73 IN | SYSTOLIC BLOOD PRESSURE: 128 MMHG | TEMPERATURE: 97.5 F | OXYGEN SATURATION: 98 % | HEART RATE: 90 BPM | BODY MASS INDEX: 21.8 KG/M2

## 2022-08-16 DIAGNOSIS — K92.1 BLOOD IN STOOL: Primary | ICD-10-CM

## 2022-08-16 DIAGNOSIS — K92.0 HEMATEMESIS WITH NAUSEA: ICD-10-CM

## 2022-08-16 PROCEDURE — 99213 OFFICE O/P EST LOW 20 MIN: CPT | Performed by: CHIROPRACTOR
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
## 2022-08-18 NOTE — PROGRESS NOTES
I have seen the patient.  I have reviewed the notes, assessments, and/or procedures performed by Jeramie Singh MD  during office visit I concur with her/his documentation and assessment and plan for Zak Lutz.            This document has been electronically signed by Bobbi Jerez MD on August 18, 2022 09:31 CDT

## 2022-08-23 DIAGNOSIS — M25.561 CHRONIC PAIN OF RIGHT KNEE: ICD-10-CM

## 2022-08-23 DIAGNOSIS — G89.29 CHRONIC PAIN OF RIGHT KNEE: ICD-10-CM

## 2022-08-23 DIAGNOSIS — M17.11 PRIMARY OSTEOARTHRITIS OF RIGHT KNEE: ICD-10-CM

## 2022-08-23 RX ORDER — GABAPENTIN 300 MG/1
300 CAPSULE ORAL 2 TIMES DAILY
Qty: 60 CAPSULE | Refills: 1 | Status: SHIPPED | OUTPATIENT
Start: 2022-08-23 | End: 2022-09-16 | Stop reason: DRUGHIGH

## 2022-08-26 DIAGNOSIS — J31.0 NON-ALLERGIC RHINITIS: ICD-10-CM

## 2022-08-29 DIAGNOSIS — J31.0 NON-ALLERGIC RHINITIS: ICD-10-CM

## 2022-08-29 RX ORDER — OLOPATADINE HYDROCHLORIDE 1 MG/ML
1 SOLUTION/ DROPS OPHTHALMIC 2 TIMES DAILY
Qty: 5 ML | Refills: 11 | Status: SHIPPED | OUTPATIENT
Start: 2022-08-29

## 2022-08-29 RX ORDER — OLOPATADINE HYDROCHLORIDE 665 UG/1
2 SPRAY NASAL 2 TIMES DAILY
Qty: 30.5 G | Refills: 2 | Status: SHIPPED | OUTPATIENT
Start: 2022-08-29 | End: 2023-01-18

## 2022-08-29 RX ORDER — OLOPATADINE HYDROCHLORIDE 665 UG/1
2 SPRAY NASAL 2 TIMES DAILY
Qty: 30.5 G | Refills: 2 | Status: CANCELLED | OUTPATIENT
Start: 2022-08-29

## 2022-08-29 RX ORDER — OLOPATADINE HYDROCHLORIDE 1 MG/ML
SOLUTION/ DROPS OPHTHALMIC
Status: CANCELLED | OUTPATIENT
Start: 2022-08-29

## 2022-09-13 ENCOUNTER — TELEPHONE (OUTPATIENT)
Dept: FAMILY MEDICINE CLINIC | Facility: CLINIC | Age: 62
End: 2022-09-13

## 2022-09-13 NOTE — TELEPHONE ENCOUNTER
Patient has called stating Dr Singh has taken him off MELOXICAM and upped the dose on gabapentin (Neurontin) 300 MG capsule.  However, when he picked it up from [facility] it is the same 300 mg dosage.  He has asked for a return call to discuss.  The # to call is [phone].    ZEUS [place]

## 2022-09-14 ENCOUNTER — TELEPHONE (OUTPATIENT)
Dept: ORTHOPEDIC SURGERY | Facility: CLINIC | Age: 62
End: 2022-09-14

## 2022-09-14 NOTE — TELEPHONE ENCOUNTER
DERRICK.  PATIENT CALLED STATING DR COWAN TOOK HIM OFF THE MELOXICAM AND ASKED YOU TO RAISE THE DOSAGE OF HIS GABAPENTIN.  HE SAID HE DID NOT NEED TO BE TAKING BOTH,  HE USES [facility].

## 2022-09-16 DIAGNOSIS — M17.11 PRIMARY OSTEOARTHRITIS OF RIGHT KNEE: ICD-10-CM

## 2022-09-16 DIAGNOSIS — G89.29 CHRONIC PAIN OF RIGHT KNEE: Primary | ICD-10-CM

## 2022-09-16 DIAGNOSIS — M25.561 CHRONIC PAIN OF RIGHT KNEE: Primary | ICD-10-CM

## 2022-09-16 RX ORDER — GABAPENTIN 300 MG/1
300 CAPSULE ORAL 3 TIMES DAILY
Qty: 90 CAPSULE | Refills: 1 | Status: SHIPPED | OUTPATIENT
Start: 2022-09-16 | End: 2023-01-18

## 2022-09-16 NOTE — TELEPHONE ENCOUNTER
Please let the patient know that I increased the Neurontin to 300 mg 3 times daily instead of 2 times daily, which increases his daily dose.  Beyond that, I cannot increase his dose of Neurontin as this is not a typical treatment for knee pain anyway. If his primary care wants to increase his dose further than that in the future, that would be up to him.  Thanks.

## 2022-09-23 ENCOUNTER — OFFICE VISIT (OUTPATIENT)
Dept: FAMILY MEDICINE CLINIC | Facility: CLINIC | Age: 62
End: 2022-09-23

## 2022-09-23 VITALS
HEART RATE: 92 BPM | HEIGHT: 73 IN | OXYGEN SATURATION: 95 % | SYSTOLIC BLOOD PRESSURE: 140 MMHG | WEIGHT: 166 LBS | BODY MASS INDEX: 22 KG/M2 | DIASTOLIC BLOOD PRESSURE: 88 MMHG

## 2022-09-23 DIAGNOSIS — J06.9 VIRAL UPPER RESPIRATORY TRACT INFECTION: ICD-10-CM

## 2022-09-23 DIAGNOSIS — F17.200 SMOKER: Primary | ICD-10-CM

## 2022-09-23 PROCEDURE — 99213 OFFICE O/P EST LOW 20 MIN: CPT | Performed by: CHIROPRACTOR

## 2022-09-23 RX ORDER — CHLORTHALIDONE 25 MG/1
25 TABLET ORAL DAILY
COMMUNITY
Start: 2022-09-18 | End: 2022-09-23
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
## 2022-09-26 ENCOUNTER — TELEPHONE (OUTPATIENT)
Dept: FAMILY MEDICINE CLINIC | Facility: CLINIC | Age: 62
End: 2022-09-26

## 2022-09-26 DIAGNOSIS — J06.9 VIRAL URI WITH COUGH: Primary | ICD-10-CM

## 2022-09-26 RX ORDER — BENZONATATE 100 MG/1
100 CAPSULE ORAL 3 TIMES DAILY PRN
Qty: 30 CAPSULE | Refills: 0 | Status: SHIPPED | OUTPATIENT
Start: 2022-09-26 | End: 2023-01-17

## 2022-09-26 NOTE — TELEPHONE ENCOUNTER
Patient called to let Dr Pham know he is still coughing and has a headache. Patient stated he was going to order a head ct if he wasn't better.    His#[phone]

## 2022-09-26 NOTE — TELEPHONE ENCOUNTER
Told patient to call Monday if he is still feeling bad.  He did indeed call today.  He reports he does not have any fevers or chills and his stomach does feel better however he continues to have cough that is so severe it results in a headache.  He has tried Mucinex and Benadryl with no help.  Advised him I would call him in some Tessalon Perles to take.  Also told him that I had ordered the CAT scan for his lung since he is a chronic smoker.    This document has been electronically signed by Jeramie Singh MD on September 26, 2022 13:14 CDT

## 2022-09-29 ENCOUNTER — OFFICE VISIT (OUTPATIENT)
Dept: FAMILY MEDICINE CLINIC | Facility: CLINIC | Age: 62
End: 2022-09-29

## 2022-09-29 ENCOUNTER — TELEPHONE (OUTPATIENT)
Dept: FAMILY MEDICINE CLINIC | Facility: CLINIC | Age: 62
End: 2022-09-29

## 2022-09-29 VITALS — WEIGHT: 166 LBS | HEIGHT: 73 IN | BODY MASS INDEX: 22 KG/M2

## 2022-09-29 DIAGNOSIS — J06.9 VIRAL URI WITH COUGH: Primary | ICD-10-CM

## 2022-09-29 PROCEDURE — 99442 PR PHYS/QHP TELEPHONE EVALUATION 11-20 MIN: CPT | Performed by: STUDENT IN AN ORGANIZED HEALTH CARE EDUCATION/TRAINING PROGRAM

## 2022-09-29 RX ORDER — GUAIFENESIN 200 MG/10ML
200 LIQUID ORAL 3 TIMES DAILY PRN
Qty: 180 ML | Refills: 0 | Status: SHIPPED | OUTPATIENT
Start: 2022-09-29 | End: 2022-10-06 | Stop reason: SDUPTHER

## 2022-09-29 NOTE — PROGRESS NOTES
I have spoken with the patient .     I have reviewed the notes, assessments, and/or procedures performed by Dr. Carmine Shea,   I concur with   his  documentation and assessment and plan for Zak Lutz.          This document has been electronically signed by Nitin Corbin MD on September 29, 2022 16:56 CDT
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
## 2022-09-29 NOTE — TELEPHONE ENCOUNTER
Patient has called asking for the doctor to send in a RX for cough medication to [facility].  He stated he is coughing so hard he loses his breath.  If you wish to speak with him call [phone].    [facility]

## 2022-09-30 ENCOUNTER — HOSPITAL ENCOUNTER (OUTPATIENT)
Dept: CT IMAGING | Facility: HOSPITAL | Age: 62
Discharge: HOME | End: 2022-09-30
Admitting: FAMILY MEDICINE

## 2022-09-30 DIAGNOSIS — F17.200 SMOKER: ICD-10-CM

## 2022-09-30 PROCEDURE — 71271 CT THORAX LUNG CANCER SCR C-: CPT

## 2022-10-05 ENCOUNTER — TELEPHONE (OUTPATIENT)
Dept: FAMILY MEDICINE CLINIC | Facility: CLINIC | Age: 62
End: 2022-10-05

## 2022-10-06 ENCOUNTER — TELEPHONE (OUTPATIENT)
Dept: FAMILY MEDICINE CLINIC | Facility: CLINIC | Age: 62
End: 2022-10-06

## 2022-10-06 DIAGNOSIS — J06.9 VIRAL URI WITH COUGH: ICD-10-CM

## 2022-10-06 RX ORDER — GUAIFENESIN 200 MG/10ML
200 LIQUID ORAL 3 TIMES DAILY PRN
Qty: 473 ML | Refills: 0 | Status: SHIPPED | OUTPATIENT
Start: 2022-10-06 | End: 2022-11-03 | Stop reason: SDUPTHER

## 2022-10-06 NOTE — TELEPHONE ENCOUNTER
Patient has called asking for a refill on guaifenesin (ROBITUSSIN) 100 MG/5ML liquid, to be sent to [facility] pharmacy.  He stated he is still congested.  If you need to speak with him call [phone].    Also, he asked about the CT scan results.    [facility]

## 2022-10-06 NOTE — TELEPHONE ENCOUNTER
Called pt to go over CT and gave him more cough meds.        This document has been electronically signed by Jeramie Singh MD on October 6, 2022 17:38 CDT

## 2022-10-17 ENCOUNTER — OFFICE VISIT (OUTPATIENT)
Dept: FAMILY MEDICINE CLINIC | Facility: CLINIC | Age: 62
End: 2022-10-17

## 2022-10-17 VITALS
SYSTOLIC BLOOD PRESSURE: 136 MMHG | HEIGHT: 73 IN | DIASTOLIC BLOOD PRESSURE: 80 MMHG | BODY MASS INDEX: 21.86 KG/M2 | HEART RATE: 96 BPM | WEIGHT: 164.9 LBS | OXYGEN SATURATION: 98 %

## 2022-10-17 DIAGNOSIS — R05.8 POST-VIRAL COUGH SYNDROME: Primary | ICD-10-CM

## 2022-10-17 PROCEDURE — 99212 OFFICE O/P EST SF 10 MIN: CPT | Performed by: CHIROPRACTOR

## 2022-10-17 RX ORDER — CHLORTHALIDONE 25 MG/1
TABLET ORAL
COMMUNITY
Start: 2022-10-16 | End: 2022-11-11
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
## 2022-10-18 NOTE — PROGRESS NOTES
I have seen the patient and I have reviewed the notes, assessments, and/or procedures performed by Jeramie Singh MD during office visit. I concur with her/his documentation and assessment and plan for Zak Frederick Bolivar.           This document has been electronically signed by Dhiraj Watkins MD on October 18, 2022 10:24 CDT

## 2022-11-03 ENCOUNTER — OFFICE VISIT (OUTPATIENT)
Dept: FAMILY MEDICINE CLINIC | Facility: CLINIC | Age: 62
End: 2022-11-03

## 2022-11-03 VITALS — BODY MASS INDEX: 21.74 KG/M2 | HEIGHT: 73 IN | WEIGHT: 164 LBS

## 2022-11-03 DIAGNOSIS — J06.9 VIRAL URI WITH COUGH: ICD-10-CM

## 2022-11-03 DIAGNOSIS — R05.8 POST-VIRAL COUGH SYNDROME: Primary | ICD-10-CM

## 2022-11-03 PROCEDURE — 99442 PR PHYS/QHP TELEPHONE EVALUATION 11-20 MIN: CPT | Performed by: CHIROPRACTOR

## 2022-11-03 RX ORDER — GUAIFENESIN 200 MG/10ML
200 LIQUID ORAL 3 TIMES DAILY PRN
Qty: 473 ML | Refills: 0 | Status: SHIPPED | OUTPATIENT
Start: 2022-11-03 | End: 2022-12-06 | Stop reason: SDUPTHER
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
## 2022-11-11 DIAGNOSIS — I10 ESSENTIAL (PRIMARY) HYPERTENSION: ICD-10-CM

## 2022-11-11 RX ORDER — CHLORTHALIDONE 25 MG/1
TABLET ORAL
Qty: 30 TABLET | Refills: 2 | Status: SHIPPED | OUTPATIENT
Start: 2022-11-11 | End: 2023-01-17

## 2022-12-06 ENCOUNTER — TELEPHONE (OUTPATIENT)
Dept: FAMILY MEDICINE CLINIC | Facility: CLINIC | Age: 62
End: 2022-12-06

## 2022-12-06 DIAGNOSIS — J06.9 VIRAL URI WITH COUGH: ICD-10-CM

## 2022-12-06 RX ORDER — GUAIFENESIN 200 MG/10ML
200 LIQUID ORAL 3 TIMES DAILY PRN
Qty: 473 ML | Refills: 0 | Status: SHIPPED | OUTPATIENT
Start: 2022-12-06 | End: 2023-01-17

## 2022-12-06 NOTE — TELEPHONE ENCOUNTER
Incoming Refill Request      Medication requested (name and dose): guaifenesin (ROBITUSSIN) 100 MG/5ML liquid    Pharmacy where request should be sent: [facility]    Additional details provided by patient:Patient has appt next week    Best call back number: [phone]    Does the patient have less than a 3 day supply:  [x] Yes  [] No    Selina Bellamy  12/06/22, 11:16 CST

## 2022-12-14 ENCOUNTER — OFFICE VISIT (OUTPATIENT)
Dept: FAMILY MEDICINE CLINIC | Facility: CLINIC | Age: 62
End: 2022-12-14

## 2022-12-14 VITALS
DIASTOLIC BLOOD PRESSURE: 88 MMHG | HEART RATE: 90 BPM | SYSTOLIC BLOOD PRESSURE: 146 MMHG | OXYGEN SATURATION: 96 % | BODY MASS INDEX: 22.12 KG/M2 | HEIGHT: 73 IN | WEIGHT: 166.9 LBS

## 2022-12-14 DIAGNOSIS — R05.3 CHRONIC COUGH: Primary | ICD-10-CM

## 2022-12-14 DIAGNOSIS — M25.841 CYST OF JOINT OF HAND, RIGHT: ICD-10-CM

## 2022-12-14 PROCEDURE — 99213 OFFICE O/P EST LOW 20 MIN: CPT | Performed by: CHIROPRACTOR

## 2022-12-16 ENCOUNTER — OFFICE VISIT (OUTPATIENT)
Dept: OTOLARYNGOLOGY | Facility: CLINIC | Age: 62
End: 2022-12-16

## 2022-12-16 VITALS — WEIGHT: 167 LBS | HEIGHT: 73 IN | HEART RATE: 110 BPM | OXYGEN SATURATION: 96 % | BODY MASS INDEX: 22.13 KG/M2

## 2022-12-16 DIAGNOSIS — R05.8 COUGH PRODUCTIVE OF CLEAR SPUTUM: ICD-10-CM

## 2022-12-16 DIAGNOSIS — R09.89 GLOBUS SENSATION: ICD-10-CM

## 2022-12-16 DIAGNOSIS — K21.9 LPRD (LARYNGOPHARYNGEAL REFLUX DISEASE): Primary | ICD-10-CM

## 2022-12-16 PROCEDURE — 99204 OFFICE O/P NEW MOD 45 MIN: CPT | Performed by: OTOLARYNGOLOGY

## 2022-12-16 PROCEDURE — 31575 DIAGNOSTIC LARYNGOSCOPY: CPT | Performed by: OTOLARYNGOLOGY

## 2022-12-16 RX ORDER — FAMOTIDINE 20 MG/1
40 TABLET, FILM COATED ORAL NIGHTLY
Qty: 60 TABLET | Refills: 5 | Status: SHIPPED | OUTPATIENT
Start: 2022-12-16 | End: 2023-01-17
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
## 2022-12-29 DIAGNOSIS — M79.641 RIGHT HAND PAIN: Primary | ICD-10-CM

## 2022-12-30 NOTE — PROGRESS NOTES
I have seen this patient and discussed the case with the resident and agree with the assessment and plan.  ADALBERTO Mohr M.D.

## 2023-01-06 DIAGNOSIS — J30.89 NON-SEASONAL ALLERGIC RHINITIS, UNSPECIFIED TRIGGER: ICD-10-CM

## 2023-01-06 RX ORDER — FLUTICASONE PROPIONATE 50 MCG
SPRAY, SUSPENSION (ML) NASAL
Qty: 16 ML | Refills: 2 | Status: SHIPPED | OUTPATIENT
Start: 2023-01-06 | End: 2023-01-18

## 2023-01-17 ENCOUNTER — OFFICE VISIT (OUTPATIENT)
Dept: ORTHOPEDIC SURGERY | Facility: CLINIC | Age: 63
End: 2023-01-17
Payer: COMMERCIAL

## 2023-01-17 VITALS — WEIGHT: 167 LBS | HEIGHT: 73 IN | BODY MASS INDEX: 22.13 KG/M2

## 2023-01-17 DIAGNOSIS — K21.9 GASTROESOPHAGEAL REFLUX DISEASE WITHOUT ESOPHAGITIS: ICD-10-CM

## 2023-01-17 DIAGNOSIS — J44.9 CHRONIC OBSTRUCTIVE PULMONARY DISEASE, UNSPECIFIED COPD TYPE: ICD-10-CM

## 2023-01-17 DIAGNOSIS — R22.31 SUBCUTANEOUS MASS OF RIGHT HAND: Primary | ICD-10-CM

## 2023-01-17 DIAGNOSIS — I10 ESSENTIAL HYPERTENSION: ICD-10-CM

## 2023-01-17 DIAGNOSIS — M79.641 RIGHT HAND PAIN: ICD-10-CM

## 2023-01-17 PROCEDURE — 99214 OFFICE O/P EST MOD 30 MIN: CPT | Performed by: ORTHOPAEDIC SURGERY

## 2023-01-17 RX ORDER — BUPIVACAINE HCL/0.9 % NACL/PF 0.1 %
2 PLASTIC BAG, INJECTION (ML) EPIDURAL ONCE
Status: CANCELLED | OUTPATIENT
Start: 2023-01-20 | End: 2023-01-17
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
## 2023-01-18 ENCOUNTER — PRE-ADMISSION TESTING (OUTPATIENT)
Dept: PREADMISSION TESTING | Facility: HOSPITAL | Age: 63
End: 2023-01-18
Payer: COMMERCIAL

## 2023-01-18 VITALS
WEIGHT: 167 LBS | HEART RATE: 83 BPM | RESPIRATION RATE: 18 BRPM | OXYGEN SATURATION: 98 % | HEIGHT: 73 IN | DIASTOLIC BLOOD PRESSURE: 80 MMHG | SYSTOLIC BLOOD PRESSURE: 120 MMHG | BODY MASS INDEX: 22.13 KG/M2

## 2023-01-18 DIAGNOSIS — M25.561 CHRONIC PAIN OF RIGHT KNEE: ICD-10-CM

## 2023-01-18 DIAGNOSIS — G89.29 CHRONIC PAIN OF RIGHT KNEE: ICD-10-CM

## 2023-01-18 DIAGNOSIS — M17.11 PRIMARY OSTEOARTHRITIS OF RIGHT KNEE: ICD-10-CM

## 2023-01-18 PROCEDURE — 93010 ELECTROCARDIOGRAM REPORT: CPT | Performed by: INTERNAL MEDICINE

## 2023-01-18 PROCEDURE — 93005 ELECTROCARDIOGRAM TRACING: CPT

## 2023-01-18 RX ORDER — SODIUM CHLORIDE, SODIUM GLUCONATE, SODIUM ACETATE, POTASSIUM CHLORIDE AND MAGNESIUM CHLORIDE 526; 502; 368; 37; 30 MG/100ML; MG/100ML; MG/100ML; MG/100ML; MG/100ML
1000 INJECTION, SOLUTION INTRAVENOUS CONTINUOUS PRN
Status: CANCELLED | OUTPATIENT
Start: 2023-01-20

## 2023-01-18 RX ORDER — FLUTICASONE PROPIONATE 50 MCG
2 SPRAY, SUSPENSION (ML) NASAL DAILY PRN
COMMUNITY
End: 2023-04-05

## 2023-01-18 RX ORDER — NICOTINE 21 MG/24HR
1 PATCH, TRANSDERMAL 24 HOURS TRANSDERMAL EVERY 24 HOURS
COMMUNITY

## 2023-01-18 RX ORDER — GABAPENTIN 300 MG/1
CAPSULE ORAL
Qty: 90 CAPSULE | Refills: 1 | Status: SHIPPED | OUTPATIENT
Start: 2023-01-18 | End: 2023-02-09 | Stop reason: SDUPTHER

## 2023-01-18 RX ORDER — MONTELUKAST SODIUM 10 MG/1
10 TABLET ORAL NIGHTLY
COMMUNITY

## 2023-01-18 NOTE — PAT
Chlorhexidine scrub given with instruction sheet.   Instruction reviewed in PAT, understanding verbalized.    Dr Reynolds here to speak with pt and review ekg, no order received.

## 2023-01-20 ENCOUNTER — ANESTHESIA (OUTPATIENT)
Dept: PERIOP | Facility: HOSPITAL | Age: 63
End: 2023-01-20
Payer: COMMERCIAL

## 2023-01-20 ENCOUNTER — ANESTHESIA EVENT (OUTPATIENT)
Dept: PERIOP | Facility: HOSPITAL | Age: 63
End: 2023-01-20
Payer: COMMERCIAL

## 2023-01-20 ENCOUNTER — HOSPITAL ENCOUNTER (OUTPATIENT)
Facility: HOSPITAL | Age: 63
Setting detail: HOSPITAL OUTPATIENT SURGERY
Discharge: HOME | End: 2023-01-20
Attending: ORTHOPAEDIC SURGERY | Admitting: ORTHOPAEDIC SURGERY
Payer: COMMERCIAL

## 2023-01-20 VITALS
OXYGEN SATURATION: 96 % | RESPIRATION RATE: 20 BRPM | TEMPERATURE: 97.1 F | BODY MASS INDEX: 22.13 KG/M2 | HEIGHT: 73 IN | HEART RATE: 66 BPM | WEIGHT: 167 LBS | DIASTOLIC BLOOD PRESSURE: 88 MMHG | SYSTOLIC BLOOD PRESSURE: 144 MMHG

## 2023-01-20 DIAGNOSIS — I10 ESSENTIAL HYPERTENSION: ICD-10-CM

## 2023-01-20 DIAGNOSIS — K21.9 GASTROESOPHAGEAL REFLUX DISEASE WITHOUT ESOPHAGITIS: ICD-10-CM

## 2023-01-20 DIAGNOSIS — R22.31 SUBCUTANEOUS MASS OF RIGHT HAND: ICD-10-CM

## 2023-01-20 DIAGNOSIS — M79.641 RIGHT HAND PAIN: ICD-10-CM

## 2023-01-20 DIAGNOSIS — J44.9 CHRONIC OBSTRUCTIVE PULMONARY DISEASE, UNSPECIFIED COPD TYPE: ICD-10-CM

## 2023-01-20 PROCEDURE — 25010000002 PHENYLEPHRINE 10 MG/ML SOLUTION: Performed by: NURSE ANESTHETIST, CERTIFIED REGISTERED

## 2023-01-20 PROCEDURE — 25010000002 PROPOFOL 200 MG/20ML EMULSION: Performed by: NURSE ANESTHETIST, CERTIFIED REGISTERED

## 2023-01-20 PROCEDURE — 25010000002 CEFAZOLIN PER 500 MG: Performed by: ORTHOPAEDIC SURGERY

## 2023-01-20 PROCEDURE — 25010000002 ONDANSETRON PER 1 MG: Performed by: NURSE ANESTHETIST, CERTIFIED REGISTERED

## 2023-01-20 PROCEDURE — 25010000002 FENTANYL CITRATE (PF) 50 MCG/ML SOLUTION: Performed by: NURSE ANESTHETIST, CERTIFIED REGISTERED

## 2023-01-20 PROCEDURE — 25010000002 MIDAZOLAM PER 1 MG: Performed by: NURSE ANESTHETIST, CERTIFIED REGISTERED

## 2023-01-20 PROCEDURE — 11422 EXC H-F-NK-SP B9+MARG 1.1-2: CPT | Performed by: ORTHOPAEDIC SURGERY

## 2023-01-20 RX ORDER — PROPOFOL 10 MG/ML
INJECTION, EMULSION INTRAVENOUS AS NEEDED
Status: DISCONTINUED | OUTPATIENT
Start: 2023-01-20 | End: 2023-01-20 | Stop reason: SURG

## 2023-01-20 RX ORDER — ACETAMINOPHEN 650 MG/1
650 SUPPOSITORY RECTAL ONCE AS NEEDED
Status: DISCONTINUED | OUTPATIENT
Start: 2023-01-20 | End: 2023-01-20 | Stop reason: HOSPADM

## 2023-01-20 RX ORDER — OXYCODONE HYDROCHLORIDE AND ACETAMINOPHEN 5; 325 MG/1; MG/1
1 TABLET ORAL ONCE AS NEEDED
Status: DISCONTINUED | OUTPATIENT
Start: 2023-01-20 | End: 2023-01-20 | Stop reason: HOSPADM

## 2023-01-20 RX ORDER — DIPHENHYDRAMINE HYDROCHLORIDE 50 MG/ML
12.5 INJECTION INTRAMUSCULAR; INTRAVENOUS
Status: DISCONTINUED | OUTPATIENT
Start: 2023-01-20 | End: 2023-01-20 | Stop reason: HOSPADM

## 2023-01-20 RX ORDER — MEPERIDINE HYDROCHLORIDE 25 MG/ML
12.5 INJECTION INTRAMUSCULAR; INTRAVENOUS; SUBCUTANEOUS
Status: DISCONTINUED | OUTPATIENT
Start: 2023-01-20 | End: 2023-01-20 | Stop reason: HOSPADM

## 2023-01-20 RX ORDER — ACETAMINOPHEN 325 MG/1
650 TABLET ORAL ONCE AS NEEDED
Status: DISCONTINUED | OUTPATIENT
Start: 2023-01-20 | End: 2023-01-20 | Stop reason: HOSPADM

## 2023-01-20 RX ORDER — PROMETHAZINE HYDROCHLORIDE 25 MG/1
25 SUPPOSITORY RECTAL ONCE AS NEEDED
Status: DISCONTINUED | OUTPATIENT
Start: 2023-01-20 | End: 2023-01-20 | Stop reason: HOSPADM

## 2023-01-20 RX ORDER — ONDANSETRON 4 MG/1
4 TABLET, FILM COATED ORAL ONCE AS NEEDED
Status: DISCONTINUED | OUTPATIENT
Start: 2023-01-20 | End: 2023-01-20 | Stop reason: HOSPADM

## 2023-01-20 RX ORDER — OXYCODONE HYDROCHLORIDE AND ACETAMINOPHEN 5; 325 MG/1; MG/1
1 TABLET ORAL EVERY 6 HOURS PRN
Qty: 12 TABLET | Refills: 0 | Status: SHIPPED | OUTPATIENT
Start: 2023-01-20 | End: 2023-03-10

## 2023-01-20 RX ORDER — PROMETHAZINE HYDROCHLORIDE 25 MG/1
25 TABLET ORAL ONCE AS NEEDED
Status: DISCONTINUED | OUTPATIENT
Start: 2023-01-20 | End: 2023-01-20 | Stop reason: HOSPADM

## 2023-01-20 RX ORDER — NALOXONE HCL 0.4 MG/ML
0.4 VIAL (ML) INJECTION AS NEEDED
Status: DISCONTINUED | OUTPATIENT
Start: 2023-01-20 | End: 2023-01-20 | Stop reason: HOSPADM

## 2023-01-20 RX ORDER — ONDANSETRON 2 MG/ML
4 INJECTION INTRAMUSCULAR; INTRAVENOUS ONCE AS NEEDED
Status: COMPLETED | OUTPATIENT
Start: 2023-01-20 | End: 2023-01-20

## 2023-01-20 RX ORDER — EPHEDRINE SULFATE 50 MG/ML
5 INJECTION, SOLUTION INTRAVENOUS ONCE AS NEEDED
Status: DISCONTINUED | OUTPATIENT
Start: 2023-01-20 | End: 2023-01-20 | Stop reason: HOSPADM

## 2023-01-20 RX ORDER — BUPIVACAINE HCL/0.9 % NACL/PF 0.1 %
2 PLASTIC BAG, INJECTION (ML) EPIDURAL ONCE
Status: COMPLETED | OUTPATIENT
Start: 2023-01-20 | End: 2023-01-20

## 2023-01-20 RX ORDER — SODIUM CHLORIDE, SODIUM GLUCONATE, SODIUM ACETATE, POTASSIUM CHLORIDE AND MAGNESIUM CHLORIDE 526; 502; 368; 37; 30 MG/100ML; MG/100ML; MG/100ML; MG/100ML; MG/100ML
1000 INJECTION, SOLUTION INTRAVENOUS CONTINUOUS PRN
Status: DISCONTINUED | OUTPATIENT
Start: 2023-01-20 | End: 2023-01-20 | Stop reason: HOSPADM

## 2023-01-20 RX ORDER — BUPIVACAINE HYDROCHLORIDE 5 MG/ML
INJECTION, SOLUTION EPIDURAL; INTRACAUDAL AS NEEDED
Status: DISCONTINUED | OUTPATIENT
Start: 2023-01-20 | End: 2023-01-20 | Stop reason: HOSPADM

## 2023-01-20 RX ORDER — LIDOCAINE HYDROCHLORIDE 10 MG/ML
INJECTION, SOLUTION EPIDURAL; INFILTRATION; INTRACAUDAL; PERINEURAL AS NEEDED
Status: DISCONTINUED | OUTPATIENT
Start: 2023-01-20 | End: 2023-01-20 | Stop reason: SURG

## 2023-01-20 RX ORDER — MIDAZOLAM HYDROCHLORIDE 1 MG/ML
INJECTION INTRAMUSCULAR; INTRAVENOUS AS NEEDED
Status: DISCONTINUED | OUTPATIENT
Start: 2023-01-20 | End: 2023-01-20 | Stop reason: SURG

## 2023-01-20 RX ORDER — FLUMAZENIL 0.1 MG/ML
0.2 INJECTION INTRAVENOUS AS NEEDED
Status: DISCONTINUED | OUTPATIENT
Start: 2023-01-20 | End: 2023-01-20 | Stop reason: HOSPADM

## 2023-01-20 RX ORDER — FENTANYL CITRATE 50 UG/ML
INJECTION, SOLUTION INTRAMUSCULAR; INTRAVENOUS AS NEEDED
Status: DISCONTINUED | OUTPATIENT
Start: 2023-01-20 | End: 2023-01-20 | Stop reason: SURG

## 2023-01-20 RX ORDER — PHENYLEPHRINE HCL IN 0.9% NACL 0.5 MG/5ML
SYRINGE (ML) INTRAVENOUS AS NEEDED
Status: DISCONTINUED | OUTPATIENT
Start: 2023-01-20 | End: 2023-01-20 | Stop reason: SURG

## 2023-01-20 RX ADMIN — Medication 100 MCG: at 15:03

## 2023-01-20 RX ADMIN — PROPOFOL 120 MG: 10 INJECTION, EMULSION INTRAVENOUS at 14:37

## 2023-01-20 RX ADMIN — Medication 2 G: at 14:39

## 2023-01-20 RX ADMIN — SODIUM CHLORIDE, SODIUM GLUCONATE, SODIUM ACETATE, POTASSIUM CHLORIDE AND MAGNESIUM CHLORIDE 1000 ML: 526; 502; 368; 37; 30 INJECTION, SOLUTION INTRAVENOUS at 12:14

## 2023-01-20 RX ADMIN — FENTANYL CITRATE 50 MCG: 50 INJECTION, SOLUTION INTRAMUSCULAR; INTRAVENOUS at 14:37

## 2023-01-20 RX ADMIN — LIDOCAINE HYDROCHLORIDE 50 MG: 10 INJECTION, SOLUTION EPIDURAL; INFILTRATION; INTRACAUDAL; PERINEURAL at 14:37

## 2023-01-20 RX ADMIN — MIDAZOLAM 2 MG: 1 INJECTION, SOLUTION INTRAMUSCULAR; INTRAVENOUS at 14:22

## 2023-01-20 RX ADMIN — ONDANSETRON 4 MG: 2 INJECTION INTRAMUSCULAR; INTRAVENOUS at 14:22

## 2023-01-20 RX ADMIN — OXYCODONE HYDROCHLORIDE AND ACETAMINOPHEN 1 TABLET: 5; 325 TABLET ORAL at 16:42

## 2023-01-20 RX ADMIN — Medication 100 MCG: at 15:02

## 2023-01-20 RX ADMIN — PROPOFOL 80 MG: 10 INJECTION, EMULSION INTRAVENOUS at 14:52

## 2023-01-20 NOTE — ANESTHESIA PREPROCEDURE EVALUATION
Anesthesia Evaluation     Patient summary reviewed and Nursing notes reviewed   no history of anesthetic complications:  NPO Solid Status: > 8 hours  NPO Liquid Status: > 4 hours           Airway   Mallampati: II  TM distance: >3 FB  Neck ROM: full  possible difficult intubation  Dental    (+) poor dentation    Pulmonary     breath sounds clear to auscultation  (+) a smoker Current Smoked day of surgery, COPD moderate, asthma,  (-) shortness of breath, rhonchi, decreased breath sounds, wheezes  Cardiovascular   Exercise tolerance: excellent (>7 METS)    ECG reviewed  Rhythm: regular  Rate: normal    (+) hypertension less than 2 medications, hyperlipidemia,   (-) past MI, dysrhythmias, angina, AMIN, murmur, carotid bruits, cardiac stents, DVT    ROS comment: EK23  Normal sinus rhythm  Possible Left atrial enlargement  Septal infarct , age undetermined  Abnormal ECG  When compared with ECG of 2018 14:14,  Septal infarct is now Present    Neuro/Psych  (+) numbness (Carpal tunnel and cervicalgia.),    (-) seizures, TIA, CVA  GI/Hepatic/Renal/Endo    (+)  GERD well controlled,  liver disease (Hemangioma ), renal disease (LEONID ),     ROS Comment: Dysphagia couple years ago has since resolved after having esophagus stretched.       Musculoskeletal     (+) chronic pain (right knee ), neck pain,       ROS comment: OA of both knees  Degenerative joint disease    Abdominal    Substance History   (+) alcohol use (hasnt had any for 4 days. ),      OB/GYN negative ob/gyn ROS         Other   arthritis,      ROS/Med Hx Other: Asthma/COPD: pt  uses ProAir inhaler: uses nightly.                   Anesthesia Plan    ASA 3     MAC and general     (Allergic to morphine (hives))  intravenous induction     Anesthetic plan, risks, benefits, and alternatives have been provided, discussed and informed consent has been obtained with: patient.  Pre-procedure education provided  Plan discussed with CRNA.

## 2023-01-20 NOTE — ANESTHESIA PROCEDURE NOTES
Airway  Urgency: elective    Date/Time: 1/20/2023 2:38 PM  End Time:1/20/2023 2:38 PM  Airway not difficult    General Information and Staff    Patient location during procedure: OR  CRNA/CAA: Zita Champion CRNA    Indications and Patient Condition  Indications for airway management: airway protection    Preoxygenated: yes  MILS not maintained throughout  Mask difficulty assessment: 0 - not attempted    Final Airway Details  Final airway type: supraglottic airway      Successful airway: I-gel  Size 4     Number of attempts at approach: 1  Assessment: lips, teeth, and gum same as pre-op and atraumatic intubation
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
## 2023-01-20 NOTE — OP NOTE
HAND PROCEDURE WITHOUT BONE WORK  Procedure Note    Name:    Zak Lutz  YOB: 1960  Date of surgery:   1/20/2023    Pre-op Diagnosis:   Right hand pain [M79.641]  Essential hypertension [I10]  Chronic obstructive pulmonary disease, unspecified COPD type (HCC) [J44.9]  Gastroesophageal reflux disease without esophagitis [K21.9]  Subcutaneous mass of right hand [R22.31]    Post-op Diagnosis:    Post-Op Diagnosis Codes:     * Right hand pain [M79.641]     * Essential hypertension [I10]     * Chronic obstructive pulmonary disease, unspecified COPD type (HCC) [J44.9]     * Gastroesophageal reflux disease without esophagitis [K21.9]     * Subcutaneous mass of right hand [R22.31]    Procedure:  Procedure(s):  EXCISE MASS OF RIGHT PALM    Surgeon:  Surgeon(s):  Jeramie Rai MD    Assistant: Jasmyn Curiel CSA was responsible for performing the following activities: Retraction, Suction, Irrigation, Suturing, Closing and Placing Dressing and their skilled assistance was necessary for the success of this case.     Anesthesia: General, Monitored Anesthesia Care    Staff:   Circulator: Aleida Noriega RN  Scrub Person: Juanita Troy  Assistant: Jasmyn Curiel CSA    Estimated Blood Loss: minimal    Specimens:                ID Type Source Tests Collected by Time   A (Not marked as sent) : mass from right hand Tissue Hand, Right TISSUE EXAM, [facility] ([facility], [facility]) Jeramie Rai MD 1/20/2023 1458         Drains: * No LDAs found *    Findings:  As below    Complications: None    IMPLANTS:   Nothing was implanted during the procedure      PROCEDURE:    Once consent was obtained the patient was taken to the operating room and once adequate anesthesia was obtained the right upper extremity was prepped and draped in standard surgical fashion.  Pneumatic tourniquet was applied and inflated to 250 mmHg.  Surgical timeout was performed.    An incision was made in line with the  main flexion hand crease and overlying the cyst.  The cyst was mainly overlying the middle finger metacarpal.  Once the skin incision was made then we began dissecting around the cyst cavity with dissection scissors and the Cheshire blade.  The cyst was circumferentially dissected down to the interosseous muscle on the radial side.  No true stalk was identified to either the joint space or the tendon sheath.  The cyst measured 1.6 x 1.4 x 0.8 cm.  It was passed off as specimen.    The tourniquet was let down and hemostasis was obtained.  The wound was copiously irrigated with saline.  The wound was then closed with interrupted nylon sutures.  A bulky soft dressing was then applied and the patient was awakened and taken to the recovery in good condition.  He tolerated the procedure very well.    Jeramie Rai MD     Date: 1/20/2023  Time: 15:29 CST

## 2023-01-20 NOTE — ANESTHESIA POSTPROCEDURE EVALUATION
Patient: Zak Lutz    Procedure Summary     Date: 01/20/23 Room / Location: [facility] OR  / [facility] OR    Anesthesia Start: 1424 Anesthesia Stop: 1522    Procedure: EXCISE MASS OF RIGHT PALM (Right: Hand) Diagnosis:       Right hand pain      Essential hypertension      Chronic obstructive pulmonary disease, unspecified COPD type (HCC)      Gastroesophageal reflux disease without esophagitis      Subcutaneous mass of right hand      (Right hand pain [M79.641])      (Essential hypertension [I10])      (Chronic obstructive pulmonary disease, unspecified COPD type (HCC) [J44.9])      (Gastroesophageal reflux disease without esophagitis [K21.9])      (Subcutaneous mass of right hand [R22.31])    Surgeons: Jeramie Rai MD Provider: Zita Champion CRNA    Anesthesia Type: MAC, general ASA Status: 3          Anesthesia Type: MAC, general    Vitals  No vitals data found for the desired time range.          Post Anesthesia Care and Evaluation    Patient location during evaluation: PACU  Patient participation: waiting for patient participation  Level of consciousness: sleepy but conscious  Pain management: adequate    Airway patency: patent  Anesthetic complications: No anesthetic complications  PONV Status: none  Cardiovascular status: acceptable and hemodynamically stable  Respiratory status: acceptable, face mask and spontaneous ventilation  Hydration status: acceptable    Comments: 1520  127/85  65  14  97.7  100%

## 2023-01-24 LAB — REF LAB TEST METHOD: NORMAL

## 2023-02-02 ENCOUNTER — OFFICE VISIT (OUTPATIENT)
Dept: ORTHOPEDIC SURGERY | Facility: CLINIC | Age: 63
End: 2023-02-02
Payer: COMMERCIAL

## 2023-02-02 ENCOUNTER — TELEPHONE (OUTPATIENT)
Dept: FAMILY MEDICINE CLINIC | Facility: CLINIC | Age: 63
End: 2023-02-02
Payer: COMMERCIAL

## 2023-02-02 VITALS — BODY MASS INDEX: 22.13 KG/M2 | HEIGHT: 73 IN | WEIGHT: 167 LBS

## 2023-02-02 DIAGNOSIS — Z98.890 STATUS POST SKIN AND SUBCUTANEOUS TISSUE SURGERY: ICD-10-CM

## 2023-02-02 DIAGNOSIS — R22.31 SUBCUTANEOUS MASS OF RIGHT HAND: ICD-10-CM

## 2023-02-02 DIAGNOSIS — J30.89 NON-SEASONAL ALLERGIC RHINITIS, UNSPECIFIED TRIGGER: ICD-10-CM

## 2023-02-02 DIAGNOSIS — M79.641 RIGHT HAND PAIN: Primary | ICD-10-CM

## 2023-02-02 PROCEDURE — 99214 OFFICE O/P EST MOD 30 MIN: CPT | Performed by: NURSE PRACTITIONER

## 2023-02-02 NOTE — PROGRESS NOTES
Zak Lutz is a 62 y.o. male is s/p   Surgery 01/20/23 (13d) Jeramie Rai MD    Excise Mass Of Right Palm - Right           Chief Complaint   Patient presents with   • Right Hand - Follow-up       HISTORY OF PRESENT ILLNESS: This 62-year-old male patient presents today for a 2-week 2-day follow-up status post excision of a cyst from the right hand.  Patient reports the numbness has improved.  Patient has no complaints at this visit.  Patient denies pain.  Patient reports he did rip 2 sutures out when trying to open up a soda.  Denies numbness and tingling.  Denies fever and chills.       Allergies   Allergen Reactions   • Lisinopril Angioedema   • Naltrexone Angioedema   • Tramadol Itching   • Morphine And Related Rash     IV         Current Outpatient Medications:   •  amLODIPine (NORVASC) 10 MG tablet, Take 1 tablet by mouth Daily., Disp: 30 tablet, Rfl: 3  •  chlorthalidone (HYGROTON) 25 MG tablet, TAKE 1 TABLET BY MOUTH EVERY DAY, Disp: 30 tablet, Rfl: 2  •  EPINEPHrine (EPIPEN) 0.3 MG/0.3ML solution auto-injector injection, Inject 0.3 mL (1 pen) into the appropriate muscle as directed by prescriber as needed for allergic reaction., Disp: 2 each, Rfl: 0  •  fluticasone (FLONASE) 50 MCG/ACT nasal spray, 2 sprays into the nostril(s) as directed by provider Daily As Needed for Rhinitis., Disp: , Rfl:   •  gabapentin (NEURONTIN) 300 MG capsule, Take 1 capsule by mouth 4 (Four) Times a Day., Disp: 120 capsule, Rfl: 1  •  guaifenesin (ROBITUSSIN) 100 MG/5ML liquid, Take 10 mL by mouth 3 (Three) Times a Day As Needed for Cough., Disp: 355 mL, Rfl: 1  •  montelukast (SINGULAIR) 10 MG tablet, Take 10 mg by mouth Every Night., Disp: , Rfl:   •  nicotine (NICODERM CQ) 21 MG/24HR patch, Place 1 patch on the skin as directed by provider Daily., Disp: , Rfl:   •  olopatadine (PATANOL) 0.1 % ophthalmic solution, Administer 1 drop to both eyes 2 (Two) Times a Day., Disp: 5 mL, Rfl: 11  •  omeprazole  (priLOSEC) 40 MG capsule, Take 1 capsule by mouth Daily., Disp: 30 capsule, Rfl: 3  •  oxyCODONE-acetaminophen (PERCOCET) 5-325 MG per tablet, Take 1 tablet by mouth Every 6 (Six) Hours As Needed (Pain)., Disp: 12 tablet, Rfl: 0  •  ProAir  (90 Base) MCG/ACT inhaler, Inhale 2 puffs Every 4 (Four) Hours As Needed (As needed for shortness of breath)., Disp: 18 g, Rfl: 2    No fevers or chills.  No nausea or vomiting.      PHYSICAL EXAMINATION:       Zak Lutz is a 62 y.o. male    Patient is awake and alert, answers questions appropriately and is in no apparent distress.    GAIT:     []  Normal  []  Antalgic    Assistive device: [x]  None  []  Walker     []  Crutches  []  Cane     []  Wheelchair  []  Stretcher    Right Hand Exam     Comments:  Surgical incision healing as expected.  Well approximated.  Surrounding skin warm, dry and intact.  No signs of infection.  Patient has good mobility of fingers able to oppose thumb and little finger without pain.  Able to make a fist.  Mild pain with hyperextension of digits.  Neurovascular intact.              XR Hand 3+ View Right    Result Date: 1/19/2023  Narrative: Ordering Provider:  Jeramie Rai MD Ordering Diagnosis/Indication:  Right hand pain Procedure:  XR HAND 3+ VW RIGHT Exam Date:  1/17/23 COMPARISON:  Not applicable, no relevant images available.     Impression:  3 views of the right hand show acceptable position and alignment with no evidence of acute bony abnormality.  No fracture or dislocation is noted.  No acute findings. Jeramie Rai MD 1/17/23           ASSESSMENT:    Diagnoses and all orders for this visit:    Right hand pain    Subcutaneous mass of right hand    Status post skin and subcutaneous tissue surgery          PLAN  Sutures removed and Steri-Strips applied.  Steri-Strip education provided.  Educated to keep clean and dry.  May wash with soap and water.  Avoid emerging in water for prolonged periods of  time.  Reviewed patient's medical chart, the patient pathology report of the mass removed has still not been reported.  Advised patient he will be notified once available.  If no one calls, advised to call to request results.  Reviewed signs and symptoms of infection, encouraged to call the clinic if he has any symptoms we discussed.  Demonstrated hand exercises to perform.  Teach back method performed.  Recommended to follow-up in 4 weeks.  May follow-up sooner as needed if symptoms change or worsen over new complaint.    All questions and concerns are addressed with understanding noted. They are aware and are in agreement to this plan.    Return in about 4 weeks (around 3/2/2023) for Recheck.    TON Hooks    This document has been electronically signed by TON Hooks on February 14, 2023 13:45 CST      EMR Dragon/Transciption Disclaimer: Some of this note may be an electronic transcription/translation of spoken language to printed text using the Dragon Dictation System.

## 2023-02-02 NOTE — TELEPHONE ENCOUNTER
Incoming Refill Request      Medication requested (name and dose): ProAir  (90 Base) MCG/ACT inhaler    Pharmacy where request should be sent: [facility]    Additional details provided by patient:Patient has been completely out    Best call back number:[phone]    Does the patient have less than a 3 day supply:  [x] Yes  [] No    Selina Bellamy  02/02/23, 15:37 CST

## 2023-02-06 DIAGNOSIS — I10 ESSENTIAL (PRIMARY) HYPERTENSION: ICD-10-CM

## 2023-02-06 RX ORDER — CHLORTHALIDONE 25 MG/1
TABLET ORAL
Qty: 30 TABLET | Refills: 2 | Status: SHIPPED | OUTPATIENT
Start: 2023-02-06

## 2023-02-09 ENCOUNTER — OFFICE VISIT (OUTPATIENT)
Dept: ORTHOPEDIC SURGERY | Facility: CLINIC | Age: 63
End: 2023-02-09
Payer: COMMERCIAL

## 2023-02-09 VITALS — HEIGHT: 73 IN | BODY MASS INDEX: 21.6 KG/M2 | WEIGHT: 163 LBS

## 2023-02-09 DIAGNOSIS — M25.561 CHRONIC PAIN OF RIGHT KNEE: ICD-10-CM

## 2023-02-09 DIAGNOSIS — M17.11 PRIMARY OSTEOARTHRITIS OF RIGHT KNEE: ICD-10-CM

## 2023-02-09 DIAGNOSIS — M23.91 INTERNAL DERANGEMENT OF RIGHT KNEE: ICD-10-CM

## 2023-02-09 DIAGNOSIS — G89.29 CHRONIC PAIN OF RIGHT KNEE: ICD-10-CM

## 2023-02-09 DIAGNOSIS — M25.361 INSTABILITY OF RIGHT KNEE JOINT: Primary | ICD-10-CM

## 2023-02-09 PROCEDURE — 99214 OFFICE O/P EST MOD 30 MIN: CPT | Performed by: NURSE PRACTITIONER

## 2023-02-09 RX ORDER — GABAPENTIN 300 MG/1
300 CAPSULE ORAL 4 TIMES DAILY
Qty: 120 CAPSULE | Refills: 1 | Status: SHIPPED | OUTPATIENT
Start: 2023-02-09

## 2023-02-09 NOTE — PROGRESS NOTES
"Zka Lutz is a 62 y.o. male returns for     Chief Complaint   Patient presents with   • Right Knee - Pain, Follow-up     HISTORY OF PRESENT ILLNESS: Patient presents to office for follow-up of chronic right knee pain.  Initial onset of right knee pain occurred several years ago and has progressively worsened over time.  Patient has been treated conservatively with previous intra-articular injections of steroid and previous prescription oral NSAIDs.  X-ray imaging has shown moderate osteoarthritic changes in the right knee.  Patient has a history of previous left knee replacement performed in April 2018 and did well postoperatively with no known complications.  Patient complains of progressively worsening right knee pain and frequent instability symptoms.  Patient reports frequent buckling when he ambulates, which nearly causes him to fall at times.  Patient continues to take Neurontin 300 mg for control of chronic knee pain, which he states has been beneficial.  However, he states that he has increased his dosing and has been taking 2 tablets twice daily.  Patient is requesting a new prescription to reflect his increased dosing if possible.  No new complaints or concerns noted since last office visit.  No falls or injuries reported since last office visit.  Current pain scale is 2/10.     CONCURRENT MEDICAL HISTORY:    The following portions of the patient's history were reviewed and updated as appropriate: allergies, current medications, past family history, past medical history, past social history, past surgical history and problem list.     ROS  No fevers or chills.  No chest pain or shortness of air.  No GI or  disturbances. Right knee pain.     PHYSICAL EXAMINATION:       Ht 185.4 cm (73\")   Wt 73.9 kg (163 lb)   BMI 21.51 kg/m²     Physical Exam  Vitals reviewed.   Constitutional:       General: He is not in acute distress.     Appearance: He is well-developed. He is not ill-appearing.   HENT: "      Head: Normocephalic.   Pulmonary:      Effort: Pulmonary effort is normal. No respiratory distress.   Abdominal:      General: There is no distension.      Palpations: Abdomen is soft.   Musculoskeletal:         General: Tenderness (Mild, right knee) present. No swelling, deformity or signs of injury.      Right knee: No effusion.      Instability Tests: Medial Kalia test positive. Lateral Kalia test negative.   Skin:     General: Skin is warm and dry.      Capillary Refill: Capillary refill takes less than 2 seconds.      Findings: No erythema.   Neurological:      Mental Status: He is alert and oriented to person, place, and time.      GCS: GCS eye subscore is 4. GCS verbal subscore is 5. GCS motor subscore is 6.      Sensory: No sensory deficit.   Psychiatric:         Speech: Speech normal.         Behavior: Behavior normal.         Thought Content: Thought content normal.         Judgment: Judgment normal.         GAIT:     []  Normal  [x]  Antalgic (mild)    Assistive device: [x]  None  []  Walker     []  Crutches  []  Cane     []  Wheelchair  []  Stretcher    Right Knee Exam     Tenderness   The patient is experiencing tenderness in the medial joint line and patella (Mild, diffuse).    Range of Motion   Extension: 0   Flexion: 130     Tests   K[place]:  Medial - positive Lateral - negative  Varus: negative Valgus: negative    Other   Erythema: absent  Sensation: normal  Pulse: present  Swelling: none  Effusion: no effusion present    Comments:  Overall good motion of the knee joint with no significant pain elicited.  No swelling or effusion noted.  No laxity appreciated.  No signs of infection noted.        XR Knee 1 or 2 View Right     Result Date: 5/21/2022  Narrative: PROCEDURE: Bilateral knee x-rays with three views. INDICATION: pain, M25.561 Pain in right knee G89.29 Other chronic pain M17.11 Unilateral primary osteoarthritis, right knee COMPARISON: 1/14/2022 bilateral knee x-rays. 5/12/2014  right knee x-rays. FINDINGS: Right knee: May be a tiny right suprapatellar joint effusion. Moderate narrowing patellofemoral joint space with tiny posterior osteophytes. Narrowing medial joint space. Old healed fracture proximal fibular shaft. Left knee: Left total knee prosthesis with anatomic alignment of components.      Impression: Right knee moderate patellofemoral and mild medial joint compartment degenerative arthritic change. Old healed proximal right fibular shaft fracture. Left total knee prosthesis with anatomic alignment. [id number] Electronically signed by:  Souarv Eric MD  5/21/2022 1:57 PM CDT Workstation: [id number]     XR Knee Bilateral AP Standing     Result Date: 5/21/2022  Narrative: PROCEDURE: Bilateral knee x-rays with three views. INDICATION: pain, M25.561 Pain in right knee G89.29 Other chronic pain M17.11 Unilateral primary osteoarthritis, right knee COMPARISON: 1/14/2022 bilateral knee x-rays. 5/12/2014 right knee x-rays. FINDINGS: Right knee: May be a tiny right suprapatellar joint effusion. Moderate narrowing patellofemoral joint space with tiny posterior osteophytes. Narrowing medial joint space. Old healed fracture proximal fibular shaft. Left knee: Left total knee prosthesis with anatomic alignment of components.      Impression: Right knee moderate patellofemoral and mild medial joint compartment degenerative arthritic change. Old healed proximal right fibular shaft fracture. Left total knee prosthesis with anatomic alignment. [id number] Electronically signed by:  Sourav Eric MD  5/21/2022 1:57 PM CDT Workstation: [id number]      ASSESSMENT:    Diagnoses and all orders for this visit:    Instability of right knee joint  -     MRI Knee Right Without Contrast; Future    Chronic pain of right knee  -     gabapentin (NEURONTIN) 300 MG capsule; Take 1 capsule by mouth 4 (Four) Times a Day.  -     MRI Knee Right Without Contrast; Future    Primary osteoarthritis of right knee  -      gabapentin (NEURONTIN) 300 MG capsule; Take 1 capsule by mouth 4 (Four) Times a Day.  -     MRI Knee Right Without Contrast; Future    Internal derangement of right knee  -     MRI Knee Right Without Contrast; Future    PLAN    Patient complains of continued chronic right knee pain, which has been progressively worsening over the course of several years.  Patient also continues to experience chronic/intermittent issues with instability and frequent buckling of the right knee.  He denies any locking/catching symptoms.  Patient has osteoarthritic changes on the right knee on prior x-ray imaging with moderate narrowing of the medial and patellofemoral joint spaces.  Patient has been treated conservatively as described in the HPI above.  We discussed and have offered to perform a repeat intra-articular injection of steroid today as it has been over a year since his last injection, but he declines this today.  We discussed proceeding with MRI imaging for further evaluation of his right knee, although we also discussed it may not change his treatment options as he may not be a surgical candidate for knee arthroscopy based on the moderate osteoarthritic changes seen on x-ray imaging.  Patient indicates that he does want to proceed with MRI imaging for further evaluation.  Recommend MRI of the right knee to evaluate for internal derangement, meniscal tearing, ligament abnormalities/injuries, osteochondral injuries/defects/chondromalacia and/or occult bony abnormalities.  Recommend a hinged knee brace to the right knee for added support as he does report frequent instability symptoms.  This is placed/fitted in office today.    Recommend the following:     -Rest and activity modification as tolerated and based on pain.  -Modified weightbearing of the affected extremity with use of a cane or walker as needed.  -Gradual progression of weightbearing and activity as pain and swelling allow.  -Conditioning and strengthening  exercises of the bilateral knees/legs.  -Elevation and ice therapy to the affected knee to minimize pain/swelling/inflammation.   -Tylenol, Aleve or Ibuprofen as needed for pain/discomfort.    Patient continues to take Neurontin 300 mg 3 times daily for chronic knee pain, which we had previously discussed as an atypical treatment for knee pain but he does state that it offers improvement.  However, patient states today that he has been taking the Neurontin more than prescribed and has been having to take 2 tablets twice daily, which he states has been beneficial.  Patient requesst to increase his Neurontin dosing to this current schedule.  A new prescription for Neurontin is written today to reflect his recent dosing regimen of 300 mg, 2 tablets twice daily.  SUELLEN is reviewed internally via Epic and is noted to be appropriate.    Follow-up after MRI to discuss results and further treatment recommendations.  Consider referral to physical therapy for conditioning/strengthening exercises.  Consider repeat intra-articular injection of steroid if indicated.  Consider trial of viscosupplementation for further management of osteoarthritic knee pain.    Time spent of a minimum of 30 minutes including the face to face evaluation, reviewing of medical history and prior medial records, reviewing of diagnostic studies, ordering additional tests, prescription drug management, documentation, patient education and coordination of care.     EMR Dragon/Transciption Disclaimer: Some of this note may be an electronic transcription/translation of spoken language to printed text using the Dragon Dictation System.     Return for follow up after MRI.        This document has been electronically signed by TON Olmstead on February 12, 2023 18:03 CST      TON Olmstead

## 2023-02-12 PROBLEM — M23.91 INTERNAL DERANGEMENT OF RIGHT KNEE: Status: ACTIVE | Noted: 2023-02-12

## 2023-02-12 PROBLEM — M25.361 INSTABILITY OF RIGHT KNEE JOINT: Status: ACTIVE | Noted: 2023-02-12

## 2023-02-13 ENCOUNTER — TELEPHONE (OUTPATIENT)
Dept: FAMILY MEDICINE CLINIC | Facility: CLINIC | Age: 63
End: 2023-02-13
Payer: COMMERCIAL

## 2023-02-13 NOTE — TELEPHONE ENCOUNTER
Incoming Refill Request      Medication requested (name and dose): Shira Chest Congestion    Pharmacy where request should be sent: [facility]    Additional details provided by patient:     Best call back number: [phone]    Does the patient have less than a 3 day supply:  [x] Yes  [] No    Selina Bellamy  02/13/23, 09:19 CST

## 2023-02-14 DIAGNOSIS — R05.8 POST-VIRAL COUGH SYNDROME: Primary | ICD-10-CM

## 2023-02-14 RX ORDER — GUAIFENESIN 200 MG/10ML
200 LIQUID ORAL 3 TIMES DAILY PRN
Qty: 355 ML | Refills: 1 | Status: SHIPPED | OUTPATIENT
Start: 2023-02-14 | End: 2023-03-07

## 2023-03-03 ENCOUNTER — OFFICE VISIT (OUTPATIENT)
Dept: FAMILY MEDICINE CLINIC | Facility: CLINIC | Age: 63
End: 2023-03-03
Payer: COMMERCIAL

## 2023-03-03 VITALS
HEIGHT: 73 IN | OXYGEN SATURATION: 97 % | BODY MASS INDEX: 22.66 KG/M2 | TEMPERATURE: 97.8 F | DIASTOLIC BLOOD PRESSURE: 72 MMHG | HEART RATE: 129 BPM | SYSTOLIC BLOOD PRESSURE: 120 MMHG | WEIGHT: 171 LBS

## 2023-03-03 DIAGNOSIS — J43.1 PANLOBULAR EMPHYSEMA: Primary | ICD-10-CM

## 2023-03-03 PROCEDURE — 1159F MED LIST DOCD IN RCRD: CPT | Performed by: CHIROPRACTOR

## 2023-03-03 PROCEDURE — 99213 OFFICE O/P EST LOW 20 MIN: CPT | Performed by: CHIROPRACTOR

## 2023-03-03 PROCEDURE — 3078F DIAST BP <80 MM HG: CPT | Performed by: CHIROPRACTOR

## 2023-03-03 PROCEDURE — 3074F SYST BP LT 130 MM HG: CPT | Performed by: CHIROPRACTOR

## 2023-03-03 RX ORDER — FLUTICASONE FUROATE, UMECLIDINIUM BROMIDE AND VILANTEROL TRIFENATATE 100; 62.5; 25 UG/1; UG/1; UG/1
1 POWDER RESPIRATORY (INHALATION)
Qty: 60 EACH | Refills: 2 | Status: SHIPPED | OUTPATIENT
Start: 2023-03-03

## 2023-03-03 RX ORDER — BENZONATATE 100 MG/1
100 CAPSULE ORAL 3 TIMES DAILY PRN
Qty: 90 CAPSULE | Refills: 2 | Status: SHIPPED | OUTPATIENT
Start: 2023-03-03

## 2023-03-03 RX ORDER — FAMOTIDINE 20 MG/1
40 TABLET, FILM COATED ORAL NIGHTLY
COMMUNITY
Start: 2023-02-14
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
## 2023-03-09 NOTE — PROGRESS NOTES
I have reviewed the notes, assessments, and/or procedures performed by Jeramie Singh MD , I concur with documentation of Zak Lutz.

## 2023-03-10 ENCOUNTER — OFFICE VISIT (OUTPATIENT)
Dept: ORTHOPEDIC SURGERY | Facility: CLINIC | Age: 63
End: 2023-03-10
Payer: COMMERCIAL

## 2023-03-10 VITALS — BODY MASS INDEX: 22.26 KG/M2 | HEIGHT: 73 IN | WEIGHT: 168 LBS

## 2023-03-10 DIAGNOSIS — I10 ESSENTIAL HYPERTENSION: ICD-10-CM

## 2023-03-10 DIAGNOSIS — M23.91 INTERNAL DERANGEMENT OF RIGHT KNEE: ICD-10-CM

## 2023-03-10 DIAGNOSIS — M79.641 RIGHT HAND PAIN: Primary | ICD-10-CM

## 2023-03-10 PROCEDURE — 1159F MED LIST DOCD IN RCRD: CPT | Performed by: ORTHOPAEDIC SURGERY

## 2023-03-10 PROCEDURE — 99213 OFFICE O/P EST LOW 20 MIN: CPT | Performed by: ORTHOPAEDIC SURGERY

## 2023-03-10 PROCEDURE — 1160F RVW MEDS BY RX/DR IN RCRD: CPT | Performed by: ORTHOPAEDIC SURGERY

## 2023-03-10 NOTE — PROGRESS NOTES
"Zak Lutz is a 62 y.o. male is s/p  Excise Mass Of Right Palm - Right     Chief Complaint   Patient presents with   • Right Hand - Follow-up       HISTORY OF PRESENT ILLNESS: Patient is here today s/p Excise Mass Of Right Palm on 1/20/2023.  Doing fantastic. Is \"thrilled\"  No complaints.  Very happy with results.  No fever or chills.       Allergies   Allergen Reactions   • Lisinopril Angioedema   • Naltrexone Angioedema   • Tramadol Itching   • Morphine And Related Rash     IV         Current Outpatient Medications:   •  amLODIPine (NORVASC) 10 MG tablet, Take 1 tablet by mouth Daily., Disp: 30 tablet, Rfl: 3  •  benzonatate (Tessalon Perles) 100 MG capsule, Take 1 capsule by mouth 3 (Three) Times a Day As Needed for Cough., Disp: 90 capsule, Rfl: 2  •  chlorthalidone (HYGROTON) 25 MG tablet, TAKE 1 TABLET BY MOUTH EVERY DAY, Disp: 30 tablet, Rfl: 2  •  EPINEPHrine (EPIPEN) 0.3 MG/0.3ML solution auto-injector injection, Inject 0.3 mL (1 pen) into the appropriate muscle as directed by prescriber as needed for allergic reaction., Disp: 2 each, Rfl: 0  •  famotidine (PEPCID) 20 MG tablet, Take 2 tablets by mouth Every Night., Disp: , Rfl:   •  fluticasone (FLONASE) 50 MCG/ACT nasal spray, 2 sprays into the nostril(s) as directed by provider Daily As Needed for Rhinitis., Disp: , Rfl:   •  Fluticasone-Umeclidin-Vilant (Trelegy Ellipta) 100-62.5-25 MCG/ACT inhaler, Inhale 1 puff Daily., Disp: 60 each, Rfl: 2  •  gabapentin (NEURONTIN) 300 MG capsule, Take 1 capsule by mouth 4 (Four) Times a Day., Disp: 120 capsule, Rfl: 1  •  montelukast (SINGULAIR) 10 MG tablet, Take 1 tablet by mouth Every Night., Disp: , Rfl:   •  nicotine (NICODERM CQ) 21 MG/24HR patch, Place 1 patch on the skin as directed by provider Daily., Disp: , Rfl:   •  olopatadine (PATANOL) 0.1 % ophthalmic solution, Administer 1 drop to both eyes 2 (Two) Times a Day., Disp: 5 mL, Rfl: 11  •  omeprazole (priLOSEC) 40 MG capsule, Take 1 capsule by " "mouth Daily., Disp: 30 capsule, Rfl: 3  •  ProAir  (90 Base) MCG/ACT inhaler, Inhale 2 puffs Every 4 (Four) Hours As Needed (As needed for shortness of breath)., Disp: 18 g, Rfl: 2    No fevers or chills.  No nausea or vomiting.      PHYSICAL EXAMINATION:       Zak Lutz is a 62 y.o. male    Patient is awake and alert, answers questions appropriately and is in no apparent distress.    GAIT:     [x]  Normal  []  Antalgic    Assistive device: [x]  None  []  Walker     []  Crutches  []  Cane     []  Wheelchair  []  Stretcher    Right Hand Exam     Comments:  Well healing incision  No erythema  Full motion  Intact strength  NVI                      ASSESSMENT:    Diagnoses and all orders for this visit:    Right hand pain    Internal derangement of right knee  -     Cane    Essential hypertension          PLAN  Doing great from hand perspective s/p excision of mass on 1/20/23. Is \"thrilled\" with results.     Spoke about expected continued improvements in  strength and motion. Continue improvements in str/conditioning/motion.     Rx cane for R knee pain.     He is not sure he likes neurontin.  We discussed maybe trying lyrica.  Activity as tolerated.    Return if symptoms worsen or fail to improve.    Jeramie Rai MD    "

## 2023-03-14 LAB
QT INTERVAL: 424 MS
QTC INTERVAL: 448 MS

## 2023-04-05 DIAGNOSIS — J30.89 OTHER ALLERGIC RHINITIS: ICD-10-CM

## 2023-04-05 RX ORDER — FLUTICASONE PROPIONATE 50 MCG
SPRAY, SUSPENSION (ML) NASAL
Qty: 16 ML | Refills: 2 | Status: SHIPPED | OUTPATIENT
Start: 2023-04-05

## 2023-04-14 ENCOUNTER — OFFICE VISIT (OUTPATIENT)
Dept: FAMILY MEDICINE CLINIC | Facility: CLINIC | Age: 63
End: 2023-04-14
Payer: COMMERCIAL

## 2023-04-14 VITALS
HEART RATE: 102 BPM | DIASTOLIC BLOOD PRESSURE: 86 MMHG | HEIGHT: 73 IN | SYSTOLIC BLOOD PRESSURE: 138 MMHG | BODY MASS INDEX: 22.12 KG/M2 | OXYGEN SATURATION: 95 % | WEIGHT: 166.9 LBS

## 2023-04-14 DIAGNOSIS — J43.1 PANLOBULAR EMPHYSEMA: Primary | ICD-10-CM

## 2023-04-14 DIAGNOSIS — R05.3 CHRONIC COUGH: ICD-10-CM

## 2023-04-14 RX ORDER — BENZONATATE 100 MG/1
100 CAPSULE ORAL 3 TIMES DAILY PRN
Qty: 90 CAPSULE | Refills: 2 | Status: SHIPPED | OUTPATIENT
Start: 2023-04-14
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
## 2023-04-14 NOTE — PROGRESS NOTES
I have seen patient, reviewed the notes, assessments, and/or procedures performed by Jeramie Singh MD , I concur with her/his documentation of Zak Lutz.

## 2023-05-10 DIAGNOSIS — I10 ESSENTIAL (PRIMARY) HYPERTENSION: ICD-10-CM

## 2023-05-10 RX ORDER — CHLORTHALIDONE 25 MG/1
TABLET ORAL
Qty: 30 TABLET | Refills: 2 | Status: SHIPPED | OUTPATIENT
Start: 2023-05-10
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
## 2023-08-01 DIAGNOSIS — M17.11 PRIMARY OSTEOARTHRITIS OF RIGHT KNEE: ICD-10-CM

## 2023-08-01 DIAGNOSIS — M25.561 CHRONIC PAIN OF RIGHT KNEE: ICD-10-CM

## 2023-08-01 DIAGNOSIS — G89.29 CHRONIC PAIN OF RIGHT KNEE: ICD-10-CM

## 2023-08-01 RX ORDER — GABAPENTIN 300 MG/1
CAPSULE ORAL
Qty: 90 CAPSULE | Refills: 1 | Status: SHIPPED | OUTPATIENT
Start: 2023-08-01

## 2023-08-18 ENCOUNTER — HOSPITAL ENCOUNTER (OUTPATIENT)
Dept: CT IMAGING | Facility: HOSPITAL | Age: 63
Discharge: HOME | End: 2023-08-18
Admitting: FAMILY MEDICINE
Payer: MEDICARE

## 2023-08-18 DIAGNOSIS — R91.1 LUNG NODULE SEEN ON IMAGING STUDY: ICD-10-CM

## 2023-08-18 DIAGNOSIS — J43.1 PANLOBULAR EMPHYSEMA: ICD-10-CM

## 2023-08-18 PROCEDURE — 71271 CT THORAX LUNG CANCER SCR C-: CPT

## 2023-08-22 ENCOUNTER — TELEPHONE (OUTPATIENT)
Dept: ORTHOPEDIC SURGERY | Facility: CLINIC | Age: 63
End: 2023-08-22
Payer: MEDICARE

## 2023-08-22 DIAGNOSIS — J30.89 OTHER ALLERGIC RHINITIS: ICD-10-CM

## 2023-08-22 DIAGNOSIS — M17.11 PRIMARY OSTEOARTHRITIS OF RIGHT KNEE: ICD-10-CM

## 2023-08-22 DIAGNOSIS — G89.29 CHRONIC PAIN OF RIGHT KNEE: Primary | ICD-10-CM

## 2023-08-22 DIAGNOSIS — M23.91 INTERNAL DERANGEMENT OF RIGHT KNEE: ICD-10-CM

## 2023-08-22 DIAGNOSIS — R91.8 MASS OF UPPER LOBE OF RIGHT LUNG: Primary | ICD-10-CM

## 2023-08-22 DIAGNOSIS — M25.561 CHRONIC PAIN OF RIGHT KNEE: Primary | ICD-10-CM

## 2023-08-22 RX ORDER — FLUTICASONE PROPIONATE 50 MCG
SPRAY, SUSPENSION (ML) NASAL
Qty: 16 ML | Refills: 2 | Status: SHIPPED | OUTPATIENT
Start: 2023-08-22

## 2023-08-22 NOTE — TELEPHONE ENCOUNTER
Tried to call patient. Physical order was placed. Magda cannot change the dose of his Neurontin.  Per Magda- I cannot change his Neurontin as that is not a typical treatment for knee pain. He needs to take it as prescribed so that he doesn't run out. If he needs to increase the dose or frequency he can discuss this with his PCP. Thanks.

## 2023-08-22 NOTE — TELEPHONE ENCOUNTER
That's fine. Please send a new order to PT for him. No, I cannot change his Neurontin as that is not a typical treatment for knee pain. He needs to take it as prescribed so that he doesn't run out. If he needs to increase the dose or frequency he can discuss this with his PCP. Thanks.

## 2023-08-22 NOTE — TELEPHONE ENCOUNTER
GUESS    Patient called and asked if you could send in another order for physical therapy on his Right knee. He was last seen on 02/09/23. He asked if you could send the order to [facility].     He also asked if you could change his rx of gabapentin 300mg from 3 times of a day to 4 times a day. He stated that he is running out before it is time for his refill.

## 2023-08-28 DIAGNOSIS — R09.82 POSTNASAL DRIP: ICD-10-CM

## 2023-08-28 DIAGNOSIS — J30.9 ALLERGIC RHINITIS, UNSPECIFIED: ICD-10-CM

## 2023-08-28 RX ORDER — MONTELUKAST SODIUM 10 MG/1
TABLET ORAL
Qty: 90 TABLET | Refills: 0 | Status: SHIPPED | OUTPATIENT
Start: 2023-08-28
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
## 2023-08-29 ENCOUNTER — PATIENT OUTREACH (OUTPATIENT)
Dept: ONCOLOGY | Facility: CLINIC | Age: 63
End: 2023-08-29
Payer: MEDICARE

## 2023-08-29 ENCOUNTER — OFFICE VISIT (OUTPATIENT)
Dept: PULMONOLOGY | Facility: CLINIC | Age: 63
End: 2023-08-29
Payer: MEDICARE

## 2023-08-29 VITALS
BODY MASS INDEX: 21.76 KG/M2 | SYSTOLIC BLOOD PRESSURE: 162 MMHG | HEIGHT: 73 IN | DIASTOLIC BLOOD PRESSURE: 90 MMHG | OXYGEN SATURATION: 98 % | WEIGHT: 164.2 LBS | HEART RATE: 98 BPM

## 2023-08-29 DIAGNOSIS — J43.2 CENTRILOBULAR EMPHYSEMA: ICD-10-CM

## 2023-08-29 DIAGNOSIS — R91.1 LUNG NODULE: Primary | ICD-10-CM

## 2023-08-29 NOTE — PROGRESS NOTES
PT seen at Dr Valle request to discuss my role as the nurse navigator in lung nodule care at [facility] and my support services to pt.  Stressed the importance of education and informed decision making. PT provided with [facility] Understanding Nodule and PET scan informational sheet. Provided Nurse Navigator contact information and encouraged pt to reach out anytime with concerns or questions. Pt stated understanding of discussion and denied any further concerns or questions today. Will f/u with pt as needed throughout treatment course.

## 2023-09-05 ENCOUNTER — HOSPITAL ENCOUNTER (OUTPATIENT)
Dept: PHYSICAL THERAPY | Facility: HOSPITAL | Age: 63
Setting detail: THERAPIES SERIES
Discharge: HOME | End: 2023-09-05
Payer: MEDICARE

## 2023-09-05 DIAGNOSIS — G89.29 CHRONIC PAIN OF RIGHT KNEE: Primary | ICD-10-CM

## 2023-09-05 DIAGNOSIS — M25.561 CHRONIC PAIN OF RIGHT KNEE: Primary | ICD-10-CM

## 2023-09-05 DIAGNOSIS — M17.11 PRIMARY OSTEOARTHRITIS OF RIGHT KNEE: ICD-10-CM

## 2023-09-05 DIAGNOSIS — M23.91 INTERNAL DERANGEMENT OF RIGHT KNEE: ICD-10-CM

## 2023-09-05 PROCEDURE — 97162 PT EVAL MOD COMPLEX 30 MIN: CPT | Performed by: PHYSICAL THERAPIST
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
## 2023-09-08 ENCOUNTER — HOSPITAL ENCOUNTER (OUTPATIENT)
Dept: PHYSICAL THERAPY | Facility: HOSPITAL | Age: 63
Setting detail: THERAPIES SERIES
Discharge: HOME | End: 2023-09-08
Payer: MEDICARE

## 2023-09-08 DIAGNOSIS — G89.29 CHRONIC PAIN OF RIGHT KNEE: Primary | ICD-10-CM

## 2023-09-08 DIAGNOSIS — M25.561 CHRONIC PAIN OF RIGHT KNEE: Primary | ICD-10-CM

## 2023-09-08 PROCEDURE — 97112 NEUROMUSCULAR REEDUCATION: CPT | Performed by: PHYSICAL THERAPIST

## 2023-09-08 PROCEDURE — 97530 THERAPEUTIC ACTIVITIES: CPT | Performed by: PHYSICAL THERAPIST

## 2023-09-08 PROCEDURE — 97110 THERAPEUTIC EXERCISES: CPT | Performed by: PHYSICAL THERAPIST
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
## 2023-09-09 DIAGNOSIS — K21.9 GASTRO-ESOPHAGEAL REFLUX DISEASE WITHOUT ESOPHAGITIS: ICD-10-CM

## 2023-09-11 RX ORDER — FAMOTIDINE 20 MG/1
40 TABLET, FILM COATED ORAL NIGHTLY
Qty: 60 TABLET | Refills: 5 | Status: SHIPPED | OUTPATIENT
Start: 2023-09-11

## 2023-09-12 ENCOUNTER — HOSPITAL ENCOUNTER (OUTPATIENT)
Dept: PHYSICAL THERAPY | Facility: HOSPITAL | Age: 63
Setting detail: THERAPIES SERIES
Discharge: HOME | End: 2023-09-12
Payer: MEDICARE

## 2023-09-12 DIAGNOSIS — M17.11 PRIMARY OSTEOARTHRITIS OF RIGHT KNEE: ICD-10-CM

## 2023-09-12 DIAGNOSIS — M25.561 CHRONIC PAIN OF RIGHT KNEE: Primary | ICD-10-CM

## 2023-09-12 DIAGNOSIS — G89.29 CHRONIC PAIN OF RIGHT KNEE: Primary | ICD-10-CM

## 2023-09-12 DIAGNOSIS — M23.91 INTERNAL DERANGEMENT OF RIGHT KNEE: ICD-10-CM

## 2023-09-12 PROCEDURE — 97530 THERAPEUTIC ACTIVITIES: CPT | Performed by: PHYSICAL THERAPIST

## 2023-09-12 PROCEDURE — 97110 THERAPEUTIC EXERCISES: CPT | Performed by: PHYSICAL THERAPIST

## 2023-09-12 PROCEDURE — 97112 NEUROMUSCULAR REEDUCATION: CPT | Performed by: PHYSICAL THERAPIST
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
## 2023-09-14 ENCOUNTER — HOSPITAL ENCOUNTER (OUTPATIENT)
Dept: PHYSICAL THERAPY | Facility: HOSPITAL | Age: 63
Setting detail: THERAPIES SERIES
Discharge: HOME | End: 2023-09-14
Payer: MEDICARE

## 2023-09-14 DIAGNOSIS — M23.91 INTERNAL DERANGEMENT OF RIGHT KNEE: ICD-10-CM

## 2023-09-14 DIAGNOSIS — M25.561 CHRONIC PAIN OF RIGHT KNEE: Primary | ICD-10-CM

## 2023-09-14 DIAGNOSIS — G89.29 CHRONIC PAIN OF RIGHT KNEE: Primary | ICD-10-CM

## 2023-09-14 DIAGNOSIS — M17.11 PRIMARY OSTEOARTHRITIS OF RIGHT KNEE: ICD-10-CM

## 2023-09-14 PROCEDURE — 97110 THERAPEUTIC EXERCISES: CPT | Performed by: PHYSICAL THERAPIST

## 2023-09-14 PROCEDURE — 97112 NEUROMUSCULAR REEDUCATION: CPT | Performed by: PHYSICAL THERAPIST

## 2023-09-14 PROCEDURE — 97530 THERAPEUTIC ACTIVITIES: CPT | Performed by: PHYSICAL THERAPIST
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
## 2023-09-19 ENCOUNTER — HOSPITAL ENCOUNTER (OUTPATIENT)
Dept: PHYSICAL THERAPY | Facility: HOSPITAL | Age: 63
Setting detail: THERAPIES SERIES
Discharge: HOME | End: 2023-09-19
Payer: MEDICARE

## 2023-09-19 DIAGNOSIS — M25.561 CHRONIC PAIN OF RIGHT KNEE: Primary | ICD-10-CM

## 2023-09-19 DIAGNOSIS — M17.11 PRIMARY OSTEOARTHRITIS OF RIGHT KNEE: ICD-10-CM

## 2023-09-19 DIAGNOSIS — M23.91 INTERNAL DERANGEMENT OF RIGHT KNEE: ICD-10-CM

## 2023-09-19 DIAGNOSIS — G89.29 CHRONIC PAIN OF RIGHT KNEE: Primary | ICD-10-CM

## 2023-09-19 PROCEDURE — 97530 THERAPEUTIC ACTIVITIES: CPT | Performed by: PHYSICAL THERAPIST

## 2023-09-19 PROCEDURE — 97112 NEUROMUSCULAR REEDUCATION: CPT | Performed by: PHYSICAL THERAPIST

## 2023-09-19 PROCEDURE — 97110 THERAPEUTIC EXERCISES: CPT | Performed by: PHYSICAL THERAPIST
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
## 2023-09-20 DIAGNOSIS — J30.89 NON-SEASONAL ALLERGIC RHINITIS, UNSPECIFIED TRIGGER: ICD-10-CM

## 2023-09-20 RX ORDER — ALBUTEROL SULFATE 90 UG/1
AEROSOL, METERED RESPIRATORY (INHALATION)
Qty: 18 G | Refills: 2 | Status: SHIPPED | OUTPATIENT
Start: 2023-09-20

## 2023-09-21 ENCOUNTER — HOSPITAL ENCOUNTER (OUTPATIENT)
Dept: PHYSICAL THERAPY | Facility: HOSPITAL | Age: 63
Setting detail: THERAPIES SERIES
Discharge: HOME | End: 2023-09-21
Payer: MEDICARE

## 2023-09-21 DIAGNOSIS — G89.29 CHRONIC PAIN OF RIGHT KNEE: Primary | ICD-10-CM

## 2023-09-21 DIAGNOSIS — M25.561 CHRONIC PAIN OF RIGHT KNEE: Primary | ICD-10-CM

## 2023-09-21 PROCEDURE — 97110 THERAPEUTIC EXERCISES: CPT
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
## 2023-09-23 ENCOUNTER — HOSPITAL ENCOUNTER (OUTPATIENT)
Dept: PET IMAGING | Facility: HOSPITAL | Age: 63
Discharge: HOME | End: 2023-09-23
Payer: MEDICARE

## 2023-09-23 DIAGNOSIS — R91.1 LUNG NODULE: ICD-10-CM

## 2023-09-28 ENCOUNTER — HOSPITAL ENCOUNTER (OUTPATIENT)
Dept: PHYSICAL THERAPY | Facility: HOSPITAL | Age: 63
Setting detail: THERAPIES SERIES
Discharge: HOME | End: 2023-09-28
Payer: MEDICARE

## 2023-09-28 ENCOUNTER — HOSPITAL ENCOUNTER (OUTPATIENT)
Dept: PULMONOLOGY | Facility: HOSPITAL | Age: 63
Discharge: HOME | End: 2023-09-28
Admitting: CHIROPRACTOR
Payer: MEDICARE

## 2023-09-28 DIAGNOSIS — M25.561 CHRONIC PAIN OF RIGHT KNEE: Primary | ICD-10-CM

## 2023-09-28 DIAGNOSIS — J43.1 PANLOBULAR EMPHYSEMA: ICD-10-CM

## 2023-09-28 DIAGNOSIS — G89.29 CHRONIC PAIN OF RIGHT KNEE: Primary | ICD-10-CM

## 2023-09-28 PROCEDURE — 94010 BREATHING CAPACITY TEST: CPT

## 2023-09-28 PROCEDURE — 94729 DIFFUSING CAPACITY: CPT | Performed by: INTERNAL MEDICINE

## 2023-09-28 PROCEDURE — 97110 THERAPEUTIC EXERCISES: CPT

## 2023-09-28 PROCEDURE — 94727 GAS DIL/WSHOT DETER LNG VOL: CPT

## 2023-09-28 PROCEDURE — 94010 BREATHING CAPACITY TEST: CPT | Performed by: INTERNAL MEDICINE

## 2023-09-28 PROCEDURE — 94727 GAS DIL/WSHOT DETER LNG VOL: CPT | Performed by: INTERNAL MEDICINE

## 2023-09-28 PROCEDURE — 94729 DIFFUSING CAPACITY: CPT
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
## 2023-09-30 ENCOUNTER — HOSPITAL ENCOUNTER (OUTPATIENT)
Dept: PET IMAGING | Facility: HOSPITAL | Age: 63
Discharge: HOME | End: 2023-09-30
Payer: MEDICARE

## 2023-09-30 PROCEDURE — A9552 F18 FDG: HCPCS | Performed by: INTERNAL MEDICINE

## 2023-09-30 PROCEDURE — 0 FLUDEOXYGLUCOSE F18 SOLUTION: Performed by: INTERNAL MEDICINE

## 2023-09-30 PROCEDURE — 78815 PET IMAGE W/CT SKULL-THIGH: CPT

## 2023-09-30 RX ADMIN — SODIUM FLUORIDE F 18 1 DOSE: 200 INJECTION, SOLUTION INTRAVENOUS at 09:15
